# Patient Record
Sex: MALE | Race: ASIAN | Employment: OTHER | URBAN - METROPOLITAN AREA
[De-identification: names, ages, dates, MRNs, and addresses within clinical notes are randomized per-mention and may not be internally consistent; named-entity substitution may affect disease eponyms.]

---

## 2017-01-11 ENCOUNTER — HOSPITAL ENCOUNTER (OUTPATIENT)
Dept: CT IMAGING | Age: 67
Discharge: HOME | End: 2017-01-11
Attending: UROLOGY
Payer: COMMERCIAL

## 2017-01-11 DIAGNOSIS — N52.9 ERECTILE DYSFUNCTION, UNSPECIFIED ERECTILE DYSFUNCTION TYPE: ICD-10-CM

## 2017-01-11 DIAGNOSIS — C67.9 MALIGNANT NEOPLASM OF URINARY BLADDER, UNSPECIFIED SITE (HCC): ICD-10-CM

## 2017-01-11 DIAGNOSIS — R97.20 ELEVATED PSA: ICD-10-CM

## 2017-01-11 PROCEDURE — 74178 CT ABD&PLV WO CNTR FLWD CNTR: CPT

## 2017-01-11 PROCEDURE — 74011636320 HC RX REV CODE- 636/320: Performed by: UROLOGY

## 2017-01-11 RX ADMIN — IOPAMIDOL 100 ML: 755 INJECTION, SOLUTION INTRAVENOUS at 16:00

## 2017-02-08 ENCOUNTER — OFFICE VISIT (OUTPATIENT)
Dept: FAMILY MEDICINE CLINIC | Age: 67
End: 2017-02-08

## 2017-02-08 ENCOUNTER — HOSPITAL ENCOUNTER (OUTPATIENT)
Dept: LAB | Age: 67
Discharge: HOME | End: 2017-02-08
Payer: COMMERCIAL

## 2017-02-08 ENCOUNTER — TELEPHONE (OUTPATIENT)
Dept: FAMILY MEDICINE CLINIC | Age: 67
End: 2017-02-08

## 2017-02-08 VITALS
SYSTOLIC BLOOD PRESSURE: 124 MMHG | DIASTOLIC BLOOD PRESSURE: 76 MMHG | HEIGHT: 66 IN | WEIGHT: 161 LBS | BODY MASS INDEX: 25.88 KG/M2 | OXYGEN SATURATION: 98 % | HEART RATE: 79 BPM | TEMPERATURE: 98.3 F | RESPIRATION RATE: 16 BRPM

## 2017-02-08 DIAGNOSIS — J02.9 SORE THROAT: Primary | ICD-10-CM

## 2017-02-08 DIAGNOSIS — J02.9 SORE THROAT: ICD-10-CM

## 2017-02-08 DIAGNOSIS — Z79.4 TYPE 2 DIABETES MELLITUS WITHOUT COMPLICATION, WITH LONG-TERM CURRENT USE OF INSULIN (HCC): ICD-10-CM

## 2017-02-08 DIAGNOSIS — E11.9 TYPE 2 DIABETES MELLITUS WITHOUT COMPLICATION, WITH LONG-TERM CURRENT USE OF INSULIN (HCC): ICD-10-CM

## 2017-02-08 LAB
S PYO AG THROAT QL: NEGATIVE
VALID INTERNAL CONTROL?: YES

## 2017-02-08 PROCEDURE — 87070 CULTURE OTHR SPECIMN AEROBIC: CPT | Performed by: FAMILY MEDICINE

## 2017-02-08 RX ORDER — AMOXICILLIN AND CLAVULANATE POTASSIUM 875; 125 MG/1; MG/1
1 TABLET, FILM COATED ORAL 2 TIMES DAILY
Qty: 20 TAB | Refills: 0 | Status: SHIPPED | OUTPATIENT
Start: 2017-02-08 | End: 2017-02-18

## 2017-02-08 RX ORDER — PEN NEEDLE, DIABETIC 31 GX3/16"
NEEDLE, DISPOSABLE MISCELLANEOUS
Qty: 100 PEN NEEDLE | Refills: 1 | Status: SHIPPED | OUTPATIENT
Start: 2017-02-08

## 2017-02-08 RX ORDER — INSULIN GLARGINE 100 [IU]/ML
20 INJECTION, SOLUTION SUBCUTANEOUS ONCE
Qty: 3 EACH | Refills: 1 | Status: SHIPPED | OUTPATIENT
Start: 2017-02-08 | End: 2017-02-08

## 2017-02-08 RX ORDER — OMEPRAZOLE 40 MG/1
40 CAPSULE, DELAYED RELEASE ORAL DAILY
Qty: 90 CAP | Refills: 1 | Status: SHIPPED | OUTPATIENT
Start: 2017-02-08 | End: 2017-03-29 | Stop reason: SDUPTHER

## 2017-02-08 RX ORDER — LANCETS
EACH MISCELLANEOUS
Qty: 100 EACH | Refills: 11 | Status: SHIPPED | OUTPATIENT
Start: 2017-02-08 | End: 2018-06-01 | Stop reason: CLARIF

## 2017-02-08 RX ORDER — ROSUVASTATIN CALCIUM 20 MG/1
10 TABLET, FILM COATED ORAL DAILY
COMMUNITY
Start: 2016-12-19 | End: 2017-02-22

## 2017-02-08 RX ORDER — GLIMEPIRIDE 4 MG/1
4 TABLET ORAL 2 TIMES DAILY
Qty: 60 TAB | Refills: 3 | Status: SHIPPED | OUTPATIENT
Start: 2017-02-08 | End: 2017-05-31 | Stop reason: SDUPTHER

## 2017-02-08 NOTE — TELEPHONE ENCOUNTER
Per Dr Emiliano Davis patient can be seen by another provider. Please contact patient and offer an appt.

## 2017-02-08 NOTE — TELEPHONE ENCOUNTER
Patient called this morning. He states he came from [facility] yesterday and now has flu symptoms. Patient states he will call back to see if Dr. Niels Bunn has any cancellations for today or tomorrow. Patient requesting message to be sent to Dr. Niels Bunn.

## 2017-02-08 NOTE — PROGRESS NOTES
1. Have you been to the ER, urgent care clinic since your last visit? Hospitalized since your last visit? No    2. Have you seen or consulted any other health care providers outside of the [facility] since your last visit? Include any pap smears or colon screening.  Yes Dr. Polly Caicedo, LOV: 2/08/17 for eye exam.

## 2017-02-08 NOTE — MR AVS SNAPSHOT
Visit Information Date & Time Provider Department Dept. Phone Encounter #  
 2/8/2017  2:15 PM Jose Angel Mendosa, [facility] [phone] [id number] Follow-up Instructions Return in about 1 week (around 2/15/2017), or if symptoms worsen or fail to improve. Your Appointments 2/21/2017  2:15 PM  
ROUTINE CARE with Jose Angel Mendosa MD  
[facility] ([address]) Appt Note: routine f/u 2mo [address] [facility] [address] [place] [address] [address] [place] [address] Se  
  
    
 2/22/2017 10:45 AM  
PROCEDURE with Yohana Jessica MD  
[facility] ([address]) Appt Note: Return in about 5 months (around 1/3/2017) for Cysto and cytology; CTU prior . [address] [facility] [address]  
[address] [facility] [address] [facility] [address] [facility] [address] 6/14/2017 11:00 AM  
ESTABLISHED PATIENT with Andra Gonzales MD  
[facility]) Appt Note: 1 yr post labs; 1 yr  
 [address] [facility], [address] [facility] [address]  
[address] [facility], [address] [place] [address] Upcoming Health Maintenance Date Due  
 EYE EXAM RETINAL OR DILATED Q1 10/29/2016 HEMOGLOBIN A1C Q6M 6/20/2017 MICROALBUMIN Q1 6/20/2017 LIPID PANEL Q1 6/20/2017 FOOT EXAM Q1 7/13/2017 Pneumococcal 19-64 Highest Risk (3 of 3 - PPSV23) 7/13/2021 COLONOSCOPY 6/3/2023 DTaP/Tdap/Td series (2 - Td) 4/20/2025 Allergies as of 2/8/2017  Review Complete On: 2/8/2017 By: Jose Angel Mendosa MD  
 No Known Allergies Current Immunizations  Reviewed on 12/20/2016 Name Date Hep A Vaccine 1/11/2016, 10/31/2002 Influenza Vaccine 10/1/2016 Influenza Vaccine (Quad) PF 10/23/2015 Pneumococcal Conjugate (PCV-13) 6/29/2016 Tdap 4/20/2015 Typhoid Vaccine 1/11/2016, 10/31/2002 Yellow Fever Vaccine 1/11/2016 Not reviewed this visit You Were Diagnosed With   
  
 Codes Comments Sore throat    -  Primary ICD-10-CM: J02.9 ICD-9-CM: 041 Type 2 diabetes mellitus without complication, with long-term current use of insulin (HCC)     ICD-10-CM: E11.9, Z79.4 ICD-9-CM: 250.00, V58.67 Vitals BP Pulse Temp Resp Height(growth percentile) Weight(growth percentile) 124/76 (BP 1 Location: Left arm, BP Patient Position: Sitting) 79 98.3 °F (36.8 °C) (Oral) 16 5' 6\" (1.676 m) 161 lb (73 kg) SpO2 BMI Smoking Status 98% 25.99 kg/m2 Never Smoker Vitals History BMI and BSA Data Body Mass Index Body Surface Area  
 25.99 kg/m 2 1.84 m 2 Preferred Pharmacy Pharmacy Name Phone [address] [place] [address], [address] [place] [address] [phone] Your Updated Medication List  
  
   
This list is accurate as of: 2/8/17  3:21 PM.  Always use your most recent med list.  
  
  
  
  
 amoxicillin-clavulanate 875-125 mg per tablet Commonly known as:  AUGMENTIN Take 1 Tab by mouth two (2) times a day for 10 days. aspirin 81 mg chewable tablet Take 81 mg by mouth daily. atorvastatin 10 mg tablet Commonly known as:  LIPITOR Take 1 Tab by mouth daily. CRESTOR 20 mg tablet Generic drug:  rosuvastatin Take 10 mg by mouth daily. glimepiride 4 mg tablet Commonly known as:  AMARYL Take 1 Tab by mouth two (2) times a day. glucose blood VI test strips strip Commonly known as:  ONETOUCH ULTRA TEST Check twice daily  
  
 insulin glargine 100 unit/mL (3 mL) pen Commonly known as:  LANTUS SOLOSTAR  
20 Units by SubCUTAneous route once for 1 dose. 18 units at bedtime Insulin Needles (Disposable) 31 gauge x 3/16\" Ndle Commonly known as:  BD INSULIN PEN NEEDLE UF MINI Check twice daily Lancets Misc Check twice daily losartan-hydroCHLOROthiazide 100-25 mg per tablet Commonly known as:  HYZAAR Take 1 Tab by mouth daily. omeprazole 40 mg capsule Commonly known as:  PRILOSEC Take 1 Cap by mouth daily. sAXagliptin-metFORMIN 2.5-1,000 mg Tm24 Commonly known as:  KOMBIGLYZE XR  
1 tab twice daily  
  
 tadalafil 20 mg tablet Commonly known as:  CIALIS Take 1 Tab by mouth daily as needed. VITAMIN B-12 1,000 mcg sublingual tablet Generic drug:  cyanocobalamin Take 1,000 mcg by mouth daily. VITAMIN D2 PO Take 1 Cap by mouth daily. Prescriptions Sent to Pharmacy Refills  
 glimepiride (AMARYL) 4 mg tablet 3 Sig: Take 1 Tab by mouth two (2) times a day. Class: Normal  
 Pharmacy: [address] [place] [address] Ph #: [phone] Route: Oral  
 omeprazole (PRILOSEC) 40 mg capsule 1 Sig: Take 1 Cap by mouth daily. Class: Normal  
 Pharmacy: [address] [place] [address] Ph #: [phone] Route: Oral  
 insulin glargine (LANTUS SOLOSTAR) 100 unit/mL (3 mL) pen 1 Si Units by SubCUTAneous route once for 1 dose. 18 units at bedtime Class: Normal  
 Pharmacy: [address] [place] [address] Ph #: [phone] Route: SubCUTAneous Insulin Needles, Disposable, (BD INSULIN PEN NEEDLE UF MINI) 31 gauge x 3/16\" ndle 1 Sig: Check twice daily Class: Normal  
 Pharmacy: [address] [place] [address] Ph #: [phone] sAXagliptin-metFORMIN (KOMBIGLYZE XR) 2.5-1,000 mg TM24 3 Si tab twice daily Class: Normal  
 Pharmacy: [address] [place] [address] Jan alan Ph #: [phone]  
 glucose blood VI test strips (ONETOUCH ULTRA TEST) strip 6 Sig: Check twice daily  Class: Normal  
 Pharmacy: [address] [place] [address], [facility] Araceli [facility]ime [facility] Ph #: [phone] Lancets misc 11 Sig: Check twice daily Class: Normal  
 Pharmacy: [id number] [facility], [address] [facility] Ph #: [phone]  
 amoxicillin-clavulanate (AUGMENTIN) 875-125 mg per tablet 0 Sig: Take 1 Tab by mouth two (2) times a day for 10 days. Class: Normal  
 Pharmacy: [id number] [facility], [address] Ph #: [phone] Route: Oral  
  
We Performed the Following AMB POC RAPID STREP A [48669 CPT(R)] Follow-up Instructions Return in about 1 week (around 2/15/2017), or if symptoms worsen or fail to improve. Introducing [facility] SERVICES! Dear Manohar Kingsley: 
Thank you for requesting a [employer] account. Our records indicate that you already have an active [employer] account. You can access your account anytime at [email or web address] Did you know that you can access your hospital and ER discharge instructions at any time in [employer]? You can also review all of your test results from your hospital stay or ER visit. Additional Information If you have questions, please visit the Frequently Asked Questions section of the [employer] website at [email or web address] Remember, [employer] is NOT to be used for urgent needs. For medical emergencies, dial 911. Now available from your iPhone and Android! Please provide this summary of care documentation to your next provider. Your primary care clinician is listed as Daniel Johansen. If you have any questions after today's visit, please call [phone].

## 2017-02-08 NOTE — PROGRESS NOTES
HISTORY OF PRESENT ILLNESS  Estevan Manjarrez is a 61 y.o. male. HPI: Here with c/o sore throat. Feels watery eyes and runny nose. Feels feverish as well. No nausea or vomiting. No chest congestion. No abdominal pain. No sob. No chest pain. Has on and off mild dry cough. Taken otc medication for cold. Visit Vitals    /76 (BP 1 Location: Left arm, BP Patient Position: Sitting)    Pulse 79    Temp 98.3 °F (36.8 °C) (Oral)    Resp 16    Ht 5' 6\" (1.676 m)    Wt 161 lb (73 kg)    SpO2 98%    BMI 25.99 kg/m2   no headache or dizziness. No urine or bowel complains. No sick contact. Recently travelled from [place]. Said today fasting sugar was 120. Said lately compliant with insulin. Took medication for travel this time but not able to do diet modification at all. ROS: see HPI     Physical Exam   Constitutional: He is oriented to person, place, and time. No distress. HENT:   Throat: generalize erythema, rt side tonsillar exudate  Neck: no palpable lymph nodes    Cardiovascular: Normal heart sounds. Pulmonary/Chest: No respiratory distress. He has no wheezes. Abdominal: Soft. There is no tenderness. Neurological: He is oriented to person, place, and time. ASSESSMENT and PLAN    ICD-10-CM ICD-9-CM    1. Sore throat; fever and rt side tonsillar exudate. For now will treat with augmentin. Advised to take it with food or probiotic. Salt water gargle. Rapid strep is negative at the office. Will send throat culture. J02.9 462 AMB POC RAPID STREP A      amoxicillin-clavulanate (AUGMENTIN) 875-125 mg per tablet      THROAT CULTURE   2.  Type 2 diabetes mellitus without complication, with long-term current use of insulin ([facility]) E11.9 250.00 glimepiride (AMARYL) 4 mg tablet    Z79.4 V58.67 insulin glargine (LANTUS SOLOSTAR) 100 unit/mL (3 mL) pen      Insulin Needles, Disposable, (BD INSULIN PEN NEEDLE UF MINI) 31 gauge x 3/16\" ndle      sAXagliptin-metFORMIN (KOMBIGLYZE XR) 2.5-1,000 mg TM24 glucose blood VI test strips (ONETOUCH ULTRA TEST) strip      Lancets misc   Pt understood and agrees with above plan. Review HM  Had an eye exam today morning . will obtain record.s   Follow-up Disposition:  Return in about 1 week (around 2/15/2017), or if symptoms worsen or fail to improve.

## 2017-02-09 NOTE — PROGRESS NOTES
Let to know that throat culture is negative. Continue current plan and f/u on follow up visit.    Thx

## 2017-02-10 LAB
BACTERIA SPEC CULT: NORMAL
BACTERIA SPEC CULT: NORMAL
SERVICE CMNT-IMP: NORMAL

## 2017-02-10 NOTE — PROGRESS NOTES
Spoke with patient (2 verifiers name/) regarding throat culture is negative and to continue with current plan. Patient informed to keep follow up visit. Patient voiced understanding.

## 2017-02-21 ENCOUNTER — OFFICE VISIT (OUTPATIENT)
Dept: FAMILY MEDICINE CLINIC | Age: 67
End: 2017-02-21

## 2017-02-21 VITALS
WEIGHT: 158.6 LBS | TEMPERATURE: 98.1 F | SYSTOLIC BLOOD PRESSURE: 134 MMHG | RESPIRATION RATE: 16 BRPM | DIASTOLIC BLOOD PRESSURE: 72 MMHG | OXYGEN SATURATION: 96 % | HEIGHT: 66 IN | HEART RATE: 84 BPM | BODY MASS INDEX: 25.49 KG/M2

## 2017-02-21 DIAGNOSIS — E11.65 POORLY CONTROLLED DIABETES MELLITUS (HCC): Primary | ICD-10-CM

## 2017-02-21 RX ORDER — LANCETS 33 GAUGE
EACH MISCELLANEOUS
Refills: 11 | COMMUNITY
Start: 2017-02-08 | End: 2018-06-01 | Stop reason: CLARIF

## 2017-02-21 RX ORDER — PEN NEEDLE, DIABETIC 31 GX5/16"
NEEDLE, DISPOSABLE MISCELLANEOUS
Refills: 1 | COMMUNITY
Start: 2017-02-08 | End: 2018-08-13 | Stop reason: SDUPTHER

## 2017-02-21 NOTE — PROGRESS NOTES
1. Have you been to the ER, urgent care clinic since your last visit? Hospitalized since your last visit? No    2. Have you seen or consulted any other health care providers outside of the [address] [place] [address] since your last visit? Include any pap smears or colon screening. No    Patient had dm eye exam on 2/08/17 with Dr. Pamela Mosley.

## 2017-02-21 NOTE — PROGRESS NOTES
HISTORY OF PRESENT ILLNESS  Estevan Harley is a 61 y.o. male. HPI; Here for follow up on diabetes. Used to be non complaint with taking insulin. Now compliant. Trying diet modification but not much success. Asymptomatic. Currently taking 16 or 18 units of lantus. Checking blood sugar but did not bring log. Said fasting is less than 120 . No hypoglycemia. Denies any headache, dizziness, no chest pain or trouble breathing, no arm or leg weakness. No nausea or vomiting, no weight or appetite changes, no depression or anxiety. No urine or bowel complains, no palpitation, no diaphoresis. Visit Vitals    /72 (BP 1 Location: Left arm, BP Patient Position: Sitting)    Pulse 84    Temp 98.1 °F (36.7 °C) (Oral)    Resp 16    Ht 5' 6\" (1.676 m)    Wt 158 lb 9.6 oz (71.9 kg)    SpO2 96%    BMI 25.6 kg/m2     Lab Results   Component Value Date/Time    Hemoglobin A1c 8.9 12/20/2016 03:10 PM    Hemoglobin A1c (POC) 7.9 01/19/2016 02:40 PM    Hemoglobin A1c, External 8.4 06/20/2016     Lab Results   Component Value Date/Time    Sodium 136 12/20/2016 03:10 PM    Potassium 3.9 12/20/2016 03:10 PM    Chloride 101 12/20/2016 03:10 PM    CO2 27 12/20/2016 03:10 PM    Anion gap 8 12/20/2016 03:10 PM    Glucose 137 12/20/2016 03:10 PM    BUN 19 12/20/2016 03:10 PM    Creatinine 0.91 12/20/2016 03:10 PM    BUN/Creatinine ratio 21 12/20/2016 03:10 PM    GFR est AA >60 12/20/2016 03:10 PM    GFR est non-AA >60 12/20/2016 03:10 PM    Calcium 9.4 12/20/2016 03:10 PM    Bilirubin, total 0.4 12/20/2016 03:10 PM    AST (SGOT) 15 12/20/2016 03:10 PM    Alk.  phosphatase 77 12/20/2016 03:10 PM    Protein, total 7.3 12/20/2016 03:10 PM    Albumin 3.9 12/20/2016 03:10 PM    Globulin 3.4 12/20/2016 03:10 PM    A-G Ratio 1.1 12/20/2016 03:10 PM    ALT (SGPT) 23 12/20/2016 03:10 PM     Lab Results   Component Value Date/Time    TSH 1.570 05/29/2015 10:30 AM     Lab Results   Component Value Date/Time    Cholesterol, total 130 06/20/2016 09:39 AM    HDL Cholesterol 54 06/20/2016 09:39 AM    LDL, calculated 52.8 06/20/2016 09:39 AM    VLDL, calculated 23.2 06/20/2016 09:39 AM    Triglyceride 116 06/20/2016 09:39 AM    CHOL/HDL Ratio 2.4 06/20/2016 09:39 AM     Recently had sore throat. Improved with antibiotic. No side effects of antibiotic. ROS: see HPI     Physical Exam   Constitutional: He is oriented to person, place, and time. No distress. Neck: No thyromegaly present. Cardiovascular: Normal rate, regular rhythm and normal heart sounds. Pulmonary/Chest:   CTA   Abdominal: Soft. Bowel sounds are normal. There is no tenderness. Musculoskeletal: He exhibits no edema. Neurological: He is oriented to person, place, and time. Psychiatric: His behavior is normal.       ASSESSMENT and PLAN    ICD-10-CM ICD-9-CM    1. Poorly controlled diabetes mellitus ([facility] [address]): elevated HBA1C. Has coming up appt with endocrinology in couple of days. Advised to bring blood sugar log. Diet modification discussed 02/21/17 also discussed complications of diabetes and importance of blood glucose control. He understood it and more receptive of diet modification and compliance with taking insulin. i have advised him to bring the log to specialist office and for now take 18 units of lantus. E11.65 250.00    Pt understood and agrees with above plan. .  Review HM  Today received eye exam report   Follow-up Disposition:  Return in about 3 months (around 5/21/2017).

## 2017-02-21 NOTE — PATIENT INSTRUCTIONS

## 2017-02-21 NOTE — MR AVS SNAPSHOT
Visit Information Date & Time Provider Department Dept. Phone Encounter #  
 2/21/2017  2:15 PM Kourtney Roy, [facility] [phone] [id number] Follow-up Instructions Return in about 3 months (around 5/21/2017). Your Appointments 2/22/2017 10:45 AM  
PROCEDURE with Merrilee Councilman, MD  
[facility] ([address]) Appt Note: Return in about 5 months (around 1/3/2017) for Cysto and cytology; CTU prior . [address] Avalos [address] [place] [id number]  
[address] Lisa David [address] [place] [id number] 6/14/2017 11:00 AM  
ESTABLISHED PATIENT with Cody Charles MD  
[facility]) Appt Note: 1 yr post labs; 1 yr  
 [address] [facility], [address] [facility] [address]  
[address] [facility], [address] [place] [address] Upcoming Health Maintenance Date Due  
 EYE EXAM RETINAL OR DILATED Q1 10/29/2016 HEMOGLOBIN A1C Q6M 6/20/2017 MICROALBUMIN Q1 6/20/2017 LIPID PANEL Q1 6/20/2017 FOOT EXAM Q1 7/13/2017 Pneumococcal 19-64 Highest Risk (3 of 3 - PPSV23) 7/13/2021 COLONOSCOPY 6/3/2023 DTaP/Tdap/Td series (2 - Td) 4/20/2025 Allergies as of 2/21/2017  Review Complete On: 2/21/2017 By: Kourtney Roy MD  
 No Known Allergies Current Immunizations  Reviewed on 12/20/2016 Name Date Hep A Vaccine 1/11/2016, 10/31/2002 Influenza Vaccine 10/1/2016 Influenza Vaccine (Quad) PF 10/23/2015 Pneumococcal Conjugate (PCV-13) 6/29/2016 Tdap 4/20/2015 Typhoid Vaccine 1/11/2016, 10/31/2002 Yellow Fever Vaccine 1/11/2016 Not reviewed this visit You Were Diagnosed With   
  
 Codes Comments Poorly controlled diabetes mellitus ([facility] [address]    -  Primary ICD-10-CM: E11.65 ICD-9-CM: 250.00 Vitals BP Pulse Temp Resp Height(growth percentile) Weight(growth percentile) 134/72 (BP 1 Location: Left arm, BP Patient Position: Sitting) 84 98.1 °F (36.7 °C) (Oral) 16 5' 6\" (1.676 m) 158 lb 9.6 oz (71.9 kg) SpO2 BMI Smoking Status 96% 25.6 kg/m2 Never Smoker BMI and BSA Data Body Mass Index Body Surface Area  
 25.6 kg/m 2 1.83 m 2 Preferred Pharmacy Pharmacy Name Phone [address] [place] [address], [address] [place] [address] [phone] Your Updated Medication List  
  
   
This list is accurate as of: 2/21/17  3:28 PM.  Always use your most recent med list.  
  
  
  
  
 aspirin 81 mg chewable tablet Take 81 mg by mouth daily. CRESTOR 20 mg tablet Generic drug:  rosuvastatin Take 10 mg by mouth daily. glimepiride 4 mg tablet Commonly known as:  AMARYL Take 1 Tab by mouth two (2) times a day. glucose blood VI test strips strip Commonly known as:  ONETOUCH ULTRA TEST Check twice daily * Insulin Needles (Disposable) 31 gauge x 3/16\" Ndle Commonly known as:  BD INSULIN PEN NEEDLE UF MINI Check twice daily * BD INSULIN PEN NEEDLE UF SHORT 31 gauge x 5/16\" Ndle Generic drug:  Insulin Needles (Disposable) * Lancets Misc Check twice daily * TRUEPLUS LANCETS 33 gauge Misc Generic drug:  lancets LANTUS SC  
by SubCUTAneous route. 16-18 units daily  
  
 losartan-hydroCHLOROthiazide 100-25 mg per tablet Commonly known as:  HYZAAR Take 1 Tab by mouth daily. omeprazole 40 mg capsule Commonly known as:  PRILOSEC Take 1 Cap by mouth daily. sAXagliptin-metFORMIN 2.5-1,000 mg Tm24 Commonly known as:  KOMBIGLYZE XR  
1 tab twice daily  
  
 tadalafil 20 mg tablet Commonly known as:  CIALIS Take 1 Tab by mouth daily as needed. VITAMIN B-12 1,000 mcg sublingual tablet Generic drug:  cyanocobalamin Take 1,000 mcg by mouth daily. VITAMIN D2 PO Take 1 Cap by mouth daily. * Notice: This list has 4 medication(s) that are the same as other medications prescribed for you. Read the directions carefully, and ask your doctor or other care provider to review them with you. Follow-up Instructions Return in about 3 months (around 5/21/2017). Patient Instructions Learning About Diabetes Food Guidelines Your Care Instructions Meal planning is important to manage diabetes. It helps keep your blood sugar at a target level (which you set with your doctor). You don't have to eat special foods. You can eat what your family eats, including sweets once in a while. But you do have to pay attention to how often you eat and how much you eat of certain foods. You may want to work with a dietitian or a certified diabetes educator (CDE) to help you plan meals and snacks. A dietitian or CDE can also help you lose weight if that is one of your goals. What should you know about eating carbs? Managing the amount of carbohydrate (carbs) you eat is an important part of healthy meals when you have diabetes. Carbohydrate is found in many foods. · Learn which foods have carbs. And learn the amounts of carbs in different foods. ¨ Bread, cereal, pasta, and rice have about 15 grams of carbs in a serving. A serving is 1 slice of bread (1 ounce), ½ cup of cooked cereal, or 1/3 cup of cooked pasta or rice. ¨ Fruits have 15 grams of carbs in a serving. A serving is 1 small fresh fruit, such as an apple or orange; ½ of a banana; ½ cup of cooked or canned fruit; ½ cup of fruit juice; 1 cup of melon or raspberries; or 2 tablespoons of dried fruit. ¨ Milk and no-sugar-added yogurt have 15 grams of carbs in a serving. A serving is 1 cup of milk or 2/3 cup of no-sugar-added yogurt. ¨ Starchy vegetables have 15 grams of carbs in a serving.  A serving is ½ cup of mashed potatoes or sweet potato; 1 cup winter squash; ½ of a small baked potato; ½ cup of cooked beans; or ½ cup cooked corn or green peas. · Learn how much carbs to eat each day and at each meal. A dietitian or CDE can teach you how to keep track of the amount of carbs you eat. This is called carbohydrate counting. · If you are not sure how to count carbohydrate grams, use the Plate Method to plan meals. It is a good, quick way to make sure that you have a balanced meal. It also helps you spread carbs throughout the day. ¨ Divide your plate by types of foods. Put non-starchy vegetables on half the plate, meat or other protein food on one-quarter of the plate, and a grain or starchy vegetable in the final quarter of the plate. To this you can add a small piece of fruit and 1 cup of milk or yogurt, depending on how many carbs you are supposed to eat at a meal. 
· Try to eat about the same amount of carbs at each meal. Do not \"save up\" your daily allowance of carbs to eat at one meal. 
· Proteins have very little or no carbs per serving. Examples of proteins are beef, chicken, turkey, fish, eggs, tofu, cheese, cottage cheese, and peanut butter. A serving size of meat is 3 ounces, which is about the size of a deck of cards. Examples of meat substitute serving sizes (equal to 1 ounce of meat) are 1/4 cup of cottage cheese, 1 egg, 1 tablespoon of peanut butter, and ½ cup of tofu. How can you eat out and still eat healthy? · Learn to estimate the serving sizes of foods that have carbohydrate. If you measure food at home, it will be easier to estimate the amount in a serving of restaurant food. · If the meal you order has too much carbohydrate (such as potatoes, corn, or baked beans), ask to have a low-carbohydrate food instead. Ask for a salad or green vegetables. · If you use insulin, check your blood sugar before and after eating out to help you plan how much to eat in the future.  
· If you eat more carbohydrate at a meal than you had planned, take a walk or do other exercise. This will help lower your blood sugar. What else should you know? · Limit saturated fat, such as the fat from meat and dairy products. This is a healthy choice because people who have diabetes are at higher risk of heart disease. So choose lean cuts of meat and nonfat or low-fat dairy products. Use olive or canola oil instead of butter or shortening when cooking. · Don't skip meals. Your blood sugar may drop too low if you skip meals and take insulin or certain medicines for diabetes. · Check with your doctor before you drink alcohol. Alcohol can cause your blood sugar to drop too low. Alcohol can also cause a bad reaction if you take certain diabetes medicines. Follow-up care is a key part of your treatment and safety. Be sure to make and go to all appointments, and call your doctor if you are having problems. It's also a good idea to know your test results and keep a list of the medicines you take. Where can you learn more? Go to http://jah-lorenzo.info/. Enter N498 in the search box to learn more about \"Learning About Diabetes Food Guidelines. \" Current as of: May 23, 2016 Content Version: 11.1 © 9441-4865 [employer]. Care instructions adapted under license by "[employer] (which disclaims liability or warranty for this information). If you have questions about a medical condition or this instruction, always ask your healthcare professional. [address] any warranty or liability for your use of this information. Introducing [facility]! Dear Fabby Parmar: 
Thank you for requesting a [employer] account. Our records indicate that you already have an active [employer] account. You can access your account anytime at [email or web address] [email or web address] Did you know that you can access your hospital and ER discharge instructions at any time in [employer]?   You can also review all of your test results from your hospital stay or ER visit. Additional Information If you have questions, please visit the Frequently Asked Questions section of the [employer] website at [email or web address] Remember, [employer] is NOT to be used for urgent needs. For medical emergencies, dial 911. Now available from your iPhone and Android! Please provide this summary of care documentation to your next provider. Your primary care clinician is listed as Alex Plush. If you have any questions after today's visit, please call [phone].

## 2017-03-27 ENCOUNTER — TELEPHONE (OUTPATIENT)
Dept: FAMILY MEDICINE CLINIC | Age: 67
End: 2017-03-27

## 2017-03-29 RX ORDER — ROSUVASTATIN CALCIUM 10 MG/1
10 TABLET, FILM COATED ORAL
Qty: 90 TAB | Refills: 1 | Status: SHIPPED | OUTPATIENT
Start: 2017-03-29 | End: 2017-03-29 | Stop reason: ALTCHOICE

## 2017-03-29 RX ORDER — LOSARTAN POTASSIUM AND HYDROCHLOROTHIAZIDE 25; 100 MG/1; MG/1
1 TABLET ORAL DAILY
Qty: 90 TAB | Refills: 1 | Status: SHIPPED | OUTPATIENT
Start: 2017-03-29 | End: 2017-08-30 | Stop reason: SDUPTHER

## 2017-03-29 RX ORDER — SIMVASTATIN 10 MG/1
10 TABLET, FILM COATED ORAL
Qty: 90 TAB | Refills: 1 | Status: SHIPPED | OUTPATIENT
Start: 2017-03-29 | End: 2017-08-30 | Stop reason: SDUPTHER

## 2017-03-29 RX ORDER — OMEPRAZOLE 40 MG/1
40 CAPSULE, DELAYED RELEASE ORAL DAILY
Qty: 90 CAP | Refills: 1 | Status: SHIPPED | OUTPATIENT
Start: 2017-03-29 | End: 2017-08-30 | Stop reason: SDUPTHER

## 2017-05-31 ENCOUNTER — OFFICE VISIT (OUTPATIENT)
Dept: FAMILY MEDICINE CLINIC | Age: 67
End: 2017-05-31

## 2017-05-31 VITALS
TEMPERATURE: 98.6 F | SYSTOLIC BLOOD PRESSURE: 118 MMHG | BODY MASS INDEX: 26 KG/M2 | DIASTOLIC BLOOD PRESSURE: 68 MMHG | HEART RATE: 80 BPM | RESPIRATION RATE: 16 BRPM | HEIGHT: 66 IN | OXYGEN SATURATION: 99 % | WEIGHT: 161.8 LBS

## 2017-05-31 DIAGNOSIS — Z13.39 SCREENING FOR ALCOHOLISM: ICD-10-CM

## 2017-05-31 DIAGNOSIS — Z79.4 TYPE 2 DIABETES MELLITUS WITHOUT COMPLICATION, WITH LONG-TERM CURRENT USE OF INSULIN (HCC): ICD-10-CM

## 2017-05-31 DIAGNOSIS — E11.9 TYPE 2 DIABETES MELLITUS WITHOUT COMPLICATION, WITH LONG-TERM CURRENT USE OF INSULIN (HCC): ICD-10-CM

## 2017-05-31 DIAGNOSIS — I10 ESSENTIAL HYPERTENSION, BENIGN: ICD-10-CM

## 2017-05-31 DIAGNOSIS — C67.9 MALIGNANT NEOPLASM OF URINARY BLADDER, UNSPECIFIED SITE (HCC): ICD-10-CM

## 2017-05-31 DIAGNOSIS — Z00.00 ROUTINE GENERAL MEDICAL EXAMINATION AT A HEALTH CARE FACILITY: Primary | ICD-10-CM

## 2017-05-31 PROBLEM — Z71.89 ADVANCE DIRECTIVE DISCUSSED WITH PATIENT: Status: ACTIVE | Noted: 2017-05-31

## 2017-05-31 RX ORDER — GLIMEPIRIDE 4 MG/1
4 TABLET ORAL 2 TIMES DAILY
Qty: 60 TAB | Refills: 3 | Status: SHIPPED | OUTPATIENT
Start: 2017-05-31 | End: 2018-03-13 | Stop reason: SDUPTHER

## 2017-05-31 NOTE — PATIENT INSTRUCTIONS
Medicare Part B Preventive Services Limitations Recommendation Scheduled   Bone Mass Measurement  (age 72 & older, biennial) Requires diagnosis related to osteoporosis or estrogen deficiency. Biennial benefit unless patient has history of long-term glucocorticoid tx or baseline is needed because initial test was by other method NA    Cardiovascular Screening Blood Tests (every 5 years)  Total cholesterol, HDL, Triglycerides Order as a panel if possible Up to date    Colorectal Cancer Screening  -Fecal occult blood test (annual)  -Flexible sigmoidoscopy (5y)  -Screening colonoscopy (10y)  -Barium Enema  Up to date  Due 2019 according to office notes   Counseling to Prevent Tobacco Use (up to 8 sessions per year)  - Counseling greater than 3 and up to 10 minutes  - Counseling greater than 10 minutes Patients must be asymptomatic of tobacco-related conditions to receive as preventive service N/a      Diabetes Screening Tests (at least every 3 years, Medicare covers annually or at 6-month intervals for prediabetic patients)    Fasting blood sugar (FBS) or glucose tolerance test (GTT) Patient must be diagnosed with one of the following:  -Hypertension, Dyslipidemia, obesity, previous impaired FBS or GTT  Or any two of the following: overweight, FH of diabetes, age ? 72, history of gestational diabetes, birth of baby weighing more than 9 pounds Up to date    Diabetes Self-Management Training (DSMT) (no USPSTF recommendation) Requires referral by treating physician for patient with diabetes or renal disease. 10 hours of initial DSMT session of no less than 30 minutes each in a continuous 12-month period. 2 hours of follow-up DSMT in subsequent years.  N/a      Glaucoma Screening (no USPSTF recommendation) Diabetes mellitus, family history, , age 48 or over,  American, age 72 or over Up to date    Human Immunodeficiency Virus (HIV) Screening (annually for increased risk patients)  HIV-1 and HIV-2 by EIA, MARIEL, rapid antibody test, or oral mucosa transudate Patient must be at increased risk for HIV infection per USPSTF guidelines or pregnant. Tests covered annually for patients at increased risk. Pregnant patients may receive up to 3 test during pregnancy. N/a      Medical Nutrition Therapy (MNT) (for diabetes or renal disease not recommended schedule) Requires referral by treating physician for patient with diabetes or renal disease. Can be provided in same year as diabetes self-management training (DSMT), and CMS recommends medical nutrition therapy take place after DSMT. Up to 3 hours for initial year and 2 hours in subsequent years. N/a      Prostate Cancer Screening (annually up to age 76)  - Digital rectal exam (ALBERTO)  - Prostate specific antigen (PSA) Annually (age 48 or over), ALBERTO not paid separately when covered E/M service is provided on same date  Men up to age 76 may need a screening blood test for prostate cancer at certain intervals, depending on their personal and family history. This decision is between the patient and his provider. Up to date    Seasonal Influenza Vaccination (annually)  Up to date  Yearly      Pneumococcal Vaccination (once after 72)  Will get pneumococcal 21 in July     Hepatitis B Vaccinations (if medium/high risk) Medium/high risk factors:  End-stage renal disease,  Hemophiliacs who received Factor VIII or IX concentrates, Clients of institutions for the mentally retarded, Persons who live in the same house as a HepB virus carrier, Homosexual men, Illicit injectable drug abusers. NA    Shingles Vaccination A shingles vaccine is also recommended once in a lifetime after age 61 Up to date     Ultrasound Screening for Abdominal Aortic Aneurysm (AAA) (once) Patient must be referred through [facility] and not have had a screening for abdominal aortic aneurysm before under Medicare.   Limited to patients who meet one of the following criteria:  - Men who are 73-68 years old and have smoked more than 100 cigarettes in their lifetime.  -Anyone with a FH of AAA  -Anyone recommended for screening by USPSTF N/a             Learning About Diabetes Food Guidelines  Your Care Instructions  Meal planning is important to manage diabetes. It helps keep your blood sugar at a target level (which you set with your doctor). You don't have to eat special foods. You can eat what your family eats, including sweets once in a while. But you do have to pay attention to how often you eat and how much you eat of certain foods. You may want to work with a dietitian or a certified diabetes educator (CDE) to help you plan meals and snacks. A dietitian or CDE can also help you lose weight if that is one of your goals. What should you know about eating carbs? Managing the amount of carbohydrate (carbs) you eat is an important part of healthy meals when you have diabetes. Carbohydrate is found in many foods. · Learn which foods have carbs. And learn the amounts of carbs in different foods. ¨ Bread, cereal, pasta, and rice have about 15 grams of carbs in a serving. A serving is 1 slice of bread (1 ounce), ½ cup of cooked cereal, or 1/3 cup of cooked pasta or rice. ¨ Fruits have 15 grams of carbs in a serving. A serving is 1 small fresh fruit, such as an apple or orange; ½ of a banana; ½ cup of cooked or canned fruit; ½ cup of fruit juice; 1 cup of melon or raspberries; or 2 tablespoons of dried fruit. ¨ Milk and no-sugar-added yogurt have 15 grams of carbs in a serving. A serving is 1 cup of milk or 2/3 cup of no-sugar-added yogurt. ¨ Starchy vegetables have 15 grams of carbs in a serving. A serving is ½ cup of mashed potatoes or sweet potato; 1 cup winter squash; ½ of a small baked potato; ½ cup of cooked beans; or ½ cup cooked corn or green peas. · Learn how much carbs to eat each day and at each meal. A dietitian or CDE can teach you how to keep track of the amount of carbs you eat.  This is called carbohydrate counting. · If you are not sure how to count carbohydrate grams, use the Plate Method to plan meals. It is a good, quick way to make sure that you have a balanced meal. It also helps you spread carbs throughout the day. ¨ Divide your plate by types of foods. Put non-starchy vegetables on half the plate, meat or other protein food on one-quarter of the plate, and a grain or starchy vegetable in the final quarter of the plate. To this you can add a small piece of fruit and 1 cup of milk or yogurt, depending on how many carbs you are supposed to eat at a meal.  · Try to eat about the same amount of carbs at each meal. Do not \"save up\" your daily allowance of carbs to eat at one meal.  · Proteins have very little or no carbs per serving. Examples of proteins are beef, chicken, turkey, fish, eggs, tofu, cheese, cottage cheese, and peanut butter. A serving size of meat is 3 ounces, which is about the size of a deck of cards. Examples of meat substitute serving sizes (equal to 1 ounce of meat) are 1/4 cup of cottage cheese, 1 egg, 1 tablespoon of peanut butter, and ½ cup of tofu. How can you eat out and still eat healthy? · Learn to estimate the serving sizes of foods that have carbohydrate. If you measure food at home, it will be easier to estimate the amount in a serving of restaurant food. · If the meal you order has too much carbohydrate (such as potatoes, corn, or baked beans), ask to have a low-carbohydrate food instead. Ask for a salad or green vegetables. · If you use insulin, check your blood sugar before and after eating out to help you plan how much to eat in the future. · If you eat more carbohydrate at a meal than you had planned, take a walk or do other exercise. This will help lower your blood sugar. What else should you know? · Limit saturated fat, such as the fat from meat and dairy products.  This is a healthy choice because people who have diabetes are at higher risk of heart disease. So choose lean cuts of meat and nonfat or low-fat dairy products. Use olive or canola oil instead of butter or shortening when cooking. · Don't skip meals. Your blood sugar may drop too low if you skip meals and take insulin or certain medicines for diabetes. · Check with your doctor before you drink alcohol. Alcohol can cause your blood sugar to drop too low. Alcohol can also cause a bad reaction if you take certain diabetes medicines. Follow-up care is a key part of your treatment and safety. Be sure to make and go to all appointments, and call your doctor if you are having problems. It's also a good idea to know your test results and keep a list of the medicines you take. Where can you learn more? Go to http://jah-lorenzo.info/. Enter J397 in the search box to learn more about \"Learning About Diabetes Food Guidelines. \"  Current as of: May 23, 2016  Content Version: 11.2  © 6871-8445 [employer]. Care instructions adapted under license by [employer] (which disclaims liability or warranty for this information). If you have questions about a medical condition or this instruction, always ask your healthcare professional. [address] any warranty or liability for your use of this information.

## 2017-05-31 NOTE — MR AVS SNAPSHOT
Visit Information Date & Time Provider Department Dept. Phone Encounter #  
 5/31/2017  1:45 PM Ewa Garland, [address] [id number] Follow-up Instructions Return in about 3 months (around 8/31/2017) for in between can come for pneumococcal 23 immunization as a nurse visit around july . Your Appointments 6/14/2017 11:00 AM  
ESTABLISHED PATIENT with Viviane Nick MD  
[facility]) Appt Note: 1 yr post labs; 1 yr  
 [address] [facility], [address] [facility] [address]  
[address], [address] named [facility] [address]  
  
    
 8/30/2017 10:45 AM  
PROCEDURE with Phi Bhakta MD  
[facility] ([facility]) Appt Note: Return in about 6 months (around 8/22/2017) for cysto, cytology. [address] [place] [address]  
[address] [facility] [address] [facility] [address] [place] [address] Upcoming Health Maintenance Date Due Pneumococcal 65+ High/Highest Risk (2 of 2 - PPSV23) 3/25/2017 MEDICARE YEARLY EXAM 3/25/2017 HEMOGLOBIN A1C Q6M 6/20/2017 MICROALBUMIN Q1 6/20/2017 LIPID PANEL Q1 6/20/2017 FOOT EXAM Q1 7/13/2017 INFLUENZA AGE 9 TO ADULT 8/1/2017 EYE EXAM RETINAL OR DILATED Q1 2/8/2018 GLAUCOMA SCREENING Q2Y 2/8/2019 COLONOSCOPY 6/3/2023 DTaP/Tdap/Td series (2 - Td) 4/20/2025 Allergies as of 5/31/2017  Review Complete On: 5/31/2017 By: Anne John No Known Allergies Current Immunizations  Reviewed on 12/20/2016 Name Date Hep A Vaccine 1/11/2016, 10/31/2002 Influenza Vaccine 10/1/2016 Influenza Vaccine (Quad) PF 10/23/2015 Pneumococcal Conjugate (PCV-13) 6/29/2016 Tdap 4/20/2015 Typhoid Vaccine 1/11/2016, 10/31/2002 Yellow Fever Vaccine 1/11/2016 Not reviewed this visit You Were Diagnosed With   
  
 Codes Comments Essential hypertension, benign    -  Primary ICD-10-CM: I10 
ICD-9-CM: 401.1 Type 2 diabetes mellitus without complication, with long-term current use of insulin (HCC)     ICD-10-CM: E11.9, Z79.4 ICD-9-CM: 250.00, V58.67 Routine general medical examination at a health care facility     ICD-10-CM: Z00.00 ICD-9-CM: V70.0 Screening for alcoholism     ICD-10-CM: Z13.89 ICD-9-CM: V79.1 Malignant neoplasm of urinary bladder, unspecified site [facility])     ICD-10-CM: C67.9 ICD-9-CM: 188. 9 Vitals BP Pulse Temp Resp Height(growth percentile) Weight(growth percentile)  
 118/68 (BP 1 Location: Left arm, BP Patient Position: Sitting) 80 98.6 °F (37 °C) (Oral) 16 5' 6\" (1.676 m) 161 lb 12.8 oz (73.4 kg) SpO2 BMI Smoking Status 99% 26.12 kg/m2 Never Smoker Vitals History BMI and BSA Data Body Mass Index Body Surface Area  
 26.12 kg/m 2 1.85 m 2 Preferred Pharmacy Pharmacy Name [facility] [address] [facility] [phone] Your Updated Medication List  
  
   
This list is accurate as of: 5/31/17  2:50 PM.  Always use your most recent med list.  
  
  
  
  
 aspirin 81 mg chewable tablet Take 81 mg by mouth daily. CINNAMON PO Take  by mouth. HSOBHA EXTRACT PO Take  by mouth.  
  
 glimepiride 4 mg tablet Commonly known as:  AMARYL Take 1 Tab by mouth two (2) times a day. glucose blood VI test strips strip Commonly known as:  ONETOUCH ULTRA TEST Check twice daily * Insulin Needles (Disposable) 31 gauge x 3/16\" Ndle Commonly known as:  BD INSULIN PEN NEEDLE UF MINI Check twice daily * BD INSULIN PEN NEEDLE UF SHORT 31 gauge x 5/16\" Ndle Generic drug:  Insulin Needles (Disposable) * Lancets Misc Check twice daily * TRUEPLUS LANCETS 33 gauge Misc Generic drug:  lancets LANTUS SC  
by SubCUTAneous route. 16-18 units daily losartan-hydroCHLOROthiazide 100-25 mg per tablet Commonly known as:  HYZAAR Take 1 Tab by mouth daily. omeprazole 40 mg capsule Commonly known as:  PRILOSEC Take 1 Cap by mouth daily. simvastatin 10 mg tablet Commonly known as:  ZOCOR Take 1 Tab by mouth nightly. tadalafil 20 mg tablet Commonly known as:  CIALIS Take 1 Tab by mouth daily as needed. TURMERIC ROOT EXTRACT PO Take  by mouth. VITAMIN B-12 1,000 mcg sublingual tablet Generic drug:  cyanocobalamin Take 1,000 mcg by mouth daily. VITAMIN D2 PO Take 1 Cap by mouth daily. * Notice: This list has 4 medication(s) that are the same as other medications prescribed for you. Read the directions carefully, and ask your doctor or other care provider to review them with you. Prescriptions Sent to Pharmacy Refills  
 glimepiride (AMARYL) 4 mg tablet 3 Sig: Take 1 Tab by mouth two (2) times a day. Class: Normal  
 Pharmacy: [address] [place] [address] #: [phone] Route: Oral  
  
Follow-up Instructions Return in about 3 months (around 8/31/2017) for in between can come for pneumococcal 23 immunization as a nurse visit around july . To-Do List   
 05/31/2017 Lab:  HEMOGLOBIN A1C WITH EAG   
  
 05/31/2017 Lab:  LIPID PANEL   
  
 05/31/2017 Lab:  METABOLIC PANEL, COMPREHENSIVE   
  
 05/31/2017 Lab:  MICROALBUMIN, UR, RAND W/ MICROALBUMIN/CREA RATIO   
  
 05/31/2017 Lab:  TSH 3RD GENERATION Patient Instructions Medicare Part B Preventive Services Limitations Recommendation Scheduled Bone Mass Measurement 
(age 72 & older, biennial) Requires diagnosis related to osteoporosis or estrogen deficiency. Biennial benefit unless patient has history of long-term glucocorticoid tx or baseline is needed because initial test was by other method NA Cardiovascular Screening Blood Tests (every 5 years) Total cholesterol, HDL, Triglycerides Order as a panel if possible Up to date Colorectal Cancer Screening 
-Fecal occult blood test (annual) -Flexible sigmoidoscopy (5y) 
-Screening colonoscopy (10y) -Barium Enema  Up to date  Due 2019 according to office notes Counseling to Prevent Tobacco Use (up to 8 sessions per year) - Counseling greater than 3 and up to 10 minutes - Counseling greater than 10 minutes Patients must be asymptomatic of tobacco-related conditions to receive as preventive service N/a Diabetes Screening Tests (at least every 3 years, Medicare covers annually or at 6-month intervals for prediabetic patients) Fasting blood sugar (FBS) or glucose tolerance test (GTT) Patient must be diagnosed with one of the following: 
-Hypertension, Dyslipidemia, obesity, previous impaired FBS or GTT 
Or any two of the following: overweight, FH of diabetes, age ? 72, history of gestational diabetes, birth of baby weighing more than 9 pounds Up to date Diabetes Self-Management Training (DSMT) (no USPSTF recommendation) Requires referral by treating physician for patient with diabetes or renal disease. 10 hours of initial DSMT session of no less than 30 minutes each in a continuous 12-month period. 2 hours of follow-up DSMT in subsequent years. N/a Glaucoma Screening (no USPSTF recommendation) Diabetes mellitus, family history, , age 48 or over,  American, age 72 or over Up to date Human Immunodeficiency Virus (HIV) Screening (annually for increased risk patients) HIV-1 and HIV-2 by EIA, MARIEL, rapid antibody test, or oral mucosa transudate Patient must be at increased risk for HIV infection per USPSTF guidelines or pregnant. Tests covered annually for patients at increased risk. Pregnant patients may receive up to 3 test during pregnancy. N/a Medical Nutrition Therapy (MNT) (for diabetes or renal disease not recommended schedule) Requires referral by treating physician for patient with diabetes or renal disease. Can be provided in same year as diabetes self-management training (DSMT), and CMS recommends medical nutrition therapy take place after DSMT. Up to 3 hours for initial year and 2 hours in subsequent years. N/a Prostate Cancer Screening (annually up to age 76) - Digital rectal exam (ALBERTO) - Prostate specific antigen (PSA) Annually (age 48 or over), ALBERTO not paid separately when covered E/M service is provided on same date Men up to age 76 may need a screening blood test for prostate cancer at certain intervals, depending on their personal and family history. This decision is between the patient and his provider. Up to date Seasonal Influenza Vaccination (annually)  Up to date  Yearly Pneumococcal Vaccination (once after 65)  Will get pneumococcal 23 in July Hepatitis B Vaccinations (if medium/high risk) Medium/high risk factors:  End-stage renal disease, Hemophiliacs who received Factor VIII or IX concentrates, Clients of institutions for the mentally retarded, Persons who live in the same house as a HepB virus carrier, Homosexual men, Illicit injectable drug abusers. NA Shingles Vaccination A shingles vaccine is also recommended once in a lifetime after age 61 Up to date Ultrasound Screening for Abdominal Aortic Aneurysm (AAA) (once) Patient must be referred through IPPE and not have had a screening for abdominal aortic aneurysm before under Medicare. Limited to patients who meet one of the following criteria: 
- Men who are 73-68 years old and have smoked more than 100 cigarettes in their lifetime. 
-Anyone with a FH of AAA 
-Anyone recommended for screening by USPSTF N/a Learning About Diabetes Food Guidelines Your Care Instructions Meal planning is important to manage diabetes.  It helps keep your blood sugar at a target level (which you set with your doctor). You don't have to eat special foods. You can eat what your family eats, including sweets once in a while. But you do have to pay attention to how often you eat and how much you eat of certain foods. You may want to work with a dietitian or a certified diabetes educator (CDE) to help you plan meals and snacks. A dietitian or CDE can also help you lose weight if that is one of your goals. What should you know about eating carbs? Managing the amount of carbohydrate (carbs) you eat is an important part of healthy meals when you have diabetes. Carbohydrate is found in many foods. · Learn which foods have carbs. And learn the amounts of carbs in different foods. ¨ Bread, cereal, pasta, and rice have about 15 grams of carbs in a serving. A serving is 1 slice of bread (1 ounce), ½ cup of cooked cereal, or 1/3 cup of cooked pasta or rice. ¨ Fruits have 15 grams of carbs in a serving. A serving is 1 small fresh fruit, such as an apple or orange; ½ of a banana; ½ cup of cooked or canned fruit; ½ cup of fruit juice; 1 cup of melon or raspberries; or 2 tablespoons of dried fruit. ¨ Milk and no-sugar-added yogurt have 15 grams of carbs in a serving. A serving is 1 cup of milk or 2/3 cup of no-sugar-added yogurt. ¨ Starchy vegetables have 15 grams of carbs in a serving. A serving is ½ cup of mashed potatoes or sweet potato; 1 cup winter squash; ½ of a small baked potato; ½ cup of cooked beans; or ½ cup cooked corn or green peas. · Learn how much carbs to eat each day and at each meal. A dietitian or CDE can teach you how to keep track of the amount of carbs you eat. This is called carbohydrate counting. · If you are not sure how to count carbohydrate grams, use the Plate Method to plan meals. It is a good, quick way to make sure that you have a balanced meal. It also helps you spread carbs throughout the day. ¨ Divide your plate by types of foods. Put non-starchy vegetables on half the plate, meat or other protein food on one-quarter of the plate, and a grain or starchy vegetable in the final quarter of the plate. To this you can add a small piece of fruit and 1 cup of milk or yogurt, depending on how many carbs you are supposed to eat at a meal. 
· Try to eat about the same amount of carbs at each meal. Do not \"save up\" your daily allowance of carbs to eat at one meal. 
· Proteins have very little or no carbs per serving. Examples of proteins are beef, chicken, turkey, fish, eggs, tofu, cheese, cottage cheese, and peanut butter. A serving size of meat is 3 ounces, which is about the size of a deck of cards. Examples of meat substitute serving sizes (equal to 1 ounce of meat) are 1/4 cup of cottage cheese, 1 egg, 1 tablespoon of peanut butter, and ½ cup of tofu. How can you eat out and still eat healthy? · Learn to estimate the serving sizes of foods that have carbohydrate. If you measure food at home, it will be easier to estimate the amount in a serving of restaurant food. · If the meal you order has too much carbohydrate (such as potatoes, corn, or baked beans), ask to have a low-carbohydrate food instead. Ask for a salad or green vegetables. · If you use insulin, check your blood sugar before and after eating out to help you plan how much to eat in the future. · If you eat more carbohydrate at a meal than you had planned, take a walk or do other exercise. This will help lower your blood sugar. What else should you know? · Limit saturated fat, such as the fat from meat and dairy products. This is a healthy choice because people who have diabetes are at higher risk of heart disease. So choose lean cuts of meat and nonfat or low-fat dairy products. Use olive or canola oil instead of butter or shortening when cooking. · Don't skip meals.  Your blood sugar may drop too low if you skip meals and take insulin or certain medicines for diabetes. · Check with your doctor before you drink alcohol. Alcohol can cause your blood sugar to drop too low. Alcohol can also cause a bad reaction if you take certain diabetes medicines. Follow-up care is a key part of your treatment and safety. Be sure to make and go to all appointments, and call your doctor if you are having problems. It's also a good idea to know your test results and keep a list of the medicines you take. Where can you learn more? Go to http://jah-lorenzo.info/. Enter F754 in the search box to learn more about \"Learning About Diabetes Food Guidelines. \" Current as of: May 23, 2016 Content Version: 11.2 © 4387-2063 [employer]. Care instructions adapted under license by [employer] (which disclaims liability or warranty for this information). If you have questions about a medical condition or this instruction, always ask your healthcare professional. [place] [address] any warranty or liability for your use of this information. Introducing [facility]! Dear Rica Garrison: 
Thank you for requesting a [employer] account. Our records indicate that you already have an active [employer] account. You can access your account anytime at [email or web address] Did you know that you can access your hospital and ER discharge instructions at any time in [employer]? You can also review all of your test results from your hospital stay or ER visit. Additional Information If you have questions, please visit the Frequently Asked Questions section of the [employer] website at [email or web address] Remember, [employer] is NOT to be used for urgent needs. For medical emergencies, dial 911. Now available from your iPhone and Android! Please provide this summary of care documentation to your next provider. Your primary care clinician is listed as Kandi Wayne. If you have any questions after today's visit, please call [phone].

## 2017-05-31 NOTE — PROGRESS NOTES
This is a \"Welcome to [employer]"  Initial Preventive Physical Examination (IPPE) providing Personalized Prevention Plan Services (Performed in the first 12 months of enrollment)    I have reviewed the patient's medical history in detail and updated the computerized patient record. History     Past Medical History:   Diagnosis Date    Bladder cancer ([facility] [address] 7/15/13    Clinical Stage [id number] HG UC    Bladder tumor     Diabetes ([facility]     Essential hypertension     History of kidney stones     Hyperlipidemia     Peripheral neuropathy (HCC)     SBO (small bowel obstruction) (HCC)     Spinal stenosis     Spondylolisthesis, grade 1       Past Surgical History:   Procedure Laterality Date    HX UROLOGICAL  7/15/13    TURBT, Dr. Harvey Franks, [facility]    HX UROLOGICAL  1/4/16    Cysto, Dr. Harvey Franks, [facility]    HX WRIST FRACTURE [address]  4/27/15    (L) wrist     Current Outpatient Prescriptions   Medication Sig Dispense Refill    CINNAMON BARK (CINNAMON PO) Take  by mouth.  TURMERIC ROOT EXTRACT PO Take  by mouth.  GINGER ROOT (GINGER EXTRACT PO) Take  by mouth.  losartan-hydroCHLOROthiazide (HYZAAR) 100-25 mg per tablet Take 1 Tab by mouth daily. 90 Tab 1    omeprazole (PRILOSEC) 40 mg capsule Take 1 Cap by mouth daily. 90 Cap 1    simvastatin (ZOCOR) 10 mg tablet Take 1 Tab by mouth nightly. 90 Tab 1    TRUEPLUS LANCETS 33 gauge misc   11    BD INSULIN PEN NEEDLE UF SHORT 31 gauge x 5/16\" ndle   1    INSULIN GLARGINE,HUM. REC. ANLOG (LANTUS SC) by SubCUTAneous route. 16-18 units daily      glimepiride (AMARYL) 4 mg tablet Take 1 Tab by mouth two (2) times a day. 60 Tab 3    Insulin Needles, Disposable, (BD INSULIN PEN NEEDLE UF MINI) 31 gauge x 3/16\" ndle Check twice daily 100 Pen Needle 1    glucose blood VI test strips (ONETOUCH ULTRA TEST) strip Check twice daily 100 Strip 6    Lancets misc Check twice daily 100 Each 11    tadalafil (CIALIS) 20 mg tablet Take 1 Tab by mouth daily as needed.  6 Tab 3  cyanocobalamin (VITAMIN B-12) 1,000 mcg Subl Take 1,000 mcg by mouth daily.  ERGOCALCIFEROL, VITAMIN D2, (VITAMIN D2 PO) Take 1 Cap by mouth daily.  aspirin 81 mg chewable tablet Take 81 mg by mouth daily.  sAXagliptin-metFORMIN (KOMBIGLYZE XR) 2.5-1,000 mg TM24 1 tab twice daily 60 Tab 3     No Known Allergies  Family History   Problem Relation Age of Onset    Hypertension Mother     Heart Attack Neg Hx     Sudden Death Neg Hx      Social History   Substance Use Topics    Smoking status: Never Smoker    Smokeless tobacco: Never Used    Alcohol use Yes      Comment: 1-2 drinks/day     Diet, Lifestyle: generally follows a low sodium diet, also diabetic diet. Exercise level: moderately active    Depression Risk Screen     PHQ over the last two weeks 9/26/2016   Little interest or pleasure in doing things Not at all   Feeling down, depressed or hopeless Not at all   Total Score PHQ 2 0     Alcohol Risk Screen   On any occasion during the past 3 months, have you had more than 4 drinks containing alcohol? No    Do you average more than 14 drinks per week? No    Functional Ability and Level of Safety     Hearing Loss   none    Activities of Daily Living   Self-care     Fall Risk Screen     Fall Risk Assessment, last 12 mths 5/31/2017   Able to walk? Yes   Fall in past 12 months? No     Abuse Screen   Patient is not abused    Review of Systems   per Dr Leslie Valderrama    Physical Examination     No exam data present   Cognitive functions intact     EKG Screening: done in 2015. Non specific changes. Asymptomatic at this time. Patient Care Team:  Miriam Callejas MD as PCP - General (Family Practice)  Carole Toscano MD (Urology)  Wilda Cano MD as Consulting Provider (Internal Medicine)  Fahad Go MD as Consulting Provider (Urology)  Deon Prado MD (Ophthalmology)     End-of-life planning  Advanced Directive discussed and documented: YES and he has a living wheel.  Will provide a copy Review nurses note and assessment. Agree with that. Discussed 5 years health plan and given copy in AVS.  Discussed advance directive. Given hand out with AVS for more information. Discussed DNR and DNI. Assessment/Plan   Education and counseling provided:  Are appropriate based on today's review and evaluation    ICD-10-CM ICD-9-CM    1. Routine general medical examination at a health care facility Z00.00 V70.0    2. Screening for alcoholism Z13.89 V79.1    Pt understood and agrees with above plan. Follow-up Disposition:  Return in about 3 months (around 8/31/2017) for in between can come for pneumococcal 23 immunization as a nurse visit around july . Jose Bending

## 2017-05-31 NOTE — PROGRESS NOTES
1. Have you been to the ER, urgent care clinic since your last visit? Hospitalized since your last visit? No    2. Have you seen or consulted any other health care providers outside of the [address] [place] [address] since your last visit? Include any pap smears or colon screening.  Yes   5/31/17 with NP Vimal Preston - endocrinology    PCV-13 on 6/29/16

## 2017-06-14 ENCOUNTER — OFFICE VISIT (OUTPATIENT)
Dept: CARDIOLOGY CLINIC | Age: 67
End: 2017-06-14

## 2017-06-14 VITALS
WEIGHT: 156 LBS | DIASTOLIC BLOOD PRESSURE: 75 MMHG | BODY MASS INDEX: 25.07 KG/M2 | SYSTOLIC BLOOD PRESSURE: 133 MMHG | HEIGHT: 66 IN | HEART RATE: 83 BPM

## 2017-06-14 DIAGNOSIS — Z79.4 TYPE 2 DIABETES MELLITUS WITHOUT COMPLICATION, WITH LONG-TERM CURRENT USE OF INSULIN (HCC): ICD-10-CM

## 2017-06-14 DIAGNOSIS — E78.2 MIXED HYPERLIPIDEMIA: ICD-10-CM

## 2017-06-14 DIAGNOSIS — E11.9 TYPE 2 DIABETES MELLITUS WITHOUT COMPLICATION, WITH LONG-TERM CURRENT USE OF INSULIN (HCC): ICD-10-CM

## 2017-06-14 DIAGNOSIS — I10 ESSENTIAL HYPERTENSION, BENIGN: Primary | ICD-10-CM

## 2017-06-14 RX ORDER — PIOGLITAZONEHYDROCHLORIDE 15 MG/1
30 TABLET ORAL DAILY
COMMUNITY
End: 2018-02-28 | Stop reason: SDUPTHER

## 2017-06-14 RX ORDER — METFORMIN HYDROCHLORIDE 1000 MG/1
TABLET, FILM COATED, EXTENDED RELEASE ORAL
COMMUNITY
End: 2018-06-12 | Stop reason: ALTCHOICE

## 2017-06-14 NOTE — MR AVS SNAPSHOT
Visit Information Date & Time Provider Department Dept. Phone Encounter #  
 6/14/2017 11:00 AM Jeremy Warren MD [facility] [id number] Follow-up Instructions Return in about 1 year (around 6/14/2018). Your Appointments 8/30/2017 10:45 AM  
PROCEDURE with Roberta Menezes MD  
[facility] ([facility]) Appt Note: Return in about 6 months (around 8/22/2017) for cysto, cytology. [address]val[address] [facility] [address]  
[address] [facility] [address] [facility] [address]  
  
    
 8/30/2017  1:45 PM  
ROUTINE CARE with Rox Irving MD  
[facility] ([facility]) Appt Note: routine f/u 3mo [address] [place] [address] Drive [address] [facility]  
[address] [facility] [address] [address] [address] [address]  
  
    
 6/13/2018 11:00 AM  
Office Visit with Jeremy Warren MD  
[facility]) Appt Note: 1 yr  
 [address] [facility], [address] [facility] [address]  
[address] Upcoming Health Maintenance Date Due Pneumococcal 65+ High/Highest Risk (2 of 2 - PPSV23) 3/25/2017 HEMOGLOBIN A1C Q6M 6/20/2017 MICROALBUMIN Q1 6/20/2017 LIPID PANEL Q1 6/20/2017 FOOT EXAM Q1 7/13/2017 INFLUENZA AGE 9 TO ADULT 8/1/2017 EYE EXAM RETINAL OR DILATED Q1 2/8/2018 MEDICARE YEARLY EXAM 6/1/2018 GLAUCOMA SCREENING Q2Y 2/8/2019 COLONOSCOPY 6/3/2023 DTaP/Tdap/Td series (2 - Td) 4/20/2025 Allergies as of 6/14/2017  Review Complete On: 6/14/2017 By: Jeremy Warren MD  
 No Known Allergies Current Immunizations  Reviewed on 12/20/2016 Name Date Hep A Vaccine 1/11/2016, 10/31/2002 Influenza Vaccine 10/1/2016 Influenza Vaccine (Quad) PF 10/23/2015 Pneumococcal Conjugate (PCV-13) 6/29/2016 Tdap 4/20/2015 Typhoid Vaccine 1/11/2016, 10/31/2002 Yellow Fever Vaccine 1/11/2016 Not reviewed this visit You Were Diagnosed With   
  
 Codes Comments Essential hypertension, benign    -  Primary ICD-10-CM: I10 
ICD-9-CM: 401.1 controlled Mixed hyperlipidemia     ICD-10-CM: E78.2 ICD-9-CM: 272.2 stable Type 2 diabetes mellitus without complication, with long-term current use of insulin (HCC)     ICD-10-CM: E11.9, Z79.4 ICD-9-CM: 250.00, V58.67 Vitals BP Pulse Height(growth percentile) Weight(growth percentile) BMI Smoking Status 133/75 83 5' 6\" (1.676 m) 156 lb (70.8 kg) 25.18 kg/m2 Never Smoker Vitals History BMI and BSA Data Body Mass Index Body Surface Area  
 25.18 kg/m 2 1.82 m 2 [facility] [address] [facility] [phone] Your Updated Medication List  
  
   
This list is accurate as of: 6/14/17 11:40 AM.  Always use your most recent med list.  
  
  
  
  
 aspirin 81 mg chewable tablet Take 81 mg by mouth daily. CINNAMON PO Take  by mouth. SHOBHA EXTRACT PO Take  by mouth.  
  
 glimepiride 4 mg tablet Commonly known as:  AMARYL Take 1 Tab by mouth two (2) times a day. glucose blood VI test strips strip Commonly known as:  ONETOUCH ULTRA TEST Check twice daily * Insulin Needles (Disposable) 31 gauge x 3/16\" Ndle Commonly known as:  BD INSULIN PEN NEEDLE UF MINI Check twice daily * BD INSULIN PEN NEEDLE UF SHORT 31 gauge x 5/16\" Ndle Generic drug:  Insulin Needles (Disposable) JANUMET 50-1,000 mg per tablet Generic drug:  SITagliptin-metFORMIN Take 1 Tab by mouth two (2) times daily (with meals). * Lancets Misc Check twice daily * TRUEPLUS LANCETS 33 gauge Misc Generic drug:  lancets LANTUS SC  
by SubCUTAneous route. 16-18 units daily  
  
 losartan-hydroCHLOROthiazide 100-25 mg per tablet Commonly known as:  HYZAAR Take 1 Tab by mouth daily. metFORMIN 1,000 mg Tg24 24 hour tablet Commonly known as:  Castalia Rudolph Take  by mouth. omeprazole 40 mg capsule Commonly known as:  PRILOSEC Take 1 Cap by mouth daily. pioglitazone 15 mg tablet Commonly known as:  ACTOS Take  by mouth. simvastatin 10 mg tablet Commonly known as:  ZOCOR Take 1 Tab by mouth nightly. tadalafil 20 mg tablet Commonly known as:  CIALIS Take 1 Tab by mouth daily as needed. TURMERIC ROOT EXTRACT PO Take  by mouth. VITAMIN B-12 1,000 mcg sublingual tablet Generic drug:  cyanocobalamin Take 1,000 mcg by mouth daily. VITAMIN D2 PO Take 1 Cap by mouth daily. * Notice: This list has 4 medication(s) that are the same as other medications prescribed for you. Read the directions carefully, and ask your doctor or other care provider to review them with you. Follow-up Instructions Return in about 1 year (around 6/14/2018). Introducing [facility]! Dear Bal Paulino: 
Thank you for requesting a [employer] account. Our records indicate that you already have an active [employer] account. You can access your account anytime at [email or web address] Did you know that you can access your hospital and ER discharge instructions at any time in [employer]? You can also review all of your test results from your hospital stay or ER visit. Additional Information If you have questions, please visit the Frequently Asked Questions section of the [employer] website at [email or web address] Remember, [employer] is NOT to be used for urgent needs. For medical emergencies, dial 911. Now available from your iPhone and Android! Please provide this summary of care documentation to your next provider. Your primary care clinician is listed as Americo Rae.  If you have any questions after today's visit, please call [phone].

## 2017-06-14 NOTE — LETTER
[employer] 1950 6/14/2017 Dear Jae John MD 
 
I had the pleasure of evaluating  Mr. Shaka Scott in office today. Below are the relevant portions of my assessment and plan of care. ICD-10-CM ICD-9-CM 1. Essential hypertension, benign I10 401.1   
 controlled 2. Mixed hyperlipidemia E78.2 272.2   
 stable 3. Type 2 diabetes mellitus without complication, with long-term current use of insulin (HCC) E11.9 250.00   
 Z79.4 V58.67 Current Outpatient Prescriptions Medication Sig Dispense Refill  SITagliptin-metFORMIN (JANUMET) 50-1,000 mg per tablet Take 1 Tab by mouth two (2) times daily (with meals).  pioglitazone (ACTOS) 15 mg tablet Take  by mouth.  metFORMIN (GLUMETZA) 1,000 mg TG24 24 hour tablet Take  by mouth.  CINNAMON BARK (CINNAMON PO) Take  by mouth.  TURMERIC ROOT EXTRACT PO Take  by mouth.  GINGER ROOT (GINGER EXTRACT PO) Take  by mouth.  glimepiride (AMARYL) 4 mg tablet Take 1 Tab by mouth two (2) times a day. 60 Tab 3  
 losartan-hydroCHLOROthiazide (HYZAAR) 100-25 mg per tablet Take 1 Tab by mouth daily. 90 Tab 1  
 omeprazole (PRILOSEC) 40 mg capsule Take 1 Cap by mouth daily. 90 Cap 1  simvastatin (ZOCOR) 10 mg tablet Take 1 Tab by mouth nightly. 90 Tab 1  TRUEPLUS LANCETS 33 gauge misc   11  
 BD INSULIN PEN NEEDLE UF SHORT 31 gauge x 5/16\" ndle   1  
 INSULIN GLARGINE,HUM. REC. ANLOG (LANTUS SC) by SubCUTAneous route. 16-18 units daily  Insulin Needles, Disposable, (BD INSULIN PEN NEEDLE UF MINI) 31 gauge x 3/16\" ndle Check twice daily 100 Pen Needle 1  
 glucose blood VI test strips (ONETOUCH ULTRA TEST) strip Check twice daily 100 Strip 6  Lancets misc Check twice daily 100 Each 11  
 tadalafil (CIALIS) 20 mg tablet Take 1 Tab by mouth daily as needed. 6 Tab 3  cyanocobalamin (VITAMIN B-12) 1,000 mcg Subl Take 1,000 mcg by mouth daily.  ERGOCALCIFEROL, VITAMIN D2, (VITAMIN D2 PO) Take 1 Cap by mouth daily.  aspirin 81 mg chewable tablet Take 81 mg by mouth daily. Orders Placed This Encounter  SITagliptin-metFORMIN (JANUMET) 50-1,000 mg per tablet Sig: Take 1 Tab by mouth two (2) times daily (with meals).  pioglitazone (ACTOS) 15 mg tablet Sig: Take  by mouth.  metFORMIN (GLUMETZA) 1,000 mg TG24 24 hour tablet Sig: Take  by mouth. If you have questions, please do not hesitate to call me. I look forward to following Mr. Maynor Pardo along with you. Sincerely, Tess Benedict MD

## 2017-06-14 NOTE — PROGRESS NOTES
HISTORY OF PRESENT ILLNESS  Ananth Gilmore is a 74 y.o. male. HPI Comments: Patient with htn,dm,hyperlipidemia. On follow up patient denies any chest pains,sob, palpitation or other significant symptoms. Hypertension   The history is provided by the patient. This is a chronic problem. The problem occurs constantly. The problem has not changed since onset. Pertinent negatives include no chest pain, no abdominal pain, no headaches and no shortness of breath. Cholesterol Problem   The history is provided by the patient. This is a chronic problem. The problem occurs constantly. Pertinent negatives include no chest pain, no abdominal pain, no headaches and no shortness of breath. Review of Systems   Constitutional: Negative for chills and fever. HENT: Negative for nosebleeds. Eyes: Negative for blurred vision and double vision. Respiratory: Negative for cough, hemoptysis, sputum production, shortness of breath and wheezing. Cardiovascular: Negative for chest pain, palpitations, orthopnea, claudication, leg swelling and PND. Gastrointestinal: Negative for abdominal pain, heartburn, nausea and vomiting. Musculoskeletal: Negative for myalgias. Skin: Negative for rash. Neurological: Negative for dizziness, weakness and headaches. Endo/Heme/Allergies: Does not bruise/bleed easily.      Family History   Problem Relation Age of Onset    Hypertension Mother     Heart Attack Neg Hx     Sudden Death Neg Hx        Past Medical History:   Diagnosis Date    Bladder cancer ([facility] [address] 7/15/13    Clinical Stage [id number] HG UC    Bladder tumor     Diabetes ([facility] [address]     Essential hypertension     History of kidney stones     Hyperlipidemia     Peripheral neuropathy (HCC)     SBO (small bowel obstruction) (HCC)     Spinal stenosis     Spondylolisthesis, grade 1        Past Surgical History:   Procedure Laterality Date    HX UROLOGICAL  7/15/13    TURBT, Dr. Maddi Dillon, [facility] HX UROLOGICAL  1/4/16 Dr. Maddi Erwin, [facility]    HX WRIST FRACTURE [address]  4/27/15    (L) wrist       No Known Allergies    Current Outpatient Prescriptions   Medication Sig    SITagliptin-metFORMIN (JANUMET) 50-1,000 mg per tablet Take 1 Tab by mouth two (2) times daily (with meals).  pioglitazone (ACTOS) 15 mg tablet Take  by mouth.  metFORMIN (GLUMETZA) 1,000 mg TG24 24 hour tablet Take  by mouth.  CINNAMON BARK (CINNAMON PO) Take  by mouth.  TURMERIC ROOT EXTRACT PO Take  by mouth.  GINGER ROOT (GINGER EXTRACT PO) Take  by mouth.  glimepiride (AMARYL) 4 mg tablet Take 1 Tab by mouth two (2) times a day.  losartan-hydroCHLOROthiazide (HYZAAR) 100-25 mg per tablet Take 1 Tab by mouth daily.  omeprazole (PRILOSEC) 40 mg capsule Take 1 Cap by mouth daily.  simvastatin (ZOCOR) 10 mg tablet Take 1 Tab by mouth nightly.  TRUEPLUS LANCETS 33 gauge misc     BD INSULIN PEN NEEDLE UF SHORT 31 gauge x 5/16\" ndle     INSULIN GLARGINE,HUM. REC. ANLOG (LANTUS SC) by SubCUTAneous route. 16-18 units daily    Insulin Needles, Disposable, (BD INSULIN PEN NEEDLE UF MINI) 31 gauge x 3/16\" ndle Check twice daily    glucose blood VI test strips (ONETOUCH ULTRA TEST) strip Check twice daily    Lancets misc Check twice daily    tadalafil (CIALIS) 20 mg tablet Take 1 Tab by mouth daily as needed.  cyanocobalamin (VITAMIN B-12) 1,000 mcg Subl Take 1,000 mcg by mouth daily.  ERGOCALCIFEROL, VITAMIN D2, (VITAMIN D2 PO) Take 1 Cap by mouth daily.  aspirin 81 mg chewable tablet Take 81 mg by mouth daily. No current facility-administered medications for this visit. Visit Vitals    /75    Pulse 83    Ht 5' 6\" (1.676 m)    Wt 70.8 kg (156 lb)    BMI 25.18 kg/m2         Physical Exam   Constitutional: He is oriented to person, place, and time. He appears well-developed and well-nourished. HENT:   Head: Normocephalic and atraumatic. Eyes: Conjunctivae are normal.   Neck: Neck supple. No JVD present. No tracheal deviation present. No thyromegaly present. Cardiovascular: Normal rate, regular rhythm and normal heart sounds. Exam reveals no gallop and no friction rub. No murmur heard. Pulmonary/Chest: Breath sounds normal. No respiratory distress. He has no wheezes. He has no rales. He exhibits no tenderness. Abdominal: Soft. There is no tenderness. Musculoskeletal: He exhibits no edema. Neurological: He is alert and oriented to person, place, and time. Skin: Skin is warm and dry. Psychiatric: He has a normal mood and affect. Mr. Micaela Stroud has a reminder for a \"due or due soon\" health maintenance. I have asked that he contact his primary care provider for follow-up on this health maintenance. Conclusion: 4/2013:stress test  1. Normal perfusion scan. 2. Normal wall motion and ejection fraction. Assessment       ICD-10-CM ICD-9-CM    1. Essential hypertension, benign I10 401.1     controlled   2. Mixed hyperlipidemia E78.2 272.2     stable   3. Type 2 diabetes mellitus without complication, with long-term current use of insulin ([facility]) E11.9 250.00     Z79.4 V58.67        There are no discontinued medications. No orders of the defined types were placed in this encounter. Follow-up Disposition:  Return in about 1 year (around 6/14/2018).

## 2017-06-28 ENCOUNTER — HOSPITAL ENCOUNTER (OUTPATIENT)
Dept: LAB | Age: 67
Discharge: HOME | End: 2017-06-28
Payer: MEDICARE

## 2017-06-28 LAB
ALBUMIN SERPL BCP-MCNC: 4 G/DL (ref 3.4–5)
ALBUMIN/GLOB SERPL: 1.3 {RATIO} (ref 0.8–1.7)
ALP SERPL-CCNC: 61 U/L (ref 45–117)
ALT SERPL-CCNC: 24 U/L (ref 16–61)
ANION GAP BLD CALC-SCNC: 6 MMOL/L (ref 3–18)
AST SERPL W P-5'-P-CCNC: 19 U/L (ref 15–37)
BILIRUB SERPL-MCNC: 0.3 MG/DL (ref 0.2–1)
BUN SERPL-MCNC: 15 MG/DL (ref 7–18)
BUN/CREAT SERPL: 17 (ref 12–20)
CALCIUM SERPL-MCNC: 9 MG/DL (ref 8.5–10.1)
CHLORIDE SERPL-SCNC: 102 MMOL/L (ref 100–108)
CHOLEST SERPL-MCNC: 130 MG/DL
CO2 SERPL-SCNC: 30 MMOL/L (ref 21–32)
CREAT SERPL-MCNC: 0.9 MG/DL (ref 0.6–1.3)
CREAT UR-MCNC: 153 MG/DL (ref 30–125)
EST. AVERAGE GLUCOSE BLD GHB EST-MCNC: 192 MG/DL
GLOBULIN SER CALC-MCNC: 3.2 G/DL (ref 2–4)
GLUCOSE SERPL-MCNC: 74 MG/DL (ref 74–99)
HBA1C MFR BLD: 8.3 % (ref 4.2–5.6)
HDLC SERPL-MCNC: 49 MG/DL (ref 40–60)
HDLC SERPL: 2.7 {RATIO} (ref 0–5)
LDLC SERPL CALC-MCNC: 62.2 MG/DL (ref 0–100)
LIPID PROFILE,FLP: NORMAL
MICROALBUMIN UR-MCNC: 1.91 MG/DL (ref 0–3)
MICROALBUMIN/CREAT UR-RTO: 12 MG/G (ref 0–30)
POTASSIUM SERPL-SCNC: 4.7 MMOL/L (ref 3.5–5.5)
PROT SERPL-MCNC: 7.2 G/DL (ref 6.4–8.2)
SODIUM SERPL-SCNC: 138 MMOL/L (ref 136–145)
TRIGL SERPL-MCNC: 94 MG/DL (ref ?–150)
TSH SERPL DL<=0.05 MIU/L-ACNC: 1.01 UIU/ML (ref 0.36–3.74)
VLDLC SERPL CALC-MCNC: 18.8 MG/DL

## 2017-06-28 PROCEDURE — 82043 UR ALBUMIN QUANTITATIVE: CPT | Performed by: FAMILY MEDICINE

## 2017-06-28 PROCEDURE — 84443 ASSAY THYROID STIM HORMONE: CPT | Performed by: FAMILY MEDICINE

## 2017-06-28 PROCEDURE — 83036 HEMOGLOBIN GLYCOSYLATED A1C: CPT | Performed by: FAMILY MEDICINE

## 2017-06-28 PROCEDURE — 80061 LIPID PANEL: CPT | Performed by: FAMILY MEDICINE

## 2017-06-28 PROCEDURE — 80053 COMPREHEN METABOLIC PANEL: CPT | Performed by: FAMILY MEDICINE

## 2017-06-28 PROCEDURE — 36415 COLL VENOUS BLD VENIPUNCTURE: CPT | Performed by: FAMILY MEDICINE

## 2017-06-28 NOTE — PROGRESS NOTES
Let pt know that some improvement in diabetes. Keep diet modification and compliance with medication. Will f/u next visit. Other labs ok as well. Further discussion on follow up visit.

## 2017-07-07 NOTE — PROGRESS NOTES
Spoke with patient (2 verifiers name/) regarding some improvement in diabetes and to keep diet modification and compliance with medication. Patient informed other labs are ok and further discussion on follow up visit. Patient voiced understanding.

## 2017-08-30 ENCOUNTER — OFFICE VISIT (OUTPATIENT)
Dept: FAMILY MEDICINE CLINIC | Age: 67
End: 2017-08-30

## 2017-08-30 VITALS
DIASTOLIC BLOOD PRESSURE: 76 MMHG | WEIGHT: 152.8 LBS | SYSTOLIC BLOOD PRESSURE: 110 MMHG | HEIGHT: 66 IN | BODY MASS INDEX: 24.56 KG/M2 | HEART RATE: 86 BPM | RESPIRATION RATE: 16 BRPM | OXYGEN SATURATION: 98 % | TEMPERATURE: 98.3 F

## 2017-08-30 DIAGNOSIS — I10 ESSENTIAL HYPERTENSION, BENIGN: ICD-10-CM

## 2017-08-30 DIAGNOSIS — Z79.4 TYPE 2 DIABETES MELLITUS WITHOUT COMPLICATION, WITH LONG-TERM CURRENT USE OF INSULIN (HCC): Primary | ICD-10-CM

## 2017-08-30 DIAGNOSIS — E11.9 TYPE 2 DIABETES MELLITUS WITHOUT COMPLICATION, WITH LONG-TERM CURRENT USE OF INSULIN (HCC): Primary | ICD-10-CM

## 2017-08-30 DIAGNOSIS — E78.2 MIXED HYPERLIPIDEMIA: ICD-10-CM

## 2017-08-30 DIAGNOSIS — Z23 ENCOUNTER FOR IMMUNIZATION: ICD-10-CM

## 2017-08-30 RX ORDER — OMEPRAZOLE 40 MG/1
40 CAPSULE, DELAYED RELEASE ORAL DAILY
Qty: 90 CAP | Refills: 1 | Status: SHIPPED | OUTPATIENT
Start: 2017-08-30 | End: 2018-02-09 | Stop reason: SDUPTHER

## 2017-08-30 RX ORDER — LOSARTAN POTASSIUM AND HYDROCHLOROTHIAZIDE 25; 100 MG/1; MG/1
1 TABLET ORAL DAILY
Qty: 90 TAB | Refills: 1 | Status: SHIPPED | OUTPATIENT
Start: 2017-08-30 | End: 2018-02-09 | Stop reason: SDUPTHER

## 2017-08-30 RX ORDER — SIMVASTATIN 10 MG/1
10 TABLET, FILM COATED ORAL
Qty: 90 TAB | Refills: 1 | Status: SHIPPED | OUTPATIENT
Start: 2017-08-30 | End: 2018-02-09 | Stop reason: SDUPTHER

## 2017-08-30 RX ORDER — GLIMEPIRIDE 4 MG/1
4 TABLET ORAL 2 TIMES DAILY
Qty: 60 TAB | Refills: 3 | Status: CANCELLED | OUTPATIENT
Start: 2017-08-30

## 2017-08-30 NOTE — PROGRESS NOTES
Verbal order for PPSV23 vaccine    Patient given PPSV23 vaccine in L deltoid. Patient tolerated well. No adverse effects noted.     Lot [id number]  Exp  01/20/2019  [facility] [employer]  . [address]  [id number]

## 2017-08-30 NOTE — PATIENT INSTRUCTIONS
Learning About Diabetes Food Guidelines  Your Care Instructions  Meal planning is important to manage diabetes. It helps keep your blood sugar at a target level (which you set with your doctor). You don't have to eat special foods. You can eat what your family eats, including sweets once in a while. But you do have to pay attention to how often you eat and how much you eat of certain foods. You may want to work with a dietitian or a certified diabetes educator (CDE) to help you plan meals and snacks. A dietitian or CDE can also help you lose weight if that is one of your goals. What should you know about eating carbs? Managing the amount of carbohydrate (carbs) you eat is an important part of healthy meals when you have diabetes. Carbohydrate is found in many foods. · Learn which foods have carbs. And learn the amounts of carbs in different foods. ¨ Bread, cereal, pasta, and rice have about 15 grams of carbs in a serving. A serving is 1 slice of bread (1 ounce), ½ cup of cooked cereal, or 1/3 cup of cooked pasta or rice. ¨ Fruits have 15 grams of carbs in a serving. A serving is 1 small fresh fruit, such as an apple or orange; ½ of a banana; ½ cup of cooked or canned fruit; ½ cup of fruit juice; 1 cup of melon or raspberries; or 2 tablespoons of dried fruit. ¨ Milk and no-sugar-added yogurt have 15 grams of carbs in a serving. A serving is 1 cup of milk or 2/3 cup of no-sugar-added yogurt. ¨ Starchy vegetables have 15 grams of carbs in a serving. A serving is ½ cup of mashed potatoes or sweet potato; 1 cup winter squash; ½ of a small baked potato; ½ cup of cooked beans; or ½ cup cooked corn or green peas. · Learn how much carbs to eat each day and at each meal. A dietitian or CDE can teach you how to keep track of the amount of carbs you eat. This is called carbohydrate counting. · If you are not sure how to count carbohydrate grams, use the Plate Method to plan meals.  It is a good, quick way to make sure that you have a balanced meal. It also helps you spread carbs throughout the day. ¨ Divide your plate by types of foods. Put non-starchy vegetables on half the plate, meat or other protein food on one-quarter of the plate, and a grain or starchy vegetable in the final quarter of the plate. To this you can add a small piece of fruit and 1 cup of milk or yogurt, depending on how many carbs you are supposed to eat at a meal.  · Try to eat about the same amount of carbs at each meal. Do not \"save up\" your daily allowance of carbs to eat at one meal.  · Proteins have very little or no carbs per serving. Examples of proteins are beef, chicken, turkey, fish, eggs, tofu, cheese, cottage cheese, and peanut butter. A serving size of meat is 3 ounces, which is about the size of a deck of cards. Examples of meat substitute serving sizes (equal to 1 ounce of meat) are 1/4 cup of cottage cheese, 1 egg, 1 tablespoon of peanut butter, and ½ cup of tofu. How can you eat out and still eat healthy? · Learn to estimate the serving sizes of foods that have carbohydrate. If you measure food at home, it will be easier to estimate the amount in a serving of restaurant food. · If the meal you order has too much carbohydrate (such as potatoes, corn, or baked beans), ask to have a low-carbohydrate food instead. Ask for a salad or green vegetables. · If you use insulin, check your blood sugar before and after eating out to help you plan how much to eat in the future. · If you eat more carbohydrate at a meal than you had planned, take a walk or do other exercise. This will help lower your blood sugar. What else should you know? · Limit saturated fat, such as the fat from meat and dairy products. This is a healthy choice because people who have diabetes are at higher risk of heart disease. So choose lean cuts of meat and nonfat or low-fat dairy products. Use olive or canola oil instead of butter or shortening when cooking.   · Don't skip meals. Your blood sugar may drop too low if you skip meals and take insulin or certain medicines for diabetes. · Check with your doctor before you drink alcohol. Alcohol can cause your blood sugar to drop too low. Alcohol can also cause a bad reaction if you take certain diabetes medicines. Follow-up care is a key part of your treatment and safety. Be sure to make and go to all appointments, and call your doctor if you are having problems. It's also a good idea to know your test results and keep a list of the medicines you take. Where can you learn more? Go to http://jah[employer]OLT Solutionslorenzo.info/. Enter K441 in the search box to learn more about \"Learning About Diabetes Food Guidelines. \"  Current as of: March 13, 2017  Content Version: 11.3  © 6920-5494 [employer]. Care instructions adapted under license by [employer] (which disclaims liability or warranty for this information). If you have questions about a medical condition or this instruction, always ask your healthcare professional. [place] [address] any warranty or liability for your use of this information. Low Sodium Diet (2,000 Milligram): Care Instructions  Your Care Instructions  Too much sodium causes your body to hold on to extra water. This can raise your blood pressure and force your heart and kidneys to work harder. In very serious cases, this could cause you to be put in the hospital. It might even be life-threatening. By limiting sodium, you will feel better and lower your risk of serious problems. The most common source of sodium is salt. People get most of the salt in their diet from canned, prepared, and packaged foods. Fast food and restaurant meals also are very high in sodium. Your doctor will probably limit your sodium to less than 2,000 milligrams (mg) a day. This limit counts all the sodium in prepared and packaged foods and any salt you add to your food.   Follow-up care is a key part of your treatment and safety. Be sure to make and go to all appointments, and call your doctor if you are having problems. It's also a good idea to know your test results and keep a list of the medicines you take. How can you care for yourself at home? Read food labels  · Read labels on cans and food packages. The labels tell you how much sodium is in each serving. Make sure that you look at the serving size. If you eat more than the serving size, you have eaten more sodium. · Food labels also tell you the Percent Daily Value for sodium. Choose products with low Percent Daily Values for sodium. · Be aware that sodium can come in forms other than salt, including monosodium glutamate (MSG), sodium citrate, and sodium bicarbonate (baking soda). MSG is often added to Asian food. When you eat out, you can sometimes ask for food without MSG or added salt. Buy low-sodium foods  · Buy foods that are labeled \"unsalted\" (no salt added), \"sodium-free\" (less than 5 mg of sodium per serving), or \"low-sodium\" (less than 140 mg of sodium per serving). Foods labeled \"reduced-sodium\" and \"light sodium\" may still have too much sodium. Be sure to read the label to see how much sodium you are getting. · Buy fresh vegetables, or frozen vegetables without added sauces. Buy low-sodium versions of canned vegetables, soups, and other canned goods. Prepare low-sodium meals  · Cut back on the amount of salt you use in cooking. This will help you adjust to the taste. Do not add salt after cooking. One teaspoon of salt has about 2,300 mg of sodium. · Take the salt shaker off the table. · Flavor your food with garlic, lemon juice, onion, vinegar, herbs, and spices. Do not use soy sauce, lite soy sauce, steak sauce, onion salt, garlic salt, celery salt, mustard, or ketchup on your food. · Use low-sodium salad dressings, sauces, and ketchup. Or make your own salad dressings and sauces without adding salt.   · Use less salt (or none) when recipes call for it. You can often use half the salt a recipe calls for without losing flavor. Other foods such as rice, pasta, and grains do not need added salt. · Rinse canned vegetables, and cook them in fresh water. This removes some--but not all--of the salt. · Avoid water that is naturally high in sodium or that has been treated with water softeners, which add sodium. Call your local water company to find out the sodium content of your water supply. If you buy bottled water, read the label and choose a sodium-free brand. Avoid high-sodium foods  · Avoid eating:  ¨ Smoked, cured, salted, and canned meat, fish, and poultry. ¨ Ham, lenz, hot dogs, and luncheon meats. ¨ Regular, hard, and processed cheese and regular peanut butter. ¨ Crackers with salted tops, and other salted snack foods such as pretzels, chips, and salted popcorn. ¨ Frozen prepared meals, unless labeled low-sodium. ¨ Canned and dried soups, broths, and bouillon, unless labeled sodium-free or low-sodium. ¨ Canned vegetables, unless labeled sodium-free or low-sodium. ¨ [place] fries, pizza, tacos, and other fast foods. ¨ Pickles, olives, ketchup, and other condiments, especially soy sauce, unless labeled sodium-free or low-sodium. Where can you learn more? Go to http://jah-lorenzo.info/. Enter W189 in the search box to learn more about \"Low Sodium Diet (2,000 Milligram): Care Instructions. \"  Current as of: July 26, 2016  Content Version: 11.3  © 2328-2593 [employer]. Care instructions adapted under license by [employer] (which disclaims liability or warranty for this information). If you have questions about a medical condition or this instruction, always ask your healthcare professional. [address] any warranty or liability for your use of this information.     Vaccine Information Statement    Pneumococcal Polysaccharide Vaccine: What You Need to Know    Many Vaccine Information Statements are available in Latvian and other languages. See www.immunize.org/vis. Hojas de información Sobre Vacunas están disponibles en español y en muchos otros idiomas. Visite Tammy.si. 1. Why get vaccinated? Vaccination can protect older adults (and some children and younger adults) from pneumococcal disease. Pneumococcal disease is caused by bacteria that can spread from person to person through close contact. It can cause ear infections, and it can also lead to more serious infections of the:   Lungs (pneumonia),   Blood (bacteremia), and   Covering of the brain and spinal cord (meningitis). Meningitis can cause deafness and brain damage, and it can be fatal.      Anyone can get pneumococcal disease, but children under 3years of age, people with certain medical conditions, adults over 72years of age, and cigarette smokers are at the highest risk. About 18,000 older adults die each year from pneumococcal disease in the [place]. Treatment of pneumococcal infections with penicillin and other drugs used to be more effective. But some strains of the disease have become resistant to these drugs. This makes prevention of the disease, through vaccination, even more important. 2. Pneumococcal polysaccharide vaccine (PPSV23)    Pneumococcal polysaccharide vaccine (PPSV23) protects against 23 types of pneumococcal bacteria. It will not prevent all pneumococcal disease. PPSV23 is recommended for:   All adults 72years of age and older,   Anyone 2 through 59years of age with certain long-term health problems,   Anyone 2 through 59years of age with a weakened immune system,   Adults 23 through 59years of age who smoke cigarettes or have asthma. Most people need only one dose of PPSV. A second dose is recommended for certain high-risk groups.   People 72 and older should get a dose even if they have gotten one or more doses of the vaccine before they turned 72. Your healthcare provider can give you more information about these recommendations. Most healthy adults develop protection within 2 to 3 weeks of getting the shot. 3. Some people should not get this vaccine     Anyone who has had a life-threatening allergic reaction to PPSV should not get another dose.  Anyone who has a severe allergy to any component of PPSV should not receive it. Tell your provider if you have any severe allergies.  Anyone who is moderately or severely ill when the shot is scheduled may be asked to wait until they recover before getting the vaccine. Someone with a mild illness can usually be vaccinated.  Children less than 3years of age should not receive this vaccine.  There is no evidence that PPSV is harmful to either a pregnant woman or to her fetus. However, as a precaution, women who need the vaccine should be vaccinated before becoming pregnant, if possible. 4. Risks of a vaccine reaction    With any medicine, including vaccines, there is a chance of side effects. These are usually mild and go away on their own, but serious reactions are also possible. About half of people who get PPSV have mild side effects, such as redness or pain where the shot is given, which go away within about two days. Less than 1 out of 100 people develop a fever, muscle aches, or more severe local reactions. Problems that could happen after any vaccine:     People sometimes faint after a medical procedure, including vaccination. Sitting or lying down for about 15 minutes can help prevent fainting, and injuries caused by a fall. Tell your doctor if you feel dizzy, or have vision changes or ringing in the ears.  Some people get severe pain in the shoulder and have difficulty moving the arm where a shot was given. This happens very rarely.  Any medication can cause a severe allergic reaction.  Such reactions from a vaccine are very rare, estimated at about 1 in a million doses, and would happen within a few minutes to a few hours after the vaccination. As with any medicine, there is a very remote chance of a vaccine causing a serious injury or death. The safety of vaccines is always being monitored. For more information, visit: www.cdc.gov/vaccinesafety/     5. What if there is a serious reaction? What should I look for? Look for anything that concerns you, such as signs of a severe allergic reaction, very high fever, or unusual behavior. Signs of a severe allergic reaction can include hives, swelling of the face and throat, difficulty breathing, a fast heartbeat, dizziness, and weakness. These would usually start a few minutes to a few hours after the vaccination. What should I do? If you think it is a severe allergic reaction or other emergency that cant wait, call 9-1-1 or get to the nearest hospital. Otherwise, call your doctor. Afterward, the reaction should be reported to the Vaccine Adverse Event Reporting System (VAERS). Your doctor might file this report, or you can do it yourself through the VAERS web site at www.vaers. [facility].gov, or by calling 0-462.236.5407. VAERS does not give medical advice. 6. How can I learn more?  Ask your doctor. He or she can give you the vaccine package insert or suggest other sources of information.  Call your local or state health department.    Contact the Centers for Disease Control and Prevention (CDC):  - Call 7-610.452.1876 (1-800-CDC-INFO) or  - Visit CDCs website at [employer] [employer] [facility] 04/24/2015    [facility] for Disease Control and Prevention    Office Use Only

## 2017-08-30 NOTE — MR AVS SNAPSHOT
Visit Information Date & Time Provider Department Dept. Phone Encounter #  
 8/30/2017  1:45 PM Chucky Cleary, [address] [facility] [phone] [id number] Follow-up Instructions Return in about 3 months (around 11/30/2017). Your Appointments 11/22/2017 10:45 AM  
ESTABLISHED PATIENT with Jez Gallagher MD  
[facility] ([facility]) [address] [facility] [address]  
[address] [facility] [address] [facility] [address]  
  
    
 2/28/2018 11:15 AM  
PROCEDURE with Jez Gallagher MD  
[facility] ([facility]) Appt Note: 6mo Cysto/Cyto  
 [address] 
[address] [facility] [address]  
[address] [facility] [address] [facility] [address] 6/13/2018 11:00 AM  
Office Visit with Dorothy Garcia MD  
[facility]) Appt Note: 1 yr  
 [address] [facility], [address] [facility] [address]  
[address], [address] [facility] [address] Upcoming Health Maintenance Date Due Pneumococcal 65+ High/Highest Risk (2 of 2 - PPSV23) 3/25/2017 INFLUENZA AGE 9 TO ADULT 8/1/2017 HEMOGLOBIN A1C Q6M 12/28/2017 EYE EXAM RETINAL OR DILATED Q1 2/8/2018 MEDICARE YEARLY EXAM 6/1/2018 MICROALBUMIN Q1 6/28/2018 LIPID PANEL Q1 6/28/2018 FOOT EXAM Q1 8/30/2018 GLAUCOMA SCREENING Q2Y 2/8/2019 COLONOSCOPY 6/3/2023 DTaP/Tdap/Td series (2 - Td) 4/20/2025 Allergies as of 8/30/2017  Review Complete On: 8/30/2017 By: Chucky Cleary MD  
 No Known Allergies Current Immunizations  Reviewed on 12/20/2016 Name Date Hep A Vaccine 1/11/2016, 10/31/2002 Influenza Vaccine 10/1/2016 Influenza Vaccine (Quad) PF 10/23/2015 Pneumococcal Conjugate (PCV-13) 6/29/2016 Pneumococcal Polysaccharide (PPSV-23) 8/30/2017 Tdap 4/20/2015 Typhoid Vaccine 1/11/2016, 10/31/2002 Yellow Fever Vaccine 1/11/2016 Not reviewed this visit You Were Diagnosed With   
  
 Codes Comments Type 2 diabetes mellitus without complication, with long-term current use of insulin (HCC)    -  Primary ICD-10-CM: E11.9, Z79.4 ICD-9-CM: 250.00, V58.67 Essential hypertension, benign     ICD-10-CM: I10 
ICD-9-CM: 401.1 Mixed hyperlipidemia     ICD-10-CM: E78.2 ICD-9-CM: 272.2 Encounter for immunization     ICD-10-CM: Y83 ICD-9-CM: V03.89 Vitals BP Pulse Temp Resp Height(growth percentile) Weight(growth percentile) 110/76 (BP 1 Location: Left arm, BP Patient Position: Sitting) 86 98.3 °F (36.8 °C) (Oral) 16 5' 6\" (1.676 m) 152 lb 12.8 oz (69.3 kg) SpO2 BMI Smoking Status 98% 24.66 kg/m2 Never Smoker BMI and BSA Data Body Mass Index Body Surface Area  
 24.66 kg/m 2 1.8 m 2 Preferred Pharmacy Pharmacy Name Phone [facility] [address] [facility] [phone] Your Updated Medication List  
  
   
This list is accurate as of: 8/30/17  2:47 PM.  Always use your most recent med list.  
  
  
  
  
 aspirin 81 mg chewable tablet Take 81 mg by mouth daily. CINNAMON PO Take  by mouth. SHOBHA EXTRACT PO Take  by mouth.  
  
 glimepiride 4 mg tablet Commonly known as:  AMARYL Take 1 Tab by mouth two (2) times a day. glucose blood VI test strips strip Commonly known as:  ONETOUCH ULTRA TEST Check twice daily * Insulin Needles (Disposable) 31 gauge x 3/16\" Ndle Commonly known as:  BD INSULIN PEN NEEDLE UF MINI Check twice daily * BD INSULIN PEN NEEDLE UF SHORT 31 gauge x 5/16\" Ndle Generic drug:  Insulin Needles (Disposable) JANUMET 50-1,000 mg per tablet Generic drug:  SITagliptin-metFORMIN Take 1 Tab by mouth two (2) times daily (with meals). * Lancets Misc Check twice daily * TRUEPLUS LANCETS 33 gauge Misc Generic drug:  lancets  
  
 losartan-hydroCHLOROthiazide 100-25 mg per tablet Commonly known as:  HYZAAR Take 1 Tab by mouth daily. metFORMIN 1,000 mg Tg24 24 hour tablet Commonly known as:  Amee Waldron Take  by mouth. omeprazole 40 mg capsule Commonly known as:  PRILOSEC Take 1 Cap by mouth daily. pioglitazone 15 mg tablet Commonly known as:  ACTOS Take 15 mg by mouth daily. simvastatin 10 mg tablet Commonly known as:  ZOCOR Take 1 Tab by mouth nightly. tadalafil 20 mg tablet Commonly known as:  CIALIS Take 1 Tab by mouth daily as needed. TURMERIC ROOT EXTRACT PO Take  by mouth. VITAMIN B-12 1,000 mcg sublingual tablet Generic drug:  cyanocobalamin Take 1,000 mcg by mouth daily. VITAMIN D2 PO Take 1 Cap by mouth daily. * Notice: This list has 4 medication(s) that are the same as other medications prescribed for you. Read the directions carefully, and ask your doctor or other care provider to review them with you. Prescriptions Sent to Pharmacy Refills  
 glucose blood VI test strips (ONETOUCH ULTRA TEST) strip 6 Sig: Check twice daily Class: Normal  
 Pharmacy: [address] [place] [address] Ph #: [phone]  
 losartan-hydroCHLOROthiazide (HYZAAR) 100-25 mg per tablet 1 Sig: Take 1 Tab by mouth daily. Class: Normal  
 Pharmacy: [address] [place] [address] Ph #: [phone] Route: Oral  
 omeprazole (PRILOSEC) 40 mg capsule 1 Sig: Take 1 Cap by mouth daily. Class: Normal  
 Pharmacy: [address] [place], [place] [address] Ph #: [phone] Route: Oral  
 simvastatin (ZOCOR) 10 mg tablet 1 Sig: Take 1 Tab by mouth nightly. Class: Normal  
 Pharmacy: [address] [place] [address] Ph #: [phone]  Route: Oral  
  
 We Performed the Following ADMIN PNEUMOCOCCAL VACCINE [ [facility]] PNEUMOCOCCAL POLYSACCHARIDE VACCINE, 23-VALENT, ADULT OR IMMUNOSUPPRESSED PT DOSE, [74394 CPT(R)] Follow-up Instructions Return in about 3 months (around 11/30/2017). Patient Instructions Learning About Diabetes Food Guidelines Your Care Instructions Meal planning is important to manage diabetes. It helps keep your blood sugar at a target level (which you set with your doctor). You don't have to eat special foods. You can eat what your family eats, including sweets once in a while. But you do have to pay attention to how often you eat and how much you eat of certain foods. You may want to work with a dietitian or a certified diabetes educator (CDE) to help you plan meals and snacks. A dietitian or CDE can also help you lose weight if that is one of your goals. What should you know about eating carbs? Managing the amount of carbohydrate (carbs) you eat is an important part of healthy meals when you have diabetes. Carbohydrate is found in many foods. · Learn which foods have carbs. And learn the amounts of carbs in different foods. ¨ Bread, cereal, pasta, and rice have about 15 grams of carbs in a serving. A serving is 1 slice of bread (1 ounce), ½ cup of cooked cereal, or 1/3 cup of cooked pasta or rice. ¨ Fruits have 15 grams of carbs in a serving. A serving is 1 small fresh fruit, such as an apple or orange; ½ of a banana; ½ cup of cooked or canned fruit; ½ cup of fruit juice; 1 cup of melon or raspberries; or 2 tablespoons of dried fruit. ¨ Milk and no-sugar-added yogurt have 15 grams of carbs in a serving. A serving is 1 cup of milk or 2/3 cup of no-sugar-added yogurt. ¨ Starchy vegetables have 15 grams of carbs in a serving. A serving is ½ cup of mashed potatoes or sweet potato; 1 cup winter squash; ½ of a small baked potato; ½ cup of cooked beans; or ½ cup cooked corn or green peas. · Learn how much carbs to eat each day and at each meal. A dietitian or CDE can teach you how to keep track of the amount of carbs you eat. This is called carbohydrate counting. · If you are not sure how to count carbohydrate grams, use the Plate Method to plan meals. It is a good, quick way to make sure that you have a balanced meal. It also helps you spread carbs throughout the day. ¨ Divide your plate by types of foods. Put non-starchy vegetables on half the plate, meat or other protein food on one-quarter of the plate, and a grain or starchy vegetable in the final quarter of the plate. To this you can add a small piece of fruit and 1 cup of milk or yogurt, depending on how many carbs you are supposed to eat at a meal. 
· Try to eat about the same amount of carbs at each meal. Do not \"save up\" your daily allowance of carbs to eat at one meal. 
· Proteins have very little or no carbs per serving. Examples of proteins are beef, chicken, turkey, fish, eggs, tofu, cheese, cottage cheese, and peanut butter. A serving size of meat is 3 ounces, which is about the size of a deck of cards. Examples of meat substitute serving sizes (equal to 1 ounce of meat) are 1/4 cup of cottage cheese, 1 egg, 1 tablespoon of peanut butter, and ½ cup of tofu. How can you eat out and still eat healthy? · Learn to estimate the serving sizes of foods that have carbohydrate. If you measure food at home, it will be easier to estimate the amount in a serving of restaurant food. · If the meal you order has too much carbohydrate (such as potatoes, corn, or baked beans), ask to have a low-carbohydrate food instead. Ask for a salad or green vegetables. · If you use insulin, check your blood sugar before and after eating out to help you plan how much to eat in the future. · If you eat more carbohydrate at a meal than you had planned, take a walk or do other exercise. This will help lower your blood sugar. What else should you know? · Limit saturated fat, such as the fat from meat and dairy products. This is a healthy choice because people who have diabetes are at higher risk of heart disease. So choose lean cuts of meat and nonfat or low-fat dairy products. Use olive or canola oil instead of butter or shortening when cooking. · Don't skip meals. Your blood sugar may drop too low if you skip meals and take insulin or certain medicines for diabetes. · Check with your doctor before you drink alcohol. Alcohol can cause your blood sugar to drop too low. Alcohol can also cause a bad reaction if you take certain diabetes medicines. Follow-up care is a key part of your treatment and safety. Be sure to make and go to all appointments, and call your doctor if you are having problems. It's also a good idea to know your test results and keep a list of the medicines you take. Where can you learn more? Go to http://jah-lorenzo.info/. Enter H922 in the search box to learn more about \"Learning About Diabetes Food Guidelines. \" Current as of: March 13, 2017 Content Version: 11.3 © 2396-6660 [employer]. Care instructions adapted under license by [employer] (which disclaims liability or warranty for this information). If you have questions about a medical condition or this instruction, always ask your healthcare professional. [address] any warranty or liability for your use of this information. Low Sodium Diet (2,000 Milligram): Care Instructions Your Care Instructions Too much sodium causes your body to hold on to extra water. This can raise your blood pressure and force your heart and kidneys to work harder. In very serious cases, this could cause you to be put in the hospital. It might even be life-threatening. By limiting sodium, you will feel better and lower your risk of serious problems. The most common source of sodium is salt.  People get most of the salt in their diet from canned, prepared, and packaged foods. Fast food and restaurant meals also are very high in sodium. Your doctor will probably limit your sodium to less than 2,000 milligrams (mg) a day. This limit counts all the sodium in prepared and packaged foods and any salt you add to your food. Follow-up care is a key part of your treatment and safety. Be sure to make and go to all appointments, and call your doctor if you are having problems. It's also a good idea to know your test results and keep a list of the medicines you take. How can you care for yourself at home? Read food labels · Read labels on cans and food packages. The labels tell you how much sodium is in each serving. Make sure that you look at the serving size. If you eat more than the serving size, you have eaten more sodium. · Food labels also tell you the Percent Daily Value for sodium. Choose products with low Percent Daily Values for sodium. · Be aware that sodium can come in forms other than salt, including monosodium glutamate (MSG), sodium citrate, and sodium bicarbonate (baking soda). MSG is often added to Asian food. When you eat out, you can sometimes ask for food without MSG or added salt. Buy low-sodium foods · Buy foods that are labeled \"unsalted\" (no salt added), \"sodium-free\" (less than 5 mg of sodium per serving), or \"low-sodium\" (less than 140 mg of sodium per serving). Foods labeled \"reduced-sodium\" and \"light sodium\" may still have too much sodium. Be sure to read the label to see how much sodium you are getting. · Buy fresh vegetables, or frozen vegetables without added sauces. Buy low-sodium versions of canned vegetables, soups, and other canned goods. Prepare low-sodium meals · Cut back on the amount of salt you use in cooking. This will help you adjust to the taste. Do not add salt after cooking. One teaspoon of salt has about 2,300 mg of sodium. · Take the salt shaker off the table. · Flavor your food with garlic, lemon juice, onion, vinegar, herbs, and spices. Do not use soy sauce, lite soy sauce, steak sauce, onion salt, garlic salt, celery salt, mustard, or ketchup on your food. · Use low-sodium salad dressings, sauces, and ketchup. Or make your own salad dressings and sauces without adding salt. · Use less salt (or none) when recipes call for it. You can often use half the salt a recipe calls for without losing flavor. Other foods such as rice, pasta, and grains do not need added salt. · Rinse canned vegetables, and cook them in fresh water. This removes somebut not allof the salt. · Avoid water that is naturally high in sodium or that has been treated with water softeners, which add sodium. Call your local water company to find out the sodium content of your water supply. If you buy bottled water, read the label and choose a sodium-free brand. Avoid high-sodium foods · Avoid eating: ¨ Smoked, cured, salted, and canned meat, fish, and poultry. ¨ Ham, lenz, hot dogs, and luncheon meats. ¨ Regular, hard, and processed cheese and regular peanut butter. ¨ Crackers with salted tops, and other salted snack foods such as pretzels, chips, and salted popcorn. ¨ Frozen prepared meals, unless labeled low-sodium. ¨ Canned and dried soups, broths, and bouillon, unless labeled sodium-free or low-sodium. ¨ Canned vegetables, unless labeled sodium-free or low-sodium. ¨ Western Rufina fries, pizza, tacos, and other fast foods. ¨ Pickles, olives, ketchup, and other condiments, especially soy sauce, unless labeled sodium-free or low-sodium. Where can you learn more? Go to http://jah-lorenzo.info/. Enter E013 in the search box to learn more about \"Low Sodium Diet (2,000 Milligram): Care Instructions. \" Current as of: July 26, 2016 Content Version: 11.3 © 0972-0000 [employer].  Care instructions adapted under license by Arin Romero (which disclaims liability or warranty for this information). If you have questions about a medical condition or this instruction, always ask your healthcare professional. [address] any warranty or liability for your use of this information. Vaccine Information Statement Pneumococcal Polysaccharide Vaccine: What You Need to Know Many Vaccine Information Statements are available in [place] and other languages. See www.immunize.org/vis. Hojas de información Sobre Vacunas están disponibles en español y en muchos otros idiomas. Visite SandersScale.si. 1. Why get vaccinated? Vaccination can protect older adults (and some children and younger adults) from pneumococcal disease. Pneumococcal disease is caused by bacteria that can spread from person to person through close contact. It can cause ear infections, and it can also lead to more serious infections of the: 
 Lungs (pneumonia),  Blood (bacteremia), and 
 Covering of the brain and spinal cord (meningitis). Meningitis can cause deafness and brain damage, and it can be fatal.   
 
Anyone can get pneumococcal disease, but children under 3years of age, people with certain medical conditions, adults over 72years of age, and cigarette smokers are at the highest risk. About 18,000 older adults die each year from pneumococcal disease in the [place]. Treatment of pneumococcal infections with penicillin and other drugs used to be more effective. But some strains of the disease have become resistant to these drugs. This makes prevention of the disease, through vaccination, even more important. 2. Pneumococcal polysaccharide vaccine (PPSV23) Pneumococcal polysaccharide vaccine (PPSV23) protects against 23 types of pneumococcal bacteria. It will not prevent all pneumococcal disease. PPSV23 is recommended for:  All adults 72years of age and older, 
  Anyone 2 through 59years of age with certain long-term health problems, 
 Anyone 2 through 59years of age with a weakened immune system, 
 Adults 23 through 59years of age who smoke cigarettes or have asthma. Most people need only one dose of PPSV. A second dose is recommended for certain high-risk groups. People 72 and older should get a dose even if they have gotten one or more doses of the vaccine before they turned 65. Your healthcare provider can give you more information about these recommendations. Most healthy adults develop protection within 2 to 3 weeks of getting the shot. 3. Some people should not get this vaccine  Anyone who has had a life-threatening allergic reaction to PPSV should not get another dose.  Anyone who has a severe allergy to any component of PPSV should not receive it. Tell your provider if you have any severe allergies.  Anyone who is moderately or severely ill when the shot is scheduled may be asked to wait until they recover before getting the vaccine. Someone with a mild illness can usually be vaccinated.  Children less than 3years of age should not receive this vaccine.  There is no evidence that PPSV is harmful to either a pregnant woman or to her fetus. However, as a precaution, women who need the vaccine should be vaccinated before becoming pregnant, if possible. 4. Risks of a vaccine reaction With any medicine, including vaccines, there is a chance of side effects. These are usually mild and go away on their own, but serious reactions are also possible. About half of people who get PPSV have mild side effects, such as redness or pain where the shot is given, which go away within about two days. Less than 1 out of 100 people develop a fever, muscle aches, or more severe local reactions. Problems that could happen after any vaccine:  People sometimes faint after a medical procedure, including vaccination. Sitting or lying down for about 15 minutes can help prevent fainting, and injuries caused by a fall. Tell your doctor if you feel dizzy, or have vision changes or ringing in the ears.  Some people get severe pain in the shoulder and have difficulty moving the arm where a shot was given. This happens very rarely.  Any medication can cause a severe allergic reaction. Such reactions from a vaccine are very rare, estimated at about 1 in a million doses, and would happen within a few minutes to a few hours after the vaccination. As with any medicine, there is a very remote chance of a vaccine causing a serious injury or death. The safety of vaccines is always being monitored. For more information, visit: www.cdc.gov/vaccinesafety/  
 
5. What if there is a serious reaction? What should I look for? Look for anything that concerns you, such as signs of a severe allergic reaction, very high fever, or unusual behavior. Signs of a severe allergic reaction can include hives, swelling of the face and throat, difficulty breathing, a fast heartbeat, dizziness, and weakness. These would usually start a few minutes to a few hours after the vaccination. What should I do? If you think it is a severe allergic reaction or other emergency that cant wait, call 9-1-1 or get to the nearest hospital. Otherwise, call your doctor. Afterward, the reaction should be reported to the Vaccine Adverse Event Reporting System (VAERS). Your doctor might file this report, or you can do it yourself through the VAERS web site at www.vaers. hhs.gov, or by calling 5-102.516.8192. VAERS does not give medical advice. 6. How can I learn more?  Ask your doctor. He or she can give you the vaccine package insert or suggest other sources of information.  Call your local or state health department.  
 Contact the Centers for Disease Control and Prevention (CDC): 
- Call 7-451.800.2109 (1-800-CDC-INFO) or 
 - Visit CDCs website at www.cdc.gov/vaccines Vaccine Information Statement PPSV  
04/24/2015 Department of [facility] and GR[employer] Centers for Disease Control and Prevention Office Use Only [facility] Dear Denisha All: 
Thank you for requesting a Offerial account. Our records indicate that you already have an active Offerial account. You can access your account anytime at [email or web address] Did you know that you can access your hospital and ER discharge instructions at any time in Offerial? You can also review all of your test results from your hospital stay or ER visit. Additional Information If you have questions, please visit the Frequently Asked Questions section of the Offerial website at [email or web address] Remember, Offerial is NOT to be used for urgent needs. For medical emergencies, dial 911. Now available from your iPhone and Android! Please provide this summary of care documentation to your next provider. Your primary care clinician is listed as Peng Figueroa. If you have any questions after today's visit, please call [phone].

## 2017-08-30 NOTE — PROGRESS NOTES
1. Have you been to the ER, urgent care clinic since your last visit? Hospitalized since your last visit? No    2. Have you seen or consulted any other health care providers outside of the [facility] since your last visit? Include any pap smears or colon screening. [facility] 5/31/17      Patient wants to discuss benefits of taking medication in the morning vs in the evening. Patient has not had dm foot exam.  Patient last dm eye exam was with Dr. Harman Mahoney in October or November 2016.   Last pneumonia vaccine 6/29/16

## 2017-08-30 NOTE — PROGRESS NOTES
HISTORY OF PRESENT ILLNESS  Bud Fall is a 74 y.o. male. HPI: Here for routine follow up. Diabetes. Gradually improving. Currently review home blood sugar log. Has changed his diet habit significantly and now fasting sugar is around 110-120. No hypoglycemia. Also following endocrinologist with compliance. HBA1C improved from 8.6 to 8.3. Said he is trying to be complaint with diet modification. Has coming up appt with Marivel Salcedo tomorrow. Will ask medication refill with them. Denies any headache, dizziness, no chest pain or trouble breathing, no arm or leg weakness. No nausea or vomiting, no weight or appetite changes, no depression or anxiety. No urine or bowel complains, no palpitation, no diaphoresis. Visit Vitals    /76 (BP 1 Location: Left arm, BP Patient Position: Sitting)    Pulse 86    Temp 98.3 °F (36.8 °C) (Oral)    Resp 16    Ht 5' 6\" (1.676 m)    Wt 152 lb 12.8 oz (69.3 kg)    SpO2 98%    BMI 24.66 kg/m2     Review medication list, vitals, problem list,allergies. Also has h/o hypertension. On medication. No side effects. Blood pressure stable. Asymptomatic. H/o hyperlipidemia. On medication. No side effects. Review labs.    Lab Results   Component Value Date/Time    WBC 17.5 03/22/2015 12:45 PM    HGB 13.3 03/22/2015 12:45 PM    HCT 41.4 03/22/2015 12:45 PM    PLATELET 722 20/01/6584 12:45 PM    MCV 90.6 03/22/2015 12:45 PM     Lab Results   Component Value Date/Time    Sodium 138 06/28/2017 09:30 AM    Potassium 4.7 06/28/2017 09:30 AM    Chloride 102 06/28/2017 09:30 AM    CO2 30 06/28/2017 09:30 AM    Anion gap 6 06/28/2017 09:30 AM    Glucose 74 06/28/2017 09:30 AM    BUN 15 06/28/2017 09:30 AM    Creatinine 0.90 06/28/2017 09:30 AM    BUN/Creatinine ratio 17 06/28/2017 09:30 AM    GFR est AA >60 06/28/2017 09:30 AM    GFR est non-AA >60 06/28/2017 09:30 AM    Calcium 9.0 06/28/2017 09:30 AM    Bilirubin, total 0.3 06/28/2017 09:30 AM    AST (SGOT) 19 06/28/2017 09:30 AM    Alk. phosphatase 61 06/28/2017 09:30 AM    Protein, total 7.2 06/28/2017 09:30 AM    Albumin 4.0 06/28/2017 09:30 AM    Globulin 3.2 06/28/2017 09:30 AM    A-G Ratio 1.3 06/28/2017 09:30 AM    ALT (SGPT) 24 06/28/2017 09:30 AM     Lab Results   Component Value Date/Time    Cholesterol, total 130 06/28/2017 09:30 AM    HDL Cholesterol 49 06/28/2017 09:30 AM    LDL, calculated 62.2 06/28/2017 09:30 AM    VLDL, calculated 18.8 06/28/2017 09:30 AM    Triglyceride 94 06/28/2017 09:30 AM    CHOL/HDL Ratio 2.7 06/28/2017 09:30 AM     Lab Results   Component Value Date/Time    TSH 1.01 06/28/2017 09:30 AM     Lab Results   Component Value Date/Time    Microalbumin/Creat ratio (mg/g creat) 12 06/28/2017 09:30 AM    Microalbumin,urine random 1.91 06/28/2017 09:30 AM   h/o bladder cancer. Post treatment. On surveillance. No concern. Being compliant with specialist recommendations. Review their notes. ROS: see HPI     Physical Exam   Constitutional: He is oriented to person, place, and time. No distress. Neck: No thyromegaly present. Cardiovascular: Normal rate, regular rhythm and normal heart sounds. Pulmonary/Chest:   CTA   Abdominal: Soft. Bowel sounds are normal. There is no tenderness. Musculoskeletal: He exhibits no edema. Foot exam: no callus or open skin area,  Monofilament test normal.  Peripheral pulsations of dorsalis pedis palpable both lower ext. Neurological: He is oriented to person, place, and time. Psychiatric: His behavior is normal.       ASSESSMENT and PLAN    ICD-10-CM ICD-9-CM    1. Type 2 diabetes mellitus without complication, with long-term current use of insulin ([address]): HBA1C elevated. Fasting blood sugar improving gradually per home blood sugar log. He has changed his diet habit significantly. Will be following endocrinologist as well. Foot exam done today. Pneumonia shot will be given today. Will f/u next visit.   E11.9 250.00 glucose blood VI test strips (ONETOUCH ULTRA TEST) strip    Z79.4 V58.67    2. Essential hypertension, benign:stable at this time. Low salt diet. Exercise as tolerated. Will continue current plan. I10 401.1    3. Mixed hyperlipidemia: stable at this time. Low salt diet. Exercise as tolerated. Will continue current plan.    E78.2 272.2    Pt understood and agrees with above plan. Review HM  Discussed flu shot will be available soon probably next month.     Follow-up Disposition: Not on File

## 2017-11-29 ENCOUNTER — OFFICE VISIT (OUTPATIENT)
Dept: FAMILY MEDICINE CLINIC | Age: 67
End: 2017-11-29

## 2017-11-29 VITALS
HEART RATE: 75 BPM | WEIGHT: 156 LBS | RESPIRATION RATE: 16 BRPM | DIASTOLIC BLOOD PRESSURE: 72 MMHG | HEIGHT: 66 IN | SYSTOLIC BLOOD PRESSURE: 122 MMHG | OXYGEN SATURATION: 99 % | BODY MASS INDEX: 25.07 KG/M2

## 2017-11-29 DIAGNOSIS — E11.9 DIABETES MELLITUS WITHOUT COMPLICATION (HCC): ICD-10-CM

## 2017-11-29 DIAGNOSIS — E78.2 MIXED HYPERLIPIDEMIA: ICD-10-CM

## 2017-11-29 DIAGNOSIS — I10 ESSENTIAL HYPERTENSION, BENIGN: Primary | ICD-10-CM

## 2017-11-29 NOTE — MR AVS SNAPSHOT
Visit Information Date & Time Provider Department Dept. Phone Encounter #  
 11/29/2017  1:30 PM Christy Ashton, [facility] [phone] [id number] Follow-up Instructions Return in about 3 months (around 2/28/2018). Your Appointments 2/28/2018 11:15 AM  
PROCEDURE with Donta Denise MD  
[facility] ([facility]) Appt Note: 6mo Cysto/Cyto  
 [address] 
[address] [facility] [address]  
[address] [facility] [address] 6/13/2018 11:00 AM  
Office Visit with Oscar Jiménez MD  
[facility]) Appt Note: 1 yr  
 [address] [facility], [address] [facility] [address]  
[address], [address] Upcoming Health Maintenance Date Due HEMOGLOBIN A1C Q6M 12/28/2017 EYE EXAM RETINAL OR DILATED Q1 2/8/2018 MEDICARE YEARLY EXAM 6/1/2018 MICROALBUMIN Q1 6/28/2018 LIPID PANEL Q1 6/28/2018 FOOT EXAM Q1 8/30/2018 GLAUCOMA SCREENING Q2Y 2/8/2019 COLONOSCOPY 6/3/2023 DTaP/Tdap/Td series (2 - Td) 4/20/2025 Allergies as of 11/29/2017  Review Complete On: 11/29/2017 By: Christy Ashton MD  
 No Known Allergies Current Immunizations  Reviewed on 12/20/2016 Name Date Hep A Vaccine 1/11/2016, 10/31/2002 Influenza Vaccine 10/1/2016 Influenza Vaccine (Quad) PF 10/23/2015 Pneumococcal Conjugate (PCV-13) 6/29/2016 Pneumococcal Polysaccharide (PPSV-23) 8/30/2017 Tdap 4/20/2015 Typhoid Vaccine 1/11/2016, 10/31/2002 Yellow Fever Vaccine 1/11/2016 Not reviewed this visit You Were Diagnosed With   
  
 Codes Comments Essential hypertension, benign    -  Primary ICD-10-CM: I10 
ICD-9-CM: 401.1 Mixed hyperlipidemia     ICD-10-CM: E78.2 ICD-9-CM: 272.2 BMI 25.0-25.9,adult     ICD-10-CM: P29.78 ICD-9-CM: V85.21   
 Diabetes mellitus without complication ([facility]     QAZ-48-UT: E11.9 ICD-9-CM: 250.00 Vitals BP Pulse Resp Height(growth percentile) Weight(growth percentile) SpO2  
 122/72 (BP 1 Location: Left arm, BP Patient Position: Sitting) 75 16 5' 6\" (1.676 m) 156 lb (70.8 kg) 99% BMI Smoking Status 25.18 kg/m2 Never Smoker BMI and BSA Data Body Mass Index Body Surface Area  
 25.18 kg/m 2 1.82 m 2 Preferred Pharmacy Pharmacy Name Phone [facility] [address] [facility] [phone] Your Updated Medication List  
  
   
This list is accurate as of: 11/29/17  2:24 PM.  Always use your most recent med list.  
  
  
  
  
 aspirin 81 mg chewable tablet Take 81 mg by mouth daily. CINNAMON PO Take  by mouth. SHOBHA EXTRACT PO Take  by mouth.  
  
 glimepiride 4 mg tablet Commonly known as:  AMARYL Take 1 Tab by mouth two (2) times a day. glucose blood VI test strips strip Commonly known as:  ONETOUCH ULTRA TEST Check twice daily * Insulin Needles (Disposable) 31 gauge x 3/16\" Ndle Commonly known as:  BD INSULIN PEN NEEDLE UF MINI Check twice daily * BD INSULIN PEN NEEDLE UF SHORT 31 gauge x 5/16\" Ndle Generic drug:  Insulin Needles (Disposable) JANUMET 50-1,000 mg per tablet Generic drug:  SITagliptin-metFORMIN Take 1 Tab by mouth two (2) times daily (with meals). * Lancets Misc Check twice daily * TRUEPLUS LANCETS 33 gauge Misc Generic drug:  lancets  
  
 losartan-hydroCHLOROthiazide 100-25 mg per tablet Commonly known as:  HYZAAR Take 1 Tab by mouth daily. metFORMIN 1,000 mg Tg24 24 hour tablet Commonly known as:  Abelino Raúl Take  by mouth. omeprazole 40 mg capsule Commonly known as:  PRILOSEC Take 1 Cap by mouth daily. pioglitazone 15 mg tablet Commonly known as:  ACTOS Take 30 mg by mouth daily. Indications: patient states the medication has increased. simvastatin 10 mg tablet Commonly known as:  ZOCOR Take 1 Tab by mouth nightly. tadalafil 20 mg tablet Commonly known as:  CIALIS Take 1 Tab by mouth daily as needed. TURMERIC ROOT EXTRACT PO Take  by mouth. VITAMIN B-12 1,000 mcg sublingual tablet Generic drug:  cyanocobalamin Take 1,000 mcg by mouth daily. VITAMIN D2 PO Take 1 Cap by mouth daily. * Notice: This list has 4 medication(s) that are the same as other medications prescribed for you. Read the directions carefully, and ask your doctor or other care provider to review them with you. We Performed the Following REFERRAL TO ENDOCRINOLOGY [QGJ27 Custom] Comments:  
 Please evaluate and treat for diabetes. Pt is changing insurance to [place] in Jan 2018. Need to process referral at that time. Follow-up Instructions Return in about 3 months (around 2/28/2018). To-Do List   
 11/29/2017 Lab:  HEMOGLOBIN A1C WITH EAG   
  
 11/29/2017 Lab:  METABOLIC PANEL, COMPREHENSIVE Referral Information Referral ID Referred By Referred To  
  
 [id number] [facility], [address]dy [address] [place] [address] [place], [facility] [address] Phone: [phone] Fax: [phone] Visits Status Start Date End Date 1 New Request 11/29/17 11/29/18 If your referral has a status of pending review or denied, additional information will be sent to support the outcome of this decision. Patient Instructions Learning About Diabetes Food Guidelines Your Care Instructions Meal planning is important to manage diabetes. It helps keep your blood sugar at a target level (which you set with your doctor). You don't have to eat special foods. You can eat what your family eats, including sweets once in a while.  But you do have to pay attention to how often you eat and how much you eat of certain foods. You may want to work with a dietitian or a certified diabetes educator (CDE) to help you plan meals and snacks. A dietitian or CDE can also help you lose weight if that is one of your goals. What should you know about eating carbs? Managing the amount of carbohydrate (carbs) you eat is an important part of healthy meals when you have diabetes. Carbohydrate is found in many foods. · Learn which foods have carbs. And learn the amounts of carbs in different foods. ¨ Bread, cereal, pasta, and rice have about 15 grams of carbs in a serving. A serving is 1 slice of bread (1 ounce), ½ cup of cooked cereal, or 1/3 cup of cooked pasta or rice. ¨ Fruits have 15 grams of carbs in a serving. A serving is 1 small fresh fruit, such as an apple or orange; ½ of a banana; ½ cup of cooked or canned fruit; ½ cup of fruit juice; 1 cup of melon or raspberries; or 2 tablespoons of dried fruit. ¨ Milk and no-sugar-added yogurt have 15 grams of carbs in a serving. A serving is 1 cup of milk or 2/3 cup of no-sugar-added yogurt. ¨ Starchy vegetables have 15 grams of carbs in a serving. A serving is ½ cup of mashed potatoes or sweet potato; 1 cup winter squash; ½ of a small baked potato; ½ cup of cooked beans; or ½ cup cooked corn or green peas. · Learn how much carbs to eat each day and at each meal. A dietitian or CDE can teach you how to keep track of the amount of carbs you eat. This is called carbohydrate counting. · If you are not sure how to count carbohydrate grams, use the Plate Method to plan meals. It is a good, quick way to make sure that you have a balanced meal. It also helps you spread carbs throughout the day. ¨ Divide your plate by types of foods. Put non-starchy vegetables on half the plate, meat or other protein food on one-quarter of the plate, and a grain or starchy vegetable in the final quarter of the plate.  To this you can add a small piece of fruit and 1 cup of milk or yogurt, depending on how many carbs you are supposed to eat at a meal. 
· Try to eat about the same amount of carbs at each meal. Do not \"save up\" your daily allowance of carbs to eat at one meal. 
· Proteins have very little or no carbs per serving. Examples of proteins are beef, chicken, turkey, fish, eggs, tofu, cheese, cottage cheese, and peanut butter. A serving size of meat is 3 ounces, which is about the size of a deck of cards. Examples of meat substitute serving sizes (equal to 1 ounce of meat) are 1/4 cup of cottage cheese, 1 egg, 1 tablespoon of peanut butter, and ½ cup of tofu. How can you eat out and still eat healthy? · Learn to estimate the serving sizes of foods that have carbohydrate. If you measure food at home, it will be easier to estimate the amount in a serving of restaurant food. · If the meal you order has too much carbohydrate (such as potatoes, corn, or baked beans), ask to have a low-carbohydrate food instead. Ask for a salad or green vegetables. · If you use insulin, check your blood sugar before and after eating out to help you plan how much to eat in the future. · If you eat more carbohydrate at a meal than you had planned, take a walk or do other exercise. This will help lower your blood sugar. What else should you know? · Limit saturated fat, such as the fat from meat and dairy products. This is a healthy choice because people who have diabetes are at higher risk of heart disease. So choose lean cuts of meat and nonfat or low-fat dairy products. Use olive or canola oil instead of butter or shortening when cooking. · Don't skip meals. Your blood sugar may drop too low if you skip meals and take insulin or certain medicines for diabetes. · Check with your doctor before you drink alcohol. Alcohol can cause your blood sugar to drop too low. Alcohol can also cause a bad reaction if you take certain diabetes medicines. Follow-up care is a key part of your treatment and safety. Be sure to make and go to all appointments, and call your doctor if you are having problems. It's also a good idea to know your test results and keep a list of the medicines you take. Where can you learn more? Go to http://jah-lorenzo.info/. Enter J922 in the search box to learn more about \"Learning About Diabetes Food Guidelines. \" Current as of: March 13, 2017 Content Version: 11.4 © 8600-1075 [employer]. Care instructions adapted under license by [employer] (which disclaims liability or warranty for this information). If you have questions about a medical condition or this instruction, always ask your healthcare professional. [address] any warranty or liability for your use of this information. Low Sodium Diet (2,000 Milligram): Care Instructions Your Care Instructions Too much sodium causes your body to hold on to extra water. This can raise your blood pressure and force your heart and kidneys to work harder. In very serious cases, this could cause you to be put in the hospital. It might even be life-threatening. By limiting sodium, you will feel better and lower your risk of serious problems. The most common source of sodium is salt. People get most of the salt in their diet from canned, prepared, and packaged foods. Fast food and restaurant meals also are very high in sodium. Your doctor will probably limit your sodium to less than 2,000 milligrams (mg) a day. This limit counts all the sodium in prepared and packaged foods and any salt you add to your food. Follow-up care is a key part of your treatment and safety. Be sure to make and go to all appointments, and call your doctor if you are having problems. It's also a good idea to know your test results and keep a list of the medicines you take. How can you care for yourself at home? Read food labels · Read labels on cans and food packages. The labels tell you how much sodium is in each serving. Make sure that you look at the serving size. If you eat more than the serving size, you have eaten more sodium. · Food labels also tell you the Percent Daily Value for sodium. Choose products with low Percent Daily Values for sodium. · Be aware that sodium can come in forms other than salt, including monosodium glutamate (MSG), sodium citrate, and sodium bicarbonate (baking soda). MSG is often added to Asian food. When you eat out, you can sometimes ask for food without MSG or added salt. Buy low-sodium foods · Buy foods that are labeled \"unsalted\" (no salt added), \"sodium-free\" (less than 5 mg of sodium per serving), or \"low-sodium\" (less than 140 mg of sodium per serving). Foods labeled \"reduced-sodium\" and \"light sodium\" may still have too much sodium. Be sure to read the label to see how much sodium you are getting. · Buy fresh vegetables, or frozen vegetables without added sauces. Buy low-sodium versions of canned vegetables, soups, and other canned goods. Prepare low-sodium meals · Cut back on the amount of salt you use in cooking. This will help you adjust to the taste. Do not add salt after cooking. One teaspoon of salt has about 2,300 mg of sodium. · Take the salt shaker off the table. · Flavor your food with garlic, lemon juice, onion, vinegar, herbs, and spices. Do not use soy sauce, lite soy sauce, steak sauce, onion salt, garlic salt, celery salt, mustard, or ketchup on your food. · Use low-sodium salad dressings, sauces, and ketchup. Or make your own salad dressings and sauces without adding salt. · Use less salt (or none) when recipes call for it. You can often use half the salt a recipe calls for without losing flavor. Other foods such as rice, pasta, and grains do not need added salt. · Rinse canned vegetables, and cook them in fresh water. This removes some-but not all-of the salt. · Avoid water that is naturally high in sodium or that has been treated with water softeners, which add sodium. Call your local water company to find out the sodium content of your water supply. If you buy bottled water, read the label and choose a sodium-free brand. Avoid high-sodium foods · Avoid eating: ¨ Smoked, cured, salted, and canned meat, fish, and poultry. ¨ Ham, lenz, hot dogs, and luncheon meats. ¨ Regular, hard, and processed cheese and regular peanut butter. ¨ Crackers with salted tops, and other salted snack foods such as pretzels, chips, and salted popcorn. ¨ Frozen prepared meals, unless labeled low-sodium. ¨ Canned and dried soups, broths, and bouillon, unless labeled sodium-free or low-sodium. ¨ Canned vegetables, unless labeled sodium-free or low-sodium. ¨ Western [place] fries, pizza, tacos, and other fast foods. ¨ Pickles, olives, ketchup, and other condiments, especially soy sauce, unless labeled sodium-free or low-sodium. Where can you learn more? Go to http://jah-lorenzo.info/. Enter W824 in the search box to learn more about \"Low Sodium Diet (2,000 Milligram): Care Instructions. \" Current as of: May 12, 2017 Content Version: 11.4 © 7357-7683 Help/Systems. Care instructions adapted under license by [employer] (which disclaims liability or warranty for this information). If you have questions about a medical condition or this instruction, always ask your healthcare professional. [place] [address] any warranty or liability for your use of this information. Introducing [facility]! Dear Lawson Rosa: 
Thank you for requesting a Yun Yun account. Our records indicate that you have previously registered for a Yun Yun account but its currently inactive. Please call our [employer] support line at [phone]. Additional Information If you have questions, please visit the Frequently Asked Questions section of the [employer] website at [email or web address] [employer]. [email or web address] Remember, [employer] is NOT to be used for urgent needs. For medical emergencies, dial 911. Now available from your iPhone and Android! Please provide this summary of care documentation to your next provider. Your primary care clinician is listed as Oscar Guerrier. If you have any questions after today's visit, please call [phone].

## 2017-11-29 NOTE — PROGRESS NOTES
HISTORY OF PRESENT ILLNESS  Ayala Barillas is a 74 y.o. male. HPI: Here for routine follow up. H/o hypertension. Today vitals stable. complaince with medication. No side effects. Visit Vitals    /72 (BP 1 Location: Left arm, BP Patient Position: Sitting)    Pulse 75    Resp 16    Ht 5' 6\" (1.676 m)    Wt 156 lb (70.8 kg)    SpO2 99%    BMI 25.18 kg/m2     Denies any headache, dizziness, no chest pain or trouble breathing, no arm or leg weakness. No nausea or vomiting, no weight or appetite changes, no depression or anxiety. No urine or bowel complains, no palpitation, no diaphoresis. Also h/o diabetes. Poorly controlled. Said currently stopped insulin as he was having hypoglycemia. On oral medication and complaince with taking medication. Also trying diet modification. Not doing much exercise. Following endocrinology as well. Now in process of changing insurance to [facility] [employer] from next year so wanted new referral for endo as Damián Hope is not as a covering physician. Denies any hypoglycemia at this time. Review labs. Lab Results   Component Value Date/Time    Hemoglobin A1c 8.3 06/28/2017 09:30 AM    Hemoglobin A1c (POC) 7.9 01/19/2016 02:40 PM    Hemoglobin A1c, External 8.4 06/20/2016     Lab Results   Component Value Date/Time    Sodium 138 06/28/2017 09:30 AM    Potassium 4.7 06/28/2017 09:30 AM    Chloride 102 06/28/2017 09:30 AM    CO2 30 06/28/2017 09:30 AM    Anion gap 6 06/28/2017 09:30 AM    Glucose 74 06/28/2017 09:30 AM    BUN 15 06/28/2017 09:30 AM    Creatinine 0.90 06/28/2017 09:30 AM    BUN/Creatinine ratio 17 06/28/2017 09:30 AM    GFR est AA >60 06/28/2017 09:30 AM    GFR est non-AA >60 06/28/2017 09:30 AM    Calcium 9.0 06/28/2017 09:30 AM    Bilirubin, total 0.3 06/28/2017 09:30 AM    AST (SGOT) 19 06/28/2017 09:30 AM    Alk.  phosphatase 61 06/28/2017 09:30 AM    Protein, total 7.2 06/28/2017 09:30 AM    Albumin 4.0 06/28/2017 09:30 AM    Globulin 3.2 06/28/2017 09:30 AM A-G Ratio 1.3 06/28/2017 09:30 AM    ALT (SGPT) 24 06/28/2017 09:30 AM     Lab Results   Component Value Date/Time    Cholesterol, total 130 06/28/2017 09:30 AM    HDL Cholesterol 49 06/28/2017 09:30 AM    LDL, calculated 62.2 06/28/2017 09:30 AM    VLDL, calculated 18.8 06/28/2017 09:30 AM    Triglyceride 94 06/28/2017 09:30 AM    CHOL/HDL Ratio 2.7 06/28/2017 09:30 AM     Lab Results   Component Value Date/Time    TSH 1.01 06/28/2017 09:30 AM     Lab Results   Component Value Date/Time    Microalbumin/Creat ratio (mg/g creat) 12 06/28/2017 09:30 AM    Microalbumin,urine random 1.91 06/28/2017 09:30 AM     Also h/o hyperlipidemia. On statin. No side effects. Borderline high BMI. Again discussed to start routine exercise and diet modification. ROS: see HPI     Physical Exam   Constitutional: He is oriented to person, place, and time. No distress. Neck: No thyromegaly present. Cardiovascular: Normal rate, regular rhythm and normal heart sounds. Pulmonary/Chest:   CTA   Abdominal: Soft. Bowel sounds are normal. There is no tenderness. Musculoskeletal: He exhibits no edema. Lymphadenopathy:     He has no cervical adenopathy. Neurological: He is oriented to person, place, and time. Psychiatric: His behavior is normal.       ASSESSMENT and PLAN    ICD-10-CM ICD-9-CM    1. Essential hypertension, benign: stable at this time. Low salt diet. Exercise as tolerated. Will continue current plan. I10 401.1    2. Mixed hyperlipidemia: diet modification. On statin. Also exercise as tolerated. E78.2 272.2    3. BMI 25.0-25.9,adult: diet modification and will work on exercise as tolerated. Z68.25 V85.21    4. Diabetes mellitus without complication ([facility] [address]): TQE2S not at goal. For now on oral medication. Stopped insulin and doing herbal medication. For now will recheck labs and further plan after lab result.   E11.9 250.00 REFERRAL TO ENDOCRINOLOGY      HEMOGLOBIN A1C WITH EAG      METABOLIC PANEL, COMPREHENSIVE Pt understood and agree with the plan   Review    Follow-up Disposition:  Return in about 3 months (around 2/28/2018).

## 2017-11-29 NOTE — PATIENT INSTRUCTIONS
Learning About Diabetes Food Guidelines  Your Care Instructions    Meal planning is important to manage diabetes. It helps keep your blood sugar at a target level (which you set with your doctor). You don't have to eat special foods. You can eat what your family eats, including sweets once in a while. But you do have to pay attention to how often you eat and how much you eat of certain foods. You may want to work with a dietitian or a certified diabetes educator (CDE) to help you plan meals and snacks. A dietitian or CDE can also help you lose weight if that is one of your goals. What should you know about eating carbs? Managing the amount of carbohydrate (carbs) you eat is an important part of healthy meals when you have diabetes. Carbohydrate is found in many foods. · Learn which foods have carbs. And learn the amounts of carbs in different foods. ¨ Bread, cereal, pasta, and rice have about 15 grams of carbs in a serving. A serving is 1 slice of bread (1 ounce), ½ cup of cooked cereal, or 1/3 cup of cooked pasta or rice. ¨ Fruits have 15 grams of carbs in a serving. A serving is 1 small fresh fruit, such as an apple or orange; ½ of a banana; ½ cup of cooked or canned fruit; ½ cup of fruit juice; 1 cup of melon or raspberries; or 2 tablespoons of dried fruit. ¨ Milk and no-sugar-added yogurt have 15 grams of carbs in a serving. A serving is 1 cup of milk or 2/3 cup of no-sugar-added yogurt. ¨ Starchy vegetables have 15 grams of carbs in a serving. A serving is ½ cup of mashed potatoes or sweet potato; 1 cup winter squash; ½ of a small baked potato; ½ cup of cooked beans; or ½ cup cooked corn or green peas. · Learn how much carbs to eat each day and at each meal. A dietitian or CDE can teach you how to keep track of the amount of carbs you eat. This is called carbohydrate counting. · If you are not sure how to count carbohydrate grams, use the Plate Method to plan meals.  It is a good, quick way to make sure that you have a balanced meal. It also helps you spread carbs throughout the day. ¨ Divide your plate by types of foods. Put non-starchy vegetables on half the plate, meat or other protein food on one-quarter of the plate, and a grain or starchy vegetable in the final quarter of the plate. To this you can add a small piece of fruit and 1 cup of milk or yogurt, depending on how many carbs you are supposed to eat at a meal.  · Try to eat about the same amount of carbs at each meal. Do not \"save up\" your daily allowance of carbs to eat at one meal.  · Proteins have very little or no carbs per serving. Examples of proteins are beef, chicken, turkey, fish, eggs, tofu, cheese, cottage cheese, and peanut butter. A serving size of meat is 3 ounces, which is about the size of a deck of cards. Examples of meat substitute serving sizes (equal to 1 ounce of meat) are 1/4 cup of cottage cheese, 1 egg, 1 tablespoon of peanut butter, and ½ cup of tofu. How can you eat out and still eat healthy? · Learn to estimate the serving sizes of foods that have carbohydrate. If you measure food at home, it will be easier to estimate the amount in a serving of restaurant food. · If the meal you order has too much carbohydrate (such as potatoes, corn, or baked beans), ask to have a low-carbohydrate food instead. Ask for a salad or green vegetables. · If you use insulin, check your blood sugar before and after eating out to help you plan how much to eat in the future. · If you eat more carbohydrate at a meal than you had planned, take a walk or do other exercise. This will help lower your blood sugar. What else should you know? · Limit saturated fat, such as the fat from meat and dairy products. This is a healthy choice because people who have diabetes are at higher risk of heart disease. So choose lean cuts of meat and nonfat or low-fat dairy products.  Use olive or canola oil instead of butter or shortening when cooking. · Don't skip meals. Your blood sugar may drop too low if you skip meals and take insulin or certain medicines for diabetes. · Check with your doctor before you drink alcohol. Alcohol can cause your blood sugar to drop too low. Alcohol can also cause a bad reaction if you take certain diabetes medicines. Follow-up care is a key part of your treatment and safety. Be sure to make and go to all appointments, and call your doctor if you are having problems. It's also a good idea to know your test results and keep a list of the medicines you take. Where can you learn more? Go to http://jah-lorenzo.info/. Enter H206 in the search box to learn more about \"Learning About Diabetes Food Guidelines. \"  Current as of: March 13, 2017  Content Version: 11.4  © 9964-0056 [employer]. Care instructions adapted under license by [employer] (which disclaims liability or warranty for this information). If you have questions about a medical condition or this instruction, always ask your healthcare professional. [place] [address] any warranty or liability for your use of this information. Low Sodium Diet (2,000 Milligram): Care Instructions  Your Care Instructions    Too much sodium causes your body to hold on to extra water. This can raise your blood pressure and force your heart and kidneys to work harder. In very serious cases, this could cause you to be put in the hospital. It might even be life-threatening. By limiting sodium, you will feel better and lower your risk of serious problems. The most common source of sodium is salt. People get most of the salt in their diet from canned, prepared, and packaged foods. Fast food and restaurant meals also are very high in sodium. Your doctor will probably limit your sodium to less than 2,000 milligrams (mg) a day.  This limit counts all the sodium in prepared and packaged foods and any salt you add to your food.  Follow-up care is a key part of your treatment and safety. Be sure to make and go to all appointments, and call your doctor if you are having problems. It's also a good idea to know your test results and keep a list of the medicines you take. How can you care for yourself at home? Read food labels  · Read labels on cans and food packages. The labels tell you how much sodium is in each serving. Make sure that you look at the serving size. If you eat more than the serving size, you have eaten more sodium. · Food labels also tell you the Percent Daily Value for sodium. Choose products with low Percent Daily Values for sodium. · Be aware that sodium can come in forms other than salt, including monosodium glutamate (MSG), sodium citrate, and sodium bicarbonate (baking soda). MSG is often added to Asian food. When you eat out, you can sometimes ask for food without MSG or added salt. Buy low-sodium foods  · Buy foods that are labeled \"unsalted\" (no salt added), \"sodium-free\" (less than 5 mg of sodium per serving), or \"low-sodium\" (less than 140 mg of sodium per serving). Foods labeled \"reduced-sodium\" and \"light sodium\" may still have too much sodium. Be sure to read the label to see how much sodium you are getting. · Buy fresh vegetables, or frozen vegetables without added sauces. Buy low-sodium versions of canned vegetables, soups, and other canned goods. Prepare low-sodium meals  · Cut back on the amount of salt you use in cooking. This will help you adjust to the taste. Do not add salt after cooking. One teaspoon of salt has about 2,300 mg of sodium. · Take the salt shaker off the table. · Flavor your food with garlic, lemon juice, onion, vinegar, herbs, and spices. Do not use soy sauce, lite soy sauce, steak sauce, onion salt, garlic salt, celery salt, mustard, or ketchup on your food. · Use low-sodium salad dressings, sauces, and ketchup.  Or make your own salad dressings and sauces without adding salt.  · Use less salt (or none) when recipes call for it. You can often use half the salt a recipe calls for without losing flavor. Other foods such as rice, pasta, and grains do not need added salt. · Rinse canned vegetables, and cook them in fresh water. This removes some-but not all-of the salt. · Avoid water that is naturally high in sodium or that has been treated with water softeners, which add sodium. Call your local water company to find out the sodium content of your water supply. If you buy bottled water, read the label and choose a sodium-free brand. Avoid high-sodium foods  · Avoid eating:  ¨ Smoked, cured, salted, and canned meat, fish, and poultry. ¨ Ham, lenz, hot dogs, and luncheon meats. ¨ Regular, hard, and processed cheese and regular peanut butter. ¨ Crackers with salted tops, and other salted snack foods such as pretzels, chips, and salted popcorn. ¨ Frozen prepared meals, unless labeled low-sodium. ¨ Canned and dried soups, broths, and bouillon, unless labeled sodium-free or low-sodium. ¨ Canned vegetables, unless labeled sodium-free or low-sodium. ¨ [place] fries, pizza, tacos, and other fast foods. ¨ Pickles, olives, ketchup, and other condiments, especially soy sauce, unless labeled sodium-free or low-sodium. Where can you learn more? Go to http://jah-lorenzo.info/. Enter X527 in the search box to learn more about \"Low Sodium Diet (2,000 Milligram): Care Instructions. \"  Current as of: May 12, 2017  Content Version: 11.4  © 1228-7594 [employer]. Care instructions adapted under license by [employer] (which disclaims liability or warranty for this information). If you have questions about a medical condition or this instruction, always ask your healthcare professional. [address] any warranty or liability for your use of this information.

## 2017-12-08 LAB
ALBUMIN SERPL-MCNC: 4.3 G/DL (ref 3.6–4.8)
ALBUMIN/GLOB SERPL: 1.5 {RATIO} (ref 1.2–2.2)
ALP SERPL-CCNC: 59 IU/L (ref 39–117)
ALT SERPL-CCNC: 14 IU/L (ref 0–44)
AST SERPL-CCNC: 18 IU/L (ref 0–40)
BILIRUB SERPL-MCNC: 0.3 MG/DL (ref 0–1.2)
BUN SERPL-MCNC: 16 MG/DL (ref 8–27)
BUN/CREAT SERPL: 18 (ref 10–24)
CALCIUM SERPL-MCNC: 9.3 MG/DL (ref 8.6–10.2)
CHLORIDE SERPL-SCNC: 100 MMOL/L (ref 96–106)
CO2 SERPL-SCNC: 27 MMOL/L (ref 18–29)
CREAT SERPL-MCNC: 0.91 MG/DL (ref 0.76–1.27)
EST. AVERAGE GLUCOSE BLD GHB EST-MCNC: 183 MG/DL
GFR SERPLBLD CREATININE-BSD FMLA CKD-EPI: 102 ML/MIN/1.73
GFR SERPLBLD CREATININE-BSD FMLA CKD-EPI: 88 ML/MIN/1.73
GLOBULIN SER CALC-MCNC: 2.9 G/DL (ref 1.5–4.5)
GLUCOSE SERPL-MCNC: 115 MG/DL (ref 65–99)
HBA1C MFR BLD: 8 % (ref 4.8–5.6)
POTASSIUM SERPL-SCNC: 4.4 MMOL/L (ref 3.5–5.2)
PROT SERPL-MCNC: 7.2 G/DL (ref 6–8.5)
SODIUM SERPL-SCNC: 141 MMOL/L (ref 134–144)

## 2017-12-08 NOTE — PROGRESS NOTES
Let pt know that still diabetes test HBA1C around 8. Will observe and if needed might need to consider going back on insulin. For now continue current plan. Diet modification is very important. Thanks.

## 2017-12-14 NOTE — PROGRESS NOTES
Spoke with patient (2 verifiers name/) regarding diabetes test A1C is around 8. Patient informed Dr Tabitha Dinero will observe and if needed might need to consider going back on insulin. Patient informed for now to continue current plan and diet modification is very important. Patient voiced understanding.

## 2018-02-09 RX ORDER — LOSARTAN POTASSIUM AND HYDROCHLOROTHIAZIDE 25; 100 MG/1; MG/1
TABLET ORAL
Qty: 90 TAB | Refills: 1 | Status: SHIPPED | OUTPATIENT
Start: 2018-02-09 | End: 2018-05-22 | Stop reason: SDUPTHER

## 2018-02-09 RX ORDER — OMEPRAZOLE 40 MG/1
CAPSULE, DELAYED RELEASE ORAL
Qty: 90 CAP | Refills: 1 | Status: SHIPPED | OUTPATIENT
Start: 2018-02-09 | End: 2018-05-22 | Stop reason: SDUPTHER

## 2018-02-09 RX ORDER — SIMVASTATIN 10 MG/1
TABLET, FILM COATED ORAL
Qty: 90 TAB | Refills: 1 | Status: SHIPPED | OUTPATIENT
Start: 2018-02-09 | End: 2018-05-22 | Stop reason: ALTCHOICE

## 2018-02-28 ENCOUNTER — OFFICE VISIT (OUTPATIENT)
Dept: FAMILY MEDICINE CLINIC | Age: 68
End: 2018-02-28

## 2018-02-28 VITALS
TEMPERATURE: 98 F | BODY MASS INDEX: 24.85 KG/M2 | RESPIRATION RATE: 16 BRPM | OXYGEN SATURATION: 99 % | HEIGHT: 66 IN | HEART RATE: 83 BPM | DIASTOLIC BLOOD PRESSURE: 78 MMHG | WEIGHT: 154.6 LBS | SYSTOLIC BLOOD PRESSURE: 96 MMHG

## 2018-02-28 DIAGNOSIS — Z85.51 H/O CARCINOMA OF BLADDER: ICD-10-CM

## 2018-02-28 DIAGNOSIS — E11.9 TYPE 2 DIABETES MELLITUS WITHOUT COMPLICATION, WITHOUT LONG-TERM CURRENT USE OF INSULIN (HCC): Primary | ICD-10-CM

## 2018-02-28 DIAGNOSIS — I10 ESSENTIAL HYPERTENSION, BENIGN: ICD-10-CM

## 2018-02-28 RX ORDER — PIOGLITAZONEHYDROCHLORIDE 15 MG/1
30 TABLET ORAL DAILY
Qty: 90 TAB | Refills: 0 | Status: SHIPPED | OUTPATIENT
Start: 2018-02-28 | End: 2018-05-22 | Stop reason: DRUGHIGH

## 2018-02-28 NOTE — MR AVS SNAPSHOT
[address] [facility] [address] 200 [facility] 
[phone] Patient: Any Montez MRN: [id number] Visit Information Date & Time Provider Department Dept. Phone Encounter #  
 2/28/2018  9:45 AM Ignacio Castillo [facility] [phone] [id number] Follow-up Instructions Return in about 3 months (around 5/28/2018). Your Appointments 3/1/2018 11:30 AM  
Nurse Visit with [facility] NURSE [facility] ([address]) [address] [facility] [address] [facility] [address]  
[address] [facility] [address] [facility] [address]  
  
    
 3/8/2018 11:10 AM  
Nurse Visit with Birdie Vang [facility] ([address]) Appt Note: MBW Pt- PSA  
 [address] [facility] [address] [address]  
  
    
 3/14/2018  1:15 PM  
PROCEDURE with Feliciano Bush MD  
[facility] ([address]) Appt Note: 6m cysto/cytol 7[address] [facility] [address] [address]  
  
    
 6/13/2018 11:00 AM  
Office Visit with Braxton Kelly MD  
[facility]) Appt Note: 1 yr  
 [address] [facility], [address] [facility] [address]  
1338 [facility], [address] [place] [address] Upcoming Health Maintenance Date Due  
 EYE EXAM RETINAL OR DILATED Q1 2/8/2018 MEDICARE YEARLY EXAM 6/1/2018 HEMOGLOBIN A1C Q6M 6/7/2018 MICROALBUMIN Q1 6/28/2018 LIPID PANEL Q1 6/28/2018 FOOT EXAM Q1 8/30/2018 GLAUCOMA SCREENING Q2Y 2/8/2019 COLONOSCOPY 6/3/2023 DTaP/Tdap/Td series (2 - Td) 4/20/2025 Allergies as of 2/28/2018  Review Complete On: 2/28/2018 By: Ignacio Castillo MD  
 No Known Allergies Current Immunizations  Reviewed on 12/20/2016 Name Date Hep A Vaccine 1/11/2016, 10/31/2002 Influenza Vaccine 10/1/2016 Influenza Vaccine (Quad) PF 10/23/2015 Pneumococcal Conjugate (PCV-13) 6/29/2016 Pneumococcal Polysaccharide (PPSV-23) 8/30/2017 Tdap 4/20/2015 Typhoid Vaccine 1/11/2016, 10/31/2002 Yellow Fever Vaccine 1/11/2016 Not reviewed this visit You Were Diagnosed With   
  
 Codes Comments Type 2 diabetes mellitus without complication, without long-term current use of insulin (HCC)    -  Primary ICD-10-CM: E11.9 ICD-9-CM: 250.00 Essential hypertension, benign     ICD-10-CM: I10 
ICD-9-CM: 401.1 H/O carcinoma of bladder     ICD-10-CM: Z85.51 
ICD-9-CM: V10.51 Vitals BP Pulse Temp Resp Height(growth percentile) Weight(growth percentile) 96/78 (BP 1 Location: Left arm, BP Patient Position: Sitting) 83 98 °F (36.7 °C) (Oral) 16 5' 6\" (1.676 m) 154 lb 9.6 oz (70.1 kg) SpO2 BMI Smoking Status 99% 24.95 kg/m2 Never Smoker Vitals History BMI and BSA Data Body Mass Index Body Surface Area 24.95 kg/m 2 1.81 m 2 Preferred Pharmacy Pharmacy Name Phone [address], [address] [place] [address] [phone] Your Updated Medication List  
  
   
This list is accurate as of 2/28/18 10:28 AM.  Always use your most recent med list.  
  
  
  
  
 aspirin 81 mg chewable tablet Take 81 mg by mouth daily. CINNAMON PO Take  by mouth. SHOBHA EXTRACT PO Take  by mouth.  
  
 glimepiride 4 mg tablet Commonly known as:  AMARYL Take 1 Tab by mouth two (2) times a day. glucose blood VI test strips strip Commonly known as:  ONETOUCH ULTRA TEST Check twice daily * Insulin Needles (Disposable) 31 gauge x 3/16\" Ndle Commonly known as:  BD INSULIN PEN NEEDLE UF MINI Check twice daily * BD INSULIN PEN NEEDLE UF SHORT 31 gauge x 5/16\" Ndle Generic drug:  Insulin Needles (Disposable) JANUMET 50-1,000 mg per tablet Generic drug:  SITagliptin-metFORMIN Take 1 Tab by mouth two (2) times daily (with meals). * Lancets Misc Check twice daily * TRUEPLUS LANCETS 33 gauge Misc Generic drug:  lancets  
  
 losartan-hydroCHLOROthiazide 100-25 mg per tablet Commonly known as:  HYZAAR  
TAKE ONE TABLET BY MOUTH ONCE DAILY  
  
 metFORMIN 1,000 mg Tg24 24 hour tablet Commonly known as:  3060 Yes[address] Castillo Take  by mouth. omeprazole 40 mg capsule Commonly known as:  PRILOSEC  
TAKE ONE CAPSULE BY MOUTH ONCE DAILY pioglitazone 15 mg tablet Commonly known as:  ACTOS Take 2 Tabs by mouth daily. Indications: patient states the medication has increased. simvastatin 10 mg tablet Commonly known as:  ZOCOR  
TAKE ONE TABLET BY MOUTH NIGHTLY  
  
 tadalafil 20 mg tablet Commonly known as:  CIALIS Take 1 Tab by mouth daily as needed. trospium 20 mg tablet Commonly known as:  Dsouza [place] Take 1 Tab by mouth two (2) times a day. TURMERIC ROOT EXTRACT PO Take  by mouth. VITAMIN B-12 1,000 mcg sublingual tablet Generic drug:  cyanocobalamin Take 1,000 mcg by mouth daily. VITAMIN D2 PO Take 1 Cap by mouth daily. * Notice: This list has 4 medication(s) that are the same as other medications prescribed for you. Read the directions carefully, and ask your doctor or other care provider to review them with you. Prescriptions Sent to Pharmacy Refills  
 pioglitazone (ACTOS) 15 mg tablet 0 Sig: Take 2 Tabs by mouth daily. Indications: patient states the medication has increased. Class: Normal  
 Pharmacy: [address] [place] [address] #: [phone] Route: Oral  
  
Follow-up Instructions Return in about 3 months (around 5/28/2018). To-Do List   
 02/28/2018 Lab:  HEMOGLOBIN A1C WITH EAG   
  
 02/28/2018 Lab:  LIPID PANEL   
  
 02/28/2018 Lab:  METABOLIC PANEL, COMPREHENSIVE   
  
 02/28/2018 Lab:  TSH 3RD GENERATION Patient Instructions Eucerin Aquaphor Learning About Diabetes Food Guidelines Your Care Instructions Meal planning is important to manage diabetes. It helps keep your blood sugar at a target level (which you set with your doctor). You don't have to eat special foods. You can eat what your family eats, including sweets once in a while. But you do have to pay attention to how often you eat and how much you eat of certain foods. You may want to work with a dietitian or a certified diabetes educator (CDE) to help you plan meals and snacks. A dietitian or CDE can also help you lose weight if that is one of your goals. What should you know about eating carbs? Managing the amount of carbohydrate (carbs) you eat is an important part of healthy meals when you have diabetes. Carbohydrate is found in many foods. · Learn which foods have carbs. And learn the amounts of carbs in different foods. ¨ Bread, cereal, pasta, and rice have about 15 grams of carbs in a serving. A serving is 1 slice of bread (1 ounce), ½ cup of cooked cereal, or 1/3 cup of cooked pasta or rice. ¨ Fruits have 15 grams of carbs in a serving. A serving is 1 small fresh fruit, such as an apple or orange; ½ of a banana; ½ cup of cooked or canned fruit; ½ cup of fruit juice; 1 cup of melon or raspberries; or 2 tablespoons of dried fruit. ¨ Milk and no-sugar-added yogurt have 15 grams of carbs in a serving. A serving is 1 cup of milk or 2/3 cup of no-sugar-added yogurt. ¨ Starchy vegetables have 15 grams of carbs in a serving. A serving is ½ cup of mashed potatoes or sweet potato; 1 cup winter squash; ½ of a small baked potato; ½ cup of cooked beans; or ½ cup cooked corn or green peas. · Learn how much carbs to eat each day and at each meal. A dietitian or CDE can teach you how to keep track of the amount of carbs you eat. This is called carbohydrate counting. · If you are not sure how to count carbohydrate grams, use the Plate Method to plan meals. It is a good, quick way to make sure that you have a balanced meal. It also helps you spread carbs throughout the day. ¨ Divide your plate by types of foods. Put non-starchy vegetables on half the plate, meat or other protein food on one-quarter of the plate, and a grain or starchy vegetable in the final quarter of the plate. To this you can add a small piece of fruit and 1 cup of milk or yogurt, depending on how many carbs you are supposed to eat at a meal. 
· Try to eat about the same amount of carbs at each meal. Do not \"save up\" your daily allowance of carbs to eat at one meal. 
· Proteins have very little or no carbs per serving. Examples of proteins are beef, chicken, turkey, fish, eggs, tofu, cheese, cottage cheese, and peanut butter. A serving size of meat is 3 ounces, which is about the size of a deck of cards. Examples of meat substitute serving sizes (equal to 1 ounce of meat) are 1/4 cup of cottage cheese, 1 egg, 1 tablespoon of peanut butter, and ½ cup of tofu. How can you eat out and still eat healthy? · Learn to estimate the serving sizes of foods that have carbohydrate. If you measure food at home, it will be easier to estimate the amount in a serving of restaurant food. · If the meal you order has too much carbohydrate (such as potatoes, corn, or baked beans), ask to have a low-carbohydrate food instead. Ask for a salad or green vegetables. · If you use insulin, check your blood sugar before and after eating out to help you plan how much to eat in the future. · If you eat more carbohydrate at a meal than you had planned, take a walk or do other exercise. This will help lower your blood sugar. What else should you know? · Limit saturated fat, such as the fat from meat and dairy products. This is a healthy choice because people who have diabetes are at higher risk of heart disease. So choose lean cuts of meat and nonfat or low-fat dairy products. Use olive or canola oil instead of butter or shortening when cooking. · Don't skip meals. Your blood sugar may drop too low if you skip meals and take insulin or certain medicines for diabetes. · Check with your doctor before you drink alcohol. Alcohol can cause your blood sugar to drop too low. Alcohol can also cause a bad reaction if you take certain diabetes medicines. Follow-up care is a key part of your treatment and safety. Be sure to make and go to all appointments, and call your doctor if you are having problems. It's also a good idea to know your test results and keep a list of the medicines you take. Where can you learn more? Go to http://jah-lorenzo.info/. Enter Z111 in the search box to learn more about \"Learning About Diabetes Food Guidelines. \" Current as of: March 13, 2017 Content Version: 11.4 © 5729-9125 iTwixie. Care instructions adapted under license by [employer] (which disclaims liability or warranty for this information). If you have questions about a medical condition or this instruction, always ask your healthcare professional. [address] any warranty or liability for your use of this information. Low Sodium Diet (2,000 Milligram): Care Instructions Your Care Instructions Too much sodium causes your body to hold on to extra water. This can raise your blood pressure and force your heart and kidneys to work harder. In very serious cases, this could cause you to be put in the hospital. It might even be life-threatening. By limiting sodium, you will feel better and lower your risk of serious problems. The most common source of sodium is salt.  People get most of the salt in their diet from canned, prepared, and packaged foods. Fast food and restaurant meals also are very high in sodium. Your doctor will probably limit your sodium to less than 2,000 milligrams (mg) a day. This limit counts all the sodium in prepared and packaged foods and any salt you add to your food. Follow-up care is a key part of your treatment and safety. Be sure to make and go to all appointments, and call your doctor if you are having problems. It's also a good idea to know your test results and keep a list of the medicines you take. How can you care for yourself at home? Read food labels · Read labels on cans and food packages. The labels tell you how much sodium is in each serving. Make sure that you look at the serving size. If you eat more than the serving size, you have eaten more sodium. · Food labels also tell you the Percent Daily Value for sodium. Choose products with low Percent Daily Values for sodium. · Be aware that sodium can come in forms other than salt, including monosodium glutamate (MSG), sodium citrate, and sodium bicarbonate (baking soda). MSG is often added to Asian food. When you eat out, you can sometimes ask for food without MSG or added salt. Buy low-sodium foods · Buy foods that are labeled \"unsalted\" (no salt added), \"sodium-free\" (less than 5 mg of sodium per serving), or \"low-sodium\" (less than 140 mg of sodium per serving). Foods labeled \"reduced-sodium\" and \"light sodium\" may still have too much sodium. Be sure to read the label to see how much sodium you are getting. · Buy fresh vegetables, or frozen vegetables without added sauces. Buy low-sodium versions of canned vegetables, soups, and other canned goods. Prepare low-sodium meals · Cut back on the amount of salt you use in cooking. This will help you adjust to the taste. Do not add salt after cooking. One teaspoon of salt has about 2,300 mg of sodium. · Take the salt shaker off the table. · Flavor your food with garlic, lemon juice, onion, vinegar, herbs, and spices. Do not use soy sauce, lite soy sauce, steak sauce, onion salt, garlic salt, celery salt, mustard, or ketchup on your food. · Use low-sodium salad dressings, sauces, and ketchup. Or make your own salad dressings and sauces without adding salt. · Use less salt (or none) when recipes call for it. You can often use half the salt a recipe calls for without losing flavor. Other foods such as rice, pasta, and grains do not need added salt. · Rinse canned vegetables, and cook them in fresh water. This removes some-but not all-of the salt. · Avoid water that is naturally high in sodium or that has been treated with water softeners, which add sodium. Call your local water company to find out the sodium content of your water supply. If you buy bottled water, read the label and choose a sodium-free brand. Avoid high-sodium foods · Avoid eating: ¨ Smoked, cured, salted, and canned meat, fish, and poultry. ¨ Ham, lenz, hot dogs, and luncheon meats. ¨ Regular, hard, and processed cheese and regular peanut butter. ¨ Crackers with salted tops, and other salted snack foods such as pretzels, chips, and salted popcorn. ¨ Frozen prepared meals, unless labeled low-sodium. ¨ Canned and dried soups, broths, and bouillon, unless labeled sodium-free or low-sodium. ¨ Canned vegetables, unless labeled sodium-free or low-sodium. ¨ [place] fries, pizza, tacos, and other fast foods. ¨ Pickles, olives, ketchup, and other condiments, especially soy sauce, unless labeled sodium-free or low-sodium. Where can you learn more? Go to http://jah-lorenzo.info/. Enter J945 in the search box to learn more about \"Low Sodium Diet (2,000 Milligram): Care Instructions. \" Current as of: May 12, 2017 Content Version: 11.4 © 5202-3763 [employer]wise[employer].  Care instructions adapted under license by [address] (which disclaims liability or warranty for this information). If you have questions about a medical condition or this instruction, always ask your healthcare professional. [address] any warranty or liability for your use of this information. Introducing [facility]! Dear [place]: 
Thank you for requesting a GPal account. Our records indicate that you have previously registered for a GPal account but its currently inactive. Please call our GPal support line at [phone]. Additional Information If you have questions, please visit the Frequently Asked Questions section of the GPal website at [email or web address] Remember, GPal is NOT to be used for urgent needs. For medical emergencies, dial 911. Now available from your iPhone and Android! Please provide this summary of care documentation to your next provider. Your primary care clinician is listed as Sebas Cummings. If you have any questions after today's visit, please call [phone].

## 2018-02-28 NOTE — PROGRESS NOTES
HISTORY OF PRESENT ILLNESS  Freddy Tracy is a 74 y.o. male. HPI: here for routine follow up. Lately doing diet modification and complaint with oral medication. Used to non complaint with checking blood sugar, insulin and diet. Also doing home remedy. No hypoglycemia. Brought blood sugar log and highest blood sugar fasting was around 154 and average blood sugar was around 120. Denies any headache, dizziness, no chest pain or trouble breathing, no arm or leg weakness. No nausea or vomiting, no weight or appetite changes, no depression or anxiety. No urine or bowel complains, no palpitation, no diaphoresis. No abdominal pain. Also h/o hypertension. Complaint with medication. No side effects. Asymptomatic. Vitals stable today. Visit Vitals    BP 96/78 (BP 1 Location: Left arm, BP Patient Position: Sitting)    Pulse 83    Temp 98 °F (36.7 °C) (Oral)    Resp 16    Ht 5' 6\" (1.676 m)    Wt 154 lb 9.6 oz (70.1 kg)    SpO2 99%    BMI 24.95 kg/m2     Review labs.    Lab Results   Component Value Date/Time    Hemoglobin A1c 8.0 (H) 12/07/2017 09:29 AM    Hemoglobin A1c (POC) 7.9 01/19/2016 02:40 PM    Hemoglobin A1c, External 8.4 06/20/2016     Lab Results   Component Value Date/Time    WBC 17.5 (H) 03/22/2015 12:45 PM    HGB 13.3 03/22/2015 12:45 PM    HCT 41.4 03/22/2015 12:45 PM    PLATELET 455 97/71/1119 12:45 PM    MCV 90.6 03/22/2015 12:45 PM     Lab Results   Component Value Date/Time    Sodium 141 12/07/2017 09:29 AM    Potassium 4.4 12/07/2017 09:29 AM    Chloride 100 12/07/2017 09:29 AM    CO2 27 12/07/2017 09:29 AM    Anion gap 6 06/28/2017 09:30 AM    Glucose 115 (H) 12/07/2017 09:29 AM    BUN 16 12/07/2017 09:29 AM    Creatinine 0.91 12/07/2017 09:29 AM    BUN/Creatinine ratio 18 12/07/2017 09:29 AM    GFR est  12/07/2017 09:29 AM    GFR est non-AA 88 12/07/2017 09:29 AM    Calcium 9.3 12/07/2017 09:29 AM    Bilirubin, total 0.3 12/07/2017 09:29 AM    AST (SGOT) 18 12/07/2017 09:29 AM Alk. phosphatase 59 12/07/2017 09:29 AM    Protein, total 7.2 12/07/2017 09:29 AM    Albumin 4.3 12/07/2017 09:29 AM    Globulin 3.2 06/28/2017 09:30 AM    A-G Ratio 1.5 12/07/2017 09:29 AM    ALT (SGPT) 14 12/07/2017 09:29 AM     Lab Results   Component Value Date/Time    TSH 1.01 06/28/2017 09:30 AM     Lab Results   Component Value Date/Time    Cholesterol, total 130 06/28/2017 09:30 AM    HDL Cholesterol 49 06/28/2017 09:30 AM    LDL, calculated 62.2 06/28/2017 09:30 AM    VLDL, calculated 18.8 06/28/2017 09:30 AM    Triglyceride 94 06/28/2017 09:30 AM    CHOL/HDL Ratio 2.7 06/28/2017 09:30 AM     Lab Results   Component Value Date/Time    Microalbumin/Creat ratio (mg/g creat) 12 06/28/2017 09:30 AM    Microalbumin,urine random 1.91 06/28/2017 09:30 AM   h/o bladder cancer. Following urology and on surveillance. ROS: see HPI     Physical Exam   Constitutional: He is oriented to person, place, and time. No distress. Neck: No thyromegaly present. Cardiovascular: Normal rate, regular rhythm and normal heart sounds. Pulmonary/Chest:   CTA   Abdominal: Soft. Bowel sounds are normal. There is no tenderness. Musculoskeletal: He exhibits no edema. Lymphadenopathy:     He has no cervical adenopathy. Neurological: He is oriented to person, place, and time. Psychiatric: His behavior is normal.       ASSESSMENT and PLAN    ICD-10-CM ICD-9-CM    1. Type 2 diabetes mellitus without complication, without long-term current use of insulin ([facility] [address]): continue current oral medication. Diet modification and keep blood sugar log. On ARB and statin. Scheduled an eye exam in April  E11.9 250.00 pioglitazone (ACTOS) 15 mg tablet      HEMOGLOBIN A1C WITH EAG      METABOLIC PANEL, COMPREHENSIVE      TSH 3RD GENERATION      LIPID PANEL   2. Essential hypertension, benign: stable at this time. Low salt diet. Exercise as tolerated. Will continue current plan. I10 401.1    3. H/O carcinoma of bladder: following urology.  On surveillance. Z85.51 V10.51    Pt understood and agree with the plan   Review HM   Follow-up Disposition:  Return in about 3 months (around 5/28/2018).

## 2018-02-28 NOTE — PATIENT INSTRUCTIONS
Eucerin  Aquaphor       Learning About Diabetes Food Guidelines  Your Care Instructions    Meal planning is important to manage diabetes. It helps keep your blood sugar at a target level (which you set with your doctor). You don't have to eat special foods. You can eat what your family eats, including sweets once in a while. But you do have to pay attention to how often you eat and how much you eat of certain foods. You may want to work with a dietitian or a certified diabetes educator (CDE) to help you plan meals and snacks. A dietitian or CDE can also help you lose weight if that is one of your goals. What should you know about eating carbs? Managing the amount of carbohydrate (carbs) you eat is an important part of healthy meals when you have diabetes. Carbohydrate is found in many foods. · Learn which foods have carbs. And learn the amounts of carbs in different foods. ¨ Bread, cereal, pasta, and rice have about 15 grams of carbs in a serving. A serving is 1 slice of bread (1 ounce), ½ cup of cooked cereal, or 1/3 cup of cooked pasta or rice. ¨ Fruits have 15 grams of carbs in a serving. A serving is 1 small fresh fruit, such as an apple or orange; ½ of a banana; ½ cup of cooked or canned fruit; ½ cup of fruit juice; 1 cup of melon or raspberries; or 2 tablespoons of dried fruit. ¨ Milk and no-sugar-added yogurt have 15 grams of carbs in a serving. A serving is 1 cup of milk or 2/3 cup of no-sugar-added yogurt. ¨ Starchy vegetables have 15 grams of carbs in a serving. A serving is ½ cup of mashed potatoes or sweet potato; 1 cup winter squash; ½ of a small baked potato; ½ cup of cooked beans; or ½ cup cooked corn or green peas. · Learn how much carbs to eat each day and at each meal. A dietitian or CDE can teach you how to keep track of the amount of carbs you eat. This is called carbohydrate counting. · If you are not sure how to count carbohydrate grams, use the Plate Method to plan meals.  It is a good, quick way to make sure that you have a balanced meal. It also helps you spread carbs throughout the day. ¨ Divide your plate by types of foods. Put non-starchy vegetables on half the plate, meat or other protein food on one-quarter of the plate, and a grain or starchy vegetable in the final quarter of the plate. To this you can add a small piece of fruit and 1 cup of milk or yogurt, depending on how many carbs you are supposed to eat at a meal.  · Try to eat about the same amount of carbs at each meal. Do not \"save up\" your daily allowance of carbs to eat at one meal.  · Proteins have very little or no carbs per serving. Examples of proteins are beef, chicken, turkey, fish, eggs, tofu, cheese, cottage cheese, and peanut butter. A serving size of meat is 3 ounces, which is about the size of a deck of cards. Examples of meat substitute serving sizes (equal to 1 ounce of meat) are 1/4 cup of cottage cheese, 1 egg, 1 tablespoon of peanut butter, and ½ cup of tofu. How can you eat out and still eat healthy? · Learn to estimate the serving sizes of foods that have carbohydrate. If you measure food at home, it will be easier to estimate the amount in a serving of restaurant food. · If the meal you order has too much carbohydrate (such as potatoes, corn, or baked beans), ask to have a low-carbohydrate food instead. Ask for a salad or green vegetables. · If you use insulin, check your blood sugar before and after eating out to help you plan how much to eat in the future. · If you eat more carbohydrate at a meal than you had planned, take a walk or do other exercise. This will help lower your blood sugar. What else should you know? · Limit saturated fat, such as the fat from meat and dairy products. This is a healthy choice because people who have diabetes are at higher risk of heart disease. So choose lean cuts of meat and nonfat or low-fat dairy products.  Use olive or canola oil instead of butter or shortening when cooking. · Don't skip meals. Your blood sugar may drop too low if you skip meals and take insulin or certain medicines for diabetes. · Check with your doctor before you drink alcohol. Alcohol can cause your blood sugar to drop too low. Alcohol can also cause a bad reaction if you take certain diabetes medicines. Follow-up care is a key part of your treatment and safety. Be sure to make and go to all appointments, and call your doctor if you are having problems. It's also a good idea to know your test results and keep a list of the medicines you take. Where can you learn more? Go to http://jah-lorenzo.info/. Enter R647 in the search box to learn more about \"Learning About Diabetes Food Guidelines. \"  Current as of: March 13, 2017  Content Version: 11.4  © 1282-9686 [employer]. Care instructions adapted under license by [employer] (which disclaims liability or warranty for this information). If you have questions about a medical condition or this instruction, always ask your healthcare professional. [place] [address] any warranty or liability for your use of this information. Low Sodium Diet (2,000 Milligram): Care Instructions  Your Care Instructions    Too much sodium causes your body to hold on to extra water. This can raise your blood pressure and force your heart and kidneys to work harder. In very serious cases, this could cause you to be put in the hospital. It might even be life-threatening. By limiting sodium, you will feel better and lower your risk of serious problems. The most common source of sodium is salt. People get most of the salt in their diet from canned, prepared, and packaged foods. Fast food and restaurant meals also are very high in sodium. Your doctor will probably limit your sodium to less than 2,000 milligrams (mg) a day.  This limit counts all the sodium in prepared and packaged foods and any salt you add to your food.  Follow-up care is a key part of your treatment and safety. Be sure to make and go to all appointments, and call your doctor if you are having problems. It's also a good idea to know your test results and keep a list of the medicines you take. How can you care for yourself at home? Read food labels  · Read labels on cans and food packages. The labels tell you how much sodium is in each serving. Make sure that you look at the serving size. If you eat more than the serving size, you have eaten more sodium. · Food labels also tell you the Percent Daily Value for sodium. Choose products with low Percent Daily Values for sodium. · Be aware that sodium can come in forms other than salt, including monosodium glutamate (MSG), sodium citrate, and sodium bicarbonate (baking soda). MSG is often added to Asian food. When you eat out, you can sometimes ask for food without MSG or added salt. Buy low-sodium foods  · Buy foods that are labeled \"unsalted\" (no salt added), \"sodium-free\" (less than 5 mg of sodium per serving), or \"low-sodium\" (less than 140 mg of sodium per serving). Foods labeled \"reduced-sodium\" and \"light sodium\" may still have too much sodium. Be sure to read the label to see how much sodium you are getting. · Buy fresh vegetables, or frozen vegetables without added sauces. Buy low-sodium versions of canned vegetables, soups, and other canned goods. Prepare low-sodium meals  · Cut back on the amount of salt you use in cooking. This will help you adjust to the taste. Do not add salt after cooking. One teaspoon of salt has about 2,300 mg of sodium. · Take the salt shaker off the table. · Flavor your food with garlic, lemon juice, onion, vinegar, herbs, and spices. Do not use soy sauce, lite soy sauce, steak sauce, onion salt, garlic salt, celery salt, mustard, or ketchup on your food. · Use low-sodium salad dressings, sauces, and ketchup.  Or make your own salad dressings and sauces without adding salt.  · Use less salt (or none) when recipes call for it. You can often use half the salt a recipe calls for without losing flavor. Other foods such as rice, pasta, and grains do not need added salt. · Rinse canned vegetables, and cook them in fresh water. This removes some-but not all-of the salt. · Avoid water that is naturally high in sodium or that has been treated with water softeners, which add sodium. Call your local water company to find out the sodium content of your water supply. If you buy bottled water, read the label and choose a sodium-free brand. Avoid high-sodium foods  · Avoid eating:  ¨ Smoked, cured, salted, and canned meat, fish, and poultry. ¨ Ham, lenz, hot dogs, and luncheon meats. ¨ Regular, hard, and processed cheese and regular peanut butter. ¨ Crackers with salted tops, and other salted snack foods such as pretzels, chips, and salted popcorn. ¨ Frozen prepared meals, unless labeled low-sodium. ¨ Canned and dried soups, broths, and bouillon, unless labeled sodium-free or low-sodium. ¨ Canned vegetables, unless labeled sodium-free or low-sodium. ¨ [place] fries, pizza, tacos, and other fast foods. ¨ Pickles, olives, ketchup, and other condiments, especially soy sauce, unless labeled sodium-free or low-sodium. Where can you learn more? Go to http://jah-lorenzo.info/. Enter H215 in the search box to learn more about \"Low Sodium Diet (2,000 Milligram): Care Instructions. \"  Current as of: May 12, 2017  Content Version: 11.4  © 3026-6920 [employer]. Care instructions adapted under license by [employer] (which disclaims liability or warranty for this information). If you have questions about a medical condition or this instruction, always ask your healthcare professional. [address] any warranty or liability for your use of this information.

## 2018-02-28 NOTE — PROGRESS NOTES
1. Have you been to the ER, urgent care clinic since your last visit? Hospitalized since your last visit? No    2. Have you seen or consulted any other health care providers outside of the [address] [place] [address] since your last visit? Include any pap smears or colon screening. No    Patient states he needs another endocrinologist; current provider does not accept [place]. Patient is leaving for [facility] next week for 15 days. Last dm eye exam 4/2017 and has upcoming appointment with Dr. Uvaldo Greene 4/2018.

## 2018-02-28 NOTE — ACP (ADVANCE CARE PLANNING)
Advance Care Planning (ACP) Provider Conversation Snapshot    Date of ACP Conversation: 02/28/18  Persons included in Conversation:  patient  Length of ACP Conversation in minutes:  <16 minutes (Non-Billable)    Authorized Decision Maker (if patient is incapable of making informed decisions): This person is:   wife and son yaz           For Patients with Decision Making Capacity:   pt has living wheel. he will provide a copy    Conversation Outcomes / Follow-Up Plan:   pt has a living wheel. will provide a copy. no change since made it.

## 2018-03-01 ENCOUNTER — HOSPITAL ENCOUNTER (OUTPATIENT)
Dept: LAB | Age: 68
Discharge: HOME | End: 2018-03-01

## 2018-03-01 PROCEDURE — 99001 SPECIMEN HANDLING PT-LAB: CPT | Performed by: FAMILY MEDICINE

## 2018-03-02 LAB
ALBUMIN SERPL-MCNC: 4.2 G/DL (ref 3.6–4.8)
ALBUMIN/GLOB SERPL: 1.4 {RATIO} (ref 1.2–2.2)
ALP SERPL-CCNC: 64 IU/L (ref 39–117)
ALT SERPL-CCNC: 14 IU/L (ref 0–44)
AST SERPL-CCNC: 19 IU/L (ref 0–40)
BILIRUB SERPL-MCNC: 0.3 MG/DL (ref 0–1.2)
BUN SERPL-MCNC: 16 MG/DL (ref 8–27)
BUN/CREAT SERPL: 20 (ref 10–24)
CALCIUM SERPL-MCNC: 9.2 MG/DL (ref 8.6–10.2)
CHLORIDE SERPL-SCNC: 102 MMOL/L (ref 96–106)
CHOLEST SERPL-MCNC: 153 MG/DL (ref 100–199)
CO2 SERPL-SCNC: 24 MMOL/L (ref 18–29)
CREAT SERPL-MCNC: 0.81 MG/DL (ref 0.76–1.27)
EST. AVERAGE GLUCOSE BLD GHB EST-MCNC: 171 MG/DL
GFR SERPLBLD CREATININE-BSD FMLA CKD-EPI: 108 ML/MIN/1.73
GFR SERPLBLD CREATININE-BSD FMLA CKD-EPI: 93 ML/MIN/1.73
GLOBULIN SER CALC-MCNC: 2.9 G/DL (ref 1.5–4.5)
GLUCOSE SERPL-MCNC: 123 MG/DL (ref 65–99)
HBA1C MFR BLD: 7.6 % (ref 4.8–5.6)
HDLC SERPL-MCNC: 56 MG/DL
INTERPRETATION, 910389: NORMAL
LDLC SERPL CALC-MCNC: 76 MG/DL (ref 0–99)
Lab: NORMAL
POTASSIUM SERPL-SCNC: 4.3 MMOL/L (ref 3.5–5.2)
PROT SERPL-MCNC: 7.1 G/DL (ref 6–8.5)
SODIUM SERPL-SCNC: 141 MMOL/L (ref 134–144)
TRIGL SERPL-MCNC: 105 MG/DL (ref 0–149)
TSH SERPL DL<=0.005 MIU/L-ACNC: 1.41 UIU/ML (ref 0.45–4.5)
VLDLC SERPL CALC-MCNC: 21 MG/DL (ref 5–40)

## 2018-03-04 NOTE — PROGRESS NOTES
Let pt know that diabetes showed improvement . For now continue current plan and will discuss further on follow up visit. Keep doing diet modification .  Thanks

## 2018-03-07 NOTE — PROGRESS NOTES
Spoke with patient's wife (on HIPPA) and advised of improvement of DM and to continue the current plan and follow-up as discussed. Requests a copy of results mailed to review with son.

## 2018-03-13 DIAGNOSIS — E11.9 TYPE 2 DIABETES MELLITUS WITHOUT COMPLICATION, WITH LONG-TERM CURRENT USE OF INSULIN (HCC): ICD-10-CM

## 2018-03-13 DIAGNOSIS — Z79.4 TYPE 2 DIABETES MELLITUS WITHOUT COMPLICATION, WITH LONG-TERM CURRENT USE OF INSULIN (HCC): ICD-10-CM

## 2018-03-14 RX ORDER — GLIMEPIRIDE 4 MG/1
4 TABLET ORAL 2 TIMES DAILY
Qty: 60 TAB | Refills: 3 | Status: SHIPPED | OUTPATIENT
Start: 2018-03-14 | End: 2018-03-19 | Stop reason: SDUPTHER

## 2018-03-16 ENCOUNTER — OFFICE VISIT (OUTPATIENT)
Dept: FAMILY MEDICINE CLINIC | Age: 68
End: 2018-03-16

## 2018-03-16 VITALS
OXYGEN SATURATION: 98 % | DIASTOLIC BLOOD PRESSURE: 70 MMHG | TEMPERATURE: 99.6 F | HEIGHT: 66 IN | WEIGHT: 156 LBS | HEART RATE: 91 BPM | SYSTOLIC BLOOD PRESSURE: 136 MMHG | BODY MASS INDEX: 25.07 KG/M2 | RESPIRATION RATE: 16 BRPM

## 2018-03-16 DIAGNOSIS — J11.1 INFLUENZA: Primary | ICD-10-CM

## 2018-03-16 RX ORDER — OSELTAMIVIR PHOSPHATE 75 MG/1
75 CAPSULE ORAL 2 TIMES DAILY
Qty: 10 CAP | Refills: 0 | Status: SHIPPED | OUTPATIENT
Start: 2018-03-16 | End: 2018-03-21

## 2018-03-16 NOTE — PROGRESS NOTES
1. Have you been to the ER, urgent care clinic since your last visit? Hospitalized since your last visit? No    2. Have you seen or consulted any other health care providers outside of the [address] [place] [address] since your last visit? Include any pap smears or colon screening.  No

## 2018-03-16 NOTE — MR AVS SNAPSHOT
[address] [facility] [address] [facility] 
[phone] Patient: Shruthi Mitchell MRN: [id number] Visit Information Date & Time Provider Department Dept. Phone Encounter #  
 3/16/2018  3:30 PM Coleman Gill [facility] [phone] [id number] Your Appointments 2018  1:00 PM  
FOLLOW UP EXAM with Coleman Gill MD  
[facility] ([address]) Appt Note: 3mth f/u  
 [address] [facility] [address] [facility] [address] [facility]  
[address] [facility] [address] [place] [address]  
  
    
 2018 11:00 AM  
Office Visit with Waleska Garcia MD  
[facility] Appt Note: 1 yr  
 [address] [facility], [address] [place] [address]  
[address] [place] [address], [address] [place] [address] Upcoming Health Maintenance Date Due  
 EYE EXAM RETINAL OR DILATED Q1 2018 MEDICARE YEARLY EXAM 2018 MICROALBUMIN Q1 2018 FOOT EXAM Q1 2018 HEMOGLOBIN A1C Q6M 2018 GLAUCOMA SCREENING Q2Y 2019 LIPID PANEL Q1 3/1/2019 COLONOSCOPY 6/3/2023 DTaP/Tdap/Td series (2 - Td) 2025 Allergies as of 3/16/2018  Review Complete On: 3/16/2018 By: Coleman Gill MD  
 No Known Allergies Current Immunizations  Reviewed on 2016 Name Date Hep A Vaccine 2016, 10/31/2002 Influenza Vaccine 10/1/2016 Influenza Vaccine (Quad) PF 10/23/2015 Pneumococcal Conjugate (PCV-13) 2016 Pneumococcal Polysaccharide (PPSV-23) 2017 Tdap 2015 Typhoid Vaccine 2016, 10/31/2002 Yellow Fever Vaccine 2016 Not reviewed this visit You Were Diagnosed With   
  
 Codes Comments Influenza    -  Primary ICD-10-CM: J11.1 ICD-9-CM: 487.1 positive influenza A Vitals BP Pulse Temp Resp Height(growth percentile) Weight(growth percentile) 136/70 (BP 1 Location: Left arm, BP Patient Position: Sitting) 91 99.6 °F (37.6 °C) (Oral) 16 5' 6\" (1.676 m) 156 lb (70.8 kg) SpO2 BMI Smoking Status 98% 25.18 kg/m2 Never Smoker Vitals History BMI and BSA Data Body Mass Index Body Surface Area  
 25.18 kg/m 2 1.82 m 2 Preferred Pharmacy Pharmacy Name Phone [address], [address] [facility] [address] [facility] [phone] Your Updated Medication List  
  
   
This list is accurate as of 3/16/18  3:47 PM.  Always use your most recent med list.  
  
  
  
  
 aspirin 81 mg chewable tablet Take 81 mg by mouth daily. CINNAMON PO Take  by mouth. SHOBHA EXTRACT PO Take  by mouth.  
  
 glimepiride 4 mg tablet Commonly known as:  AMARYL Take 1 Tab by mouth two (2) times a day. glucose blood VI test strips strip Commonly known as:  ONETOUCH ULTRA TEST Check twice daily * Insulin Needles (Disposable) 31 gauge x 3/16\" Ndle Commonly known as:  BD INSULIN PEN NEEDLE UF MINI Check twice daily * BD INSULIN PEN NEEDLE UF SHORT 31 gauge x 5/16\" Ndle Generic drug:  Insulin Needles (Disposable) JANUMET 50-1,000 mg per tablet Generic drug:  SITagliptin-metFORMIN Take 1 Tab by mouth two (2) times daily (with meals). * Lancets Misc Check twice daily * TRUEPLUS LANCETS 33 gauge Misc Generic drug:  lancets  
  
 losartan-hydroCHLOROthiazide 100-25 mg per tablet Commonly known as:  HYZAAR  
TAKE ONE TABLET BY MOUTH ONCE DAILY  
  
 metFORMIN 1,000 mg Tg24 24 hour tablet Commonly known as:  Caralee Ou Take  by mouth. omeprazole 40 mg capsule Commonly known as:  PRILOSEC  
TAKE ONE CAPSULE BY MOUTH ONCE DAILY  
  
 oseltamivir 75 mg capsule Commonly known as:  TAMIFLU Take 1 Cap by mouth two (2) times a day for 5 days. pioglitazone 15 mg tablet Commonly known as:  ACTOS Take 2 Tabs by mouth daily. Indications: patient states the medication has increased. simvastatin 10 mg tablet Commonly known as:  ZOCOR  
TAKE ONE TABLET BY MOUTH NIGHTLY  
  
 tadalafil 20 mg tablet Commonly known as:  CIALIS Take 1 Tab by mouth daily as needed. trospium 20 mg tablet Commonly known as:  Dsouza Farnam Take 1 Tab by mouth two (2) times a day. TURMERIC ROOT EXTRACT PO Take  by mouth. VITAMIN B-12 1,000 mcg sublingual tablet Generic drug:  cyanocobalamin Take 1,000 mcg by mouth daily. VITAMIN D2 PO Take 1 Cap by mouth daily. * Notice: This list has 4 medication(s) that are the same as other medications prescribed for you. Read the directions carefully, and ask your doctor or other care provider to review them with you. Prescriptions Sent to Pharmacy Refills  
 oseltamivir (TAMIFLU) 75 mg capsule 0 Sig: Take 1 Cap by mouth two (2) times a day for 5 days. Class: Normal  
 Pharmacy: [address] [place] [address] #: [phone] Route: Oral  
  
Introducing [facility]! Dear Ortiz Decker: 
Thank you for requesting a Terabitz account. Our records indicate that you have previously registered for a Terabitz account but its currently inactive. Please call our Terabitz support line at [phone]. Additional Information If you have questions, please visit the Frequently Asked Questions section of the Terabitz website at [email or web address]. [employer]/[email or web address] Remember, Terabitz is NOT to be used for urgent needs. For medical emergencies, dial 911. Now available from your iPhone and Android! Please provide this summary of care documentation to your next provider. Your primary care clinician is listed as Evla Jeff. If you have any questions after today's visit, please call [phone].

## 2018-03-16 NOTE — PATIENT INSTRUCTIONS

## 2018-03-16 NOTE — PROGRESS NOTES
HISTORY OF PRESENT ILLNESS  Margot Fallon is a 74 y.o. male. HPI: here for same day sick appt. Came from [place] day before yesterday. Felt tiered and feverish. Sore throat. No cough. No nausea or vomiting. Feeling stomach upset but no diarrhea. No abdominal pain. Feels fatigue. Denies any specific sick contact. Had flu shot this season. No h/o copd or asthma. No smoking. Visit Vitals    /70 (BP 1 Location: Left arm, BP Patient Position: Sitting)    Pulse 91    Temp 99.6 °F (37.6 °C) (Oral)    Resp 16    Ht 5' 6\" (1.676 m)    Wt 156 lb (70.8 kg)    SpO2 98%    BMI 25.18 kg/m2     Review medication list, vitals, problem list,allergies. ROS: see HPI     Physical Exam   Constitutional: He is oriented to person, place, and time. No distress. HENT:   Throat: no tonsillar enlargement, no erythema. Neck: no palpable lymph nodes    Cardiovascular: Normal heart sounds. Pulmonary/Chest: No respiratory distress. He has no wheezes. Abdominal: Soft. There is no tenderness. Musculoskeletal: He exhibits no edema. Neurological: He is oriented to person, place, and time. ASSESSMENT and PLAN    ICD-10-CM ICD-9-CM    1. Influenza: tamiflu given. Salt water gargle for strep throat. Negative rapid strep test. Tylenol or motrin with food as needed for fever and pain. If no improvement in symptoms over weekend come back. J11.1 487.1 oseltamivir (TAMIFLU) 75 mg capsule    positive influenza A    Pt understood and agree with the plan   Review HM     Addundum\"   Sore throat. Rapid strep negative. No palpable lymph nodes. Pt called back that still has sore throat. Will give azithromycin as ? Rt tonsillar exudate   having wheezing and cough. Will send prednisone as well.    Follow-up Disposition: Not on File

## 2018-03-19 ENCOUNTER — TELEPHONE (OUTPATIENT)
Dept: FAMILY MEDICINE CLINIC | Age: 68
End: 2018-03-19

## 2018-03-19 DIAGNOSIS — Z79.4 TYPE 2 DIABETES MELLITUS WITHOUT COMPLICATION, WITH LONG-TERM CURRENT USE OF INSULIN (HCC): ICD-10-CM

## 2018-03-19 DIAGNOSIS — E11.9 TYPE 2 DIABETES MELLITUS WITHOUT COMPLICATION, WITH LONG-TERM CURRENT USE OF INSULIN (HCC): ICD-10-CM

## 2018-03-19 RX ORDER — AZITHROMYCIN 250 MG/1
TABLET, FILM COATED ORAL
Qty: 6 TAB | Refills: 0 | Status: SHIPPED | OUTPATIENT
Start: 2018-03-19 | End: 2018-03-24

## 2018-03-19 RX ORDER — GLIMEPIRIDE 4 MG/1
4 TABLET ORAL 2 TIMES DAILY
Qty: 180 TAB | Refills: 0 | Status: SHIPPED | OUTPATIENT
Start: 2018-03-19 | End: 2018-05-22 | Stop reason: SDUPTHER

## 2018-03-19 RX ORDER — PREDNISONE 10 MG/1
10 TABLET ORAL
Qty: 7 TAB | Refills: 0 | Status: SHIPPED | OUTPATIENT
Start: 2018-03-19 | End: 2018-06-12 | Stop reason: ALTCHOICE

## 2018-03-19 RX ORDER — ALBUTEROL SULFATE 90 UG/1
2 AEROSOL, METERED RESPIRATORY (INHALATION)
Qty: 1 INHALER | Refills: 0 | Status: SHIPPED | OUTPATIENT
Start: 2018-03-19 | End: 2018-06-27

## 2018-03-19 RX ORDER — GLIMEPIRIDE 4 MG/1
4 TABLET ORAL 2 TIMES DAILY
Qty: 180 TAB | Status: CANCELLED | OUTPATIENT
Start: 2018-03-19

## 2018-03-19 NOTE — TELEPHONE ENCOUNTER
Pt called and states the tadalafil isn't working Pt would like to talk to you Please call pt [phone]

## 2018-03-19 NOTE — TELEPHONE ENCOUNTER
Clarified with Bashir Lyman that the patient was calling about Tamiflu. Contacted patient and verified identity using name and date of birth (2- identifiers)  Spoke with patient and he said that he is still experiencing a dry heavy cough that is disturbing his sleep. Also with fairly sore throat still and chills off and on. Does report some wheezing. Advised Dr. Serenity Sen and she indicated that she would send Azithromycin, Prednisone and Albuterol. Patient notified and advised to contact office back by Wed if no improvement or worsening of symptoms. Verbalized understanding.

## 2018-03-19 NOTE — TELEPHONE ENCOUNTER
[facility] requesting to change the Amaryl script o a 90-day supply for improved compliance. See scanned attached document.

## 2018-05-08 ENCOUNTER — TELEPHONE (OUTPATIENT)
Dept: FAMILY MEDICINE CLINIC | Age: 68
End: 2018-05-08

## 2018-05-08 NOTE — TELEPHONE ENCOUNTER
Yoselyn Martin is requesting a prior auth for RX One Touch Ultra Blue Strips . Please go to key. CoSchedule. [employer] and use Key: [id number].           fax request sent to nurses

## 2018-05-17 NOTE — TELEPHONE ENCOUNTER
Outcome  Denied today  Questionnaire submitted. [place] Case [id number] Status: Partial Denial. Authorization and Notifications Pending.

## 2018-05-21 DIAGNOSIS — Z79.4 TYPE 2 DIABETES MELLITUS WITHOUT COMPLICATION, WITH LONG-TERM CURRENT USE OF INSULIN (HCC): ICD-10-CM

## 2018-05-21 DIAGNOSIS — E11.9 TYPE 2 DIABETES MELLITUS WITHOUT COMPLICATION, WITH LONG-TERM CURRENT USE OF INSULIN (HCC): ICD-10-CM

## 2018-05-21 NOTE — TELEPHONE ENCOUNTER
This pharmacy faxed over request for the following prescriptions to be filled:    Medication requested :   Requested Prescriptions     Pending Prescriptions Disp Refills    glimepiride (AMARYL) 4 mg tablet 180 Tab 0     Sig: Take 1 Tab by mouth two (2) times a day.      PCP: Saji Brito or Print: Mio  Mail order or Local pharmacy  jose Gonzalez     Scheduled appointment if not seen by current providers in office: LOV 2/28/2018 f/u 5/30/2018

## 2018-05-22 DIAGNOSIS — E11.65 POORLY CONTROLLED DIABETES MELLITUS (HCC): Primary | ICD-10-CM

## 2018-05-22 DIAGNOSIS — K21.9 GASTROESOPHAGEAL REFLUX DISEASE WITHOUT ESOPHAGITIS: ICD-10-CM

## 2018-05-22 DIAGNOSIS — E11.9 TYPE 2 DIABETES MELLITUS WITHOUT COMPLICATION, WITH LONG-TERM CURRENT USE OF INSULIN (HCC): Primary | ICD-10-CM

## 2018-05-22 DIAGNOSIS — I10 ESSENTIAL HYPERTENSION: ICD-10-CM

## 2018-05-22 DIAGNOSIS — Z79.4 TYPE 2 DIABETES MELLITUS WITHOUT COMPLICATION, WITH LONG-TERM CURRENT USE OF INSULIN (HCC): Primary | ICD-10-CM

## 2018-05-22 RX ORDER — ATORVASTATIN CALCIUM 10 MG/1
10 TABLET, FILM COATED ORAL DAILY
Qty: 90 TAB | Refills: 1 | Status: SHIPPED | OUTPATIENT
Start: 2018-05-22 | End: 2018-10-08 | Stop reason: SDUPTHER

## 2018-05-22 RX ORDER — LOSARTAN POTASSIUM AND HYDROCHLOROTHIAZIDE 25; 100 MG/1; MG/1
1 TABLET ORAL DAILY
Qty: 90 TAB | Refills: 1 | Status: SHIPPED | OUTPATIENT
Start: 2018-05-22 | End: 2018-10-08 | Stop reason: SDUPTHER

## 2018-05-22 RX ORDER — PIOGLITAZONEHYDROCHLORIDE 30 MG/1
TABLET ORAL
Qty: 90 TAB | Refills: 1 | Status: SHIPPED | OUTPATIENT
Start: 2018-05-22 | End: 2018-10-08 | Stop reason: SDUPTHER

## 2018-05-22 RX ORDER — INSULIN GLARGINE 100 [IU]/ML
INJECTION, SOLUTION SUBCUTANEOUS
Qty: 3 PEN | Refills: 1 | Status: SHIPPED | OUTPATIENT
Start: 2018-05-22 | End: 2018-08-13 | Stop reason: SDUPTHER

## 2018-05-22 RX ORDER — GLIMEPIRIDE 4 MG/1
4 TABLET ORAL 2 TIMES DAILY
Qty: 180 TAB | Refills: 0 | Status: SHIPPED | OUTPATIENT
Start: 2018-05-22 | End: 2018-08-13 | Stop reason: SDUPTHER

## 2018-05-22 RX ORDER — OMEPRAZOLE 40 MG/1
40 CAPSULE, DELAYED RELEASE ORAL DAILY
Qty: 90 CAP | Refills: 1 | Status: SHIPPED | OUTPATIENT
Start: 2018-05-22 | End: 2018-10-08 | Stop reason: SDUPTHER

## 2018-05-29 ENCOUNTER — TELEPHONE (OUTPATIENT)
Dept: FAMILY MEDICINE CLINIC | Age: 68
End: 2018-05-29

## 2018-05-29 NOTE — TELEPHONE ENCOUNTER
One Touch Supplies are Non Preferred & require Prior Auth. Pt aware and is requesting new RX for preferred supplies. Accu-Check Eliane Plus Kit, Accu-Chek Eliane Plus Test Strips, Accu-Chek Softclix Lancets.  Fax request sent to nurses

## 2018-05-30 ENCOUNTER — TELEPHONE (OUTPATIENT)
Dept: FAMILY MEDICINE CLINIC | Age: 68
End: 2018-05-30

## 2018-05-30 NOTE — TELEPHONE ENCOUNTER
Spoke with Janina Molina, surgery scheduler for Dr. Nilton Anderson, in regards to pre-op clearance appointment. Janina Molina was advised patient will need endocrinology clearance prior to PCP pre-op exam and surgery, as per Dr. Claudell Morales recommendation. Janina Molina was informed patient has an endocrinology appointment on 6/06/18 at 9:00 a.m. Patient was scheduled for a pre-op appointment with Dr. Ananda Agarwal during phone call on 6/12/18 at 11:30 a.m. Janina Molina will contact patient to reschedule surgery date to 6/13/18. She will give patient PCP pre-op appointment information, as well. Janina Molina was asked to return call to [facility], [phone] option 0, if any further questions. She verbalized understanding.

## 2018-05-30 NOTE — TELEPHONE ENCOUNTER
Dr. Neva Moran please see message below and route new script for testing supplies to [facility] pharmacy.

## 2018-06-01 RX ORDER — BLOOD-GLUCOSE METER
EACH MISCELLANEOUS
Qty: 1 EACH | Refills: 0 | Status: SHIPPED | OUTPATIENT
Start: 2018-06-01 | End: 2022-01-19 | Stop reason: SDUPTHER

## 2018-06-01 RX ORDER — LANCETS
EACH MISCELLANEOUS
Qty: 100 EACH | Refills: 3 | Status: SHIPPED | OUTPATIENT
Start: 2018-06-01 | End: 2022-01-19 | Stop reason: SDUPTHER

## 2018-06-01 NOTE — TELEPHONE ENCOUNTER
Left a voice message asking patient to return call to \[facility]\"" in regards to an upcoming appointment, [phone] option 0. Ask for Beatriz 31. Need to advise patient Dr. Izaiah Badillo would like to see him for his pre-op exam on 6/11/18 at 8:00 a.m. He was originally scheduled for pre-op exam with Dr. Izaiah Badillo on 6/12/18, but PCP requested that he be scheduled on the 11th instead.

## 2018-06-01 NOTE — TELEPHONE ENCOUNTER
Pt called with a fax # from [address] [place] [address].  This is for the prior auth on his one touch diabetic supplies

## 2018-06-01 NOTE — TELEPHONE ENCOUNTER
Spoke with patient (all identifiers verified) to advise Dr. Clay Lucio changed his pre-op exam appointment to Monday, 6/11/18 at 8:00 a.m. Patient stated the he could not come to an early morning appointment that day, as he had to be at his office. He requested a later morning appointment for the pre-op exam. Patient was given another appointment time for  6/11/18 at 10:30A with Dr. Clay Lucio. He was told we are working him into the schedule and he may have a little bit of wait time before he is seen. Patient verbalized understanding. Patient was contacted by Eboni Hamlin and aware of new surgery date with Dr. Gareth Chery, 6/13/18. He stated he has a specialist appointment on 6/13/18. He was asked to contact Eboni Hamlin, surgery scheduler for Dr. Gareth Chery, to discuss upcoming surgery date. Patient verbalized understanding. Left a voice message for Eboni Hamlin advising patient's pre-op exam has been rescheduled to 6/11/18 at 10:30A with Dr. Clay Lucio. She was asked to contact [facility], [phone] option 0, if any questions. Ask for Beatriz Valles.

## 2018-06-12 ENCOUNTER — OFFICE VISIT (OUTPATIENT)
Dept: FAMILY MEDICINE CLINIC | Age: 68
End: 2018-06-12

## 2018-06-12 VITALS
DIASTOLIC BLOOD PRESSURE: 68 MMHG | RESPIRATION RATE: 16 BRPM | WEIGHT: 151.2 LBS | OXYGEN SATURATION: 97 % | HEIGHT: 66 IN | SYSTOLIC BLOOD PRESSURE: 116 MMHG | TEMPERATURE: 97.8 F | HEART RATE: 79 BPM | BODY MASS INDEX: 24.3 KG/M2

## 2018-06-12 DIAGNOSIS — R97.20 ELEVATED PSA: ICD-10-CM

## 2018-06-12 DIAGNOSIS — I10 ESSENTIAL HYPERTENSION, BENIGN: ICD-10-CM

## 2018-06-12 DIAGNOSIS — Z01.818 PRE-OPERATIVE CLEARANCE: Primary | ICD-10-CM

## 2018-06-12 DIAGNOSIS — E11.9 TYPE 2 DIABETES MELLITUS WITHOUT COMPLICATION, WITH LONG-TERM CURRENT USE OF INSULIN (HCC): ICD-10-CM

## 2018-06-12 DIAGNOSIS — Z79.4 TYPE 2 DIABETES MELLITUS WITHOUT COMPLICATION, WITH LONG-TERM CURRENT USE OF INSULIN (HCC): ICD-10-CM

## 2018-06-12 DIAGNOSIS — E78.2 MIXED HYPERLIPIDEMIA: ICD-10-CM

## 2018-06-12 LAB
ALBUMIN UR QL STRIP: 30 MG/L
CREATININE, URINE POC: 300 MG/DL
MICROALBUMIN/CREAT RATIO POC: <30 MG/G

## 2018-06-12 RX ORDER — PREDNISOLONE ACETATE 10 MG/ML
SUSPENSION/ DROPS OPHTHALMIC
COMMUNITY
Start: 2018-05-03 | End: 2018-08-13

## 2018-06-12 RX ORDER — BESIFLOXACIN 6 MG/ML
SUSPENSION OPHTHALMIC
COMMUNITY
Start: 2018-05-15 | End: 2018-08-13

## 2018-06-12 RX ORDER — KETOROLAC TROMETHAMINE 4 MG/ML
SOLUTION/ DROPS OPHTHALMIC
COMMUNITY
Start: 2018-05-03 | End: 2018-08-13

## 2018-06-12 NOTE — PROGRESS NOTES
1. Have you been to the ER, urgent care clinic since your last visit? Hospitalized since your last visit? No    2. Have you seen or consulted any other health care providers outside of the [address] [place] [address] since your last visit? Include any pap smears or colon screening. [facility] LOV: 6/11/18    Patient wants to know which medications he is to take prior to surgery tomorrow. He has the eye drops, but is unsure which to take prior to surgery as well. Patient was advised to speak with Dr. Pinzon [place] office regarding drops administration. He verbalized understanding.

## 2018-06-12 NOTE — PROGRESS NOTES
HISTORY OF PRESENT ILLNESS  Estevan Malagon is a 79 y.o. male. HPI: Here for pre op clearance for cataract extraction with intraocular lens implantation , left eye with Dr. Josh Cole on 6/13/18. Has h/o diabetes, hypertension, hyperlipidemia, prior h/o bladder cancer and under surveillance. Today vitals stable. Asymptomatic. Denies any headache, dizziness, no chest pain or trouble breathing, no arm or leg weakness. No nausea or vomiting, no weight or appetite changes, no depression or anxiety. No urine or bowel complains, no palpitation, no diaphoresis. No abdominal pain. No unusual fatigue. No cold or cough. No fever. No recent antibiotic usage. No recent steroid use. No prior h/o complications from anesthesia. H/o diabetes. Recently fasting sugar is around 100-110. No hypoglycemia. HBA1C around 7.6. Denies any abdominal pain. No nausea or vomiting. No unusual fatigue. No weight or appetite changes. Was non complaint with his diet and insulin but since sometime being very complaint.     Visit Vitals    /68 (BP 1 Location: Left arm, BP Patient Position: Sitting)    Pulse 79    Temp 97.8 °F (36.6 °C) (Oral)    Resp 16    Ht 5' 6\" (1.676 m)    Wt 151 lb 3.2 oz (68.6 kg)    SpO2 97%    BMI 24.4 kg/m2     Lab Results   Component Value Date/Time    WBC 17.5 (H) 03/22/2015 12:45 PM    HGB 13.3 03/22/2015 12:45 PM    HCT 41.4 03/22/2015 12:45 PM    PLATELET 228 97/29/6474 12:45 PM    MCV 90.6 03/22/2015 12:45 PM     Lab Results   Component Value Date/Time    Sodium 141 03/01/2018 12:00 AM    Potassium 4.3 03/01/2018 12:00 AM    Chloride 102 03/01/2018 12:00 AM    CO2 24 03/01/2018 12:00 AM    Anion gap 6 06/28/2017 09:30 AM    Glucose 123 (H) 03/01/2018 12:00 AM    BUN 16 03/01/2018 12:00 AM    Creatinine 0.81 03/01/2018 12:00 AM    BUN/Creatinine ratio 20 03/01/2018 12:00 AM    GFR est  03/01/2018 12:00 AM    GFR est non-AA 93 03/01/2018 12:00 AM    Calcium 9.2 03/01/2018 12:00 AM Bilirubin, total 0.3 03/01/2018 12:00 AM    AST (SGOT) 19 03/01/2018 12:00 AM    Alk. phosphatase 64 03/01/2018 12:00 AM    Protein, total 7.1 03/01/2018 12:00 AM    Albumin 4.2 03/01/2018 12:00 AM    Globulin 3.2 06/28/2017 09:30 AM    A-G Ratio 1.4 03/01/2018 12:00 AM    ALT (SGPT) 14 03/01/2018 12:00 AM     Lab Results   Component Value Date/Time    Cholesterol, total 153 03/01/2018 12:00 AM    HDL Cholesterol 56 03/01/2018 12:00 AM    LDL, calculated 76 03/01/2018 12:00 AM    VLDL, calculated 21 03/01/2018 12:00 AM    Triglyceride 105 03/01/2018 12:00 AM    CHOL/HDL Ratio 2.7 06/28/2017 09:30 AM     Lab Results   Component Value Date/Time    TSH 1.410 03/01/2018 12:00 AM     Lab Results   Component Value Date/Time    Hemoglobin A1c 7.6 (H) 03/01/2018 12:00 AM    Hemoglobin A1c (POC) 7.9 01/19/2016 02:40 PM    Hemoglobin A1c, External 8.4 06/20/2016     Lab Results   Component Value Date/Time    Microalbumin/Creat ratio (mg/g creat) 12 06/28/2017 09:30 AM    Microalbumin,urine random 1.91 06/28/2017 09:30 AM     Patient Active Problem List    Diagnosis Date Noted    Advance directive discussed with patient 05/31/2017    Abnormal findings on esophagogastroduodenoscopy (EGD)/ done on 8/1/16. gunner's esophagus . f/u EGD due in 3 years will be 2019 08/02/2016    Cubital tunnel syndrome on left 01/12/2016    Elevated PSA 10/23/2015    Erectile dysfunction 07/08/2015    S/P colonoscopy with polypectomy/ follwoing GI. done in 2013. stage 4 esophagitis on EGD done in 2013. recently seen GI.  Dr. Krupa Handley on 7/7/16 06/24/2015    Bladder cancer ([facility]     Bladder tumor     Chest pain, unspecified 04/24/2013    Essential hypertension, benign 04/24/2013    Type 2 diabetes mellitus without complication ([facility] 11/18/1608    Mixed hyperlipidemia 04/24/2013     Current Outpatient Prescriptions   Medication Sig Dispense Refill    prednisoLONE acetate (PRED FORTE) 1 % ophthalmic suspension       ketorolac (ACULAR LS) 0.4 % drop       BESIVANCE 0.6 % drps ophthalmic suspension       glucose blood VI test strips (ACCU-CHEK HALIMA PLUS TEST STRP) strip Use 1 daily for blood glucose monitoring DX: E11.9 100 Strip 3    Lancets (ACCU-CHEK SOFTCLIX LANCETS) misc Use 1 daily for blood glucose monitoring DX: E11.9 100 Each 3    Blood-Glucose Meter (ACCU-CHEK HALIMA PLUS METER) misc Use 1 daily for blood glucose monitoring DX: E11.9 1 Each 0    glimepiride (AMARYL) 4 mg tablet Take 1 Tab by mouth two (2) times a day. 180 Tab 0    SITagliptin (JANUVIA) 100 mg tablet Take 1 Tab by mouth daily. 90 Tab 1    empagliflozin (JARDIANCE) 10 mg tablet Take 1 Tab by mouth daily. 90 Tab 1    glucose blood VI test strips (ONETOUCH ULTRA TEST) strip Check twice daily 100 Strip 6    pioglitazone (ACTOS) 30 mg tablet Take once a day (Patient taking differently: 30 mg. Take once a day) 90 Tab 1    losartan-hydroCHLOROthiazide (HYZAAR) 100-25 mg per tablet Take 1 Tab by mouth daily. 90 Tab 1    atorvastatin (LIPITOR) 10 mg tablet Take 1 Tab by mouth daily. 90 Tab 1    omeprazole (PRILOSEC) 40 mg capsule Take 1 Cap by mouth daily. 90 Cap 1    insulin glargine (LANTUS,BASAGLAR) 100 unit/mL (3 mL) inpn 10 units at bedtime 3 Pen 1    tadalafil (CIALIS) 20 mg tablet Take 1 Tab by mouth daily as needed. 6 Tab 3    CINNAMON BARK (CINNAMON PO) Take  by mouth.  TURMERIC ROOT EXTRACT PO Take  by mouth.  GINGER ROOT (GINGER EXTRACT PO) Take  by mouth.  BD INSULIN PEN NEEDLE UF SHORT 31 gauge x 5/16\" ndle   1    Insulin Needles, Disposable, (BD INSULIN PEN NEEDLE UF MINI) 31 gauge x 3/16\" ndle Check twice daily 100 Pen Needle 1    cyanocobalamin (VITAMIN B-12) 1,000 mcg Subl Take 1,000 mcg by mouth daily.  ERGOCALCIFEROL, VITAMIN D2, (VITAMIN D2 PO) Take 1 Cap by mouth daily.  aspirin 81 mg chewable tablet Take 81 mg by mouth daily.       albuterol (PROVENTIL HFA, VENTOLIN HFA, PROAIR HFA) 90 mcg/actuation inhaler Take 2 Puffs by inhalation every four (4) hours as needed for Wheezing. 1 Inhaler 0    trospium (SANCTURA) 20 mg tablet Take 1 Tab by mouth two (2) times a day. 180 Tab 2     No Known Allergies  Past Medical History:   Diagnosis Date    Bladder cancer ([facility] [address] 7/15/13    Clinical Stage [id number] HG UC    Bladder tumor     Diabetes ([facility] [address]     Essential hypertension     History of kidney stones     Hyperlipidemia     Peripheral neuropathy     SBO (small bowel obstruction) (HCC)     Spinal stenosis     Spondylolisthesis, grade 1      Past Surgical History:   Procedure Laterality Date    HX UROLOGICAL  7/15/13    T[facility]BT, Dr. Roque Mercado, [facility]    HX UROLOGICAL  1/4/16    Cysto, Dr. Roque Mercado, [address] [place] [address]    HX WRIST FRACTURE [place]  4/27/15    (L) wrist     Family History   Problem Relation Age of Onset    Hypertension Mother     Heart Attack Neg Hx     Sudden Death Neg Hx      Social History   Substance Use Topics    Smoking status: Never Smoker    Smokeless tobacco: Never Used    Alcohol use Yes      Comment: 1-2 drinks/day      ROS: see HPI     Physical Exam   Constitutional: He is oriented to person, place, and time. No distress. Neck: No thyromegaly present. Cardiovascular: Normal rate, regular rhythm and normal heart sounds. Pulmonary/Chest:   CTA   Abdominal: Soft. Bowel sounds are normal. There is no tenderness. Musculoskeletal: He exhibits no edema. Lymphadenopathy:     He has no cervical adenopathy. Neurological: He is oriented to person, place, and time. Psychiatric: His behavior is normal.       ASSESSMENT and PLAN    ICD-10-CM ICD-9-CM    1. Pre-operative clearance: cleared for scheduled procedure. Endo clearance is on the file. For now advised to follow insulin instructions per surgeon if he would be fasting for his scheduled surgery. Hold aspirin. He has scheduled surgery tomorrow. I have advise pt to let surgeon know about taking aspirin as risk of bleeding. Z01.818 V72.84    2.  Type 2 diabetes mellitus without complication, with long-term current use of insulin ([facility] [address]): currently stable fasting sugar around 100-130. Continue diet modification and current oral medication and insulin. Discussed the importance of tight glucose control to improve healing process after any procedure. He understood it well and will be compliant with his diet and medication. E11.9 250.00 AMB POC URINE, MICROALBUMIN, SEMIQUANT (3 RESULTS)    Z79.4 V58.67    3. Essential hypertension, benign;stable at this time. Low salt diet. Exercise as tolerated. Will continue current plan. I10 401.1    4. Elevated PSA: following urology/oncology as h/o bladder cancer. Under surveillance. R97.20 790.93    5. Mixed hyperlipidemia: on statin. No side effects. E78.2 272.2    Pt understood and agree with the plan     Follow-up Disposition:  Return in about 2 months (around 8/12/2018).

## 2018-06-12 NOTE — MR AVS SNAPSHOT
[address] 
[phone] Patient: Wesley Lambert MRN: [id number] Visit Information Date & Time Provider Department Dept. Phone Encounter #  
 6/12/2018 11:45 AM Tatiana Aguilar, [address] [facility] [address] [id number] Follow-up Instructions Return in about 2 months (around 8/12/2018). Your Appointments 6/27/2018  9:30 AM  
Office Visit with Olga Blankenship MD  
[facility]) Appt Note: 1 yr; â¢1 yr  
 [address] [facility], [address] [place] [id number]  
[address], [address] [place] [address] [address] [place] Upcoming Health Maintenance Date Due  
 EYE EXAM RETINAL OR DILATED Q1 2/8/2018 MEDICARE YEARLY EXAM 6/1/2018 MICROALBUMIN Q1 6/28/2018 Influenza Age 5 to Adult 8/1/2018 FOOT EXAM Q1 8/30/2018 HEMOGLOBIN A1C Q6M 9/1/2018 GLAUCOMA SCREENING Q2Y 2/8/2019 LIPID PANEL Q1 3/1/2019 COLONOSCOPY 6/3/2023 DTaP/Tdap/Td series (2 - Td) 4/20/2025 Allergies as of 6/12/2018  Review Complete On: 6/12/2018 By: Tatiana Aguilar MD  
 No Known Allergies Current Immunizations  Reviewed on 12/20/2016 Name Date Hep A Vaccine 1/11/2016, 10/31/2002 Influenza Vaccine 10/1/2016 Influenza Vaccine (Quad) PF 10/23/2015 Pneumococcal Conjugate (PCV-13) 6/29/2016 Pneumococcal Polysaccharide (PPSV-23) 8/30/2017 Tdap 4/20/2015 Typhoid Vaccine 1/11/2016, 10/31/2002 Yellow Fever Vaccine 1/11/2016 Not reviewed this visit You Were Diagnosed With   
  
 Codes Comments Pre-operative clearance    -  Primary ICD-10-CM: M36.276 ICD-9-CM: V72.84 Type 2 diabetes mellitus without complication, with long-term current use of insulin (HCC)     ICD-10-CM: E11.9, Z79.4 ICD-9-CM: 250.00, V58.67 Essential hypertension, benign     ICD-10-CM: I10 
ICD-9-CM: 401.1 Elevated PSA     ICD-10-CM: R97.20 ICD-9-CM: 790.93 Mixed hyperlipidemia     ICD-10-CM: E78.2 ICD-9-CM: 272.2 Vitals BP Pulse Temp Resp Height(growth percentile) Weight(growth percentile) 116/68 (BP 1 Location: Left arm, BP Patient Position: Sitting) 79 97.8 °F (36.6 °C) (Oral) 16 5' 6\" (1.676 m) 151 lb 3.2 oz (68.6 kg) SpO2 BMI Smoking Status 97% 24.4 kg/m2 Never Smoker Vitals History BMI and BSA Data Body Mass Index Body Surface Area  
 24.4 kg/m 2 1.79 m 2 [facility] [facility] [address] [phone] Your Updated Medication List  
  
   
This list is accurate as of 6/12/18 12:02 PM.  Always use your most recent med list.  
  
  
  
  
 albuterol 90 mcg/actuation inhaler Commonly known as:  PROVENTIL HFA, VENTOLIN HFA, PROAIR HFA Take 2 Puffs by inhalation every four (4) hours as needed for Wheezing. aspirin 81 mg chewable tablet Take 81 mg by mouth daily. atorvastatin 10 mg tablet Commonly known as:  LIPITOR Take 1 Tab by mouth daily. BESIVANCE 0.6 % Drps ophthalmic suspension Generic drug:  besifloxacin Blood-Glucose Meter Misc Commonly known as:  ACCU-CHEK HALIMA PLUS METER Use 1 daily for blood glucose monitoring DX: E11.9 CINNAMON PO Take  by mouth.  
  
 empagliflozin 10 mg tablet Commonly known as:  Sammi Otter Take 1 Tab by mouth daily. GINGER EXTRACT PO Take  by mouth.  
  
 glimepiride 4 mg tablet Commonly known as:  AMARYL Take 1 Tab by mouth two (2) times a day. * glucose blood VI test strips strip Commonly known as:  ONETOUCH ULTRA TEST Check twice daily * glucose blood VI test strips strip Commonly known as:  ACCU-CHEK HALIMA PLUS TEST STRP Use 1 daily for blood glucose monitoring DX: E11.9  
  
 insulin glargine 100 unit/mL (3 mL) Inpn Commonly known as:  LANTUS,BASAGLAR  
10 units at bedtime * Insulin Needles (Disposable) 31 gauge x 3/16\" Ndle Commonly known as:  BD ULTRA-FINE MINI PEN NEEDLE Check twice daily * BD ULTRA-FINE SHORT PEN NEEDLE 31 gauge x 5/16\" Ndle Generic drug:  Insulin Needles (Disposable)  
  
 ketorolac 0.4 % Drop Commonly known as:  ACULAR LS Lancets Misc Commonly known as:  ACCU-CHEK SOFTCLIX LANCETS Use 1 daily for blood glucose monitoring DX: E11.9  
  
 losartan-hydroCHLOROthiazide 100-25 mg per tablet Commonly known as:  HYZAAR Take 1 Tab by mouth daily. omeprazole 40 mg capsule Commonly known as:  PRILOSEC Take 1 Cap by mouth daily. pioglitazone 30 mg tablet Commonly known as:  ACTOS Take once a day  
  
 prednisoLONE acetate 1 % ophthalmic suspension Commonly known as:  PRED FORTE SITagliptin 100 mg tablet Commonly known as:  Christy Mitchell Take 1 Tab by mouth daily. tadalafil 20 mg tablet Commonly known as:  CIALIS Take 1 Tab by mouth daily as needed. trospium 20 mg tablet Commonly known as:  Dsouza [place] Take 1 Tab by mouth two (2) times a day. TURMERIC ROOT EXTRACT PO Take  by mouth. VITAMIN B-12 1,000 mcg sublingual tablet Generic drug:  cyanocobalamin Take 1,000 mcg by mouth daily. VITAMIN D2 PO Take 1 Cap by mouth daily. * Notice: This list has 4 medication(s) that are the same as other medications prescribed for you. Read the directions carefully, and ask your doctor or other care provider to review them with you. Follow-up Instructions Return in about 2 months (around 8/12/2018). Patient Instructions Learning About Meal Planning for Diabetes Why plan your meals? Meal planning can be a key part of managing diabetes.  Planning meals and snacks with the right balance of carbohydrate, protein, and fat can help you keep your blood sugar at the target level you set with your doctor. You don't have to eat special foods. You can eat what your family eats, including sweets once in a while. But you do have to pay attention to how often you eat and how much you eat of certain foods. You may want to work with a dietitian or a certified diabetes educator. He or she can give you tips and meal ideas and can answer your questions about meal planning. This health professional can also help you reach a healthy weight if that is one of your goals. What plan is right for you? Your dietitian or diabetes educator may suggest that you start with the plate format or carbohydrate counting. The plate format The plate format is a simple way to help you manage how you eat. You plan meals by learning how much space each food should take on a plate. Using the plate format helps you spread carbohydrate throughout the day. It can make it easier to keep your blood sugar level within your target range. It also helps you see if you're eating healthy portion sizes. To use the plate format, you put non-starchy vegetables on half your plate. Add meat or meat substitutes on one-quarter of the plate. Put a grain or starchy vegetable (such as brown rice or a potato) on the final quarter of the plate. You can add a small piece of fruit and some low-fat or fat-free milk or yogurt, depending on your carbohydrate goal for each meal. 
Here are some tips for using the plate format: · Make sure that you are not using an oversized plate. A 9-inch plate is best. Many restaurants use larger plates. · Get used to using the plate format at home. Then you can use it when you eat out. · Write down your questions about using the plate format. Talk to your doctor, a dietitian, or a diabetes educator about your concerns. Carbohydrate counting With carbohydrate counting, you plan meals based on the amount of carbohydrate in each food. Carbohydrate raises blood sugar higher and more quickly than any other nutrient. It is found in desserts, breads and cereals, and fruit. It's also found in starchy vegetables such as potatoes and corn, grains such as rice and pasta, and milk and yogurt. Spreading carbohydrate throughout the day helps keep your blood sugar levels within your target range. Your daily amount depends on several things, including your weight, how active you are, which diabetes medicines you take, and what your goals are for your blood sugar levels. A registered dietitian or diabetes educator can help you plan how much carbohydrate to include in each meal and snack. A guideline for your daily amount of carbohydrate is: · 45 to 60 grams at each meal. That's about the same as 3 to 4 carbohydrate servings. · 15 to 20 grams at each snack. That's about the same as 1 carbohydrate serving. The Nutrition Facts label on packaged foods tells you how much carbohydrate is in a serving of the food. First, look at the serving size on the food label. Is that the amount you eat in a serving? All of the nutrition information on a food label is based on that serving size. So if you eat more or less than that, you'll need to adjust the other numbers. Total carbohydrate is the next thing you need to look for on the label. If you count carbohydrate servings, one serving of carbohydrate is 15 grams. For foods that don't come with labels, such as fresh fruits and vegetables, you'll need a guide that lists carbohydrate in these foods. Ask your doctor, dietitian, or diabetes educator about books or other nutrition guides you can use. If you take insulin, you need to know how many grams of carbohydrate are in a meal. This lets you know how much rapid-acting insulin to take before you eat. If you use an insulin pump, you get a constant rate of insulin during the day.  So the pump must be programmed at meals to give you extra insulin to cover the rise in blood sugar after meals. When you know how much carbohydrate you will eat, you can take the right amount of insulin. Or, if you always use the same amount of insulin, you need to make sure that you eat the same amount of carbohydrate at meals. If you need more help to understand carbohydrate counting and food labels, ask your doctor, dietitian, or diabetes educator. How do you get started with meal planning? Here are some tips to get started: 
· Plan your meals a week at a time. Don't forget to include snacks too. · Use cookbooks or online recipes to plan several main meals. Plan some quick meals for busy nights. You also can double some recipes that freeze well. Then you can save half for other busy nights when you don't have time to cook. · Make sure you have the ingredients you need for your recipes. If you're running low on basic items, put these items on your shopping list too. · List foods that you use to make breakfasts, lunches, and snacks. List plenty of fruits and vegetables. · Post this list on the refrigerator. Add to it as you think of more things you need. · Take the list to the store to do your weekly shopping. Follow-up care is a key part of your treatment and safety. Be sure to make and go to all appointments, and call your doctor if you are having problems. It's also a good idea to know your test results and keep a list of the medicines you take. Where can you learn more? Go to http://jah-lorenzo.info/. Anastasia Johnson in the search box to learn more about \"Learning About Meal Planning for Diabetes. \" Current as of: March 13, 2017 Content Version: 11.4 © 7066-1576 Healthwise, Incorporated. Care instructions adapted under license by [employer] (which disclaims liability or warranty for this information).  If you have questions about a medical condition or this instruction, always ask your healthcare professional. Rani Anna [employer] disclaims any warranty or liability for your use of this information. Learning About Diabetes Food Guidelines Your Care Instructions Meal planning is important to manage diabetes. It helps keep your blood sugar at a target level (which you set with your doctor). You don't have to eat special foods. You can eat what your family eats, including sweets once in a while. But you do have to pay attention to how often you eat and how much you eat of certain foods. You may want to work with a dietitian or a certified diabetes educator (CDE) to help you plan meals and snacks. A dietitian or CDE can also help you lose weight if that is one of your goals. What should you know about eating carbs? Managing the amount of carbohydrate (carbs) you eat is an important part of healthy meals when you have diabetes. Carbohydrate is found in many foods. · Learn which foods have carbs. And learn the amounts of carbs in different foods. ¨ Bread, cereal, pasta, and rice have about 15 grams of carbs in a serving. A serving is 1 slice of bread (1 ounce), ½ cup of cooked cereal, or 1/3 cup of cooked pasta or rice. ¨ Fruits have 15 grams of carbs in a serving. A serving is 1 small fresh fruit, such as an apple or orange; ½ of a banana; ½ cup of cooked or canned fruit; ½ cup of fruit juice; 1 cup of melon or raspberries; or 2 tablespoons of dried fruit. ¨ Milk and no-sugar-added yogurt have 15 grams of carbs in a serving. A serving is 1 cup of milk or 2/3 cup of no-sugar-added yogurt. ¨ Starchy vegetables have 15 grams of carbs in a serving. A serving is ½ cup of mashed potatoes or sweet potato; 1 cup winter squash; ½ of a small baked potato; ½ cup of cooked beans; or ½ cup cooked corn or green peas. · Learn how much carbs to eat each day and at each meal. A dietitian or CDE can teach you how to keep track of the amount of carbs you eat. This is called carbohydrate counting. · If you are not sure how to count carbohydrate grams, use the Plate Method to plan meals. It is a good, quick way to make sure that you have a balanced meal. It also helps you spread carbs throughout the day. ¨ Divide your plate by types of foods. Put non-starchy vegetables on half the plate, meat or other protein food on one-quarter of the plate, and a grain or starchy vegetable in the final quarter of the plate. To this you can add a small piece of fruit and 1 cup of milk or yogurt, depending on how many carbs you are supposed to eat at a meal. 
· Try to eat about the same amount of carbs at each meal. Do not \"save up\" your daily allowance of carbs to eat at one meal. 
· Proteins have very little or no carbs per serving. Examples of proteins are beef, chicken, turkey, fish, eggs, tofu, cheese, cottage cheese, and peanut butter. A serving size of meat is 3 ounces, which is about the size of a deck of cards. Examples of meat substitute serving sizes (equal to 1 ounce of meat) are 1/4 cup of cottage cheese, 1 egg, 1 tablespoon of peanut butter, and ½ cup of tofu. How can you eat out and still eat healthy? · Learn to estimate the serving sizes of foods that have carbohydrate. If you measure food at home, it will be easier to estimate the amount in a serving of restaurant food. · If the meal you order has too much carbohydrate (such as potatoes, corn, or baked beans), ask to have a low-carbohydrate food instead. Ask for a salad or green vegetables. · If you use insulin, check your blood sugar before and after eating out to help you plan how much to eat in the future. · If you eat more carbohydrate at a meal than you had planned, take a walk or do other exercise. This will help lower your blood sugar. What else should you know? · Limit saturated fat, such as the fat from meat and dairy products.  This is a healthy choice because people who have diabetes are at higher risk of heart disease. So choose lean cuts of meat and nonfat or low-fat dairy products. Use olive or canola oil instead of butter or shortening when cooking. · Don't skip meals. Your blood sugar may drop too low if you skip meals and take insulin or certain medicines for diabetes. · Check with your doctor before you drink alcohol. Alcohol can cause your blood sugar to drop too low. Alcohol can also cause a bad reaction if you take certain diabetes medicines. Follow-up care is a key part of your treatment and safety. Be sure to make and go to all appointments, and call your doctor if you are having problems. It's also a good idea to know your test results and keep a list of the medicines you take. Where can you learn more? Go to http://jah-lorenzo.info/. Enter W757 in the search box to learn more about \"Learning About Diabetes Food Guidelines. \" Current as of: March 13, 2017 Content Version: 11.4 © 4180-8182 [employer]. Care instructions adapted under license by [employer] (which disclaims liability or warranty for this information). If you have questions about a medical condition or this instruction, always ask your healthcare professional. [place] [address] any warranty or liability for your use of this information. Low Sodium Diet (2,000 Milligram): Care Instructions Your Care Instructions Too much sodium causes your body to hold on to extra water. This can raise your blood pressure and force your heart and kidneys to work harder. In very serious cases, this could cause you to be put in the hospital. It might even be life-threatening. By limiting sodium, you will feel better and lower your risk of serious problems. The most common source of sodium is salt. People get most of the salt in their diet from canned, prepared, and packaged foods. Fast food and restaurant meals also are very high in sodium.  Your doctor will probably limit your sodium to less than 2,000 milligrams (mg) a day. This limit counts all the sodium in prepared and packaged foods and any salt you add to your food. Follow-up care is a key part of your treatment and safety. Be sure to make and go to all appointments, and call your doctor if you are having problems. It's also a good idea to know your test results and keep a list of the medicines you take. How can you care for yourself at home? Read food labels · Read labels on cans and food packages. The labels tell you how much sodium is in each serving. Make sure that you look at the serving size. If you eat more than the serving size, you have eaten more sodium. · Food labels also tell you the Percent Daily Value for sodium. Choose products with low Percent Daily Values for sodium. · Be aware that sodium can come in forms other than salt, including monosodium glutamate (MSG), sodium citrate, and sodium bicarbonate (baking soda). MSG is often added to Asian food. When you eat out, you can sometimes ask for food without MSG or added salt. Buy low-sodium foods · Buy foods that are labeled \"unsalted\" (no salt added), \"sodium-free\" (less than 5 mg of sodium per serving), or \"low-sodium\" (less than 140 mg of sodium per serving). Foods labeled \"reduced-sodium\" and \"light sodium\" may still have too much sodium. Be sure to read the label to see how much sodium you are getting. · Buy fresh vegetables, or frozen vegetables without added sauces. Buy low-sodium versions of canned vegetables, soups, and other canned goods. Prepare low-sodium meals · Cut back on the amount of salt you use in cooking. This will help you adjust to the taste. Do not add salt after cooking. One teaspoon of salt has about 2,300 mg of sodium. · Take the salt shaker off the table. · Flavor your food with garlic, lemon juice, onion, vinegar, herbs, and spices.  Do not use soy sauce, lite soy sauce, steak sauce, onion salt, garlic salt, celery salt, mustard, or ketchup on your food. · Use low-sodium salad dressings, sauces, and ketchup. Or make your own salad dressings and sauces without adding salt. · Use less salt (or none) when recipes call for it. You can often use half the salt a recipe calls for without losing flavor. Other foods such as rice, pasta, and grains do not need added salt. · Rinse canned vegetables, and cook them in fresh water. This removes some-but not all-of the salt. · Avoid water that is naturally high in sodium or that has been treated with water softeners, which add sodium. Call your local water company to find out the sodium content of your water supply. If you buy bottled water, read the label and choose a sodium-free brand. Avoid high-sodium foods · Avoid eating: ¨ Smoked, cured, salted, and canned meat, fish, and poultry. ¨ Ham, lenz, hot dogs, and luncheon meats. ¨ Regular, hard, and processed cheese and regular peanut butter. ¨ Crackers with salted tops, and other salted snack foods such as pretzels, chips, and salted popcorn. ¨ Frozen prepared meals, unless labeled low-sodium. ¨ Canned and dried soups, broths, and bouillon, unless labeled sodium-free or low-sodium. ¨ Canned vegetables, unless labeled sodium-free or low-sodium. ¨ [place] fries, pizza, tacos, and other fast foods. ¨ Pickles, olives, ketchup, and other condiments, especially soy sauce, unless labeled sodium-free or low-sodium. Where can you learn more? Go to http://jah-lorenzo.info/. Enter R015 in the search box to learn more about \"Low Sodium Diet (2,000 Milligram): Care Instructions. \" Current as of: May 12, 2017 Content Version: 11.4 © 4417-2674 [employer]. Care instructions adapted under license by [employer] (which disclaims liability or warranty for this information).  If you have questions about a medical condition or this instruction, always ask your healthcare professional. [place] [address] any warranty or liability for your use of this information. Introducing [facility]! [employer] introduces [employer] patient portal. Now you can access parts of your medical record, email your doctor's office, and request medication refills online. 1. In your internet browser, go to [email or web address] 2. Click on the First Time User? Click Here link in the Sign In box. You will see the New Member Sign Up page. 3. Enter your [employer] Access Code exactly as it appears below. You will not need to use this code after youve completed the sign-up process. If you do not sign up before the expiration date, you must request a new code. · [employer] Access Code: [email or web address] Expires: 9/10/2018 11:27 AM 
 
4. Enter the last four digits of your Social Security Number (xxxx) and Date of Birth (mm/dd/yyyy) as indicated and click Submit. You will be taken to the next sign-up page. 5. Create a [employer] ID. This will be your [employer] login ID and cannot be changed, so think of one that is secure and easy to remember. 6. Create a [employer] password. You can change your password at any time. 7. Enter your Password Reset Question and Answer. This can be used at a later time if you forget your password. 8. Enter your e-mail address. You will receive e-mail notification when new information is available in [address] 9. Click Sign Up. You can now view and download portions of your medical record. 10. Click the Download Summary menu link to download a portable copy of your medical information. If you have questions, please visit the Frequently Asked Questions section of the [employer] website. Remember, [employer] is NOT to be used for urgent needs. For medical emergencies, dial 911. Now available from your iPhone and Android! Please provide this summary of care documentation to your next provider. Your primary care clinician is listed as Diamond Kuhn. If you have any questions after today's visit, please call [phone].

## 2018-06-12 NOTE — PATIENT INSTRUCTIONS
Learning About Meal Planning for Diabetes  Why plan your meals? Meal planning can be a key part of managing diabetes. Planning meals and snacks with the right balance of carbohydrate, protein, and fat can help you keep your blood sugar at the target level you set with your doctor. You don't have to eat special foods. You can eat what your family eats, including sweets once in a while. But you do have to pay attention to how often you eat and how much you eat of certain foods. You may want to work with a dietitian or a certified diabetes educator. He or she can give you tips and meal ideas and can answer your questions about meal planning. This health professional can also help you reach a healthy weight if that is one of your goals. What plan is right for you? Your dietitian or diabetes educator may suggest that you start with the plate format or carbohydrate counting. The plate format  The plate format is a simple way to help you manage how you eat. You plan meals by learning how much space each food should take on a plate. Using the plate format helps you spread carbohydrate throughout the day. It can make it easier to keep your blood sugar level within your target range. It also helps you see if you're eating healthy portion sizes. To use the plate format, you put non-starchy vegetables on half your plate. Add meat or meat substitutes on one-quarter of the plate. Put a grain or starchy vegetable (such as brown rice or a potato) on the final quarter of the plate. You can add a small piece of fruit and some low-fat or fat-free milk or yogurt, depending on your carbohydrate goal for each meal.  Here are some tips for using the plate format:  · Make sure that you are not using an oversized plate. A 9-inch plate is best. Many restaurants use larger plates. · Get used to using the plate format at home. Then you can use it when you eat out. · Write down your questions about using the plate format.  Talk to your doctor, a dietitian, or a diabetes educator about your concerns. Carbohydrate counting  With carbohydrate counting, you plan meals based on the amount of carbohydrate in each food. Carbohydrate raises blood sugar higher and more quickly than any other nutrient. It is found in desserts, breads and cereals, and fruit. It's also found in starchy vegetables such as potatoes and corn, grains such as rice and pasta, and milk and yogurt. Spreading carbohydrate throughout the day helps keep your blood sugar levels within your target range. Your daily amount depends on several things, including your weight, how active you are, which diabetes medicines you take, and what your goals are for your blood sugar levels. A registered dietitian or diabetes educator can help you plan how much carbohydrate to include in each meal and snack. A guideline for your daily amount of carbohydrate is:  · 45 to 60 grams at each meal. That's about the same as 3 to 4 carbohydrate servings. · 15 to 20 grams at each snack. That's about the same as 1 carbohydrate serving. The Nutrition Facts label on packaged foods tells you how much carbohydrate is in a serving of the food. First, look at the serving size on the food label. Is that the amount you eat in a serving? All of the nutrition information on a food label is based on that serving size. So if you eat more or less than that, you'll need to adjust the other numbers. Total carbohydrate is the next thing you need to look for on the label. If you count carbohydrate servings, one serving of carbohydrate is 15 grams. For foods that don't come with labels, such as fresh fruits and vegetables, you'll need a guide that lists carbohydrate in these foods. Ask your doctor, dietitian, or diabetes educator about books or other nutrition guides you can use.   If you take insulin, you need to know how many grams of carbohydrate are in a meal. This lets you know how much rapid-acting insulin to take before you eat. If you use an insulin pump, you get a constant rate of insulin during the day. So the pump must be programmed at meals to give you extra insulin to cover the rise in blood sugar after meals. When you know how much carbohydrate you will eat, you can take the right amount of insulin. Or, if you always use the same amount of insulin, you need to make sure that you eat the same amount of carbohydrate at meals. If you need more help to understand carbohydrate counting and food labels, ask your doctor, dietitian, or diabetes educator. How do you get started with meal planning? Here are some tips to get started:  · Plan your meals a week at a time. Don't forget to include snacks too. · Use cookbooks or online recipes to plan several main meals. Plan some quick meals for busy nights. You also can double some recipes that freeze well. Then you can save half for other busy nights when you don't have time to cook. · Make sure you have the ingredients you need for your recipes. If you're running low on basic items, put these items on your shopping list too. · List foods that you use to make breakfasts, lunches, and snacks. List plenty of fruits and vegetables. · Post this list on the refrigerator. Add to it as you think of more things you need. · Take the list to the store to do your weekly shopping. Follow-up care is a key part of your treatment and safety. Be sure to make and go to all appointments, and call your doctor if you are having problems. It's also a good idea to know your test results and keep a list of the medicines you take. Where can you learn more? Go to http://jah-lorenzo.info/. Deedee Ferrer in the search box to learn more about \"Learning About Meal Planning for Diabetes. \"  Current as of: March 13, 2017  Content Version: 11.4  © 3099-7221 Healthwise, Incorporated.  Care instructions adapted under license by [employer] (which disclaims liability or warranty for this information). If you have questions about a medical condition or this instruction, always ask your healthcare professional. [address] any warranty or liability for your use of this information. Learning About Diabetes Food Guidelines  Your Care Instructions    Meal planning is important to manage diabetes. It helps keep your blood sugar at a target level (which you set with your doctor). You don't have to eat special foods. You can eat what your family eats, including sweets once in a while. But you do have to pay attention to how often you eat and how much you eat of certain foods. You may want to work with a dietitian or a certified diabetes educator (CDE) to help you plan meals and snacks. A dietitian or CDE can also help you lose weight if that is one of your goals. What should you know about eating carbs? Managing the amount of carbohydrate (carbs) you eat is an important part of healthy meals when you have diabetes. Carbohydrate is found in many foods. · Learn which foods have carbs. And learn the amounts of carbs in different foods. ¨ Bread, cereal, pasta, and rice have about 15 grams of carbs in a serving. A serving is 1 slice of bread (1 ounce), ½ cup of cooked cereal, or 1/3 cup of cooked pasta or rice. ¨ Fruits have 15 grams of carbs in a serving. A serving is 1 small fresh fruit, such as an apple or orange; ½ of a banana; ½ cup of cooked or canned fruit; ½ cup of fruit juice; 1 cup of melon or raspberries; or 2 tablespoons of dried fruit. ¨ Milk and no-sugar-added yogurt have 15 grams of carbs in a serving. A serving is 1 cup of milk or 2/3 cup of no-sugar-added yogurt. ¨ Starchy vegetables have 15 grams of carbs in a serving. A serving is ½ cup of mashed potatoes or sweet potato; 1 cup winter squash; ½ of a small baked potato; ½ cup of cooked beans; or ½ cup cooked corn or green peas.   · Learn how much carbs to eat each day and at each meal. A dietitian or CDE can teach you how to keep track of the amount of carbs you eat. This is called carbohydrate counting. · If you are not sure how to count carbohydrate grams, use the Plate Method to plan meals. It is a good, quick way to make sure that you have a balanced meal. It also helps you spread carbs throughout the day. ¨ Divide your plate by types of foods. Put non-starchy vegetables on half the plate, meat or other protein food on one-quarter of the plate, and a grain or starchy vegetable in the final quarter of the plate. To this you can add a small piece of fruit and 1 cup of milk or yogurt, depending on how many carbs you are supposed to eat at a meal.  · Try to eat about the same amount of carbs at each meal. Do not \"save up\" your daily allowance of carbs to eat at one meal.  · Proteins have very little or no carbs per serving. Examples of proteins are beef, chicken, turkey, fish, eggs, tofu, cheese, cottage cheese, and peanut butter. A serving size of meat is 3 ounces, which is about the size of a deck of cards. Examples of meat substitute serving sizes (equal to 1 ounce of meat) are 1/4 cup of cottage cheese, 1 egg, 1 tablespoon of peanut butter, and ½ cup of tofu. How can you eat out and still eat healthy? · Learn to estimate the serving sizes of foods that have carbohydrate. If you measure food at home, it will be easier to estimate the amount in a serving of restaurant food. · If the meal you order has too much carbohydrate (such as potatoes, corn, or baked beans), ask to have a low-carbohydrate food instead. Ask for a salad or green vegetables. · If you use insulin, check your blood sugar before and after eating out to help you plan how much to eat in the future. · If you eat more carbohydrate at a meal than you had planned, take a walk or do other exercise. This will help lower your blood sugar. What else should you know?   · Limit saturated fat, such as the fat from meat and dairy products. This is a healthy choice because people who have diabetes are at higher risk of heart disease. So choose lean cuts of meat and nonfat or low-fat dairy products. Use olive or canola oil instead of butter or shortening when cooking. · Don't skip meals. Your blood sugar may drop too low if you skip meals and take insulin or certain medicines for diabetes. · Check with your doctor before you drink alcohol. Alcohol can cause your blood sugar to drop too low. Alcohol can also cause a bad reaction if you take certain diabetes medicines. Follow-up care is a key part of your treatment and safety. Be sure to make and go to all appointments, and call your doctor if you are having problems. It's also a good idea to know your test results and keep a list of the medicines you take. Where can you learn more? Go to http://jah-lorenzo.info/. Enter E395 in the search box to learn more about \"Learning About Diabetes Food Guidelines. \"  Current as of: March 13, 2017  Content Version: 11.4  © 7990-6112 [employer]. Care instructions adapted under license by [employer] (which disclaims liability or warranty for this information). If you have questions about a medical condition or this instruction, always ask your healthcare professional. [address] any warranty or liability for your use of this information. Low Sodium Diet (2,000 Milligram): Care Instructions  Your Care Instructions    Too much sodium causes your body to hold on to extra water. This can raise your blood pressure and force your heart and kidneys to work harder. In very serious cases, this could cause you to be put in the hospital. It might even be life-threatening. By limiting sodium, you will feel better and lower your risk of serious problems. The most common source of sodium is salt. People get most of the salt in their diet from canned, prepared, and packaged foods.  Fast food and restaurant meals also are very high in sodium. Your doctor will probably limit your sodium to less than 2,000 milligrams (mg) a day. This limit counts all the sodium in prepared and packaged foods and any salt you add to your food. Follow-up care is a key part of your treatment and safety. Be sure to make and go to all appointments, and call your doctor if you are having problems. It's also a good idea to know your test results and keep a list of the medicines you take. How can you care for yourself at home? Read food labels  · Read labels on cans and food packages. The labels tell you how much sodium is in each serving. Make sure that you look at the serving size. If you eat more than the serving size, you have eaten more sodium. · Food labels also tell you the Percent Daily Value for sodium. Choose products with low Percent Daily Values for sodium. · Be aware that sodium can come in forms other than salt, including monosodium glutamate (MSG), sodium citrate, and sodium bicarbonate (baking soda). MSG is often added to Asian food. When you eat out, you can sometimes ask for food without MSG or added salt. Buy low-sodium foods  · Buy foods that are labeled \"unsalted\" (no salt added), \"sodium-free\" (less than 5 mg of sodium per serving), or \"low-sodium\" (less than 140 mg of sodium per serving). Foods labeled \"reduced-sodium\" and \"light sodium\" may still have too much sodium. Be sure to read the label to see how much sodium you are getting. · Buy fresh vegetables, or frozen vegetables without added sauces. Buy low-sodium versions of canned vegetables, soups, and other canned goods. Prepare low-sodium meals  · Cut back on the amount of salt you use in cooking. This will help you adjust to the taste. Do not add salt after cooking. One teaspoon of salt has about 2,300 mg of sodium. · Take the salt shaker off the table. · Flavor your food with garlic, lemon juice, onion, vinegar, herbs, and spices.  Do not use soy sauce, lite soy sauce, steak sauce, onion salt, garlic salt, celery salt, mustard, or ketchup on your food. · Use low-sodium salad dressings, sauces, and ketchup. Or make your own salad dressings and sauces without adding salt. · Use less salt (or none) when recipes call for it. You can often use half the salt a recipe calls for without losing flavor. Other foods such as rice, pasta, and grains do not need added salt. · Rinse canned vegetables, and cook them in fresh water. This removes some-but not all-of the salt. · Avoid water that is naturally high in sodium or that has been treated with water softeners, which add sodium. Call your local water company to find out the sodium content of your water supply. If you buy bottled water, read the label and choose a sodium-free brand. Avoid high-sodium foods  · Avoid eating:  ¨ Smoked, cured, salted, and canned meat, fish, and poultry. ¨ Ham, lenz, hot dogs, and luncheon meats. ¨ Regular, hard, and processed cheese and regular peanut butter. ¨ Crackers with salted tops, and other salted snack foods such as pretzels, chips, and salted popcorn. ¨ Frozen prepared meals, unless labeled low-sodium. ¨ Canned and dried soups, broths, and bouillon, unless labeled sodium-free or low-sodium. ¨ Canned vegetables, unless labeled sodium-free or low-sodium. ¨ [place] fries, pizza, tacos, and other fast foods. ¨ Pickles, olives, ketchup, and other condiments, especially soy sauce, unless labeled sodium-free or low-sodium. Where can you learn more? Go to http://jah-lorenzo.info/. Enter C106 in the search box to learn more about \"Low Sodium Diet (2,000 Milligram): Care Instructions. \"  Current as of: May 12, 2017  Content Version: 11.4  © 7784-1937 [employer]. Care instructions adapted under license by [employer] (which disclaims liability or warranty for this information).  If you have questions about a medical condition or this instruction, always ask your healthcare professional. [place] [address] any warranty or liability for your use of this information.

## 2018-06-27 ENCOUNTER — TELEPHONE (OUTPATIENT)
Dept: FAMILY MEDICINE CLINIC | Age: 68
End: 2018-06-27

## 2018-06-27 ENCOUNTER — OFFICE VISIT (OUTPATIENT)
Dept: CARDIOLOGY CLINIC | Age: 68
End: 2018-06-27

## 2018-06-27 VITALS
SYSTOLIC BLOOD PRESSURE: 109 MMHG | HEART RATE: 68 BPM | DIASTOLIC BLOOD PRESSURE: 57 MMHG | BODY MASS INDEX: 25.55 KG/M2 | WEIGHT: 159 LBS | HEIGHT: 66 IN

## 2018-06-27 DIAGNOSIS — I10 ESSENTIAL HYPERTENSION, BENIGN: Primary | ICD-10-CM

## 2018-06-27 DIAGNOSIS — E78.2 MIXED HYPERLIPIDEMIA: ICD-10-CM

## 2018-06-27 DIAGNOSIS — E11.9 TYPE 2 DIABETES MELLITUS WITHOUT COMPLICATION, WITH LONG-TERM CURRENT USE OF INSULIN (HCC): ICD-10-CM

## 2018-06-27 DIAGNOSIS — Z79.4 TYPE 2 DIABETES MELLITUS WITHOUT COMPLICATION, WITH LONG-TERM CURRENT USE OF INSULIN (HCC): ICD-10-CM

## 2018-06-27 NOTE — PROGRESS NOTES
Patient didn't bring medications, verbally reviewed    1. Have you been to the ER, urgent care clinic since your last visit? Hospitalized since your last visit? No    2. Have you seen or consulted any other health care providers outside of the [address] [place] [address] since your last visit? Include any pap smears or colon screening.  Yes Where: PCP Routine/ Endocrinology Routine

## 2018-06-27 NOTE — MR AVS SNAPSHOT
[address] [facility] [address] 
 
 
 [address] [facility], [address] [facility] [address] 
[phone] Patient: Brandi Becerril MRN: [id number] OKM:[id number] Visit Information Date & Time Provider Department Dept. Phone Encounter #  
 6/27/2018  9:30 AM Abdullahi Mcelroy MD [facility] [address] [place] [address] [id number] Follow-up Instructions Return in about 1 year (around 6/27/2019). Your Appointments 8/13/2018  1:30 PM  
FOLLOW UP EXAM with Niko Weldon MD  
[address] [place] [address] ([facility]) Appt Note: routine f/u 2mo [address] [facility] [address]  
[address] [facility] [address] [facility] [address] [facility]  
  
    
 6/26/2019  9:15 AM  
Office Visit with Abdullahi Mcelroy MD  
[facility]) Appt Note: 1 yr  
 [address] [facility] [address], [address] [facility] [address]  
[address] [facility], [address] [place] [address] Upcoming Health Maintenance Date Due  
 EYE EXAM RETINAL OR DILATED Q1 2/8/2018 MEDICARE YEARLY EXAM 6/1/2018 Influenza Age 5 to Adult 8/1/2018 FOOT EXAM Q1 8/30/2018 HEMOGLOBIN A1C Q6M 9/1/2018 GLAUCOMA SCREENING Q2Y 2/8/2019 LIPID PANEL Q1 3/1/2019 MICROALBUMIN Q1 6/12/2019 COLONOSCOPY 6/3/2023 DTaP/Tdap/Td series (2 - Td) 4/20/2025 Allergies as of 6/27/2018  Review Complete On: 6/27/2018 By: Abdullahi Mcelroy MD  
 No Known Allergies Current Immunizations  Reviewed on 12/20/2016 Name Date Hep A Vaccine 1/11/2016, 10/31/2002 Influenza Vaccine 10/1/2016 Influenza Vaccine (Quad) PF 10/23/2015 Pneumococcal Conjugate (PCV-13) 6/29/2016 Pneumococcal Polysaccharide (PPSV-23) 8/30/2017 Tdap 4/20/2015 Typhoid Vaccine 1/11/2016, 10/31/2002 Yellow Fever Vaccine 1/11/2016 Not reviewed this visit You Were Diagnosed With   
  
 Codes Comments Essential hypertension, benign    -  Primary ICD-10-CM: I10 
ICD-9-CM: 401.1 controlled Mixed hyperlipidemia     ICD-10-CM: E78.2 ICD-9-CM: 272.2 on statin 
lab with pcp Type 2 diabetes mellitus without complication, with long-term current use of insulin (HCC)     ICD-10-CM: E11.9, Z79.4 ICD-9-CM: 250.00, V58.67 Vitals BP Pulse Height(growth percentile) Weight(growth percentile) BMI Smoking Status 109/57 68 5' 6\" (1.676 m) 159 lb (72.1 kg) 25.66 kg/m2 Never Smoker Vitals History BMI and BSA Data Body Mass Index Body Surface Area  
 25.66 kg/m 2 1.83 m 2 Preferred Pharmacy Pharmacy Name Phone [facility] [address] [place] [phone] [address] [phone] Your Updated Medication List  
  
   
This list is accurate as of 6/27/18  9:45 AM.  Always use your most recent med list.  
  
  
  
  
 aspirin 81 mg chewable tablet Take 81 mg by mouth daily. atorvastatin 10 mg tablet Commonly known as:  LIPITOR Take 1 Tab by mouth daily. BESIVANCE 0.6 % Drps ophthalmic suspension Generic drug:  besifloxacin Blood-Glucose Meter Misc Commonly known as:  ACCU-CHEK HALIMA PLUS METER Use 1 daily for blood glucose monitoring DX: E11.9 CINNAMON PO Take  by mouth.  
  
 empagliflozin 10 mg tablet Commonly known as:  Montalvo Rook Take 1 Tab by mouth daily. SHOBHA EXTRACT PO Take  by mouth.  
  
 glimepiride 4 mg tablet Commonly known as:  AMARYL Take 1 Tab by mouth two (2) times a day. * glucose blood VI test strips strip Commonly known as:  ONETOUCH ULTRA TEST Check twice daily * glucose blood VI test strips strip Commonly known as:  ACCU-CHEK HALIMA PLUS TEST STRP Use 1 daily for blood glucose monitoring DX: E11.9  
  
 insulin glargine 100 unit/mL (3 mL) Inpn Commonly known as:  LANTUS,BASAGLAR  
10 units at bedtime * Insulin Needles (Disposable) 31 gauge x 3/16\" Ndle Commonly known as:  BD ULTRA-FINE MINI PEN NEEDLE Check twice daily * BD ULTRA-FINE SHORT PEN NEEDLE 31 gauge x 5/16\" Ndle Generic drug:  Insulin Needles (Disposable)  
  
 ketorolac 0.4 % Drop Commonly known as:  ACULAR LS Lancets Misc Commonly known as:  ACCU-CHEK SOFTCLIX LANCETS Use 1 daily for blood glucose monitoring DX: E11.9  
  
 losartan-hydroCHLOROthiazide 100-25 mg per tablet Commonly known as:  HYZAAR Take 1 Tab by mouth daily. omeprazole 40 mg capsule Commonly known as:  PRILOSEC Take 1 Cap by mouth daily. pioglitazone 30 mg tablet Commonly known as:  ACTOS Take once a day  
  
 prednisoLONE acetate 1 % ophthalmic suspension Commonly known as:  PRED FORTE SITagliptin 100 mg tablet Commonly known as:  Del Couch Take 1 Tab by mouth daily. tadalafil 20 mg tablet Commonly known as:  CIALIS Take 1 Tab by mouth daily as needed. trospium 20 mg tablet Commonly known as:  Dsouza [place] Take 1 Tab by mouth two (2) times a day. TURMERIC ROOT EXTRACT PO Take  by mouth. VITAMIN B-12 1,000 mcg sublingual tablet Generic drug:  cyanocobalamin Take 1,000 mcg by mouth daily. VITAMIN D2 PO Take 1 Cap by mouth daily. * Notice: This list has 4 medication(s) that are the same as other medications prescribed for you. Read the directions carefully, and ask your doctor or other care provider to review them with you. Follow-up Instructions Return in about 1 year (around 6/27/2019). Introducing [facility]! Elsi Gonzales introduces [employer] patient portal. Now you can access parts of your medical record, email your doctor's office, and request medication refills online. 1. In your internet browser, go to [email or web address] 2. Click on the First Time User? Click Here link in the Sign In box. You will see the New Member Sign Up page. 3. Enter your [email or web address] Access Code exactly as it appears below. You will not need to use this code after youve completed the sign-up process. If you do not sign up before the expiration date, you must request a new code. · [email or web address] Access Code: [email or web address] Expires: 9/10/2018 11:27 AM 
 
4. Enter the last four digits of your Social Security Number (xxxx) and Date of Birth (mm/dd/yyyy) as indicated and click Submit. You will be taken to the next sign-up page. 5. Create a [email or web address] ID. This will be your [email or web address] login ID and cannot be changed, so think of one that is secure and easy to remember. 6. Create a [email or web address] password. You can change your password at any time. 7. Enter your Password Reset Question and Answer. This can be used at a later time if you forget your password. 8. Enter your e-mail address. You will receive e-mail notification when new information is available in [address]. 9. Click Sign Up. You can now view and download portions of your medical record. 10. Click the Download Summary menu link to download a portable copy of your medical information. If you have questions, please visit the Frequently Asked Questions section of the [email or web address] website. Remember, [email or web address] is NOT to be used for urgent needs. For medical emergencies, dial 911. Now available from your iPhone and Android! Please provide this summary of care documentation to your next provider. Your primary care clinician is listed as Leesa Quiñones. If you have any questions after today's visit, please call [phone].

## 2018-06-27 NOTE — TELEPHONE ENCOUNTER
Pt states that he hasn't been able to sleep for 3 days and bought melatonin OTC but it didn't help. Please advise.

## 2018-06-27 NOTE — LETTER
Marco Olsenald 1950 
 
6/27/2018 Dear Alma Bar MD 
 
I had the pleasure of evaluating  Mr. Amy Caballero in office today. Below are the relevant portions of my assessment and plan of care. ICD-10-CM ICD-9-CM 1. Essential hypertension, benign I10 401.1   
 controlled 2. Mixed hyperlipidemia E78.2 272.2   
 on statin 
lab with pcp 3. Type 2 diabetes mellitus without complication, with long-term current use of insulin ([facility]) E11.9 250.00   
 Z79.4 V58.67 Current Outpatient Prescriptions Medication Sig Dispense Refill  prednisoLONE acetate (PRED FORTE) 1 % ophthalmic suspension  ketorolac (ACULAR LS) 0.4 % drop  BESIVANCE 0.6 % drps ophthalmic suspension  glucose blood VI test strips (ACCU-CHEK HALIMA PLUS TEST STRP) strip Use 1 daily for blood glucose monitoring DX: E11.9 100 Strip 3  Lancets (ACCU-CHEK SOFTCLIX LANCETS) misc Use 1 daily for blood glucose monitoring DX: E11.9 100 Each 3  Blood-Glucose Meter (ACCU-CHEK HALIMA PLUS METER) misc Use 1 daily for blood glucose monitoring DX: E11.9 1 Each 0  
 glimepiride (AMARYL) 4 mg tablet Take 1 Tab by mouth two (2) times a day. 180 Tab 0  
 SITagliptin (JANUVIA) 100 mg tablet Take 1 Tab by mouth daily. 90 Tab 1  
 empagliflozin (JARDIANCE) 10 mg tablet Take 1 Tab by mouth daily. 90 Tab 1  
 glucose blood VI test strips (ONETOUCH ULTRA TEST) strip Check twice daily 100 Strip 6  pioglitazone (ACTOS) 30 mg tablet Take once a day (Patient taking differently: 30 mg. Take once a day) 90 Tab 1  
 losartan-hydroCHLOROthiazide (HYZAAR) 100-25 mg per tablet Take 1 Tab by mouth daily. 90 Tab 1  
 atorvastatin (LIPITOR) 10 mg tablet Take 1 Tab by mouth daily. 90 Tab 1  
 omeprazole (PRILOSEC) 40 mg capsule Take 1 Cap by mouth daily.  90 Cap 1  
 insulin glargine (LANTUS,BASAGLAR) 100 unit/mL (3 mL) inpn 10 units at bedtime 3 Pen 1  
 trospium (SANCTURA) 20 mg tablet Take 1 Tab by mouth two (2) times a day. 180 Tab 2  
 tadalafil (CIALIS) 20 mg tablet Take 1 Tab by mouth daily as needed. 6 Tab 3  
 CINNAMON BARK (CINNAMON PO) Take  by mouth.  TURMERIC ROOT EXTRACT PO Take  by mouth.  GINGER ROOT (GINGER EXTRACT PO) Take  by mouth.  BD INSULIN PEN NEEDLE UF SHORT 31 gauge x 5/16\" ndle   1  
 Insulin Needles, Disposable, (BD INSULIN PEN NEEDLE UF MINI) 31 gauge x 3/16\" ndle Check twice daily 100 Pen Needle 1  cyanocobalamin (VITAMIN B-12) 1,000 mcg Subl Take 1,000 mcg by mouth daily.  ERGOCALCIFEROL, VITAMIN D2, (VITAMIN D2 PO) Take 1 Cap by mouth daily.  aspirin 81 mg chewable tablet Take 81 mg by mouth daily. No orders of the defined types were placed in this encounter. If you have questions, please do not hesitate to call me. I look forward to following Radha Arvin Peguero along with you. Sincerely, Navi Mcgregor MD

## 2018-06-27 NOTE — PROGRESS NOTES
HISTORY OF PRESENT ILLNESS  Estevan Walker is a 79 y.o. male. HPI Comments: Patient with htn,dm,hyperlipidemia. On follow up patient denies any chest pains,sob, palpitation or other significant symptoms. Hypertension   The history is provided by the patient. This is a chronic problem. The problem occurs constantly. The problem has not changed since onset. Pertinent negatives include no chest pain, no abdominal pain, no headaches and no shortness of breath. Cholesterol Problem   The history is provided by the patient. This is a chronic problem. The problem occurs constantly. Pertinent negatives include no chest pain, no abdominal pain, no headaches and no shortness of breath. Review of Systems   Constitutional: Negative for chills and fever. HENT: Negative for nosebleeds. Eyes: Negative for blurred vision and double vision. Respiratory: Negative for cough, hemoptysis, sputum production, shortness of breath and wheezing. Cardiovascular: Negative for chest pain, palpitations, orthopnea, claudication, leg swelling and PND. Gastrointestinal: Negative for abdominal pain, heartburn, nausea and vomiting. Musculoskeletal: Negative for myalgias. Skin: Negative for rash. Neurological: Negative for dizziness, weakness and headaches. Endo/Heme/Allergies: Does not bruise/bleed easily.      Family History   Problem Relation Age of Onset    Hypertension Mother     Heart Attack Neg Hx     Sudden Death Neg Hx        Past Medical History:   Diagnosis Date    Bladder cancer ([address] 7/15/13    Clinical Stage TaN0M0 HG UC    Bladder tumor     Diabetes ([address]     Essential hypertension     History of kidney stones     Hyperlipidemia     Peripheral neuropathy     SBO (small bowel obstruction) ([address]     Spinal stenosis     Spondylolisthesis, grade 1        Past Surgical History:   Procedure Laterality Date    HX UROLOGICAL  7/15/13    TURBT, [facility] Alyssa Skinner, [facility]    HX UROLOGICAL  1/4/16    Cysto, Dr. Catalina Rader, [facility]    HX WRIST FRACTURE [address]  4/27/15    (L) wrist       No Known Allergies    Current Outpatient Prescriptions   Medication Sig    prednisoLONE acetate (PRED FORTE) 1 % ophthalmic suspension     ketorolac (ACULAR LS) 0.4 % drop     BESIVANCE 0.6 % drps ophthalmic suspension     glucose blood VI test strips (ACCU-CHEK HALIMA PLUS TEST STRP) strip Use 1 daily for blood glucose monitoring DX: E11.9    Lancets (ACCU-CHEK SOFTCLIX LANCETS) misc Use 1 daily for blood glucose monitoring DX: E11.9    Blood-Glucose Meter (ACCU-CHEK HALIMA PLUS METER) misc Use 1 daily for blood glucose monitoring DX: E11.9    glimepiride (AMARYL) 4 mg tablet Take 1 Tab by mouth two (2) times a day.  SITagliptin (JANUVIA) 100 mg tablet Take 1 Tab by mouth daily.  empagliflozin (JARDIANCE) 10 mg tablet Take 1 Tab by mouth daily.  glucose blood VI test strips (ONETOUCH ULTRA TEST) strip Check twice daily    pioglitazone (ACTOS) 30 mg tablet Take once a day (Patient taking differently: 30 mg. Take once a day)    losartan-hydroCHLOROthiazide (HYZAAR) 100-25 mg per tablet Take 1 Tab by mouth daily.  atorvastatin (LIPITOR) 10 mg tablet Take 1 Tab by mouth daily.  omeprazole (PRILOSEC) 40 mg capsule Take 1 Cap by mouth daily.  insulin glargine (LANTUS,BASAGLAR) 100 unit/mL (3 mL) inpn 10 units at bedtime    trospium (SANCTURA) 20 mg tablet Take 1 Tab by mouth two (2) times a day.  tadalafil (CIALIS) 20 mg tablet Take 1 Tab by mouth daily as needed.  CINNAMON BARK (CINNAMON PO) Take  by mouth.  TURMERIC ROOT EXTRACT PO Take  by mouth.  GINGER ROOT (GINGER EXTRACT PO) Take  by mouth.  BD INSULIN PEN NEEDLE UF SHORT 31 gauge x 5/16\" ndle     Insulin Needles, Disposable, (BD INSULIN PEN NEEDLE UF MINI) 31 gauge x 3/16\" ndle Check twice daily    cyanocobalamin (VITAMIN B-12) 1,000 mcg Subl Take 1,000 mcg by mouth daily.  ERGOCALCIFEROL, VITAMIN D2, (VITAMIN D2 PO) Take 1 Cap by mouth daily.     aspirin 81 mg chewable tablet Take 81 mg by mouth daily. No current facility-administered medications for this visit. Visit Vitals    /57    Pulse 68    Ht 5' 6\" (1.676 m)    Wt 72.1 kg (159 lb)    BMI 25.66 kg/m2         Physical Exam   Constitutional: He is oriented to person, place, and time. He appears well-developed and well-nourished. HENT:   Head: Normocephalic and atraumatic. Eyes: Conjunctivae are normal.   Neck: Neck supple. No JVD present. No tracheal deviation present. No thyromegaly present. Cardiovascular: Normal rate, regular rhythm and normal heart sounds. Exam reveals no gallop and no friction rub. No murmur heard. Pulmonary/Chest: Breath sounds normal. No respiratory distress. He has no wheezes. He has no rales. He exhibits no tenderness. Abdominal: Soft. There is no tenderness. Musculoskeletal: He exhibits no edema. Neurological: He is alert and oriented to person, place, and time. Skin: Skin is warm and dry. Psychiatric: He has a normal mood and affect. Mr. Yamilex Anthony has a reminder for a \"due or due soon\" health maintenance. I have asked that he contact his primary care provider for follow-up on this health maintenance. Conclusion: 4/2013:stress test  1. Normal perfusion scan. 2. Normal wall motion and ejection fraction. I Have personally reviewed recent relevant labs available and discussed with patient  3/2018  Assessment       ICD-10-CM ICD-9-CM    1. Essential hypertension, benign I10 401.1     controlled   2. Mixed hyperlipidemia E78.2 272.2     on statin  lab with pcp   3.  Type 2 diabetes mellitus without complication, with long-term current use of insulin ([facility]) E11.9 250.00     Z79.4 V58.67        Medications Discontinued During This Encounter   Medication Reason    albuterol (PROVENTIL HFA, VENTOLIN HFA, PROAIR HFA) 90 mcg/actuation inhaler Not A Current Medication       No orders of the defined types were placed in this encounter. Follow-up Disposition:  Return in about 1 year (around 6/27/2019).

## 2018-07-05 ENCOUNTER — TELEPHONE (OUTPATIENT)
Dept: FAMILY MEDICINE CLINIC | Age: 68
End: 2018-07-05

## 2018-07-05 NOTE — TELEPHONE ENCOUNTER
Pt was told form [facility] for his Diabetes and they stated that they are limited license and can only see him 3 times a year and he needs to go back to his PCP. Would like to know if he can get in to a different Endocrinology.

## 2018-07-09 ENCOUNTER — TELEPHONE (OUTPATIENT)
Dept: FAMILY MEDICINE CLINIC | Age: 68
End: 2018-07-09

## 2018-07-10 NOTE — TELEPHONE ENCOUNTER
Spoke with patient (all identifiers verified) in regards to endocrinology referral request. He stated he would like to see another endocrinologist; as he was told by [facility] physician that there was nothing they could do for him and that he should return to PCP for diabetes management. Patient stated there office could only see him three times per year; wants an endocrinologist that will see him every six weeks. He stated he will be out of town from July 16th through July 29th. Patient was advised he has an upcoming endocrinology appointment at [facility] with Dr. Johana Harris on Tuesday, 9/18/18 at 9:00A. He was asked to arrive 30 minutes prior to appointment. Patient stated he would not be able to make this appointment, as he will be in [place]. He was asked to contact [facility] to reschedule appointment. Patient verbalized understanding. He was given the address and phone number to endocrinology office ([address] [place] [address]; [phone]). Office notes and labs have been faxed to Dr. Nasim King, as per  request, to [phone]. A fax confirmation has been received. Also, patient advised he stopped taking his Jardiance last week since it was causing urinary frequency and sleep issues. Patient aware a message will be routed to Dr. Henrik Britton. He was reminded he has a follow-up appointment scheduled with Dr. Henrik Britton on 8/13/18 at 1:30P. Patient verbalized understanding.

## 2018-07-10 NOTE — TELEPHONE ENCOUNTER
Kirsten Marquez   to Christy Ashton MD        11:43 AM   Note      Spoke with patient (all identifiers verified) in regards to endocrinology referral request. He stated he would like to see another endocrinologist; as he was told by [facility] physician that there was nothing they could do for him and that he should return to PCP for diabetes management. Patient stated there office could only see him three times per year; wants an endocrinologist that will see him every six weeks. He stated he will be out of town from July 16th through July 29th. Patient was advised he has an upcoming endocrinology appointment at [facility] with Dr. Gabby Villavicencio on Tuesday, 9/18/18 at 9:00A. He was asked to arrive 30 minutes prior to appointment. Patient stated he would not be able to make this appointment, as he will be in [place]. He was asked to contact [facility] to reschedule appointment. Patient verbalized understanding.     He was given the address and phone number to endocrinology office ([address] [place] [address], [place] [address]; [phone]). Office notes and labs have been faxed to Dr. Geri Allen, as per  request, to [phone]. A fax confirmation has been received.     Also, patient advised he stopped taking his Jardiance last week since it was causing urinary frequency and sleep issues. Patient aware a message will be routed to Dr. Ajith Laguna. He was reminded he has a follow-up appointment scheduled with Dr. Ajith Laguna on 8/13/18 at 1:30P. Patient verbalized understanding.

## 2018-08-13 ENCOUNTER — OFFICE VISIT (OUTPATIENT)
Dept: FAMILY MEDICINE CLINIC | Age: 68
End: 2018-08-13

## 2018-08-13 VITALS
DIASTOLIC BLOOD PRESSURE: 78 MMHG | HEART RATE: 85 BPM | TEMPERATURE: 97.9 F | OXYGEN SATURATION: 96 % | HEIGHT: 66 IN | RESPIRATION RATE: 16 BRPM | WEIGHT: 156.8 LBS | BODY MASS INDEX: 25.2 KG/M2 | SYSTOLIC BLOOD PRESSURE: 120 MMHG

## 2018-08-13 DIAGNOSIS — Z00.00 INITIAL MEDICARE ANNUAL WELLNESS VISIT: Primary | ICD-10-CM

## 2018-08-13 DIAGNOSIS — R35.0 URINE FREQUENCY: ICD-10-CM

## 2018-08-13 DIAGNOSIS — G47.9 SLEEP TROUBLE: ICD-10-CM

## 2018-08-13 DIAGNOSIS — E11.9 TYPE 2 DIABETES MELLITUS WITHOUT COMPLICATION, WITH LONG-TERM CURRENT USE OF INSULIN (HCC): ICD-10-CM

## 2018-08-13 DIAGNOSIS — R97.20 ELEVATED PSA: ICD-10-CM

## 2018-08-13 DIAGNOSIS — Z79.4 TYPE 2 DIABETES MELLITUS WITHOUT COMPLICATION, WITH LONG-TERM CURRENT USE OF INSULIN (HCC): ICD-10-CM

## 2018-08-13 LAB
BILIRUB UR QL STRIP: NEGATIVE
GLUCOSE UR-MCNC: ABNORMAL MG/DL
HBA1C MFR BLD HPLC: 8.6 %
KETONES P FAST UR STRIP-MCNC: NEGATIVE MG/DL
PH UR STRIP: 5.5 [PH] (ref 4.6–8)
PROT UR QL STRIP: NEGATIVE
SP GR UR STRIP: 1.01 (ref 1–1.03)
UA UROBILINOGEN AMB POC: ABNORMAL (ref 0.2–1)
URINALYSIS CLARITY POC: CLEAR
URINALYSIS COLOR POC: YELLOW
URINE BLOOD POC: NEGATIVE
URINE LEUKOCYTES POC: NEGATIVE
URINE NITRITES POC: NEGATIVE

## 2018-08-13 RX ORDER — GLIMEPIRIDE 4 MG/1
4 TABLET ORAL 2 TIMES DAILY
Qty: 180 TAB | Refills: 0 | Status: SHIPPED | OUTPATIENT
Start: 2018-08-13 | End: 2018-10-08 | Stop reason: SDUPTHER

## 2018-08-13 RX ORDER — BISMUTH SUBSALICYLATE 262 MG
1 TABLET,CHEWABLE ORAL DAILY
COMMUNITY

## 2018-08-13 RX ORDER — TRAZODONE HYDROCHLORIDE 50 MG/1
50 TABLET ORAL
Qty: 30 TAB | Refills: 1 | Status: SHIPPED | OUTPATIENT
Start: 2018-08-13 | End: 2018-10-01 | Stop reason: SDUPTHER

## 2018-08-13 RX ORDER — INSULIN GLARGINE 100 [IU]/ML
INJECTION, SOLUTION SUBCUTANEOUS
Qty: 3 PEN | Refills: 1 | Status: SHIPPED | OUTPATIENT
Start: 2018-08-13 | End: 2018-10-08 | Stop reason: SDUPTHER

## 2018-08-13 RX ORDER — TRAZODONE HYDROCHLORIDE 50 MG/1
50 TABLET ORAL
Qty: 30 TAB | Refills: 1 | Status: SHIPPED | OUTPATIENT
Start: 2018-08-13 | End: 2018-08-13 | Stop reason: SDUPTHER

## 2018-08-13 RX ORDER — CHOLECALCIFEROL (VITAMIN D3) 125 MCG
10 CAPSULE ORAL
COMMUNITY
End: 2019-06-26

## 2018-08-13 RX ORDER — PEN NEEDLE, DIABETIC 31 GX5/16"
NEEDLE, DISPOSABLE MISCELLANEOUS
Qty: 1 PACKAGE | Refills: 1 | Status: SHIPPED | OUTPATIENT
Start: 2018-08-13 | End: 2018-10-08 | Stop reason: SDUPTHER

## 2018-08-13 NOTE — PROGRESS NOTES
This is an Initial Medicare Annual Wellness Exam (AWV) (Performed 12 months after IPPE or effective date of Medicare Part B enrollment, Once in a lifetime)    I have reviewed the patient's medical history in detail and updated the computerized patient record. History     Past Medical History:   Diagnosis Date    Bl[facility] ([facility] 7/15/13    Clinical Stage TaN0M0 HG UC    Bladder tumor     Diabetes ([facility]     Essential hypertension     History of kidney stones     Hyperlipidemia     Peripheral neuropathy     SBO (small bowel obstruction) (HCC)     Spinal stenosis     Spondylolisthesis, grade 1       Past Surgical History:   Procedure Laterality Date    HX UROLOGICAL  7/15/13    [facility]URBT, [facility]. Chuck Agarwal, [facility]    HX UROLOGICAL  1/4/16    Cysto, Dr. Chuck Agarwal, [facility]    HX WRIST FRACTURE [address]  4/27/15    (L) wrist     Current Outpatient Prescriptions   Medication Sig Dispense Refill    prednisoLONE acetate (PRED FORTE) 1 % ophthalmic suspension       ketorolac (ACULAR LS) 0.4 % drop       BESIVANCE 0.6 % drps ophthalmic suspension       glucose blood VI test strips (ACCU-CHEK HALIMA PLUS TEST STRP) strip Use 1 daily for blood glucose monitoring DX: E11.9 100 Strip 3    Lancets (ACCU-CHEK SOFTCLIX LANCETS) misc Use 1 daily for blood glucose monitoring DX: E11.9 100 Each 3    Blood-Glucose Meter (ACCU-CHEK HALIMA PLUS METER) misc Use 1 daily for blood glucose monitoring DX: E11.9 1 Each 0    glimepiride (AMARYL) 4 mg tablet Take 1 Tab by mouth two (2) times a day. 180 Tab 0    SITagliptin (JANUVIA) 100 mg tablet Take 1 Tab by mouth daily. 90 Tab 1    empagliflozin (JARDIANCE) 10 mg tablet Take 1 Tab by mouth daily. 90 Tab 1    glucose blood VI test strips (ONETOUCH ULTRA TEST) strip Check twice daily 100 Strip 6    pioglitazone (ACTOS) 30 mg tablet Take once a day (Patient taking differently: 30 mg.  Take once a day) 90 Tab 1    losartan-hydroCHLOROthiazide (HYZAAR) 100-25 mg per tablet Take 1 Tab by mouth daily. 90 Tab 1    atorvastatin (LIPITOR) 10 mg tablet Take 1 Tab by mouth daily. 90 Tab 1    omeprazole (PRILOSEC) 40 mg capsule Take 1 Cap by mouth daily. 90 Cap 1    insulin glargine (LANTUS,BASAGLAR) 100 unit/mL (3 mL) inpn 10 units at bedtime 3 Pen 1    trospium (SANCTURA) 20 mg tablet Take 1 Tab by mouth two (2) times a day. 180 Tab 2    tadalafil (CIALIS) 20 mg tablet Take 1 Tab by mouth daily as needed. 6 Tab 3    CINNAMON BARK (CINNAMON PO) Take  by mouth.  TURMERIC ROOT EXTRACT PO Take  by mouth.  GINGER ROOT (GINGER EXTRACT PO) Take  by mouth.  BD INSULIN PEN NEEDLE UF SHORT 31 gauge x 5/16\" ndle   1    Insulin Needles, Disposable, (BD INSULIN PEN NEEDLE UF MINI) 31 gauge x 3/16\" ndle Check twice daily 100 Pen Needle 1    cyanocobalamin (VITAMIN B-12) 1,000 mcg Subl Take 1,000 mcg by mouth daily.  ERGOCALCIFEROL, VITAMIN D2, (VITAMIN D2 PO) Take 1 Cap by mouth daily.  aspirin 81 mg chewable tablet Take 81 mg by mouth daily. No Known Allergies  Family History   Problem Relation Age of Onset    Hypertension Mother     Heart Attack Neg Hx     Sudden Death Neg Hx      Social History   Substance Use Topics    Smoking status: Never Smoker    Smokeless tobacco: Never Used    Alcohol use Yes      Comment: 1-2 drinks/day     Patient Active Problem List   Diagnosis Code    Chest pain, unspecified R07.9    Essential hypertension, benign I10    Type 2 diabetes mellitus without complication ([facility] P72.7    Mixed hyperlipidemia E78.2    Bladder cancer ([facility] C67.9    Bladder tumor D49.4    S/P colonoscopy with polypectomy/ follwoing GI. done in 2013. stage 4 esophagitis on EGD done in 2013. recently seen GI. Dr. Manuel Naidu on 7/7/16 Z98.890    Erectile dysfunction N52.9    Elevated PSA R97.20    Cubital tunnel syndrome on left G56.22    Abnormal findings on esophagogastroduodenoscopy (EGD)/ done on 8/1/16. gunner's esophagus .  f/u EGD due in 3 years will be 2019 R19.8    Advance directive discussed with patient Z71.89       Depression Risk Factor Screening:     PHQ over the last two weeks 2018   Little interest or pleasure in doing things Not at all   Feeling down, depressed, irritable, or hopeless Not at all   Total Score PHQ 2 0     Alcohol Risk Factor Screening:   {screenin}    Functional Ability and Level of Safety:     Hearing Loss  {Desc; hearing loss:71392::\"Hearing is good. \"}    Activities of Daily Living  The home contains: {AWV Home Safety:83145::\"no safety equipment. \"}  {Functional ADL's:78920::\"Patient does total self care\"}    Fall Risk  Fall Risk Assessment, last 12 mths 2018   Able to walk? Yes   Fall in past 12 months? No       Abuse Screen  {Abuse Screen:::\"Patient is not abused\"}    Cognitive Screening   Evaluation of Cognitive Function:  Has your family/caregiver stated any concerns about your memory: {AWV no/yes:72982::\"no\"}  {Cognitive Screenin::\"Normal\"}    Patient Care Team   Patient Care Team:  Jing Longoria MD as PCP - General (Family Practice)  Matt Mccarty MD (Urology)  Umm Schmitz MD as Consulting Provider (Internal Medicine)  Venkatesh Smart MD as Consulting Provider (Urology)  Santo Vargas MD (Ophthalmology)  Selvin Cloud MD (Ophthalmology)  Rich Avila MD (Endocrinology)    Assessment/Plan   Education and counseling provided:  {Education List, choose as appropriate:::\"Are appropriate based on today's review and evaluation\"}    Diagnoses and all orders for this visit:    1.  Initial Medicare annual wellness visit       Health Maintenance Due   Topic Date Due    EYE EXAM RETINAL OR DILATED Q1  2018    MEDICARE YEARLY EXAM  2018    Influenza Age 5 to Adult  2018    FOOT EXAM Q1  2018    HEMOGLOBIN A1C Q6M  2018

## 2018-08-13 NOTE — PROGRESS NOTES
1. Have you been to the ER, urgent care clinic since your last visit? Hospitalized since your last visit? No    2. Have you seen or consulted any other health care providers outside of the [address] [place] [address] since your last visit? Include any pap smears or colon screening. Dr. Berny Lo: 8/2018    Patient has not had flu vaccine. Patient had dm eye exam - Dr. J Luis Rolon 2/2018. Patient has not had dm foot exam.    Patient has an upcoming appointment with Dr. Merlin Sarks DR P [facility]) on Tuesday, 10/09/18 at 11:00A.

## 2018-08-13 NOTE — MR AVS SNAPSHOT
[address] [facility] [address] 200 [facility] 
[phone] Patient: Russell Cummings MRN: [id number] Visit Information Date & Time Provider Department Dept. Phone Encounter #  
 8/13/2018  1:30 PM Patrick Alexis, [facility] [phone] [id number] Your Appointments 3/13/2019  9:00 AM  
PROCEDURE with Dereje Aguilar MD  
[facility] ([address] [facility]) Appt Note: 1yr Cysto/Cyto  
 [address] [facility] [address] [address] [address] 10[address] [facility]  
  
   
 200 [facility]  
  
    
 6/26/2019  9:15 AM  
Office Visit with Mukul Lee MD  
[facility]) Appt Note: 1 yr  
 [address] [facility], [address] [place] [address]  
1338 [facility], [address] [place] [address] Upcoming Health Maintenance Date Due  
 EYE EXAM RETINAL OR DILATED Q1 2/8/2018 MEDICARE YEARLY EXAM 6/1/2018 Influenza Age 5 to Adult 8/1/2018 FOOT EXAM Q1 8/30/2018 HEMOGLOBIN A1C Q6M 9/1/2018 GLAUCOMA SCREENING Q2Y 2/8/2019 LIPID PANEL Q1 3/1/2019 MICROALBUMIN Q1 6/12/2019 COLONOSCOPY 8/9/2023 DTaP/Tdap/Td series (2 - Td) 4/20/2025 Allergies as of 8/13/2018  Review Complete On: 8/13/2018 By: Patrick Alexis MD  
 No Known Allergies Current Immunizations  Reviewed on 12/20/2016 Name Date Hep A Vaccine 1/11/2016, 10/31/2002 Influenza Vaccine 10/1/2016 Influenza Vaccine (Quad) PF 10/23/2015 Pneumococcal Conjugate (PCV-13) 6/29/2016 Pneumococcal Polysaccharide (PPSV-23) 8/30/2017 Tdap 4/20/2015 Typhoid Vaccine 1/11/2016, 10/31/2002 Yellow Fever Vaccine 1/11/2016 Not reviewed this visit You Were Diagnosed With   
  
 Codes Comments Initial Medicare annual wellness visit    -  Primary ICD-10-CM: Z00.00 ICD-9-CM: V70.0 Type 2 diabetes mellitus without complication, with long-term current use of insulin (HCC)     ICD-10-CM: E11.9, Z79.4 ICD-9-CM: 250.00, V58.67 Sleep trouble     ICD-10-CM: G47.9 ICD-9-CM: 780.50 BMI 25.0-25.9,adult     ICD-10-CM: E09.99 ICD-9-CM: V85.21 Urine frequency     ICD-10-CM: R35.0 ICD-9-CM: 788.41 Elevated PSA     ICD-10-CM: R97.20 ICD-9-CM: 790.93 Vitals BP Pulse Temp Resp Height(growth percentile) Weight(growth percentile) 120/78 (BP 1 Location: Left arm, BP Patient Position: Sitting) 85 97.9 °F (36.6 °C) (Oral) 16 5' 6\" (1.676 m) 156 lb 12.8 oz (71.1 kg) SpO2 BMI Smoking Status 96% 25.31 kg/m2 Never Smoker Vitals History BMI and BSA Data Body Mass Index Body Surface Area  
 25.31 kg/m 2 1.82 m 2 Preferred Pharmacy Pharmacy Name Phone [address] [address] [phone] Your Updated Medication List  
  
   
This list is accurate as of 8/13/18  2:55 PM.  Always use your most recent med list.  
  
  
  
  
 aspirin 81 mg chewable tablet Take 81 mg by mouth daily. atorvastatin 10 mg tablet Commonly known as:  LIPITOR Take 1 Tab by mouth daily. Blood-Glucose Meter Misc Commonly known as:  ACCU-CHEK HALIMA PLUS METER Use 1 daily for blood glucose monitoring DX: E11.9 CINNAMON PO Take  by mouth. SHOBHA EXTRACT PO Take  by mouth.  
  
 glimepiride 4 mg tablet Commonly known as:  AMARYL Take 1 Tab by mouth two (2) times a day. * glucose blood VI test strips strip Commonly known as:  ONETOUCH ULTRA TEST Check twice daily * glucose blood VI test strips strip Commonly known as:  ACCU-CHEK HALIMA PLUS TEST STRP Use 1 daily for blood glucose monitoring DX: E11.9  
  
 insulin glargine 100 unit/mL (3 mL) Inpn Commonly known as:  GREY MARADIAGA  
10 units at bedtime * Insulin Needles (Disposable) 31 gauge x 3/16\" Ndle Commonly known as:  BD ULTRA-FINE MINI PEN NEEDLE Check twice daily * BD ULTRA-FINE SHORT PEN NEEDLE 31 gauge x 5/16\" Ndle Generic drug:  Insulin Needles (Disposable) Once daily Lancets Misc Commonly known as:  ACCU-CHEK SOFTCLIX LANCETS Use 1 daily for blood glucose monitoring DX: E11.9  
  
 losartan-hydroCHLOROthiazide 100-25 mg per tablet Commonly known as:  HYZAAR Take 1 Tab by mouth daily. melatonin Tab tablet Take 10 mg by mouth nightly. multivitamin tablet Commonly known as:  ONE A DAY Take 1 Tab by mouth daily. omeprazole 40 mg capsule Commonly known as:  PRILOSEC Take 1 Cap by mouth daily. pioglitazone 30 mg tablet Commonly known as:  ACTOS Take once a day SITagliptin 100 mg tablet Commonly known as:  William Norlander Take 1 Tab by mouth daily. tadalafil 20 mg tablet Commonly known as:  CIALIS Take 1 Tab by mouth daily as needed. traZODone 50 mg tablet Commonly known as:  Amish Handy Take 1 Tab by mouth nightly. TURMERIC ROOT EXTRACT PO Take  by mouth. VITAMIN B-12 1,000 mcg sublingual tablet Generic drug:  cyanocobalamin Take 1,000 mcg by mouth daily. VITAMIN D2 PO Take 1 Cap by mouth daily. * Notice: This list has 4 medication(s) that are the same as other medications prescribed for you. Read the directions carefully, and ask your doctor or other care provider to review them with you. Prescriptions Sent to Pharmacy Refills  
 insulin glargine (LANTUS,BASAGLAR) 100 unit/mL (3 mL) inpn 1 Sig: 10 units at bedtime Class: Normal  
 Pharmacy: [address] [place] [address] Ph #: [phone] BD ULTRA-FINE SHORT PEN NEEDLE 31 gauge x 5/16\" ndle 1 Sig: Once daily Class: Normal  
 Pharmacy: [address] [place] [address] Ph #: [phone] glimepiride (AMARYL) 4 mg tablet 0 Sig: Take 1 Tab by mouth two (2) times a day. Class: Normal  
 Pharmacy: [address] [place] [address], [address] [place] [address] Ph #: [phone] Route: Oral  
 traZODone (DESYREL) 50 mg tablet 1 Sig: Take 1 Tab by mouth nightly. Class: Normal  
 Pharmacy: [address] [place] [address], [address] [place] [address] Ph #: [phone] Route: Oral  
  
We Performed the Following AMB POC HEMOGLOBIN A1C [14128 CPT(R)] AMB POC URINALYSIS DIP STICK AUTO W/ MICRO [79963 CPT(R)] REFERRAL TO SLEEP STUDIES [REF99 Custom] Referral Information Referral ID Referred By Referred To  
  
 [id number], [address] MD   
   83[address] [facility] [address] [place], [address] Phone: [phone] Fax: [phone] Visits Status Start Date End Date 1 New Request 8/13/18 8/13/19 If your referral has a status of pending review or denied, additional information will be sent to support the outcome of this decision. Patient Instructions Medicare Part B Preventive Services Limitations Recommendation Scheduled Bone Mass Measurement 
(age 72 & older, biennial) Requires diagnosis related to osteoporosis or estrogen deficiency. Biennial benefit unless patient has history of long-term glucocorticoid tx or baseline is needed because initial test was by other method Cardiovascular Screening Blood Tests (every 5 years) Total cholesterol, HDL, Triglycerides Order as a panel if possible 03/01/2018 Colorectal Cancer Screening 
-Fecal occult blood test (annual) -Flexible sigmoidoscopy (5y) 
-Screening colonoscopy (10y) -Barium Enema  08/09/2013 Q10 year Counseling to Prevent Tobacco Use (up to 8 sessions per year) - Counseling greater than 3 and up to 10 minutes - Counseling greater than 10 minutes Patients must be asymptomatic of tobacco-related conditions to receive as preventive service Not indicated Diabetes Screening Tests (at least every 3 years, Medicare covers annually or at 6-month intervals for prediabetic patients) Fasting blood sugar (FBS) or glucose tolerance test (GTT) Patient must be diagnosed with one of the following: 
-Hypertension, Dyslipidemia, obesity, previous impaired FBS or GTT 
Or any two of the following: overweight, FH of diabetes, age ? 72, history of gestational diabetes, birth of baby weighing more than 9 pounds 03/01/2018 Diabetes Self-Management Training (DSMT) (no USPSTF recommendation) Requires referral by treating physician for patient with diabetes or renal disease. 10 hours of initial DSMT session of no less than 30 minutes each in a continuous 12-month period. 2 hours of follow-up DSMT in subsequent years. UTD Glaucoma Screening (no USPSTF recommendation) Diabetes mellitus, family history, , age 48 or over,  American, age 72 or over 02/08/2017 Human Immunodeficiency Virus (HIV) Screening (annually for increased risk patients) HIV-1 and HIV-2 by EIA, MARIEL, rapid antibody test, or oral mucosa transudate Patient must be at increased risk for HIV infection per USPSTF guidelines or pregnant. Tests covered annually for patients at increased risk. Pregnant patients may receive up to 3 test during pregnancy. Not indicated Medical Nutrition Therapy (MNT) (for diabetes or renal disease not recommended schedule) Requires referral by treating physician for patient with diabetes or renal disease. Can be provided in same year as diabetes self-management training (DSMT), and CMS recommends medical nutrition therapy take place after DSMT. Up to 3 hours for initial year and 2 hours in subsequent years. UTD Prostate Cancer Screening (annually up to age 76) - Digital rectal exam (ALBERTO) - Prostate specific antigen (PSA) Annually (age 48 or over), ALBERTO not paid separately when covered E/M service is provided on same date Men up to age 76 may need a screening blood test for prostate cancer at certain intervals, depending on their personal and family history. This decision is between the patient and his provider. 03/01/2018 Seasonal Influenza Vaccination (annually)  09/04/2017 Pneumococcal Vaccination (once after 65) Completed Hepatitis B Vaccinations (if medium/high risk) Medium/high risk factors:  End-stage renal disease, Hemophiliacs who received Factor VIII or IX concentrates, Clients of institutions for the mentally retarded, Persons who live in the same house as a HepB virus carrier, Homosexual men, Illicit injectable drug abusers. Not indicated Shingles Vaccination A shingles vaccine is also recommended once in a lifetime after age 61 05/20/2015 Ultrasound Screening for Abdominal Aortic Aneurysm (AAA) (once) Patient must be referred through IPPE and not have had a screening for abdominal aortic aneurysm before under Medicare. Limited to patients who meet one of the following criteria: 
- Men who are 73-68 years old and have smoked more than 100 cigarettes in their lifetime. 
-Anyone with a FH of AAA 
-Anyone recommended for screening by USPSTF Not indicated Learning About Diabetes Food Guidelines Your Care Instructions Meal planning is important to manage diabetes. It helps keep your blood sugar at a target level (which you set with your doctor). You don't have to eat special foods. You can eat what your family eats, including sweets once in a while. But you do have to pay attention to how often you eat and how much you eat of certain foods. You may want to work with a dietitian or a certified diabetes educator (CDE) to help you plan meals and snacks. A dietitian or CDE can also help you lose weight if that is one of your goals. What should you know about eating carbs? Managing the amount of carbohydrate (carbs) you eat is an important part of healthy meals when you have diabetes. Carbohydrate is found in many foods. · Learn which foods have carbs. And learn the amounts of carbs in different foods. ¨ Bread, cereal, pasta, and rice have about 15 grams of carbs in a serving. A serving is 1 slice of bread (1 ounce), ½ cup of cooked cereal, or 1/3 cup of cooked pasta or rice. ¨ Fruits have 15 grams of carbs in a serving. A serving is 1 small fresh fruit, such as an apple or orange; ½ of a banana; ½ cup of cooked or canned fruit; ½ cup of fruit juice; 1 cup of melon or raspberries; or 2 tablespoons of dried fruit. ¨ Milk and no-sugar-added yogurt have 15 grams of carbs in a serving. A serving is 1 cup of milk or 2/3 cup of no-sugar-added yogurt. ¨ Starchy vegetables have 15 grams of carbs in a serving. A serving is ½ cup of mashed potatoes or sweet potato; 1 cup winter squash; ½ of a small baked potato; ½ cup of cooked beans; or ½ cup cooked corn or green peas. · Learn how much carbs to eat each day and at each meal. A dietitian or CDE can teach you how to keep track of the amount of carbs you eat. This is called carbohydrate counting. · If you are not sure how to count carbohydrate grams, use the Plate Method to plan meals. It is a good, quick way to make sure that you have a balanced meal. It also helps you spread carbs throughout the day. ¨ Divide your plate by types of foods. Put non-starchy vegetables on half the plate, meat or other protein food on one-quarter of the plate, and a grain or starchy vegetable in the final quarter of the plate. To this you can add a small piece of fruit and 1 cup of milk or yogurt, depending on how many carbs you are supposed to eat at a meal. 
· Try to eat about the same amount of carbs at each meal. Do not \"save up\" your daily allowance of carbs to eat at one meal. 
· Proteins have very little or no carbs per serving.  Examples of proteins are beef, chicken, turkey, fish, eggs, tofu, cheese, cottage cheese, and peanut butter. A serving size of meat is 3 ounces, which is about the size of a deck of cards. Examples of meat substitute serving sizes (equal to 1 ounce of meat) are 1/4 cup of cottage cheese, 1 egg, 1 tablespoon of peanut butter, and ½ cup of tofu. How can you eat out and still eat healthy? · Learn to estimate the serving sizes of foods that have carbohydrate. If you measure food at home, it will be easier to estimate the amount in a serving of restaurant food. · If the meal you order has too much carbohydrate (such as potatoes, corn, or baked beans), ask to have a low-carbohydrate food instead. Ask for a salad or green vegetables. · If you use insulin, check your blood sugar before and after eating out to help you plan how much to eat in the future. · If you eat more carbohydrate at a meal than you had planned, take a walk or do other exercise. This will help lower your blood sugar. What else should you know? · Limit saturated fat, such as the fat from meat and dairy products. This is a healthy choice because people who have diabetes are at higher risk of heart disease. So choose lean cuts of meat and nonfat or low-fat dairy products. Use olive or canola oil instead of butter or shortening when cooking. · Don't skip meals. Your blood sugar may drop too low if you skip meals and take insulin or certain medicines for diabetes. · Check with your doctor before you drink alcohol. Alcohol can cause your blood sugar to drop too low. Alcohol can also cause a bad reaction if you take certain diabetes medicines. Follow-up care is a key part of your treatment and safety. Be sure to make and go to all appointments, and call your doctor if you are having problems. It's also a good idea to know your test results and keep a list of the medicines you take. Where can you learn more? Go to http://jah-lorenzo.info/. Enter T742 in the search box to learn more about \"Learning About Diabetes Food Guidelines. \" Current as of: December 7, 2017 Content Version: 11.7 © 7156-7889 [employer]. Care instructions adapted under license by [employer] (which disclaims liability or warranty for this information). If you have questions about a medical condition or this instruction, always ask your healthcare professional. [place] [address] any warranty or liability for your use of this information. Advance Directives: Care Instructions Your Care Instructions An advance directive is a legal way to state your wishes at the end of your life. It tells your family and your doctor what to do if you can no longer say what you want. There are two main types of advance directives. You can change them any time that your wishes change. · A living will tells your family and your doctor your wishes about life support and other treatment. · A durable power of  for health care lets you name a person to make treatment decisions for you when you can't speak for yourself. This person is called a health care agent. If you do not have an advance directive, decisions about your medical care may be made by a doctor or a  who doesn't know you. It may help to think of an advance directive as a gift to the people who care for you. If you have one, they won't have to make tough decisions by themselves. Follow-up care is a key part of your treatment and safety. Be sure to make and go to all appointments, and call your doctor if you are having problems. It's also a good idea to know your test results and keep a list of the medicines you take. How can you care for yourself at home? · Discuss your wishes with your loved ones and your doctor. This way, there are no surprises. · Many states have a unique form.  Or you might use a universal form that has been approved by many states. This kind of form can sometimes be completed and stored online. Your electronic copy will then be available wherever you have a connection to the Internet. In most cases, doctors will respect your wishes even if you have a form from a different state. · You don't need a  to do an advance directive. But you may want to get legal advice. · Think about these questions when you prepare an advance directive: ¨ Who do you want to make decisions about your medical care if you are not able to? Many people choose a family member or close friend. ¨ Do you know enough about life support methods that might be used? If not, talk to your doctor so you understand. ¨ What are you most afraid of that might happen? You might be afraid of having pain, losing your independence, or being kept alive by machines. ¨ Where would you prefer to die? Choices include your home, a hospital, or a nursing home. ¨ Would you like to have information about hospice care to support you and your family? ¨ Do you want to donate organs when you die? ¨ Do you want certain Adventism practices performed before you die? If so, put your wishes in the advance directive. · Read your advance directive every year, and make changes as needed. When should you call for help? Be sure to contact your doctor if you have any questions. Where can you learn more? Go to http://jah-lorenzo.info/. Enter R264 in the search box to learn more about \"Advance Directives: Care Instructions. \" Current as of: October 6, 2017 Content Version: 11.7 © 0551-5003 [employer]. Care instructions adapted under license by [employer] (which disclaims liability or warranty for this information).  If you have questions about a medical condition or this instruction, always ask your healthcare professional. [address] any warranty or liability for your use of this information. Medicare Wellness Visit, Male The best way to live healthy is to have a lifestyle where you eat a well-balanced diet, exercise regularly, limit alcohol use, and quit all forms of tobacco/nicotine, if applicable. Regular preventive services are another way to keep healthy. Preventive services (vaccines, screening tests, monitoring & exams) can help personalize your care plan, which helps you manage your own care. Screening tests can find health problems at the earliest stages, when they are easiest to treat. [address] [facility] follows the current, evidence-based guidelines published by the [facility] Deion Jensen ([facility]) when recommending preventive services for our patients. Because we follow these guidelines, sometimes recommendations change over time as research supports it. (For example, a prostate screening blood test is no longer routinely recommended for men with no symptoms.) Of course, you and your provider may decide to screen more often for some diseases, based on your risk and co-morbidities (chronic disease you are already diagnosed with). Preventive services for you include: - Medicare offers their members a free annual wellness visit, which is time for you and your primary care provider to discuss and plan for your preventive service needs. Take advantage of this benefit every year! 
 
-All people over age 72 should receive the recommended pneumonia vaccines. Current USPSTF guidelines recommend a series of two vaccines for the best pneumonia protection.  
 
-All adults should have a yearly flu vaccine and a tetanus vaccine every 10 years.  All adults age 61 years should receive a shingles vaccine once in their lifetime.   
 
-All adults age 38-68 years who are overweight should have a diabetes screening test once every three years.  
 
-Other screening tests & preventive services for persons with diabetes include: an eye exam to screen for diabetic retinopathy, a kidney function test, a foot exam, and stricter control over your cholesterol.  
 
-Cardiovascular screening for adults with routine risk involves an electrocardiogram (ECG) at intervals determined by the provider.  
 
-Colorectal cancer screenings should be done for adults age 54-65 years with normal risk. There are a number of acceptable methods of screening for this type of cancer. Each test has its own benefits and drawbacks. Discuss with your provider what is most appropriate for you during your annual wellness visit. The different tests include: colonoscopy (considered the best screening method), a fecal occult blood test, a fecal DNA test, and sigmoidoscopy. 
 
-All adults born between [facility] should be screened once for Hepatitis C. 
 
-An Abdominal Aortic Aneurysm (AAA) Screening is recommended for men age 73-68 who has ever smoked in their lifetime. Here is a list of your current Health Maintenance items (your personalized list of preventive services) with a due date: 
Health Maintenance Due Topic Date Due [employer] Eye Exam  02/08/2018 [employer] Annual Well Visit  06/01/2018  Flu Vaccine  08/01/2018 [employer] Diabetic Foot Care  08/30/2018  Hemoglobin A1C    09/01/2018 Introducing [facility]! [employer] introduces Flywheel patient portal. Now you can access parts of your medical record, email your doctor's office, and request medication refills online. 1. In your internet browser, go to [email or web address] 2. Click on the First Time User? Click Here link in the Sign In box. You will see the New Member Sign Up page. 3. Enter your Flywheel Access Code exactly as it appears below. You will not need to use this code after youve completed the sign-up process. If you do not sign up before the expiration date, you must request a new code. · Flywheel Access Code: [email or web address] Expires: 9/10/2018 11:27 AM 
 
 4. Enter the last four digits of your Social Security Number (xxxx) and Date of Birth (mm/dd/yyyy) as indicated and click Submit. You will be taken to the next sign-up page. 5. Create a Employ[employer]ight ID. This will be your [employer] login ID and cannot be changed, so think of one that is secure and easy to remember. 6. Create a [employer] password. You can change your password at any time. 7. Enter your Password Reset Question and Answer. This can be used at a later time if you forget your password. 8. Enter your e-mail address. You will receive e-mail notification when new information is available in [address] 9. Click Sign Up. You can now view and download portions of your medical record. 10. Click the Download Summary menu link to download a portable copy of your medical information. If you have questions, please visit the Frequently Asked Questions section of the [employer] website. Remember, [employer] is NOT to be used for urgent needs. For medical emergencies, dial 911. Now available from your iPhone and Android! Please provide this summary of care documentation to your next provider. Your primary care clinician is listed as Daniel Johansen. If you have any questions after today's visit, please call [phone].

## 2018-08-13 NOTE — PROGRESS NOTES
HISTORY OF PRESENT ILLNESS  Estevan Jones is a 79 y.o. male. HPI: Here for routine follow up. H/o diabetes. Lately said increase frequency of urination at night time and feeling dry mouth. Has been checking blood sugar but forgot to bring log. Said fasting sugar is around 120-140. Noted on UA no signs of infection but sugar in urine. Feels fatigue and also poor sleep. Sebastián Rm he has been sleeping only 2-3 hours at night and partly due to frequency of urination. Denies any mood changes. No depression or anxiety. No unusual stress. Insisting to see sleep specialist as worried. Feeling daytime sleepiness. Taking naps during day. discussed high BMI. Discussed diet modification, calorie count and exercise. Discussed importance of weight loss. agree to do exercise and life style modification. Diet and exercise hand out given in AVS.   Visit Vitals    /78 (BP 1 Location: Left arm, BP Patient Position: Sitting)    Pulse 85    Temp 97.9 °F (36.6 °C) (Oral)    Resp 16    Ht 5' 6\" (1.676 m)    Wt 156 lb 12.8 oz (71.1 kg)    SpO2 96%    BMI 25.31 kg/m2     Review medication list, vitals, problem list,allergies. Denies any headache, dizziness, no chest pain or trouble breathing, no arm or leg weakness. No nausea or vomiting, no weight or appetite changes, no depression or anxiety. No  bowel complains, no palpitation, no diaphoresis. No abdominal pain. Lab Results   Component Value Date/Time    Hemoglobin A1c 7.6 (H) 03/01/2018 12:00 AM    Hemoglobin A1c (POC) 8.6 08/13/2018 02:50 PM    Hemoglobin A1c, External 8.4 06/20/2016     ROS: see HPI     Physical Exam   Constitutional: He is oriented to person, place, and time. No distress. Neck: No thyromegaly present. Cardiovascular: Normal rate, regular rhythm and normal heart sounds. Pulmonary/Chest:   CTA   Abdominal: Soft. Bowel sounds are normal. There is no tenderness. Musculoskeletal: He exhibits no edema.    Lymphadenopathy:     He has no cervical adenopathy. Neurological: He is oriented to person, place, and time. Psychiatric: His behavior is normal.       ASSESSMENT and PLAN  1. Type 2 diabetes mellitus without complication, with long-term current use of insulin ([address]): elevated blood sugar. Also glucose in UA. Symptomatic. Discussed diet modification. He will work on it and also discussed to adjust lantus according to fasting blood sugar. Take 12 unites at bedtime and increase 2 unites every other day if blood sugar fasting is more than 120. Also discussed importance of exercise and diet modification. F/u next visit and encouraged him to bring log. Pending appt with new endocrinologist in oct. E11.9 250.00 insulin glargine (LANTUS,BASAGLAR) 100 unit/mL (3 mL) inpn    Z79.4 V58.67 glimepiride (AMARYL) 4 mg tablet      AMB POC HEMOGLOBIN A1C   2. Sleep trouble: could be related to poorly controlled diabetes and urinary frequency. For now given trazodone but he wants to see sleep specialist before try any medication . mean time given trazodone. Discussed medication side effects. F/u next visit./  G47.9 780.50 REFERRAL TO SLEEP STUDIES      traZODone (DESYREL) 50 mg tablet      DISCONTINUED: traZODone (DESYREL) 50 mg tablet   3. BMI 25.0-25.9,adult: discussed high BMI. Discussed diet modification, calorie count and exercise. Discussed importance of weight loss. agree to do exercise and life style modification. Diet and exercise hand out given in AVS.    Z68.25 V85.21    4. Urine frequency: poorly controlled diabetes. For now goal to control diabetes. R35.0 788.41 AMB POC URINALYSIS DIP STICK AUTO W/ MICRO   5. Elevated PSA: following urology. R97.20 790.93    Pt understood and agree with the plan   Review HM   Follow-up Disposition:  Return in about 3 weeks (around 9/3/2018).

## 2018-08-13 NOTE — PATIENT INSTRUCTIONS
Medicare Part B Preventive Services Limitations Recommendation Scheduled   Bone Mass Measurement  (age 72 & older, biennial) Requires diagnosis related to osteoporosis or estrogen deficiency. Biennial benefit unless patient has history of long-term glucocorticoid tx or baseline is needed because initial test was by other method NA    Cardiovascular Screening Blood Tests (every 5 years)  Total cholesterol, HDL, Triglycerides Order as a panel if possible 03/01/2018    Colorectal Cancer Screening  -Fecal occult blood test (annual)  -Flexible sigmoidoscopy (5y)  -Screening colonoscopy (10y)  -Barium Enema  08/09/2013 Q10 year   Counseling to Prevent Tobacco Use (up to 8 sessions per year)  - Counseling greater than 3 and up to 10 minutes  - Counseling greater than 10 minutes Patients must be asymptomatic of tobacco-related conditions to receive as preventive service Not indicated    Diabetes Screening Tests (at least every 3 years, Medicare covers annually or at 6-month intervals for prediabetic patients)    Fasting blood sugar (FBS) or glucose tolerance test (GTT) Patient must be diagnosed with one of the following:  -Hypertension, Dyslipidemia, obesity, previous impaired FBS or GTT  Or any two of the following: overweight, FH of diabetes, age ? 72, history of gestational diabetes, birth of baby weighing more than 9 pounds 03/01/2018    Diabetes Self-Management Training (DSMT) (no USPSTF recommendation) Requires referral by treating physician for patient with diabetes or renal disease. 10 hours of initial DSMT session of no less than 30 minutes each in a continuous 12-month period. 2 hours of follow-up DSMT in subsequent years.  UTD    Glaucoma Screening (no USPSTF recommendation) Diabetes mellitus, family history, , age 48 or over,  American, age 72 or over 02/08/2017    Human Immunodeficiency Virus (HIV) Screening (annually for increased risk patients)  HIV-1 and HIV-2 by EIA, MARIEL, rapid antibody test, or oral mucosa transudate Patient must be at increased risk for HIV infection per USPSTF guidelines or pregnant. Tests covered annually for patients at increased risk. Pregnant patients may receive up to 3 test during pregnancy. Not indicated    Medical Nutrition Therapy (MNT) (for diabetes or renal disease not recommended schedule) Requires referral by treating physician for patient with diabetes or renal disease. Can be provided in same year as diabetes self-management training (DSMT), and CMS recommends medical nutrition therapy take place after DSMT. Up to 3 hours for initial year and 2 hours in subsequent years. UTD    Prostate Cancer Screening (annually up to age 76)  - Digital rectal exam (ALBERTO)  - Prostate specific antigen (PSA) Annually (age 48 or over), ALBERTO not paid separately when covered E/M service is provided on same date  Men up to age 76 may need a screening blood test for prostate cancer at certain intervals, depending on their personal and family history. This decision is between the patient and his provider. 03/01/2018    Seasonal Influenza Vaccination (annually)  09/04/2017      Pneumococcal Vaccination (once after 72)    Completed    Hepatitis B Vaccinations (if medium/high risk) Medium/high risk factors:  End-stage renal disease,  Hemophiliacs who received Factor VIII or IX concentrates, Clients of institutions for the mentally retarded, Persons who live in the same house as a HepB virus carrier, Homosexual men, Illicit injectable drug abusers. Not indicated    Shingles Vaccination A shingles vaccine is also recommended once in a lifetime after age 61 05/20/2015    Ultrasound Screening for Abdominal Aortic Aneurysm (AAA) (once) Patient must be referred through [facility] and not have had a screening for abdominal aortic aneurysm before under Medicare.   Limited to patients who meet one of the following criteria:  - Men who are 73-68 years old and have smoked more than 100 cigarettes in their lifetime.  -Anyone with a FH of AAA  -Anyone recommended for screening by USPSTF Not indicated           Learning About Diabetes Food Guidelines  Your Care Instructions    Meal planning is important to manage diabetes. It helps keep your blood sugar at a target level (which you set with your doctor). You don't have to eat special foods. You can eat what your family eats, including sweets once in a while. But you do have to pay attention to how often you eat and how much you eat of certain foods. You may want to work with a dietitian or a certified diabetes educator (CDE) to help you plan meals and snacks. A dietitian or CDE can also help you lose weight if that is one of your goals. What should you know about eating carbs? Managing the amount of carbohydrate (carbs) you eat is an important part of healthy meals when you have diabetes. Carbohydrate is found in many foods. · Learn which foods have carbs. And learn the amounts of carbs in different foods. ¨ Bread, cereal, pasta, and rice have about 15 grams of carbs in a serving. A serving is 1 slice of bread (1 ounce), ½ cup of cooked cereal, or 1/3 cup of cooked pasta or rice. ¨ Fruits have 15 grams of carbs in a serving. A serving is 1 small fresh fruit, such as an apple or orange; ½ of a banana; ½ cup of cooked or canned fruit; ½ cup of fruit juice; 1 cup of melon or raspberries; or 2 tablespoons of dried fruit. ¨ Milk and no-sugar-added yogurt have 15 grams of carbs in a serving. A serving is 1 cup of milk or 2/3 cup of no-sugar-added yogurt. ¨ Starchy vegetables have 15 grams of carbs in a serving. A serving is ½ cup of mashed potatoes or sweet potato; 1 cup winter squash; ½ of a small baked potato; ½ cup of cooked beans; or ½ cup cooked corn or green peas. · Learn how much carbs to eat each day and at each meal. A dietitian or CDE can teach you how to keep track of the amount of carbs you eat. This is called carbohydrate counting.   · If you are not sure how to count carbohydrate grams, use the Plate Method to plan meals. It is a good, quick way to make sure that you have a balanced meal. It also helps you spread carbs throughout the day. ¨ Divide your plate by types of foods. Put non-starchy vegetables on half the plate, meat or other protein food on one-quarter of the plate, and a grain or starchy vegetable in the final quarter of the plate. To this you can add a small piece of fruit and 1 cup of milk or yogurt, depending on how many carbs you are supposed to eat at a meal.  · Try to eat about the same amount of carbs at each meal. Do not \"save up\" your daily allowance of carbs to eat at one meal.  · Proteins have very little or no carbs per serving. Examples of proteins are beef, chicken, turkey, fish, eggs, tofu, cheese, cottage cheese, and peanut butter. A serving size of meat is 3 ounces, which is about the size of a deck of cards. Examples of meat substitute serving sizes (equal to 1 ounce of meat) are 1/4 cup of cottage cheese, 1 egg, 1 tablespoon of peanut butter, and ½ cup of tofu. How can you eat out and still eat healthy? · Learn to estimate the serving sizes of foods that have carbohydrate. If you measure food at home, it will be easier to estimate the amount in a serving of restaurant food. · If the meal you order has too much carbohydrate (such as potatoes, corn, or baked beans), ask to have a low-carbohydrate food instead. Ask for a salad or green vegetables. · If you use insulin, check your blood sugar before and after eating out to help you plan how much to eat in the future. · If you eat more carbohydrate at a meal than you had planned, take a walk or do other exercise. This will help lower your blood sugar. What else should you know? · Limit saturated fat, such as the fat from meat and dairy products. This is a healthy choice because people who have diabetes are at higher risk of heart disease.  So choose lean cuts of meat and nonfat or low-fat dairy products. Use olive or canola oil instead of butter or shortening when cooking. · Don't skip meals. Your blood sugar may drop too low if you skip meals and take insulin or certain medicines for diabetes. · Check with your doctor before you drink alcohol. Alcohol can cause your blood sugar to drop too low. Alcohol can also cause a bad reaction if you take certain diabetes medicines. Follow-up care is a key part of your treatment and safety. Be sure to make and go to all appointments, and call your doctor if you are having problems. It's also a good idea to know your test results and keep a list of the medicines you take. Where can you learn more? Go to http://jah-lorenzo.info/. Enter K204 in the search box to learn more about \"Learning About Diabetes Food Guidelines. \"  Current as of: December 7, 2017  Content Version: 11.7  © 5361-9239 [employer]. Care instructions adapted under license by [employer] (which disclaims liability or warranty for this information). If you have questions about a medical condition or this instruction, always ask your healthcare professional. [place] [address] any warranty or liability for your use of this information. Advance Directives: Care Instructions  Your Care Instructions  An advance directive is a legal way to state your wishes at the end of your life. It tells your family and your doctor what to do if you can no longer say what you want. There are two main types of advance directives. You can change them any time that your wishes change. · A living will tells your family and your doctor your wishes about life support and other treatment. · A durable power of  for health care lets you name a person to make treatment decisions for you when you can't speak for yourself. This person is called a health care agent.   If you do not have an advance directive, decisions about your medical care may be made by a doctor or a  who doesn't know you. It may help to think of an advance directive as a gift to the people who care for you. If you have one, they won't have to make tough decisions by themselves. Follow-up care is a key part of your treatment and safety. Be sure to make and go to all appointments, and call your doctor if you are having problems. It's also a good idea to know your test results and keep a list of the medicines you take. How can you care for yourself at home? · Discuss your wishes with your loved ones and your doctor. This way, there are no surprises. · Many states have a unique form. Or you might use a universal form that has been approved by many states. This kind of form can sometimes be completed and stored online. Your electronic copy will then be available wherever you have a connection to the Internet. In most cases, doctors will respect your wishes even if you have a form from a different state. · You don't need a  to do an advance directive. But you may want to get legal advice. · Think about these questions when you prepare an advance directive:  ¨ Who do you want to make decisions about your medical care if you are not able to? Many people choose a family member or close friend. ¨ Do you know enough about life support methods that might be used? If not, talk to your doctor so you understand. ¨ What are you most afraid of that might happen? You might be afraid of having pain, losing your independence, or being kept alive by machines. ¨ Where would you prefer to die? Choices include your home, a hospital, or a nursing home. ¨ Would you like to have information about hospice care to support you and your family? ¨ Do you want to donate organs when you die? ¨ Do you want certain Protestant practices performed before you die? If so, put your wishes in the advance directive.   · Read your advance directive every year, and make changes as needed. When should you call for help? Be sure to contact your doctor if you have any questions. Where can you learn more? Go to http://jah-lorenzo.info/. Enter R264 in the search box to learn more about \"Advance Directives: Care Instructions. \"  Current as of: October 6, 2017  Content Version: 11.7  © 6007-3237 [employer]. Care instructions adapted under license by [employer] (which disclaims liability or warranty for this information). If you have questions about a medical condition or this instruction, always ask your healthcare professional. [place] [address] any warranty or liability for your use of this information. Medicare Wellness Visit, Male    The best way to live healthy is to have a lifestyle where you eat a well-balanced diet, exercise regularly, limit alcohol use, and quit all forms of tobacco/nicotine, if applicable. Regular preventive services are another way to keep healthy. Preventive services (vaccines, screening tests, monitoring & exams) can help personalize your care plan, which helps you manage your own care. Screening tests can find health problems at the earliest stages, when they are easiest to treat. [facility] follows the current, evidence-based guidelines published by the [place] [employer] (USPSTF) when recommending preventive services for our patients. Because we follow these guidelines, sometimes recommendations change over time as research supports it. (For example, a prostate screening blood test is no longer routinely recommended for men with no symptoms.)    Of course, you and your provider may decide to screen more often for some diseases, based on your risk and co-morbidities (chronic disease you are already diagnosed with).      Preventive services for you include:    - Medicare offers their members a free annual wellness visit, which is time for you and your primary care provider to discuss and plan for your preventive service needs. Take advantage of this benefit every year!    -All people over age 72 should receive the recommended pneumonia vaccines. Current USPSTF guidelines recommend a series of two vaccines for the best pneumonia protection.     -All adults should have a yearly flu vaccine and a tetanus vaccine every 10 years. All adults age 61 years should receive a shingles vaccine once in their lifetime.      -All adults age 38-68 years who are overweight should have a diabetes screening test once every three years.     -Other screening tests & preventive services for persons with diabetes include: an eye exam to screen for diabetic retinopathy, a kidney function test, a foot exam, and stricter control over your cholesterol.     -Cardiovascular screening for adults with routine risk involves an electrocardiogram (ECG) at intervals determined by the provider.     -Colorectal cancer screenings should be done for adults age 54-65 years with normal risk. There are a number of acceptable methods of screening for this type of cancer. Each test has its own benefits and drawbacks. Discuss with your provider what is most appropriate for you during your annual wellness visit. The different tests include: colonoscopy (considered the best screening method), a fecal occult blood test, a fecal DNA test, and sigmoidoscopy.    -All adults born between [facility] should be screened once for Hepatitis C.    -An Abdominal Aortic Aneurysm (AAA) Screening is recommended for men age 73-68 who has ever smoked in their lifetime.      Here is a list of your current Health Maintenance items (your personalized list of preventive services) with a due date:  Health Maintenance Due   Topic Date Due   [address] Exam  02/08/2018    Annual Well Visit  06/01/2018    Flu Vaccine  08/01/2018    Diabetic Foot Care  08/30/2018    Hemoglobin A1C    09/01/2018

## 2018-09-07 ENCOUNTER — OFFICE VISIT (OUTPATIENT)
Dept: FAMILY MEDICINE CLINIC | Age: 68
End: 2018-09-07

## 2018-09-07 VITALS
SYSTOLIC BLOOD PRESSURE: 106 MMHG | WEIGHT: 156 LBS | RESPIRATION RATE: 16 BRPM | TEMPERATURE: 98 F | HEIGHT: 66 IN | OXYGEN SATURATION: 98 % | BODY MASS INDEX: 25.07 KG/M2 | DIASTOLIC BLOOD PRESSURE: 70 MMHG | HEART RATE: 80 BPM

## 2018-09-07 DIAGNOSIS — G47.9 TROUBLE IN SLEEPING: ICD-10-CM

## 2018-09-07 DIAGNOSIS — E11.65 POORLY CONTROLLED DIABETES MELLITUS (HCC): Primary | ICD-10-CM

## 2018-09-07 NOTE — PROGRESS NOTES
Chief Complaint   Patient presents with    Diabetes    Sleep Problem     states sleep is horrible- not taking med Rx'd as on hold by Dr. Chapo Caicedo- Has to RS sleep study    Elevated PSA     1. Have you been to the ER, urgent care clinic since your last visit? Hospitalized since your last visit? No    2. Have you seen or consulted any other health care providers outside of the [address] [place] [address] since your last visit? Include any pap smears or colon screening.  Dr. Chapo Caicedo for sleep

## 2018-09-07 NOTE — PATIENT INSTRUCTIONS

## 2018-09-07 NOTE — MR AVS SNAPSHOT
1017 [facility] 250 200 [facility] 
[phone] Patient: All Fernandez MRN: [id number] Visit Information Date & Time Provider Department Dept. Phone Encounter #  
 9/7/2018  1:00 PM Priscilla Ulloa, 2056 [facility] [phone] [id number] Follow-up Instructions Return in about 1 month (around 10/7/2018). Your Appointments 3/13/2019  9:00 AM  
PROCEDURE with Navin Thomas MD  
[facility]: 1yr Cysto/Cyto  
 2057 [facility] 200 [facility] [address] [place] [address]  
  
    
 6/26/2019  9:15 AM  
Office Visit with German Lund MD  
[facility]) Appt Note: 1 yr  
 178 [facility] [place], [facility] [address] [place] [address]  
[facility], [address] [place] [address] Upcoming Health Maintenance Date Due  
 EYE EXAM RETINAL OR DILATED Q1 2/8/2018 Influenza Age 5 to Adult 8/1/2018 FOOT EXAM Q1 8/30/2018 GLAUCOMA SCREENING Q2Y 2/8/2019 HEMOGLOBIN A1C Q6M 2/13/2019 LIPID PANEL Q1 3/1/2019 MICROALBUMIN Q1 6/12/2019 MEDICARE YEARLY EXAM 8/14/2019 COLONOSCOPY 8/9/2023 DTaP/Tdap/Td series (2 - Td) 4/20/2025 Allergies as of 9/7/2018  Review Complete On: 9/7/2018 By: Priscilla Ulloa MD  
 No Known Allergies Current Immunizations  Reviewed on 12/20/2016 Name Date Hep A Vaccine 1/11/2016, 10/31/2002 Influenza Vaccine 10/1/2016 Influenza Vaccine (Quad) PF 10/23/2015 Pneumococcal Conjugate (PCV-13) 6/29/2016 Pneumococcal Polysaccharide (PPSV-23) 8/30/2017 Tdap 4/20/2015 Typhoid Vaccine 1/11/2016, 10/31/2002 Yellow Fever Vaccine 1/11/2016 Not reviewed this visit You Were Diagnosed With   
  
 Codes Comments Poorly controlled diabetes mellitus ([facility] 75.)    -  Primary ICD-10-CM: E11.65 ICD-9-CM: 250.00 Trouble in sleeping     ICD-10-CM: G47.9 ICD-9-CM: 780.50 Vitals BP Pulse Temp Resp Height(growth percentile) Weight(growth percentile) 106/70 (BP 1 Location: Left arm, BP Patient Position: Sitting) 80 98 °F (36.7 °C) (Oral) 16 5' 6\" (1.676 m) 156 lb (70.8 kg) SpO2 BMI Smoking Status 98% 25.18 kg/m2 Never Smoker BMI and BSA Data Body Mass Index Body Surface Area  
 25.18 kg/m 2 1.82 m 2 Preferred Pharmacy Pharmacy Name Phone [address], [address] [facility] [phone] Your Updated Medication List  
  
   
This list is accurate as of 9/7/18  1:33 PM.  Always use your most recent med list.  
  
  
  
  
 aspirin 81 mg chewable tablet Take 81 mg by mouth daily. atorvastatin 10 mg tablet Commonly known as:  LIPITOR Take 1 Tab by mouth daily. Blood-Glucose Meter Misc Commonly known as:  ACCU-CHEK HALIMA PLUS METER Use 1 daily for blood glucose monitoring DX: E11.9 CINNAMON PO Take  by mouth. SHOBHA EXTRACT PO Take  by mouth.  
  
 glimepiride 4 mg tablet Commonly known as:  AMARYL Take 1 Tab by mouth two (2) times a day. * glucose blood VI test strips strip Commonly known as:  ONETOUCH ULTRA TEST Check twice daily * glucose blood VI test strips strip Commonly known as:  ACCU-CHEK HALIMA PLUS TEST STRP Use 1 daily for blood glucose monitoring DX: E11.9  
  
 insulin glargine 100 unit/mL (3 mL) Inpn Commonly known as:  LANTUS,BASAGLAR  
10 units at bedtime * Insulin Needles (Disposable) 31 gauge x 3/16\" Ndle Commonly known as:  BD ULTRA-FINE MINI PEN NEEDLE Check twice daily * BD ULTRA-FINE SHORT PEN NEEDLE 31 gauge x 5/16\" Ndle Generic drug:  Insulin Needles (Disposable) Once daily Lancets Misc Commonly known as:  ACCU-CHEK SOFTCLIX LANCETS  
 Use 1 daily for blood glucose monitoring DX: E11.9  
  
 losartan-hydroCHLOROthiazide 100-25 mg per tablet Commonly known as:  HYZAAR Take 1 Tab by mouth daily. melatonin Tab tablet Take 10 mg by mouth nightly. multivitamin tablet Commonly known as:  ONE A DAY Take 1 Tab by mouth daily. omeprazole 40 mg capsule Commonly known as:  PRILOSEC Take 1 Cap by mouth daily. pioglitazone 30 mg tablet Commonly known as:  ACTOS Take once a day SITagliptin 100 mg tablet Commonly known as:  Yary Pierini Take 1 Tab by mouth daily. tadalafil 20 mg tablet Commonly known as:  CIALIS Take 1 Tab by mouth daily as needed. traZODone 50 mg tablet Commonly known as:  Pranav Bump Take 1 Tab by mouth nightly. TURMERIC ROOT EXTRACT PO Take  by mouth. VITAMIN B-12 1,000 mcg sublingual tablet Generic drug:  cyanocobalamin Take 1,000 mcg by mouth daily. VITAMIN D2 PO Take 1 Cap by mouth daily. * Notice: This list has 4 medication(s) that are the same as other medications prescribed for you. Read the directions carefully, and ask your doctor or other care provider to review them with you. Follow-up Instructions Return in about 1 month (around 10/7/2018). Patient Instructions Learning About Diabetes Food Guidelines Your Care Instructions Meal planning is important to manage diabetes. It helps keep your blood sugar at a target level (which you set with your doctor). You don't have to eat special foods. You can eat what your family eats, including sweets once in a while. But you do have to pay attention to how often you eat and how much you eat of certain foods. You may want to work with a dietitian or a certified diabetes educator (CDE) to help you plan meals and snacks. A dietitian or CDE can also help you lose weight if that is one of your goals. What should you know about eating carbs? Managing the amount of carbohydrate (carbs) you eat is an important part of healthy meals when you have diabetes. Carbohydrate is found in many foods. · Learn which foods have carbs. And learn the amounts of carbs in different foods. ¨ Bread, cereal, pasta, and rice have about 15 grams of carbs in a serving. A serving is 1 slice of bread (1 ounce), ½ cup of cooked cereal, or 1/3 cup of cooked pasta or rice. ¨ Fruits have 15 grams of carbs in a serving. A serving is 1 small fresh fruit, such as an apple or orange; ½ of a banana; ½ cup of cooked or canned fruit; ½ cup of fruit juice; 1 cup of melon or raspberries; or 2 tablespoons of dried fruit. ¨ Milk and no-sugar-added yogurt have 15 grams of carbs in a serving. A serving is 1 cup of milk or 2/3 cup of no-sugar-added yogurt. ¨ Starchy vegetables have 15 grams of carbs in a serving. A serving is ½ cup of mashed potatoes or sweet potato; 1 cup winter squash; ½ of a small baked potato; ½ cup of cooked beans; or ½ cup cooked corn or green peas. · Learn how much carbs to eat each day and at each meal. A dietitian or CDE can teach you how to keep track of the amount of carbs you eat. This is called carbohydrate counting. · If you are not sure how to count carbohydrate grams, use the Plate Method to plan meals. It is a good, quick way to make sure that you have a balanced meal. It also helps you spread carbs throughout the day. ¨ Divide your plate by types of foods. Put non-starchy vegetables on half the plate, meat or other protein food on one-quarter of the plate, and a grain or starchy vegetable in the final quarter of the plate. To this you can add a small piece of fruit and 1 cup of milk or yogurt, depending on how many carbs you are supposed to eat at a meal. 
· Try to eat about the same amount of carbs at each meal. Do not \"save up\" your daily allowance of carbs to eat at one meal. 
· Proteins have very little or no carbs per serving.  Examples of proteins are beef, chicken, turkey, fish, eggs, tofu, cheese, cottage cheese, and peanut butter. A serving size of meat is 3 ounces, which is about the size of a deck of cards. Examples of meat substitute serving sizes (equal to 1 ounce of meat) are 1/4 cup of cottage cheese, 1 egg, 1 tablespoon of peanut butter, and ½ cup of tofu. How can you eat out and still eat healthy? · Learn to estimate the serving sizes of foods that have carbohydrate. If you measure food at home, it will be easier to estimate the amount in a serving of restaurant food. · If the meal you order has too much carbohydrate (such as potatoes, corn, or baked beans), ask to have a low-carbohydrate food instead. Ask for a salad or green vegetables. · If you use insulin, check your blood sugar before and after eating out to help you plan how much to eat in the future. · If you eat more carbohydrate at a meal than you had planned, take a walk or do other exercise. This will help lower your blood sugar. What else should you know? · Limit saturated fat, such as the fat from meat and dairy products. This is a healthy choice because people who have diabetes are at higher risk of heart disease. So choose lean cuts of meat and nonfat or low-fat dairy products. Use olive or canola oil instead of butter or shortening when cooking. · Don't skip meals. Your blood sugar may drop too low if you skip meals and take insulin or certain medicines for diabetes. · Check with your doctor before you drink alcohol. Alcohol can cause your blood sugar to drop too low. Alcohol can also cause a bad reaction if you take certain diabetes medicines. Follow-up care is a key part of your treatment and safety. Be sure to make and go to all appointments, and call your doctor if you are having problems. It's also a good idea to know your test results and keep a list of the medicines you take. Where can you learn more? Go to http://jah-lorenzo.info/. Enter Q251 in the search box to learn more about \"Learning About Diabetes Food Guidelines. \" Current as of: December 7, 2017 Content Version: 11.7 © 2055-2782 [employer]. Care instructions adapted under license by [employer] (which disclaims liability or warranty for this information). If you have questions about a medical condition or this instruction, always ask your healthcare professional. [address] any warranty or liability for your use of this information. Introducing [facility]! [employer] introduces Disrupt6 patient portal. Now you can access parts of your medical record, email your doctor's office, and request medication refills online. 1. In your internet browser, go to [email or web address] 2. Click on the First Time User? Click Here link in the Sign In box. You will see the New Member Sign Up page. 3. Enter your [email or web address] Access Code exactly as it appears below. You will not need to use this code after youve completed the sign-up process. If you do not sign up before the expiration date, you must request a new code. · [email or web address] Access Code: [email or web address] Expires: 9/10/2018 11:27 AM 
 
4. Enter the last four digits of your Social Security Number (xxxx) and Date of Birth (mm/dd/yyyy) as indicated and click Submit. You will be taken to the next sign-up page. 5. Create a Disrupt6 ID. This will be your Disrupt6 login ID and cannot be changed, so think of one that is secure and easy to remember. 6. Create a Disrupt6 password. You can change your password at any time. 7. Enter your Password Reset Question and Answer. This can be used at a later time if you forget your password. 8. Enter your e-mail address. You will receive e-mail notification when new information is available in [address] 9. Click Sign Up. You can now view and download portions of your medical record. 10. Click the Download Summary menu link to download a portable copy of your medical information. If you have questions, please visit the Frequently Asked Questions section of the [employer] website. Remember, [employer] is NOT to be used for urgent needs. For medical emergencies, dial 911. Now available from your iPhone and Android! Please provide this summary of care documentation to your next provider. Your primary care clinician is listed as Hina Storm. If you have any questions after today's visit, please call [phone].

## 2018-09-07 NOTE — PROGRESS NOTES
HISTORY OF PRESENT ILLNESS  Dilip R Lorie Gilford is a 79 y.o. male. HPI; here for follow up on elevated blood sugar. HBA1C done during office visit also went up. Also feeling urinary frequency and dry mouth. Last visit increased lantus to 12 units from 10 units. Said fasting sugar improved from 140-160 to 120-130. No hypoglycemia. Not checking post prandial blood sugar. Has son's marriage. Not much complaint with diet at this time. Discussed importance of being complaint with diet and medication. Also advised to keep a log and drop it off next week. Also having some trouble sleeping. Partly due to urinary frequency he has to wake up few times and not noy to go back to sleep easily. Taking melatonin and it is not helping much. Done home sleep study. Pending result. For now also poor sleep hygiene and not able to sleep same time. Some social stress of son's wedding and also employee issue at work so feeling some stress. No depression or anxiety. Feeling fatigue from not enough sleep. Visit Vitals    /70 (BP 1 Location: Left arm, BP Patient Position: Sitting)    Pulse 80    Temp 98 °F (36.7 °C) (Oral)    Resp 16    Ht 5' 6\" (1.676 m)    Wt 156 lb (70.8 kg)    SpO2 98%    BMI 25.18 kg/m2     Review medication list, vitals, problem list,allergies. Denies any headache or dizziness, no nausea or vomiting. No abdominal pain. No chest pain or sob. / no bowel  Complains.    Lab Results   Component Value Date/Time    Hemoglobin A1c 7.6 (H) 03/01/2018 12:00 AM    Hemoglobin A1c (POC) 8.6 08/13/2018 02:50 PM    Hemoglobin A1c, External 8.4 06/20/2016     Lab Results   Component Value Date/Time    Sodium 141 03/01/2018 12:00 AM    Potassium 4.3 03/01/2018 12:00 AM    Chloride 102 03/01/2018 12:00 AM    CO2 24 03/01/2018 12:00 AM    Anion gap 6 06/28/2017 09:30 AM    Glucose 123 (H) 03/01/2018 12:00 AM    BUN 16 03/01/2018 12:00 AM    Creatinine 0.81 03/01/2018 12:00 AM    BUN/Creatinine ratio 20 03/01/2018 12:00 AM GFR est  03/01/2018 12:00 AM    GFR est non-AA 93 03/01/2018 12:00 AM    Calcium 9.2 03/01/2018 12:00 AM    Bilirubin, total 0.3 03/01/2018 12:00 AM    AST (SGOT) 19 03/01/2018 12:00 AM    Alk. phosphatase 64 03/01/2018 12:00 AM    Protein, total 7.1 03/01/2018 12:00 AM    Albumin 4.2 03/01/2018 12:00 AM    Globulin 3.2 06/28/2017 09:30 AM    A-G Ratio 1.4 03/01/2018 12:00 AM    ALT (SGPT) 14 03/01/2018 12:00 AM     Lab Results   Component Value Date/Time    Cholesterol, total 153 03/01/2018 12:00 AM    HDL Cholesterol 56 03/01/2018 12:00 AM    LDL, calculated 76 03/01/2018 12:00 AM    VLDL, calculated 21 03/01/2018 12:00 AM    Triglyceride 105 03/01/2018 12:00 AM    CHOL/HDL Ratio 2.7 06/28/2017 09:30 AM     Lab Results   Component Value Date/Time    TSH 1.410 03/01/2018 12:00 AM           ROS: see HPI     Physical Exam   Constitutional: He is oriented to person, place, and time. No distress. Cardiovascular: Normal heart sounds. Pulmonary/Chest: No respiratory distress. He has no wheezes. Neurological: He is oriented to person, place, and time. ASSESSMENT and PLAN    ICD-10-CM ICD-9-CM    1. Poorly controlled diabetes mellitus ([facility] [address]): for now go up on lantus to 14 units. Keep post prandial blood sugar and fasting blood sugar log. compliant with diet and medication. F/u next visit. Also advise to drop off log next week. E11.65 250.00    2. Trouble in sleeping: sleep hygiene. Also will be following instructions from sleep specialist. Pending sleep study result. G47.9 780.50    Pt understood and agree with the plan     Follow-up Disposition:  Return in about 1 month (around 10/7/2018).

## 2018-10-01 DIAGNOSIS — G47.9 SLEEP TROUBLE: ICD-10-CM

## 2018-10-02 RX ORDER — TRAZODONE HYDROCHLORIDE 50 MG/1
TABLET ORAL
Qty: 60 TAB | Refills: 1 | Status: SHIPPED | OUTPATIENT
Start: 2018-10-02 | End: 2018-10-08 | Stop reason: SDUPTHER

## 2018-10-08 ENCOUNTER — OFFICE VISIT (OUTPATIENT)
Dept: FAMILY MEDICINE CLINIC | Age: 68
End: 2018-10-08

## 2018-10-08 VITALS
TEMPERATURE: 98.6 F | RESPIRATION RATE: 16 BRPM | WEIGHT: 157.8 LBS | HEIGHT: 66 IN | OXYGEN SATURATION: 98 % | HEART RATE: 86 BPM | BODY MASS INDEX: 25.36 KG/M2 | DIASTOLIC BLOOD PRESSURE: 60 MMHG | SYSTOLIC BLOOD PRESSURE: 116 MMHG

## 2018-10-08 DIAGNOSIS — Z23 ENCOUNTER FOR IMMUNIZATION: ICD-10-CM

## 2018-10-08 DIAGNOSIS — I10 ESSENTIAL HYPERTENSION: ICD-10-CM

## 2018-10-08 DIAGNOSIS — E11.9 TYPE 2 DIABETES MELLITUS WITHOUT COMPLICATION, WITH LONG-TERM CURRENT USE OF INSULIN (HCC): ICD-10-CM

## 2018-10-08 DIAGNOSIS — G47.9 SLEEP TROUBLE: ICD-10-CM

## 2018-10-08 DIAGNOSIS — Z79.4 TYPE 2 DIABETES MELLITUS WITHOUT COMPLICATION, WITH LONG-TERM CURRENT USE OF INSULIN (HCC): ICD-10-CM

## 2018-10-08 DIAGNOSIS — K21.9 GASTROESOPHAGEAL REFLUX DISEASE WITHOUT ESOPHAGITIS: ICD-10-CM

## 2018-10-08 RX ORDER — OMEPRAZOLE 40 MG/1
40 CAPSULE, DELAYED RELEASE ORAL DAILY
Qty: 90 CAP | Refills: 1 | Status: SHIPPED | OUTPATIENT
Start: 2018-10-08 | End: 2019-01-08 | Stop reason: SDUPTHER

## 2018-10-08 RX ORDER — PREDNISOLONE ACETATE 10 MG/ML
SUSPENSION/ DROPS OPHTHALMIC
Refills: 0 | COMMUNITY
Start: 2018-07-10 | End: 2019-10-01

## 2018-10-08 RX ORDER — GLIMEPIRIDE 4 MG/1
4 TABLET ORAL 2 TIMES DAILY
Qty: 180 TAB | Refills: 0 | Status: SHIPPED | OUTPATIENT
Start: 2018-10-08 | End: 2019-06-26 | Stop reason: ALTCHOICE

## 2018-10-08 RX ORDER — TRAZODONE HYDROCHLORIDE 50 MG/1
50 TABLET ORAL
Qty: 30 TAB | Refills: 1 | Status: SHIPPED | OUTPATIENT
Start: 2018-10-08 | End: 2019-06-26

## 2018-10-08 RX ORDER — PIOGLITAZONEHYDROCHLORIDE 30 MG/1
TABLET ORAL
Qty: 90 TAB | Refills: 1 | Status: SHIPPED | OUTPATIENT
Start: 2018-10-08 | End: 2019-03-15 | Stop reason: SDUPTHER

## 2018-10-08 RX ORDER — LOSARTAN POTASSIUM AND HYDROCHLOROTHIAZIDE 25; 100 MG/1; MG/1
1 TABLET ORAL DAILY
Qty: 90 TAB | Refills: 1 | Status: SHIPPED | OUTPATIENT
Start: 2018-10-08 | End: 2019-01-08 | Stop reason: SDUPTHER

## 2018-10-08 RX ORDER — ATORVASTATIN CALCIUM 10 MG/1
10 TABLET, FILM COATED ORAL DAILY
Qty: 90 TAB | Refills: 1 | Status: SHIPPED | OUTPATIENT
Start: 2018-10-08 | End: 2019-01-08 | Stop reason: SDUPTHER

## 2018-10-08 RX ORDER — INSULIN GLARGINE 100 [IU]/ML
INJECTION, SOLUTION SUBCUTANEOUS
Qty: 3 PEN | Refills: 1 | Status: SHIPPED | OUTPATIENT
Start: 2018-10-08 | End: 2019-03-15 | Stop reason: SDUPTHER

## 2018-10-08 RX ORDER — PEN NEEDLE, DIABETIC 31 GX5/16"
NEEDLE, DISPOSABLE MISCELLANEOUS
Qty: 1 PACKAGE | Refills: 1 | Status: SHIPPED | OUTPATIENT
Start: 2018-10-08 | End: 2019-03-15 | Stop reason: SDUPTHER

## 2018-10-08 NOTE — PROGRESS NOTES
HISTORY OF PRESENT ILLNESS  Estevan Luna is a 79 y.o. male. HPI: Here for follow up for insomnia. Had trouble falling and maintaining a sleep. Average was sleeping hardly couple of hours. No unusual anxiety or depression. Had some stress due to son's wedding but said not out of norm. Seen sleep specialist and also went for sleep study. Started him on trazodone and it has been helping for sleep. Taking half tab till now and going to start full tab as wanted to make sure he has no side effects. Denies any mood changes. Feels much better and no side effect of medication. Also working on sleep hygiene. Visit Vitals    /60 (BP 1 Location: Left arm, BP Patient Position: Sitting)    Pulse 86    Temp 98.6 °F (37 °C) (Oral)    Resp 16    Ht 5' 6\" (1.676 m)    Wt 157 lb 12.8 oz (71.6 kg)    SpO2 98%    BMI 25.47 kg/m2     Review medication list, vitals, problem list,allergies. Denies any headache or dizziness. No chest pain or sob. No abdominal pain. No urinary or bowel complains. His fasting sugar is coming around 100. Trying to be complaint with insulin and diet. Forgot to bring blood sugar log today but will bring it next visit. Today vital stable. ROS: see HPI     Physical Exam   Constitutional: He is oriented to person, place, and time. No distress. Cardiovascular: Normal heart sounds. Pulmonary/Chest: No respiratory distress. He has no wheezes. Musculoskeletal: He exhibits no edema. Neurological: He is oriented to person, place, and time. Psychiatric: His behavior is normal. Thought content normal.       ASSESSMENT and PLAN    ICD-10-CM ICD-9-CM    1. Sleep trouble: discussed sleep hygiene. Continue current management as it is helping. Given hand out on sleep hygiene in AVS. Further follow up  Next visit. G47.9 780.50 traZODone (DESYREL) 50 mg tablet   2.  Type 2 diabetes mellitus without complication, with long-term current use of insulin ([address]): improving fasting blood sugar, now around 100 . No hypoglycemia. F/u next visit. E11.9 250.00 insulin glargine (LANTUS,BASAGLAR) 100 unit/mL (3 mL) inpn    Z79.4 V58.67 glimepiride (AMARYL) 4 mg tablet      SITagliptin (JANUVIA) 100 mg tablet      pioglitazone (ACTOS) 30 mg tablet      losartan-hydroCHLOROthiazide (HYZAAR) 100-25 mg per tablet      atorvastatin (LIPITOR) 10 mg tablet   3. Essential hypertension: stable at this time. Low salt diet. Exercise as tolerated. Will continue current plan. I10 401.9 losartan-hydroCHLOROthiazide (HYZAAR) 100-25 mg per tablet   4. Gastroesophageal reflux disease without esophagitis K21.9 530.81 omeprazole (PRILOSEC) 40 mg capsule   5. Encounter for immunization Z23 V03.89 INFLUENZA VACCINE INACTIVATED (IIV), SUBUNIT, ADJUVANTED, IM      ADMIN INFLUENZA VIRUS VAC   Pt understood and agree with the plan   Review    Follow-up Disposition:  Return in about 3 months (around 1/8/2019).

## 2018-10-08 NOTE — PROGRESS NOTES
Patient presents for High Dose flu vaccine. Consent obtained, Tolerated procedure well at left deltoid. Patient remained in office 10 minutes post vaccine.  No side effects noted    Lot# [id number]  Exp: 05/31/2019  Jayajocelyn  DebRadha [address]: [id number]

## 2018-10-08 NOTE — MR AVS SNAPSHOT
[address] [facility] [address] [facility] 
[phone] Patient: Therese Roland MRN: [id number] LLX:[id number] Visit Information Date & Time Provider Department Dept. Phone Encounter #  
 10/8/2018  1:30 PM Seema Blackwood, [address] [phone] [id number] Follow-up Instructions Return in about 3 months (around 1/8/2019). Your Appointments 3/13/2019  9:00 AM  
PROCEDURE with Georgiana Noyola MD  
[facility] ([address] [facility]) Appt Note: 1yr Cysto/Cyto  
 [address] [facility] [address] [address] [address]  
  
    
 6/26/2019  9:15 AM  
Office Visit with Abbey Sharp MD  
[facility]) Appt Note: 1 yr  
 [address] [facility], [address] [facility] [address]  
[address], [address] [place] [address] Upcoming Health Maintenance Date Due Shingrix Vaccine Age 50> (1 of 2) 3/25/2002 FOOT EXAM Q1 8/30/2018 HEMOGLOBIN A1C Q6M 2/13/2019 LIPID PANEL Q1 3/1/2019 MICROALBUMIN Q1 6/12/2019 MEDICARE YEARLY EXAM 8/14/2019 EYE EXAM RETINAL OR DILATED Q1 9/11/2019 GLAUCOMA SCREENING Q2Y 9/11/2020 COLONOSCOPY 8/9/2023 DTaP/Tdap/Td series (2 - Td) 4/20/2025 Allergies as of 10/8/2018  Review Complete On: 10/8/2018 By: Cori Fox No Known Allergies Current Immunizations  Reviewed on 12/20/2016 Name Date Hep A Vaccine 1/11/2016, 10/31/2002 Influenza Vaccine 10/1/2016 Influenza Vaccine (Quad) PF 10/23/2015 Influenza Vaccine (Tri) Adjuvanted 10/8/2018 Pneumococcal Conjugate (PCV-13) 6/29/2016 Pneumococcal Polysaccharide (PPSV-23) 8/30/2017 Tdap 4/20/2015 Typhoid Vaccine 1/11/2016, 10/31/2002 Yellow Fever Vaccine 1/11/2016 Not reviewed this visit You Were Diagnosed With   
  
 Codes Comments Sleep trouble     ICD-10-CM: G47.9 ICD-9-CM: 780.50 Type 2 diabetes mellitus without complication, with long-term current use of insulin (HCC)     ICD-10-CM: E11.9, Z79.4 ICD-9-CM: 250.00, V58.67 Essential hypertension     ICD-10-CM: I10 
ICD-9-CM: 401.9 Gastroesophageal reflux disease without esophagitis     ICD-10-CM: K21.9 ICD-9-CM: 530.81 Encounter for immunization     ICD-10-CM: K31 ICD-9-CM: V03.89 Vitals BP Pulse Temp Resp Height(growth percentile) Weight(growth percentile) 116/60 (BP 1 Location: Left arm, BP Patient Position: Sitting) 86 98.6 °F (37 °C) (Oral) 16 5' 6\" (1.676 m) 157 lb 12.8 oz (71.6 kg) SpO2 BMI Smoking Status 98% 25.47 kg/m2 Never Smoker BMI and BSA Data Body Mass Index Body Surface Area  
 25.47 kg/m 2 1.83 m 2 Preferred Pharmacy Pharmacy Name Phone [facility] [address] [place] [address] [phone] Your Updated Medication List  
  
   
This list is accurate as of 10/8/18  2:20 PM.  Always use your most recent med list.  
  
  
  
  
 aspirin 81 mg chewable tablet Take 81 mg by mouth daily. atorvastatin 10 mg tablet Commonly known as:  LIPITOR Take 1 Tab by mouth daily. Blood-Glucose Meter Misc Commonly known as:  ACCU-CHEK HALIMA PLUS METER Use 1 daily for blood glucose monitoring DX: E11.9 CINNAMON PO Take  by mouth. SHOBHA EXTRACT PO Take  by mouth.  
  
 glimepiride 4 mg tablet Commonly known as:  AMARYL Take 1 Tab by mouth two (2) times a day. * glucose blood VI test strips strip Commonly known as:  ONETOUCH ULTRA TEST Check twice daily * glucose blood VI test strips strip Commonly known as:  ACCU-CHEK HALIMA PLUS TEST STRP Use 1 daily for blood glucose monitoring DX: E11.9  
  
 insulin glargine 100 unit/mL (3 mL) Inpn Commonly known as:  Gloria Junior  
 10 units at bedtime * Insulin Needles (Disposable) 31 gauge x 3/16\" Ndle Commonly known as:  BD ULTRA-FINE MINI PEN NEEDLE Check twice daily * BD ULTRA-FINE SHORT PEN NEEDLE 31 gauge x 5/16\" Ndle Generic drug:  Insulin Needles (Disposable) Once daily Lancets Misc Commonly known as:  ACCU-CHEK SOFTCLIX LANCETS Use 1 daily for blood glucose monitoring DX: E11.9  
  
 losartan-hydroCHLOROthiazide 100-25 mg per tablet Commonly known as:  HYZAAR Take 1 Tab by mouth daily. melatonin Tab tablet Take 10 mg by mouth nightly. multivitamin tablet Commonly known as:  ONE A DAY Take 1 Tab by mouth daily. omeprazole 40 mg capsule Commonly known as:  PRILOSEC Take 1 Cap by mouth daily. pioglitazone 30 mg tablet Commonly known as:  ACTOS Take once a day  
  
 prednisoLONE acetate 1 % ophthalmic suspension Commonly known as:  PRED FORTE INSTILL ONE DROP IN EACH EYE 2 TIMES A DAY FOR 10 DAYS THEN INSTILL ONE DROP ONCE DAILY FOR 10 DAYS SITagliptin 100 mg tablet Commonly known as:  Christian Soho Take 1 Tab by mouth daily. tadalafil 20 mg tablet Commonly known as:  CIALIS Take 1 Tab by mouth daily as needed. traZODone 50 mg tablet Commonly known as:  DESYREL  
TAKE 1 TABLET EVERY NIGHT  
  
 TURMERIC ROOT EXTRACT PO Take  by mouth. VITAMIN B-12 1,000 mcg sublingual tablet Generic drug:  cyanocobalamin Take 1,000 mcg by mouth daily. VITAMIN D2 PO Take 1 Cap by mouth daily. VITAMIN D3 PO Take  by mouth. * Notice: This list has 4 medication(s) that are the same as other medications prescribed for you. Read the directions carefully, and ask your doctor or other care provider to review them with you. We Performed the Following ADMIN INFLUENZA VIRUS VAC [ [facility]] INFLUENZA VACCINE INACTIVATED (IIV), SUBUNIT, ADJUVANTED, IM [id number] CPT(R)] Follow-up Instructions Return in about 3 months (around 1/8/2019). Patient Instructions Influenza (Flu) Vaccine (Inactivated or Recombinant): What You Need to Know Why get vaccinated? Influenza (\"flu\") is a contagious disease that spreads around the [place] every winter, usually between October and May. Flu is caused by influenza viruses and is spread mainly by coughing, sneezing, and close contact. Anyone can get flu. Flu strikes suddenly and can last several days. Symptoms vary by age, but can include: · Fever/chills. · Sore throat. · Muscle aches. · Fatigue. · Cough. · Headache. · Runny or stuffy nose. Flu can also lead to pneumonia and blood infections, and cause diarrhea and seizures in children. If you have a medical condition, such as heart or lung disease, flu can make it worse. Flu is more dangerous for some people. Infants and young children, people 72years of age and older, pregnant women, and people with certain health conditions or a weakened immune system are at greatest risk. Each year thousands of people in the [facility] die from flu, and many more are hospitalized. Flu vaccine can: · Keep you from getting flu. · Make flu less severe if you do get it. · Keep you from spreading flu to your family and other people. Inactivated and recombinant flu vaccines A dose of flu vaccine is recommended every flu season. Children 6 months through 6years of age may need two doses during the same flu season. Everyone else needs only one dose each flu season. Some inactivated flu vaccines contain a very small amount of a mercury-based preservative called thimerosal. Studies have not shown thimerosal in vaccines to be harmful, but flu vaccines that do not contain thimerosal are available. There is no live flu virus in flu shots. They cannot cause the flu. There are many flu viruses, and they are always changing.  Each year a new flu vaccine is made to protect against three or four viruses that are likely to cause disease in the upcoming flu season. But even when the vaccine doesn't exactly match these viruses, it may still provide some protection. Flu vaccine cannot prevent: · Flu that is caused by a virus not covered by the vaccine. · Illnesses that look like flu but are not. Some people should not get this vaccine Tell the person who is giving you the vaccine: · If you have any severe (life-threatening) allergies. If you ever had a life-threatening allergic reaction after a dose of flu vaccine, or have a severe allergy to any part of this vaccine, you may be advised not to get vaccinated. Most, but not all, types of flu vaccine contain a small amount of egg protein. · If you ever had Guillain-Barré syndrome (also called GBS) Some people with a history of GBS should not get this vaccine. This should be discussed with your doctor. · If you are not feeling well. It is usually okay to get flu vaccine when you have a mild illness, but you might be asked to come back when you feel better. Risks of a vaccine reaction With any medicine, including vaccines, there is a chance of reactions. These are usually mild and go away on their own, but serious reactions are also possible. Most people who get a flu shot do not have any problems with it. Minor problems following a flu shot include: · Soreness, redness, or swelling where the shot was given · Hoarseness · Sore, red or itchy eyes · Cough · Fever · Aches · Headache · Itching · Fatigue If these problems occur, they usually begin soon after the shot and last 1 or 2 days. More serious problems following a flu shot can include the following: · There may be a small increased risk of Guillain-Barré Syndrome (GBS) after inactivated flu vaccine. This risk has been estimated at 1 or 2 additional cases per million people vaccinated.  This is much lower than the risk of severe complications from flu, which can be prevented by flu vaccine. · Peggy Muhammad children who get the flu shot along with pneumococcal vaccine (PCV13) and/or DTaP vaccine at the same time might be slightly more likely to have a seizure caused by fever. Ask your doctor for more information. Tell your doctor if a child who is getting flu vaccine has ever had a seizure Problems that could happen after any injected vaccine: · People sometimes faint after a medical procedure, including vaccination. Sitting or lying down for about 15 minutes can help prevent fainting, and injuries caused by a fall. Tell your doctor if you feel dizzy, or have vision changes or ringing in the ears. · Some people get severe pain in the shoulder and have difficulty moving the arm where a shot was given. This happens very rarely. · Any medication can cause a severe allergic reaction. Such reactions from a vaccine are very rare, estimated at about 1 in a million doses, and would happen within a few minutes to a few hours after the vaccination. As with any medicine, there is a very remote chance of a vaccine causing a serious injury or death. The safety of vaccines is always being monitored. For more information, visit: www.cdc.gov/vaccinesafety/. What if there is a serious reaction? What should I look for? · Look for anything that concerns you, such as signs of a severe allergic reaction, very high fever, or unusual behavior. Signs of a severe allergic reaction can include hives, swelling of the face and throat, difficulty breathing, a fast heartbeat, dizziness, and weakness  usually within a few minutes to a few hours after the vaccination. What should I do? · If you think it is a severe allergic reaction or other emergency that can't wait, call 9-1-1 and get the person to the nearest hospital. Otherwise, call your doctor.  
· Reactions should be reported to the \"Vaccine Adverse Event Reporting System\" ([employer]). Your doctor should file this report, or you can do it yourself through the VAERS website at www.vaers. [facility].gov, or by calling 6-312.301.9630. [employer] does not give medical advice. The National Vaccine Injury Compensation Program 
The National Vaccine Injury Compensation Program (VICP) is a federal program that was created to compensate people who may have been injured by certain vaccines. Persons who believe they may have been injured by a vaccine can learn about the program and about filing a claim by calling 8-836.802.2758 or visiting the [employer] website at www.[facility].gov/vaccinecompensation. There is a time limit to file a claim for compensation. How can I learn more? · Ask your healthcare provider. He or she can give you the vaccine package insert or suggest other sources of information. · Call your local or state health department. · Contact the Centers for Disease Control and Prevention (CDC): 
¨ Call 6-567.947.3707 (1-800-CDC-INFO) or ¨ Visit CDC's website at www.cdc.gov/flu Vaccine Information Statement Inactivated Influenza Vaccine 8/7/2015) 42 SHANITA Gambino 409SM-77 [facility] and [place] 49 Centers for Disease Control and Prevention Many Vaccine Information Statements are available in [place] and other languages. See www.immunize.org/vis. Muchas hojas de información sobre vacunas están disponibles en español y en otros idiomas. Visite www.immunize.org/vis. Care instructions adapted under license by "[employer] (which disclaims liability or warranty for this information). If you have questions about a medical condition or this instruction, always ask your healthcare professional. [address] [address] any warranty or liability for your use of this information. Learning About Sleeping Well What does sleeping well mean? Sleeping well means getting enough sleep. How much sleep is enough varies among people. The number of hours you sleep is not as important as how you feel when you wake up. If you do not feel refreshed, you probably need more sleep. Another sign of not getting enough sleep is feeling tired during the day. The average total nightly sleep time is 7½ to 8 hours. Healthy adults may need a little more or a little less than this. Why is getting enough sleep important? Getting enough quality sleep is a basic part of good health. When your sleep suffers, your mood and your thoughts can suffer too. You may find yourself feeling more grumpy or stressed. Not getting enough sleep also can lead to serious problems, including injury, accidents, anxiety, and depression. What might cause poor sleeping? Many things can cause sleep problems, including: · Stress. Stress can be caused by fear about a single event, such as giving a speech. Or you may have ongoing stress, such as worry about work or school. · Depression, anxiety, and other mental or emotional conditions. · Changes in your sleep habits or surroundings. This includes changes that happen where you sleep, such as noise, light, or sleeping in a different bed. It also includes changes in your sleep pattern, such as having jet lag or working a late shift. · Health problems, such as pain, breathing problems, and restless legs syndrome. · Lack of regular exercise. How can you help yourself? Here are some tips that may help you sleep more soundly and wake up feeling more refreshed. Your sleeping area · Use your bedroom only for sleeping and sex. A bit of light reading may help you fall asleep. But if it doesn't, do your reading elsewhere in the house. Don't watch TV in bed. · Be sure your bed is big enough to stretch out comfortably, especially if you have a sleep partner. · Keep your bedroom quiet, dark, and cool. Use curtains, blinds, or a sleep mask to block out light. To block out noise, use earplugs, soothing music, or a \"white noise\" machine. Your evening and bedtime routine · Create a relaxing bedtime routine. You might want to take a warm shower or bath, listen to soothing music, or drink a cup of noncaffeinated tea. · Go to bed at the same time every night. And get up at the same time every morning, even if you feel tired. What to avoid · Limit caffeine (coffee, tea, caffeinated sodas) during the day, and don't have any for at least 4 to 6 hours before bedtime. · Don't drink alcohol before bedtime. Alcohol can cause you to wake up more often during the night. · Don't smoke or use tobacco, especially in the evening. Nicotine can keep you awake. · Don't take naps during the day, especially close to bedtime. · Don't lie in bed awake for too long. If you can't fall asleep, or if you wake up in the middle of the night and can't get back to sleep within 15 minutes or so, get out of bed and go to another room until you feel sleepy. · Don't take medicine right before bed that may keep you awake or make you feel hyper or energized. Your doctor can tell you if your medicine may do this and if you can take it earlier in the day. If you can't sleep · Imagine yourself in a peaceful, pleasant scene. Focus on the details and feelings of being in a place that is relaxing. · Get up and do a quiet or boring activity until you feel sleepy. · Don't drink any liquids after 6 p.m. if you wake up often because you have to go to the bathroom. Where can you learn more? Go to http://jah-lorenzo.info/. Enter K261 in the search box to learn more about \"Learning About Sleeping Well. \" Current as of: December 7, 2017 Content Version: 11.8 © 3230-9886 Healthwise, [employer]. Care instructions adapted under license by [employer] (which disclaims liability or warranty for this information).  If you have questions about a medical condition or this instruction, always ask your healthcare professional. [employer] disclaims any warranty or liability for your use of this information. Insomnia: Care Instructions Your Care Instructions Insomnia is the inability to sleep well. It is a common problem for most people at some time. Insomnia may make it hard for you to get to sleep, stay asleep, or sleep as long as you need to. This can make you tired and grouchy during the day. It can also make you forgetful, less effective at work, and unhappy. Insomnia can be caused by conditions such as depression or anxiety. Pain can also affect your ability to sleep. When these problems are solved, the insomnia usually clears up. But sometimes bad sleep habits can cause insomnia. If insomnia is affecting your work or your enjoyment of life, you can take steps to improve your sleep. Follow-up care is a key part of your treatment and safety. Be sure to make and go to all appointments, and call your doctor if you are having problems. It's also a good idea to know your test results and keep a list of the medicines you take. How can you care for yourself at home? What to avoid · Do not have drinks with caffeine, such as coffee or black tea, for 8 hours before bed. · Do not smoke or use other types of tobacco near bedtime. Nicotine is a stimulant and can keep you awake. · Avoid drinking alcohol late in the evening, because it can cause you to wake in the middle of the night. · Do not eat a big meal close to bedtime. If you are hungry, eat a light snack. · Do not drink a lot of water close to bedtime, because the need to urinate may wake you up during the night. · Do not read or watch TV in bed. Use the bed only for sleeping and sexual activity. What to try · Go to bed at the same time every night, and wake up at the same time every morning. Do not take naps during the day. · Keep your bedroom quiet, dark, and cool. · Sleep on a comfortable pillow and mattress. · If watching the clock makes you anxious, turn it facing away from you so you cannot see the time. · If you worry when you lie down, start a worry book. Well before bedtime, write down your worries, and then set the book and your concerns aside. · Try meditation or other relaxation techniques before you go to bed. · If you cannot fall asleep, get up and go to another room until you feel sleepy. Do something relaxing. Repeat your bedtime routine before you go to bed again. · Make your house quiet and calm about an hour before bedtime. Turn down the lights, turn off the TV, log off the computer, and turn down the volume on music. This can help you relax after a busy day. When should you call for help? Watch closely for changes in your health, and be sure to contact your doctor if: 
  · Your efforts to improve your sleep do not work.  
  · Your insomnia gets worse.  
  · You have been feeling down, depressed, or hopeless or have lost interest in things that you usually enjoy. Where can you learn more? Go to http://jah-lorenzo.info/. Enter P513 in the search box to learn more about \"Insomnia: Care Instructions. \" Current as of: June 29, 2018 Content Version: 11.8 © 3915-6134 Healthwise, Incorporated. Care instructions adapted under license by [employer] (which disclaims liability or warranty for this information). If you have questions about a medical condition or this instruction, always ask your healthcare professional. [place] [address] any warranty or liability for your use of this information. Introducing \[facility]\"" & HEALTH SERVICES! [facility] introduces KnowFu patient portal. Now you can access parts of your medical record, email your doctor's office, and request medication refills online. 1. In your internet browser, go to [email or web address] and [email or web address]. [email or web address]/[email or web address] and Play 2. Click on the First Time User? Click Here link in the Sign In box.  You will see the New Member Sign Up page. 3. Enter your [email or web address] Access Code exactly as it appears below. You will not need to use this code after youve completed the sign-up process. If you do not sign up before the expiration date, you must request a new code. · [email or web address] Access Code: [email or web address] Expires: 1/6/2019  1:44 PM 
 
4. Enter the last four digits of your Social Security Number (xxxx) and Date of Birth (mm/dd/yyyy) as indicated and click Submit. You will be taken to the next sign-up page. 5. Create a Clarot ID. This will be your [email or web address] login ID and cannot be changed, so think of one that is secure and easy to remember. 6. Create a [email or web address] password. You can change your password at any time. 7. Enter your Password Reset Question and Answer. This can be used at a later time if you forget your password. 8. Enter your e-mail address. You will receive e-mail notification when new information is available in [address]. 9. Click Sign Up. You can now view and download portions of your medical record. 10. Click the Download Summary menu link to download a portable copy of your medical information. If you have questions, please visit the Frequently Asked Questions section of the [email or web address] website. Remember, [email or web address] is NOT to be used for urgent needs. For medical emergencies, dial 911. Now available from your iPhone and Android! Please provide this summary of care documentation to your next provider. Your primary care clinician is listed as Gay Villagran. If you have any questions after today's visit, please call [phone].

## 2018-10-08 NOTE — PATIENT INSTRUCTIONS
Influenza (Flu) Vaccine (Inactivated or Recombinant): What You Need to Know  Why get vaccinated? Influenza (\"flu\") is a contagious disease that spreads around the [place] every winter, usually between October and May. Flu is caused by influenza viruses and is spread mainly by coughing, sneezing, and close contact. Anyone can get flu. Flu strikes suddenly and can last several days. Symptoms vary by age, but can include:  · Fever/chills. · Sore throat. · Muscle aches. · Fatigue. · Cough. · Headache. · Runny or stuffy nose. Flu can also lead to pneumonia and blood infections, and cause diarrhea and seizures in children. If you have a medical condition, such as heart or lung disease, flu can make it worse. Flu is more dangerous for some people. Infants and young children, people 72years of age and older, pregnant women, and people with certain health conditions or a weakened immune system are at greatest risk. Each year thousands of people in the [facility] die from flu, and many more are hospitalized. Flu vaccine can:  · Keep you from getting flu. · Make flu less severe if you do get it. · Keep you from spreading flu to your family and other people. Inactivated and recombinant flu vaccines  A dose of flu vaccine is recommended every flu season. Children 6 months through 6years of age may need two doses during the same flu season. Everyone else needs only one dose each flu season. Some inactivated flu vaccines contain a very small amount of a mercury-based preservative called thimerosal. Studies have not shown thimerosal in vaccines to be harmful, but flu vaccines that do not contain thimerosal are available. There is no live flu virus in flu shots. They cannot cause the flu. There are many flu viruses, and they are always changing. Each year a new flu vaccine is made to protect against three or four viruses that are likely to cause disease in the upcoming flu season.  But even when the vaccine doesn't exactly match these viruses, it may still provide some protection. Flu vaccine cannot prevent:  · Flu that is caused by a virus not covered by the vaccine. · Illnesses that look like flu but are not. Some people should not get this vaccine  Tell the person who is giving you the vaccine:  · If you have any severe (life-threatening) allergies. If you ever had a life-threatening allergic reaction after a dose of flu vaccine, or have a severe allergy to any part of this vaccine, you may be advised not to get vaccinated. Most, but not all, types of flu vaccine contain a small amount of egg protein. · If you ever had Guillain-Barré syndrome (also called GBS) Some people with a history of GBS should not get this vaccine. This should be discussed with your doctor. · If you are not feeling well. It is usually okay to get flu vaccine when you have a mild illness, but you might be asked to come back when you feel better. Risks of a vaccine reaction  With any medicine, including vaccines, there is a chance of reactions. These are usually mild and go away on their own, but serious reactions are also possible. Most people who get a flu shot do not have any problems with it. Minor problems following a flu shot include:  · Soreness, redness, or swelling where the shot was given  · Hoarseness  · Sore, red or itchy eyes  · Cough  · Fever  · Aches  · Headache  · Itching  · Fatigue  If these problems occur, they usually begin soon after the shot and last 1 or 2 days. More serious problems following a flu shot can include the following:  · There may be a small increased risk of Guillain-Barré Syndrome (GBS) after inactivated flu vaccine. This risk has been estimated at 1 or 2 additional cases per million people vaccinated. This is much lower than the risk of severe complications from flu, which can be prevented by flu vaccine.   · The [facility] children who get the flu shot along with pneumococcal vaccine (PCV13) and/or DTaP vaccine at the same time might be slightly more likely to have a seizure caused by fever. Ask your doctor for more information. Tell your doctor if a child who is getting flu vaccine has ever had a seizure  Problems that could happen after any injected vaccine:  · People sometimes faint after a medical procedure, including vaccination. Sitting or lying down for about 15 minutes can help prevent fainting, and injuries caused by a fall. Tell your doctor if you feel dizzy, or have vision changes or ringing in the ears. · Some people get severe pain in the shoulder and have difficulty moving the arm where a shot was given. This happens very rarely. · Any medication can cause a severe allergic reaction. Such reactions from a vaccine are very rare, estimated at about 1 in a million doses, and would happen within a few minutes to a few hours after the vaccination. As with any medicine, there is a very remote chance of a vaccine causing a serious injury or death. The safety of vaccines is always being monitored. For more information, visit: www.cdc.gov/vaccinesafety/. What if there is a serious reaction? What should I look for? · Look for anything that concerns you, such as signs of a severe allergic reaction, very high fever, or unusual behavior. Signs of a severe allergic reaction can include hives, swelling of the face and throat, difficulty breathing, a fast heartbeat, dizziness, and weakness - usually within a few minutes to a few hours after the vaccination. What should I do? · If you think it is a severe allergic reaction or other emergency that can't wait, call 9-1-1 and get the person to the nearest hospital. Otherwise, call your doctor. · Reactions should be reported to the \"Vaccine Adverse Event Reporting System\" (VAERS). Your doctor should file this report, or you can do it yourself through the VAERS website at www.vaers. [facility].gov, or by calling 0-368.841.2135.   [employer] does not give medical advice. The National Vaccine Injury Compensation Program  The National Vaccine Injury Compensation Program (VICP) is a federal program that was created to compensate people who may have been injured by certain vaccines. Persons who believe they may have been injured by a vaccine can learn about the program and about filing a claim by calling 0-225.104.5758 or visiting the [employer] website at www.[facility].gov/vaccinecompensation. There is a time limit to file a claim for compensation. How can I learn more? · Ask your healthcare provider. He or she can give you the vaccine package insert or suggest other sources of information. · Call your local or state health department. · Contact the Centers for Disease Control and Prevention (CDC):  ¨ Call 8-589.715.1489 (1-800-CDC-INFO) or  ¨ Visit CDC's website at www.cdc.gov/flu  Vaccine Information Statement  Inactivated Influenza Vaccine  8/7/2015)  42 SHANITA Jacobo [id number]  Department of Health and Human Services  Centers for Disease Control and Prevention  Many Vaccine Information Statements are available in [place] and other languages. See www.immunize.org/vis. Muchas hojas de información sobre vacunas están disponibles en español y en otros idiomas. Visite www.immunize.org/vis. Care instructions adapted under license by [employer] (which disclaims liability or warranty for this information). If you have questions about a medical condition or this instruction, always ask your healthcare professional. [place] [address] any warranty or liability for your use of this information. Learning About Sleeping Well  What does sleeping well mean? Sleeping well means getting enough sleep. How much sleep is enough varies among people. The number of hours you sleep is not as important as how you feel when you wake up. If you do not feel refreshed, you probably need more sleep.  Another sign of not getting enough sleep is feeling tired during the day.  The average total nightly sleep time is 7½ to 8 hours. Healthy adults may need a little more or a little less than this. Why is getting enough sleep important? Getting enough quality sleep is a basic part of good health. When your sleep suffers, your mood and your thoughts can suffer too. You may find yourself feeling more grumpy or stressed. Not getting enough sleep also can lead to serious problems, including injury, accidents, anxiety, and depression. What might cause poor sleeping? Many things can cause sleep problems, including:  · Stress. Stress can be caused by fear about a single event, such as giving a speech. Or you may have ongoing stress, such as worry about work or school. · Depression, anxiety, and other mental or emotional conditions. · Changes in your sleep habits or surroundings. This includes changes that happen where you sleep, such as noise, light, or sleeping in a different bed. It also includes changes in your sleep pattern, such as having jet lag or working a late shift. · Health problems, such as pain, breathing problems, and restless legs syndrome. · Lack of regular exercise. How can you help yourself? Here are some tips that may help you sleep more soundly and wake up feeling more refreshed. Your sleeping area  · Use your bedroom only for sleeping and sex. A bit of light reading may help you fall asleep. But if it doesn't, do your reading elsewhere in the house. Don't watch TV in bed. · Be sure your bed is big enough to stretch out comfortably, especially if you have a sleep partner. · Keep your bedroom quiet, dark, and cool. Use curtains, blinds, or a sleep mask to block out light. To block out noise, use earplugs, soothing music, or a \"white noise\" machine. Your evening and bedtime routine  · Create a relaxing bedtime routine. You might want to take a warm shower or bath, listen to soothing music, or drink a cup of noncaffeinated tea.   · Go to bed at the same time every night. And get up at the same time every morning, even if you feel tired. What to avoid  · Limit caffeine (coffee, tea, caffeinated sodas) during the day, and don't have any for at least 4 to 6 hours before bedtime. · Don't drink alcohol before bedtime. Alcohol can cause you to wake up more often during the night. · Don't smoke or use tobacco, especially in the evening. Nicotine can keep you awake. · Don't take naps during the day, especially close to bedtime. · Don't lie in bed awake for too long. If you can't fall asleep, or if you wake up in the middle of the night and can't get back to sleep within 15 minutes or so, get out of bed and go to another room until you feel sleepy. · Don't take medicine right before bed that may keep you awake or make you feel hyper or energized. Your doctor can tell you if your medicine may do this and if you can take it earlier in the day. If you can't sleep  · Imagine yourself in a peaceful, pleasant scene. Focus on the details and feelings of being in a place that is relaxing. · Get up and do a quiet or boring activity until you feel sleepy. · Don't drink any liquids after 6 p.m. if you wake up often because you have to go to the bathroom. Where can you learn more? Go to http://jah-lorenzo.info/. Enter E697 in the search box to learn more about \"Learning About Sleeping Well. \"  Current as of: December 7, 2017  Content Version: 11.8  © 9124-2109 [employer]. Care instructions adapted under license by [employer] (which disclaims liability or warranty for this information). If you have questions about a medical condition or this instruction, always ask your healthcare professional. [place] [address] any warranty or liability for your use of this information. Insomnia: Care Instructions  Your Care Instructions    Insomnia is the inability to sleep well.  It is a common problem for most people at some time. Insomnia may make it hard for you to get to sleep, stay asleep, or sleep as long as you need to. This can make you tired and grouchy during the day. It can also make you forgetful, less effective at work, and unhappy. Insomnia can be caused by conditions such as depression or anxiety. Pain can also affect your ability to sleep. When these problems are solved, the insomnia usually clears up. But sometimes bad sleep habits can cause insomnia. If insomnia is affecting your work or your enjoyment of life, you can take steps to improve your sleep. Follow-up care is a key part of your treatment and safety. Be sure to make and go to all appointments, and call your doctor if you are having problems. It's also a good idea to know your test results and keep a list of the medicines you take. How can you care for yourself at home? What to avoid  · Do not have drinks with caffeine, such as coffee or black tea, for 8 hours before bed. · Do not smoke or use other types of tobacco near bedtime. Nicotine is a stimulant and can keep you awake. · Avoid drinking alcohol late in the evening, because it can cause you to wake in the middle of the night. · Do not eat a big meal close to bedtime. If you are hungry, eat a light snack. · Do not drink a lot of water close to bedtime, because the need to urinate may wake you up during the night. · Do not read or watch TV in bed. Use the bed only for sleeping and sexual activity. What to try  · Go to bed at the same time every night, and wake up at the same time every morning. Do not take naps during the day. · Keep your bedroom quiet, dark, and cool. · Sleep on a comfortable pillow and mattress. · If watching the clock makes you anxious, turn it facing away from you so you cannot see the time. · If you worry when you lie down, start a worry book. Well before bedtime, write down your worries, and then set the book and your concerns aside.   · Try meditation or other relaxation techniques before you go to bed. · If you cannot fall asleep, get up and go to another room until you feel sleepy. Do something relaxing. Repeat your bedtime routine before you go to bed again. · Make your house quiet and calm about an hour before bedtime. Turn down the lights, turn off the TV, log off the computer, and turn down the volume on music. This can help you relax after a busy day. When should you call for help? Watch closely for changes in your health, and be sure to contact your doctor if:    · Your efforts to improve your sleep do not work.     · Your insomnia gets worse.     · You have been feeling down, depressed, or hopeless or have lost interest in things that you usually enjoy. Where can you learn more? Go to http://jah-lorenzo.info/. Enter P513 in the search box to learn more about \"Insomnia: Care Instructions. \"  Current as of: June 29, 2018  Content Version: 11.8  © 7685-8163 [employer]. Care instructions adapted under license by [employer] (which disclaims liability or warranty for this information). If you have questions about a medical condition or this instruction, always ask your healthcare professional. [address] any warranty or liability for your use of this information.

## 2018-10-08 NOTE — PROGRESS NOTES
1. Have you been to the ER, urgent care clinic since your last visit? Hospitalized since your last visit? no    2. Have you seen or consulted any other health care providers outside of the [address] [place] [address] since your last visit? Include any pap smears or colon screening.  no    Patient has not had dm foot exam.

## 2018-12-19 NOTE — TELEPHONE ENCOUNTER
This patient contacted office for the following prescriptions to be filled:    Medication requested :   Requested Prescriptions     Pending Prescriptions Disp Refills    glucose blood VI test strips (ACCU-CHEK HALIMA PLUS TEST STRP) strip 100 Strip 3     Sig: Use 1 daily for blood glucose monitoring DX: E11.9     PCP: [address] or Print: [employer]   Mail order or [phone]     Scheduled appointment if not seen by current providers in office: LOV 10/8/2018 F/U 1/8/2019

## 2019-01-08 ENCOUNTER — OFFICE VISIT (OUTPATIENT)
Dept: FAMILY MEDICINE CLINIC | Age: 69
End: 2019-01-08

## 2019-01-08 VITALS
OXYGEN SATURATION: 98 % | TEMPERATURE: 98.5 F | DIASTOLIC BLOOD PRESSURE: 70 MMHG | HEART RATE: 75 BPM | RESPIRATION RATE: 16 BRPM | WEIGHT: 161 LBS | SYSTOLIC BLOOD PRESSURE: 114 MMHG | BODY MASS INDEX: 25.88 KG/M2 | HEIGHT: 66 IN

## 2019-01-08 DIAGNOSIS — E11.9 TYPE 2 DIABETES MELLITUS WITHOUT COMPLICATION, WITH LONG-TERM CURRENT USE OF INSULIN (HCC): Primary | ICD-10-CM

## 2019-01-08 DIAGNOSIS — K21.9 GASTROESOPHAGEAL REFLUX DISEASE WITHOUT ESOPHAGITIS: ICD-10-CM

## 2019-01-08 DIAGNOSIS — G47.09 OTHER INSOMNIA: ICD-10-CM

## 2019-01-08 DIAGNOSIS — I10 ESSENTIAL HYPERTENSION: ICD-10-CM

## 2019-01-08 DIAGNOSIS — Z79.4 TYPE 2 DIABETES MELLITUS WITHOUT COMPLICATION, WITH LONG-TERM CURRENT USE OF INSULIN (HCC): Primary | ICD-10-CM

## 2019-01-08 RX ORDER — OMEPRAZOLE 40 MG/1
40 CAPSULE, DELAYED RELEASE ORAL DAILY
Qty: 90 CAP | Refills: 1 | Status: SHIPPED | OUTPATIENT
Start: 2019-01-08 | End: 2019-07-31 | Stop reason: SDUPTHER

## 2019-01-08 RX ORDER — LOSARTAN POTASSIUM AND HYDROCHLOROTHIAZIDE 25; 100 MG/1; MG/1
1 TABLET ORAL DAILY
Qty: 90 TAB | Refills: 1 | Status: SHIPPED | OUTPATIENT
Start: 2019-01-08 | End: 2019-06-19 | Stop reason: SDUPTHER

## 2019-01-08 RX ORDER — ATORVASTATIN CALCIUM 10 MG/1
10 TABLET, FILM COATED ORAL DAILY
Qty: 90 TAB | Refills: 1 | Status: SHIPPED | OUTPATIENT
Start: 2019-01-08 | End: 2019-06-19 | Stop reason: SDUPTHER

## 2019-01-08 NOTE — PATIENT INSTRUCTIONS
Nutrition Tips for Diabetes: After Your Visit  Your Care Instructions  A healthy diet is important to manage diabetes. It helps you lose weight (if you need to) and keep it off. It gives you the nutrition and energy your body needs and helps prevent heart disease. But a diet for diabetes does not mean that you have to eat special foods. You can eat what your family eats, including occasional sweets and other favorites. But you do have to pay attention to how often you eat and how much you eat of certain foods. The right plan for you will give you meals that help you keep your blood sugar at healthy levels. Try to eat a variety of foods and to spread carbohydrate throughout the day. Carbohydrate raises blood sugar higher and more quickly than any other nutrient does. Carbohydrate is found in sugar, breads and cereals, fruit, starchy vegetables such as potatoes and corn, and milk and yogurt. You may want to work with a dietitian or diabetes educator to help you plan meals and snacks. A dietitian or diabetes educator also can help you lose weight if that is one of your goals. The following tips can help you enjoy your meals and stay healthy. Follow-up care is a key part of your treatment and safety. Be sure to make and go to all appointments, and call your doctor if you are having problems. Its also a good idea to know your test results and keep a list of the medicines you take. How can you care for yourself at home? · Learn which foods have carbohydrate and how much carbohydrate to eat. A dietitian or diabetes educator can help you learn to keep track of how much carbohydrate you eat. · Spread carbohydrate throughout the day. Eat some carbohydrate at all meals, but do not eat too much at any one time. · Plan meals to include food from all the food groups.  These are the food groups and some example portion sizes:  ¨ Grains: 1 slice of bread (1 ounce), ½ cup of cooked cereal, and 1/3 cup of cooked pasta or rice. These have about 15 grams of carbohydrate in a serving. Choose whole grains such as whole wheat bread or crackers, oatmeal, and brown rice more often than refined grains. ¨ Fruit: 1 small fresh fruit, such as an apple or orange; ½ of a banana; ½ cup of chopped, cooked, or canned fruit; ½ cup of fruit juice; 1 cup of melon or raspberries; and 2 tablespoons of dried fruit. These have about 15 grams of carbohydrate in a serving. ¨ Dairy: 1 cup of nonfat or low-fat milk and 2/3 cup of plain yogurt. These have about 15 grams of carbohydrate in a serving. ¨ Protein foods: Beef, chicken, turkey, fish, eggs, tofu, cheese, cottage cheese, and peanut butter. A serving size of meat is 3 ounces, which is about the size of a deck of cards. Examples of meat substitute serving sizes (equal to 1 ounce of meat) are 1/4 cup of cottage cheese, 1 egg, 1 tablespoon of peanut butter, and ½ cup of tofu. These have very little or no carbohydrate per serving. ¨ Vegetables: Starchy vegetables such as ½ cup of cooked dried beans, peas, potatoes, or corn have about 15 grams of carbohydrate. Nonstarchy vegetables have very little carbohydrate, such as 1 cup of raw leafy vegetables (such as spinach), ½ cup of other vegetables (cooked or chopped), and 3/4 cup of vegetable juice. · Use the plate format to plan meals. It is a good, quick way to make sure that you have a balanced meal. It also helps you spread carbohydrate throughout the day. You divide your plate by types of foods. Put vegetables on half the plate, meat or meat substitutes on one-quarter of the plate, and a grain or starchy vegetable (such as brown rice or a potato) in the final quarter of the plate. To this you can add a small piece of fruit and 1 cup of milk or yogurt, depending on how much carbohydrate you are supposed to eat at a meal.  · Talk to your dietitian or diabetes educator about ways to add limited amounts of sweets into your meal plan.  You can eat these foods now and then, as long as you include the amount of carbohydrate they have in your daily carbohydrate allowance. · If you drink alcohol, limit it to no more than 1 drink a day for women and 2 drinks a day for men. If you are pregnant, no amount of alcohol is known to be safe. · Protein, fat, and fiber do not raise blood sugar as much as carbohydrate does. If you eat a lot of these nutrients in a meal, your blood sugar will rise more slowly than it would otherwise. · Limit saturated fats, such as those from meat and dairy products. Try to replace it with monounsaturated fat, such as olive oil. This is a healthier choice because people who have diabetes are at higher-than-average risk of heart disease. But use a modest amount of olive oil. A tablespoon of olive oil has 14 grams of fat and 120 calories. · Exercise lowers blood sugar. If you take insulin by shots or pump, you can use less than you would if you were not exercising. Keep in mind that timing matters. If you exercise within 1 hour after a meal, your body may need less insulin for that meal than it would if you exercised 3 hours after the meal. Test your blood sugar to find out how exercise affects your need for insulin. · Exercise on most days of the week. Aim for at least 30 minutes. Exercise helps you stay at a healthy weight and helps your body use insulin. Walking is an easy way to get exercise. Gradually increase the amount you walk every day. You also may want to swim, bike, or do other activities. When you eat out  · Learn to estimate the serving sizes of foods that have carbohydrate. If you measure food at home, it will be easier to estimate the amount in a serving of restaurant food. · If the meal you order has too much carbohydrate (such as potatoes, corn, or baked beans), ask to have a low-carbohydrate food instead. Ask for a salad or green vegetables.   · If you use insulin, check your blood sugar before and after eating out to help you plan how much to eat in the future. · If you eat more carbohydrate at a meal than you had planned, take a walk or do other exercise. This will help lower your blood sugar. Where can you learn more? Go to [employer]  Enter C398 in the search box to learn more about \"Nutrition Tips for Diabetes: After Your Visit. \"   © 4758-8601 Healthwise, Incorporated. Care instructions adapted under license by [employer] (which disclaims liability or warranty for this information). This care instruction is for use with your licensed healthcare professional. If you have questions about a medical condition or this instruction, always ask your healthcare professional. [address] any warranty or liability for your use of this information. Content Version: 79.2.914516; Current as of: June 4, 2014                 Learning About Sleeping Well  What does sleeping well mean? Sleeping well means getting enough sleep. How much sleep is enough varies among people. The number of hours you sleep is not as important as how you feel when you wake up. If you do not feel refreshed, you probably need more sleep. Another sign of not getting enough sleep is feeling tired during the day. The average total nightly sleep time is 7½ to 8 hours. Healthy adults may need a little more or a little less than this. Why is getting enough sleep important? Getting enough quality sleep is a basic part of good health. When your sleep suffers, your mood and your thoughts can suffer too. You may find yourself feeling more grumpy or stressed. Not getting enough sleep also can lead to serious problems, including injury, accidents, anxiety, and depression. What might cause poor sleeping? Many things can cause sleep problems, including:  · Stress. Stress can be caused by fear about a single event, such as giving a speech. Or you may have ongoing stress, such as worry about work or school.   · Depression, anxiety, and other mental or emotional conditions. · Changes in your sleep habits or surroundings. This includes changes that happen where you sleep, such as noise, light, or sleeping in a different bed. It also includes changes in your sleep pattern, such as having jet lag or working a late shift. · Health problems, such as pain, breathing problems, and restless legs syndrome. · Lack of regular exercise. How can you help yourself? Here are some tips that may help you sleep more soundly and wake up feeling more refreshed. Your sleeping area  · Use your bedroom only for sleeping and sex. A bit of light reading may help you fall asleep. But if it doesn't, do your reading elsewhere in the house. Don't watch TV in bed. · Be sure your bed is big enough to stretch out comfortably, especially if you have a sleep partner. · Keep your bedroom quiet, dark, and cool. Use curtains, blinds, or a sleep mask to block out light. To block out noise, use earplugs, soothing music, or a \"white noise\" machine. Your evening and bedtime routine  · Create a relaxing bedtime routine. You might want to take a warm shower or bath, listen to soothing music, or drink a cup of noncaffeinated tea. · Go to bed at the same time every night. And get up at the same time every morning, even if you feel tired. What to avoid  · Limit caffeine (coffee, tea, caffeinated sodas) during the day, and don't have any for at least 4 to 6 hours before bedtime. · Don't drink alcohol before bedtime. Alcohol can cause you to wake up more often during the night. · Don't smoke or use tobacco, especially in the evening. Nicotine can keep you awake. · Don't take naps during the day, especially close to bedtime. · Don't lie in bed awake for too long. If you can't fall asleep, or if you wake up in the middle of the night and can't get back to sleep within 15 minutes or so, get out of bed and go to another room until you feel sleepy.   · Don't take medicine right before bed that may keep you awake or make you feel hyper or energized. Your doctor can tell you if your medicine may do this and if you can take it earlier in the day. If you can't sleep  · Imagine yourself in a peaceful, pleasant scene. Focus on the details and feelings of being in a place that is relaxing. · Get up and do a quiet or boring activity until you feel sleepy. · Don't drink any liquids after 6 p.m. if you wake up often because you have to go to the bathroom. Where can you learn more? Go to http://jah-lorenzo.info/. Enter N461 in the search box to learn more about \"Learning About Sleeping Well. \"  Current as of: December 7, 2017  Content Version: 11.8  © 9953-0835 Healthwise, Incorporated. Care instructions adapted under license by [employer] (which disclaims liability or warranty for this information). If you have questions about a medical condition or this instruction, always ask your healthcare professional. [place] [address] any warranty or liability for your use of this information.

## 2019-01-08 NOTE — PROGRESS NOTES
1. Have you been to the ER, urgent care clinic since your last visit? Hospitalized since your last visit? No    2. Have you seen or consulted any other health care providers outside of the [address] [place] [address] since your last visit? Include any pap smears or colon screening. Dr. Lane Like: 10/2018.   Eye exam Dr. Wander Sandoval 1/2018    Last dm foot exam - patient states exam by PCP, Dr. Temple Hodgkins

## 2019-01-08 NOTE — PROGRESS NOTES
HISTORY OF PRESENT ILLNESS  Estevan Anthony is a 79 y.o. male. HPI: Here for follow up. Lately c/o insomnia which has been resolved at this time. Taking trazodone as needed only. Now thinking could be stress from his son's wedding. No mood changes. Denies any depression or anxiety. No unusual fatigue. Taking melatonin also as needed. H/o diabetes. Seen new endo specialist. Made some medication changes. Trying to follow diet modification per their recommendations. Brought blood sugar log. Some higher sugar around 196 and per him due to meal what he age night before. Trying to be more compliant with diet modification. Visit Vitals  /70 (BP 1 Location: Left arm, BP Patient Position: Sitting)   Pulse 75   Temp 98.5 °F (36.9 °C) (Oral)   Resp 16   Ht 5' 6\" (1.676 m)   Wt 161 lb (73 kg)   SpO2 98%   BMI 25.99 kg/m²     Review medication list, vitals, problem list,allergies. Compliant with medication for hypertension. On statin. No side effects. Will obtain records from endo. Prior h/o bladder cancer. Following urology. Elevated PSA. Will follow their recommendations. No urinary  Complains. ROS: see HPI     Physical Exam   Constitutional: He is oriented to person, place, and time. No distress. Cardiovascular: Normal rate, regular rhythm and normal heart sounds. Pulmonary/Chest:   CTA   Abdominal: Soft. Bowel sounds are normal. There is no tenderness. Musculoskeletal: He exhibits no edema. Neurological: He is oriented to person, place, and time. Psychiatric: His behavior is normal.       ASSESSMENT and PLAN    ICD-10-CM ICD-9-CM    1. Type 2 diabetes mellitus without complication, with long-term current use of insulin ([address]): poorly controlled diabetes. Trying to be more compliant with diet. Seen endo. Will follow their recommendations. Recheck labs. F/u after lab result.   E11.9 250.00 atorvastatin (LIPITOR) 10 mg tablet    Z79.4 V58.67 losartan-hydroCHLOROthiazide (HYZAAR) 100-25 mg per tablet HEMOGLOBIN A1C WITH EAG      METABOLIC PANEL, COMPREHENSIVE      LIPID PANEL   2. Essential hypertension: stable at this time. Low salt diet. Exercise as tolerated. Will continue current plan. I10 401.9 losartan-hydroCHLOROthiazide (HYZAAR) 100-25 mg per tablet   3. Gastroesophageal reflux disease without esophagitis: stable. Given medication refill. K21.9 530.81 omeprazole (PRILOSEC) 40 mg capsule   4. Other insomnia: stable at this time. Prn medication. G47.09 780.52    5. Elevated PSA\" following urology. No urinary complains. Pt understood and agree with the plan   Review HM   Follow-up Disposition:  Return in about 3 months (around 4/8/2019).

## 2019-01-16 ENCOUNTER — HOSPITAL ENCOUNTER (OUTPATIENT)
Dept: LAB | Age: 69
Discharge: HOME | End: 2019-01-16

## 2019-01-16 LAB — XX-LABCORP SPECIMEN COL,LCBCF: NORMAL

## 2019-01-16 PROCEDURE — 99001 SPECIMEN HANDLING PT-LAB: CPT

## 2019-01-17 ENCOUNTER — TELEPHONE (OUTPATIENT)
Dept: FAMILY MEDICINE CLINIC | Age: 69
End: 2019-01-17

## 2019-01-17 LAB
ALBUMIN SERPL-MCNC: 4.1 G/DL (ref 3.6–4.8)
ALBUMIN/GLOB SERPL: 1.5 {RATIO} (ref 1.2–2.2)
ALP SERPL-CCNC: 82 IU/L (ref 39–117)
ALT SERPL-CCNC: 16 IU/L (ref 0–44)
AST SERPL-CCNC: 16 IU/L (ref 0–40)
BILIRUB SERPL-MCNC: 0.2 MG/DL (ref 0–1.2)
BUN SERPL-MCNC: 15 MG/DL (ref 8–27)
BUN/CREAT SERPL: 16 (ref 10–24)
CALCIUM SERPL-MCNC: 9.2 MG/DL (ref 8.6–10.2)
CHLORIDE SERPL-SCNC: 103 MMOL/L (ref 96–106)
CHOLEST SERPL-MCNC: 155 MG/DL (ref 100–199)
CO2 SERPL-SCNC: 23 MMOL/L (ref 20–29)
CREAT SERPL-MCNC: 0.91 MG/DL (ref 0.76–1.27)
EST. AVERAGE GLUCOSE BLD GHB EST-MCNC: 226 MG/DL
GLOBULIN SER CALC-MCNC: 2.8 G/DL (ref 1.5–4.5)
GLUCOSE SERPL-MCNC: 171 MG/DL (ref 65–99)
HBA1C MFR BLD: 9.5 % (ref 4.8–5.6)
HDLC SERPL-MCNC: 49 MG/DL
INTERPRETATION, 910389: NORMAL
LDLC SERPL CALC-MCNC: 78 MG/DL (ref 0–99)
Lab: NORMAL
POTASSIUM SERPL-SCNC: 4.4 MMOL/L (ref 3.5–5.2)
PROT SERPL-MCNC: 6.9 G/DL (ref 6–8.5)
SODIUM SERPL-SCNC: 143 MMOL/L (ref 134–144)
SPECIMEN STATUS REPORT, ROLRST: NORMAL
TRIGL SERPL-MCNC: 141 MG/DL (ref 0–149)
VLDLC SERPL CALC-MCNC: 28 MG/DL (ref 5–40)

## 2019-01-17 NOTE — PROGRESS NOTES
Contacted patient and verified identity using name and date of birth (2- identifiers)  Spoke with patient and he verbalized understanding of A1c very high and insulin needs adjusting by 2 units daily if fasting blood sugar is >120. Advised of dietary compliance.

## 2019-01-17 NOTE — PROGRESS NOTES
HBA1C is > 9. For now adjust insulin dose. Go up on 2 units every other day if fasting sugar is staying above 120. Need to lower blood sugar and please ask to be more compliant with diet.  Ask if he would like to see nutritionist.

## 2019-01-17 NOTE — TELEPHONE ENCOUNTER
Contacted patient and verified identity using name and date of birth (2- identifiers)  See results note for call completion.

## 2019-01-23 DIAGNOSIS — E11.65 POORLY CONTROLLED DIABETES MELLITUS (HCC): Primary | ICD-10-CM

## 2019-02-06 ENCOUNTER — TELEPHONE (OUTPATIENT)
Dept: FAMILY MEDICINE CLINIC | Age: 69
End: 2019-02-06

## 2019-02-06 NOTE — TELEPHONE ENCOUNTER
I called patient to schedule him with Lito Mcarthur. While on the phone he stated he recently saw the endocrinologist and they were recommending another blood test to see how much insulin he was producing. He said he thought they already sent communication to Dr. Gómez Cruz but would like to know if something else is needed. He can be reached at [phone].

## 2019-02-07 NOTE — TELEPHONE ENCOUNTER
Message    LEFT MESSAGE FOR PATIENT TO CALL BACK TO COME IN SOONER TO BE CLEARED FOR A DENTAL PROCEDURE..     Signatures   Electronically signed by : LOURDES Elise; Apr 7 2017  3:51PM CST     Notes in for review by Dr. Christiano Garcia

## 2019-02-07 NOTE — TELEPHONE ENCOUNTER
Called and spoke with Dr. Renetta Bonilla (Endocrine) was seen 1/20/19 and had labs done. Notes and results requested for our review.

## 2019-02-08 NOTE — TELEPHONE ENCOUNTER
Called patient and left message that no additional testing required at this time. Any questions to please call the office.

## 2019-02-27 ENCOUNTER — OFFICE VISIT (OUTPATIENT)
Dept: INTERNAL MEDICINE CLINIC | Age: 69
End: 2019-02-27

## 2019-02-27 DIAGNOSIS — Z79.4 TYPE 2 DIABETES MELLITUS WITHOUT COMPLICATION, WITH LONG-TERM CURRENT USE OF INSULIN (HCC): Primary | ICD-10-CM

## 2019-02-27 DIAGNOSIS — E11.9 TYPE 2 DIABETES MELLITUS WITHOUT COMPLICATION, WITH LONG-TERM CURRENT USE OF INSULIN (HCC): Primary | ICD-10-CM

## 2019-02-27 RX ORDER — METFORMIN HYDROCHLORIDE 500 MG/1
TABLET, EXTENDED RELEASE ORAL
Qty: 120 TAB | Refills: 6 | Status: SHIPPED | OUTPATIENT
Start: 2019-02-27 | End: 2019-11-15 | Stop reason: SDUPTHER

## 2019-02-27 RX ORDER — METFORMIN HYDROCHLORIDE 500 MG/1
TABLET, EXTENDED RELEASE ORAL
Qty: 120 TAB | Refills: 6 | Status: SHIPPED | OUTPATIENT
Start: 2019-02-27 | End: 2019-02-27 | Stop reason: SDUPTHER

## 2019-02-27 NOTE — PROGRESS NOTES
Pharmacy Note - Diabetes   02/27/19       Patient name: Belkis Nobles (26 y.o., male)  YOB: 1950    Referred by: Yolanda Akhtar MD for diabetes education   Past Medical History:   Diagnosis Date    Bladder cancer ([facility] 7/15/13    Clinical Stage [id number] HG UC    Bladder tumor     Diabetes ([facility]     Essential hypertension     History of kidney stones     Hyperlipidemia     Peripheral neuropathy     SBO (small bowel obstruction) ([facility]     Spinal stenosis     Spondylolisthesis, grade 1      Allergies:   No Known Allergies  Subjective / Objective      Medications:   Current medications for diabetes include:      Key Antihyperglycemic Medications             insulin glargine (LANTUS,BASAGLAR) 100 unit/mL (3 mL) inpn 10 units at bedtime    glimepiride (AMARYL) 4 mg tablet Take 1 Tab by mouth two (2) times a day. SITagliptin (JANUVIA) 100 mg tablet Take 1 Tab by mouth daily. pioglitazone (ACTOS) 30 mg tablet Take once a day        Previous medications used for diabetes management include:   [x] metformin (doesn't remember why this was stopped) [x] SGLT-2 Inhibitors (Jardiance - increased urination)    [] sulfonylureas [] TZDs   [] DPP-IV inhibitors [] insulin   [] GLP-1 agonists  [] none     Patient reports adherence with his medications. Blood Glucose Findings:   He checks his blood glucose readings 2 times daily. Patient did not bring his blood sugar log to today's visit.      - fasting range: 120 - 170 lately     - preprandial range: 200's or 300    Hypoglycemic episodes: None noted     His last A1c value(s) noted to be:      Lab Results   Component Value Date/Time    Hemoglobin A1c 9.5 (H) 01/16/2019 10:09 AM    Hemoglobin A1c 7.6 (H) 03/01/2018 12:00 AM    Hemoglobin A1c 8.0 (H) 12/07/2017 09:29 AM    Hemoglobin A1c (POC) 8.6 08/13/2018 02:50 PM    Hemoglobin A1c (POC) 7.9 01/19/2016 02:40 PM    Hemoglobin A1c (POC) 7.8 10/23/2015 12:00 PM    Hemoglobin A1c, External 8.4 06/20/2016     Dietary Considerations:   Does eat a largely vegetarian diet. Will eat occasional chicken. Does eat rice and potatoes, lentil soup, bread, vegetables. Screenings/Prevention Parameters:  -Diabetic Eye and Foot Exams:      Diabetic Foot and Eye Exam HM Status   Topic Date Due    Diabetic Foot Care  08/30/2018    Eye Exam  09/11/2020     -Microalbumin / Creatinine ratio:       Lab Results   Component Value Date/Time    Microalbumin/Creat ratio (mg/g creat) 12 06/28/2017 09:30 AM    Microalbumin,urine random 1.91 06/28/2017 09:30 AM     -Immunizations:      Immunization History   Administered Date(s) Administered    Hep A Vaccine 10/31/2002, 01/11/2016    Influenza Vaccine 10/01/2016    Influenza Vaccine (Quad) PF 10/23/2015    Influenza Vaccine (Tri) Adjuvanted 10/08/2018    Pneumococcal Conjugate (PCV-13) 06/29/2016    Pneumococcal Polysaccharide (PPSV-23) 08/30/2017    Tdap 04/20/2015    Typhoid Vaccine 10/31/2002, 01/11/2016    Yellow Fever Vaccine 01/11/2016       Additional Laboratory Parameters of Interest:     Estimation of renal function:  Lab Results   Component Value Date/Time    Creatinine 0.91 01/16/2019 10:09 AM    Creatinine 0.81 03/01/2018 12:00 AM    Creatinine 0.91 12/07/2017 09:29 AM    GFR est  01/16/2019 10:09 AM    GFR est  03/01/2018 12:00 AM    GFR est  12/07/2017 09:29 AM    GFR est non-AA 88 01/16/2019 10:09 AM    GFR est non-AA 93 03/01/2018 12:00 AM    GFR est non-AA 88 12/07/2017 09:29 AM     Wt Readings from Last 3 Encounters:   01/08/19 161 lb (73 kg)   10/08/18 157 lb 12.8 oz (71.6 kg)   09/07/18 156 lb (70.8 kg)     Ht Readings from Last 1 Encounters:   01/08/19 5' 6\" (1.676 m)     Assessment / Plan        1. Diabetes:  Goal A1C: < 7%.  Patient's most recent A1c is not at their current goal. During the visit today reviewed with patient: self-monitoring blood glucose fasting/post-prandial goals, importance of blood glucose control in avoidance of diabetic complications or further progression if already present, signs/symptoms/management of hypoglycemia, and diet and health maintenance items explained below. Patient noted that he is using about 14 units of Lantus daily. He has been checking pre-prandial readings occasionally recently after meeting with his endocrinologist. Before lunch readings usually in the 200's and usually similar or slightly higher if he checks before dinner. Also noted that he has [place] recently which he thinks has contributed to the loss of control of his A1c. Patient was interested in trying metformin again. He doesn't remember having any side effects from it and wasn't sure why it was stopped in the past. Verbal order obtained from patient's PCP for metformin and sent to patient's pharmacy of choice. Follow up visit planned with patient in roughly 1 month. 2. Diet/lifestyle:  Reviewed with patient utilization of the plate method as a way to encourage healthy eating. Reviewed carbohydrate and non-carbohydrate rich foods, carbohydrate content of various foods, appropriate serving sizes for carbohydrates (15 grams = 1 carb serving), reading the nutrition label focusing on total carbohydrates while being mindful of serving size, recommended serving sizes for carbohydrates for meals and snacks (45-60g with meals and less than or equal to 15g for snacks ), and discussion regarding fruits as a carbohydrate. Patient education materials were provided and reviewed with the patient for their future reference and use. Patient verbalized understanding of the information presented and all of the patients questions were answered. AVS was handed to the patient and information reviewed. Patient advised to call the office with any additional questions or concerns. Notification of recommendations will be sent to Saleem Hayward MD for review.     Future Appointments   Date Time Provider Ruben Chavez   3/13/2019  9:00 AM MD Cassie Plascencia   4/3/2019 11:00 AM [place], [address]   4/24/2019  1:30 PM Roscoe Felder MD [address]   6/26/2019  9:15 AM Deon Floyd MD [facility].     Thank you for the consult,  Wesley Hutchinson, PharmD, BCPS, BCACP, BC-ADM

## 2019-03-15 DIAGNOSIS — E11.9 TYPE 2 DIABETES MELLITUS WITHOUT COMPLICATION, WITH LONG-TERM CURRENT USE OF INSULIN (HCC): ICD-10-CM

## 2019-03-15 DIAGNOSIS — Z79.4 TYPE 2 DIABETES MELLITUS WITHOUT COMPLICATION, WITH LONG-TERM CURRENT USE OF INSULIN (HCC): ICD-10-CM

## 2019-03-18 RX ORDER — PIOGLITAZONEHYDROCHLORIDE 30 MG/1
TABLET ORAL
Qty: 90 TAB | Refills: 1 | Status: SHIPPED | OUTPATIENT
Start: 2019-03-18 | End: 2019-07-31 | Stop reason: SDUPTHER

## 2019-03-18 RX ORDER — INSULIN GLARGINE 100 [IU]/ML
INJECTION, SOLUTION SUBCUTANEOUS
Qty: 15 ML | Refills: 1 | Status: SHIPPED | OUTPATIENT
Start: 2019-03-18 | End: 2019-07-31 | Stop reason: SDUPTHER

## 2019-03-18 RX ORDER — PEN NEEDLE, DIABETIC 31 GX5/16"
NEEDLE, DISPOSABLE MISCELLANEOUS
Qty: 1 PACKAGE | Refills: 6 | Status: SHIPPED | OUTPATIENT
Start: 2019-03-18 | End: 2020-03-30

## 2019-03-18 RX ORDER — SITAGLIPTIN 100 MG/1
TABLET, FILM COATED ORAL
Qty: 90 TAB | Refills: 1 | Status: SHIPPED | OUTPATIENT
Start: 2019-03-18 | End: 2019-07-31 | Stop reason: SDUPTHER

## 2019-04-03 ENCOUNTER — OFFICE VISIT (OUTPATIENT)
Dept: INTERNAL MEDICINE CLINIC | Age: 69
End: 2019-04-03

## 2019-04-03 DIAGNOSIS — E11.9 TYPE 2 DIABETES MELLITUS WITHOUT COMPLICATION, WITH LONG-TERM CURRENT USE OF INSULIN (HCC): Primary | ICD-10-CM

## 2019-04-03 DIAGNOSIS — Z79.4 TYPE 2 DIABETES MELLITUS WITHOUT COMPLICATION, WITH LONG-TERM CURRENT USE OF INSULIN (HCC): Primary | ICD-10-CM

## 2019-04-03 NOTE — PROGRESS NOTES
Pharmacy Note - Diabetes   04/03/19       Patient name: Carmel Pop (9 y.o., male)  YOB: 1950    Referred by: Kika Saunders MD for diabetes education / management   Past Medical History:   Diagnosis Date    Bladder cancer ([facility] [address] 7/15/13    Clinical Stage [id number] HG UC    Bladder tumor     Diabetes ([facility] [address]     Essential hypertension     History of kidney stones     Hyperlipidemia     Peripheral neuropathy     SBO (small bowel obstruction) ([facility] [address]     Spinal stenosis     Spondylolisthesis, grade 1      Allergies:   No Known Allergies  Subjective / Objective      Medications:   Current medications for diabetes include:      Key Antihyperglycemic Medications             pioglitazone (ACTOS) 30 mg tablet TAKE 1 TABLET EVERY DAY    insulin glargine (LANTUS SOLOSTAR U-100 INSULIN) 100 unit/mL (3 mL) inpn INJECT  10 UNITS SUBCUTANEOUSLY AT BEDTIME. DISCARD AND BEGIN NEW PEN AFTER 28 DAYS. JANUVIA 100 mg tablet TAKE 1 TABLET EVERY DAY    metFORMIN ER (GLUCOPHAGE XR) 500 mg tablet Take 1 tablet daily for 1 week then increase to 2 tablets daily for 1 week then take 4 tablets daily thereafter    glimepiride (AMARYL) 4 mg tablet Take 1 Tab by mouth two (2) times a day. Patient reports adherence with his medications. Has been taking 1 metformin in the morning and usually 2 at dinner. Blood Glucose Findings:   He checks his blood glucose readings 1-2 times daily. Patient did not bring his blood sugar log to today's visit. - fasting range: 105, usually less than 115 - 120    - pre-prandial range: before dinner usually in the 220 - 260 range    Hypoglycemic episodes: Once noted being sweaty in the morning, checked and he was in the 60's. Noted that he hadn't had much for dinner the night before.      His last A1c value(s) noted to be:      Lab Results   Component Value Date/Time    Hemoglobin A1c 9.5 (H) 01/16/2019 10:09 AM    Hemoglobin A1c 7.6 (H) 03/01/2018 12:00 AM    Hemoglobin A1c 8.0 (H) 12/07/2017 09:29 AM    Hemoglobin A1c (POC) 8.6 08/13/2018 02:50 PM    Hemoglobin A1c (POC) 7.9 01/19/2016 02:40 PM    Hemoglobin A1c (POC) 7.8 10/23/2015 12:00 PM    Hemoglobin A1c, External 8.4 06/20/2016     Assessment / Plan      1. Diabetes: Noted that he is tolerating metformin well and his morning numbers seem to have improved since starting. Post-prandial readings seem to be uncontrolled and when repeat A1c is recheck, may contribute to lack of desired control. Could consider changing Januvia to a GLP-1 agonist like Trulicity for improved control. Patient will continue to follow with his PCP and endocrinologist. Follow up appointment with me made in 3 months at patient request. Patient also noted that he had recently purchased the [facility] and will likely apply it soon. Patient verbalized understanding of the information presented and all of the patients questions were answered. AVS was handed to the patient and information reviewed. Patient advised to call the office with any additional questions or concerns. Notification of recommendations will be sent to Dora Oh MD for review.     Future Appointments   Date Time Provider Ruben Chavez   4/11/2019  1:40 PM [facility] NURSE [facility] ED[facility] SCHED   4/24/2019  1:30 PM Dora Oh MD [facility] EDITH SCHED   6/26/2019  9:15 AM Raul Nation MD [facility] EDITH SCHED   9/10/2019  8:30 AM [facility] CT RM 1 [facility]   3/11/2020 10:45 AM Jordyn Moore MD [address] [place] [address]       Thank you for the consult,  Nayely Aguilar, PharmD, BCPS, BCACP, BC-ADM

## 2019-04-24 ENCOUNTER — OFFICE VISIT (OUTPATIENT)
Dept: FAMILY MEDICINE CLINIC | Age: 69
End: 2019-04-24

## 2019-04-24 ENCOUNTER — HOSPITAL ENCOUNTER (OUTPATIENT)
Dept: LAB | Age: 69
Discharge: HOME | End: 2019-04-24

## 2019-04-24 VITALS
WEIGHT: 161 LBS | RESPIRATION RATE: 16 BRPM | HEIGHT: 66 IN | OXYGEN SATURATION: 97 % | DIASTOLIC BLOOD PRESSURE: 72 MMHG | BODY MASS INDEX: 25.88 KG/M2 | SYSTOLIC BLOOD PRESSURE: 114 MMHG | HEART RATE: 90 BPM | TEMPERATURE: 98.2 F

## 2019-04-24 DIAGNOSIS — E11.65 POORLY CONTROLLED DIABETES MELLITUS (HCC): Primary | ICD-10-CM

## 2019-04-24 LAB — XX-LABCORP SPECIMEN COL,LCBCF: NORMAL

## 2019-04-24 PROCEDURE — 99001 SPECIMEN HANDLING PT-LAB: CPT

## 2019-04-24 NOTE — PROGRESS NOTES
1. Have you been to the ER, urgent care clinic since your last visit? Hospitalized since your last visit? No    2. Have you seen or consulted any other health care providers outside of the [address] [place] [address] since your last visit? Include any pap smears or colon screening.  Dr. Luis E Mckinley LOV: 10 days ago

## 2019-04-24 NOTE — PROGRESS NOTES
HISTORY OF PRESENT ILLNESS  Estevan Tierney is a 81 y.o. male. HPI: here for follow up. Poorly controlled diabetes. Following endo. fluctuating blood sugar. Also was non complaint with taking insulin, diet modification. Now trying to be more compliant. Elevated HBA1C more than 9. Today he has the digital meter. Checking sugar 163 , 3 hours post meal.   Discussed importance of diet modification. He is trying to work on compliance. Also has lab orders to be completed from endo. Lab Results   Component Value Date/Time    Hemoglobin A1c 9.5 (H) 01/16/2019 10:09 AM    Hemoglobin A1c (POC) 8.6 08/13/2018 02:50 PM    Hemoglobin A1c, External 8.4 06/20/2016   Denies any headache, dizziness, no chest pain or trouble breathing, no arm or leg weakness. No nausea or vomiting, no weight or appetite changes, no mood changes . No urine or bowel complains, no palpitation, no diaphoresis. No abdominal pain. No cold or cough. No leg swelling. No fever. No sleep trouble. Also seen pharmacist for med check and recommended to restart metformin. He is tolerating it well.   following urology for his bladder cancer and elevated PSA> no urinary complains at this time. Lab Results   Component Value Date/Time    Prostate Specific Ag 4.0 04/11/2019 01:59 PM    Prostate Specific Ag 5.1 (H) 03/01/2018 11:35 AM    Prostate Specific Ag 5.38 (H) 08/03/2016 12:06 PM       ROS: see HPI     Physical Exam   Constitutional: He is oriented to person, place, and time. No distress. Cardiovascular: Normal heart sounds. Pulmonary/Chest: No respiratory distress. He has no wheezes. Abdominal: Soft. There is no tenderness. Musculoskeletal: He exhibits no edema. Neurological: He is oriented to person, place, and time. Psychiatric: His behavior is normal.       ASSESSMENT and PLAN    ICD-10-CM ICD-9-CM    1. Poorly controlled diabetes mellitus ([facility] [address]): for now recheck labs. Said fluctuating blood sugar. Did not bring log.  Discussed diet modification and complaint with it. Discussed to take lantus 12 units for now instead of taking sometimes 10.12 or 14. Continue oral medication. F/u next visit. Advised to bring log / meter with him. F/u after lab result. E11.65 250.00    Pt understood and agree with the plan   Review    Follow-up and Dispositions    · Return in about 2 months (around 6/24/2019).

## 2019-06-19 DIAGNOSIS — Z79.4 TYPE 2 DIABETES MELLITUS WITHOUT COMPLICATION, WITH LONG-TERM CURRENT USE OF INSULIN (HCC): ICD-10-CM

## 2019-06-19 DIAGNOSIS — E11.9 TYPE 2 DIABETES MELLITUS WITHOUT COMPLICATION, WITH LONG-TERM CURRENT USE OF INSULIN (HCC): ICD-10-CM

## 2019-06-19 DIAGNOSIS — I10 ESSENTIAL HYPERTENSION: ICD-10-CM

## 2019-06-20 RX ORDER — LOSARTAN POTASSIUM AND HYDROCHLOROTHIAZIDE 25; 100 MG/1; MG/1
TABLET ORAL
Qty: 90 TAB | Refills: 1 | Status: SHIPPED | OUTPATIENT
Start: 2019-06-20 | End: 2019-12-16 | Stop reason: SDUPTHER

## 2019-06-20 RX ORDER — ATORVASTATIN CALCIUM 10 MG/1
TABLET, FILM COATED ORAL
Qty: 90 TAB | Refills: 1 | Status: SHIPPED | OUTPATIENT
Start: 2019-06-20 | End: 2019-10-01 | Stop reason: SDUPTHER

## 2019-06-26 ENCOUNTER — OFFICE VISIT (OUTPATIENT)
Dept: FAMILY MEDICINE CLINIC | Age: 69
End: 2019-06-26

## 2019-06-26 ENCOUNTER — OFFICE VISIT (OUTPATIENT)
Dept: CARDIOLOGY CLINIC | Age: 69
End: 2019-06-26

## 2019-06-26 VITALS
SYSTOLIC BLOOD PRESSURE: 127 MMHG | DIASTOLIC BLOOD PRESSURE: 71 MMHG | WEIGHT: 157.4 LBS | HEART RATE: 89 BPM | BODY MASS INDEX: 25.3 KG/M2 | HEIGHT: 66 IN

## 2019-06-26 VITALS
BODY MASS INDEX: 25.23 KG/M2 | DIASTOLIC BLOOD PRESSURE: 78 MMHG | RESPIRATION RATE: 16 BRPM | WEIGHT: 157 LBS | OXYGEN SATURATION: 97 % | HEIGHT: 66 IN | TEMPERATURE: 98.2 F | SYSTOLIC BLOOD PRESSURE: 100 MMHG | HEART RATE: 87 BPM

## 2019-06-26 DIAGNOSIS — Z79.4 TYPE 2 DIABETES MELLITUS WITHOUT COMPLICATION, WITH LONG-TERM CURRENT USE OF INSULIN (HCC): ICD-10-CM

## 2019-06-26 DIAGNOSIS — E11.9 TYPE 2 DIABETES MELLITUS WITHOUT COMPLICATION, WITH LONG-TERM CURRENT USE OF INSULIN (HCC): ICD-10-CM

## 2019-06-26 DIAGNOSIS — C67.9 MALIGNANT NEOPLASM OF URINARY BLADDER, UNSPECIFIED SITE (HCC): ICD-10-CM

## 2019-06-26 DIAGNOSIS — R97.20 ELEVATED PSA: ICD-10-CM

## 2019-06-26 DIAGNOSIS — E78.2 MIXED HYPERLIPIDEMIA: ICD-10-CM

## 2019-06-26 DIAGNOSIS — I10 ESSENTIAL HYPERTENSION, BENIGN: ICD-10-CM

## 2019-06-26 DIAGNOSIS — I10 ESSENTIAL HYPERTENSION, BENIGN: Primary | ICD-10-CM

## 2019-06-26 DIAGNOSIS — R05.9 COUGH: ICD-10-CM

## 2019-06-26 DIAGNOSIS — E11.65 POORLY CONTROLLED DIABETES MELLITUS (HCC): Primary | ICD-10-CM

## 2019-06-26 RX ORDER — MONTELUKAST SODIUM 10 MG/1
10 TABLET ORAL DAILY
Qty: 30 TAB | Refills: 0 | Status: SHIPPED | OUTPATIENT
Start: 2019-06-26 | End: 2020-01-03

## 2019-06-26 RX ORDER — GLIPIZIDE 10 MG/1
10 TABLET ORAL DAILY
COMMUNITY
End: 2019-07-17 | Stop reason: DRUGHIGH

## 2019-06-26 NOTE — PATIENT INSTRUCTIONS
Nutrition Tips for Diabetes: After Your Visit  Your Care Instructions  A healthy diet is important to manage diabetes. It helps you lose weight (if you need to) and keep it off. It gives you the nutrition and energy your body needs and helps prevent heart disease. But a diet for diabetes does not mean that you have to eat special foods. You can eat what your family eats, including occasional sweets and other favorites. But you do have to pay attention to how often you eat and how much you eat of certain foods. The right plan for you will give you meals that help you keep your blood sugar at healthy levels. Try to eat a variety of foods and to spread carbohydrate throughout the day. Carbohydrate raises blood sugar higher and more quickly than any other nutrient does. Carbohydrate is found in sugar, breads and cereals, fruit, starchy vegetables such as potatoes and corn, and milk and yogurt. You may want to work with a dietitian or diabetes educator to help you plan meals and snacks. A dietitian or diabetes educator also can help you lose weight if that is one of your goals. The following tips can help you enjoy your meals and stay healthy. Follow-up care is a key part of your treatment and safety. Be sure to make and go to all appointments, and call your doctor if you are having problems. Its also a good idea to know your test results and keep a list of the medicines you take. How can you care for yourself at home? · Learn which foods have carbohydrate and how much carbohydrate to eat. A dietitian or diabetes educator can help you learn to keep track of how much carbohydrate you eat. · Spread carbohydrate throughout the day. Eat some carbohydrate at all meals, but do not eat too much at any one time. · Plan meals to include food from all the food groups.  These are the food groups and some example portion sizes:  ¨ Grains: 1 slice of bread (1 ounce), ½ cup of cooked cereal, and 1/3 cup of cooked pasta or rice. These have about 15 grams of carbohydrate in a serving. Choose whole grains such as whole wheat bread or crackers, oatmeal, and brown rice more often than refined grains. ¨ Fruit: 1 small fresh fruit, such as an apple or orange; ½ of a banana; ½ cup of chopped, cooked, or canned fruit; ½ cup of fruit juice; 1 cup of melon or raspberries; and 2 tablespoons of dried fruit. These have about 15 grams of carbohydrate in a serving. ¨ Dairy: 1 cup of nonfat or low-fat milk and 2/3 cup of plain yogurt. These have about 15 grams of carbohydrate in a serving. ¨ Protein foods: Beef, chicken, turkey, fish, eggs, tofu, cheese, cottage cheese, and peanut butter. A serving size of meat is 3 ounces, which is about the size of a deck of cards. Examples of meat substitute serving sizes (equal to 1 ounce of meat) are 1/4 cup of cottage cheese, 1 egg, 1 tablespoon of peanut butter, and ½ cup of tofu. These have very little or no carbohydrate per serving. ¨ Vegetables: Starchy vegetables such as ½ cup of cooked dried beans, peas, potatoes, or corn have about 15 grams of carbohydrate. Nonstarchy vegetables have very little carbohydrate, such as 1 cup of raw leafy vegetables (such as spinach), ½ cup of other vegetables (cooked or chopped), and 3/4 cup of vegetable juice. · Use the plate format to plan meals. It is a good, quick way to make sure that you have a balanced meal. It also helps you spread carbohydrate throughout the day. You divide your plate by types of foods. Put vegetables on half the plate, meat or meat substitutes on one-quarter of the plate, and a grain or starchy vegetable (such as brown rice or a potato) in the final quarter of the plate. To this you can add a small piece of fruit and 1 cup of milk or yogurt, depending on how much carbohydrate you are supposed to eat at a meal.  · Talk to your dietitian or diabetes educator about ways to add limited amounts of sweets into your meal plan.  You can eat these foods now and then, as long as you include the amount of carbohydrate they have in your daily carbohydrate allowance. · If you drink alcohol, limit it to no more than 1 drink a day for women and 2 drinks a day for men. If you are pregnant, no amount of alcohol is known to be safe. · Protein, fat, and fiber do not raise blood sugar as much as carbohydrate does. If you eat a lot of these nutrients in a meal, your blood sugar will rise more slowly than it would otherwise. · Limit saturated fats, such as those from meat and dairy products. Try to replace it with monounsaturated fat, such as olive oil. This is a healthier choice because people who have diabetes are at higher-than-average risk of heart disease. But use a modest amount of olive oil. A tablespoon of olive oil has 14 grams of fat and 120 calories. · Exercise lowers blood sugar. If you take insulin by shots or pump, you can use less than you would if you were not exercising. Keep in mind that timing matters. If you exercise within 1 hour after a meal, your body may need less insulin for that meal than it would if you exercised 3 hours after the meal. Test your blood sugar to find out how exercise affects your need for insulin. · Exercise on most days of the week. Aim for at least 30 minutes. Exercise helps you stay at a healthy weight and helps your body use insulin. Walking is an easy way to get exercise. Gradually increase the amount you walk every day. You also may want to swim, bike, or do other activities. When you eat out  · Learn to estimate the serving sizes of foods that have carbohydrate. If you measure food at home, it will be easier to estimate the amount in a serving of restaurant food. · If the meal you order has too much carbohydrate (such as potatoes, corn, or baked beans), ask to have a low-carbohydrate food instead. Ask for a salad or green vegetables.   · If you use insulin, check your blood sugar before and after eating out to help you plan how much to eat in the future. · If you eat more carbohydrate at a meal than you had planned, take a walk or do other exercise. This will help lower your blood sugar. Where can you learn more? Go to LGL/LatinMedios.be  Enter B792 in the search box to learn more about \"Nutrition Tips for Diabetes: After Your Visit. \"   © 1157-0283 Healthwise, Incorporated. Care instructions adapted under license by [facility] [employer] (which disclaims liability or warranty for this information). This care instruction is for use with your licensed healthcare professional. If you have questions about a medical condition or this instruction, always ask your healthcare professional. [address] any warranty or liability for your use of this information. Content Version: 24.7.220504; Current as of: June 4, 2014                 Allergies: Care Instructions  Your Care Instructions    Allergies occur when your body's defense system (immune system) overreacts to certain substances. The immune system treats a harmless substance as if it were a harmful germ or virus. Many things can cause this overreaction, including pollens, medicine, food, dust, animal dander, and mold. Allergies can be mild or severe. Mild allergies can be managed with home treatment. But medicine may be needed to prevent problems. Managing your allergies is an important part of staying healthy. Your doctor may suggest that you have allergy testing to help find out what is causing your allergies. When you know what things trigger your symptoms, you can avoid them. This can prevent allergy symptoms and other health problems. For severe allergies that cause reactions that affect your whole body (anaphylactic reactions), your doctor may prescribe a shot of epinephrine to carry with you in case you have a severe reaction. Learn how to give yourself the shot and keep it with you at all times.  Make sure it is not . Follow-up care is a key part of your treatment and safety. Be sure to make and go to all appointments, and call your doctor if you are having problems. It's also a good idea to know your test results and keep a list of the medicines you take. How can you care for yourself at home? · If you have been told by your doctor that dust or dust mites are causing your allergy, decrease the dust around your bed:  ? Wash sheets, pillowcases, and other bedding in hot water every week. ? Use dust-proof covers for pillows, duvets, and mattresses. Avoid plastic covers because they tear easily and do not \"breathe. \" Wash as instructed on the label. ? Do not use any blankets and pillows that you do not need. ? Use blankets that you can wash in your washing machine. ? Consider removing drapes and carpets, which attract and hold dust, from your bedroom. · If you are allergic to house dust and mites, do not use home humidifiers. Your doctor can suggest ways you can control dust and mites. · Look for signs of cockroaches. Cockroaches cause allergic reactions. Use cockroach baits to get rid of them. Then, clean your home well. Cockroaches like areas where grocery bags, newspapers, empty bottles, or cardboard boxes are stored. Do not keep these inside your home, and keep trash and food containers sealed. Seal off any spots where cockroaches might enter your home. · If you are allergic to mold, get rid of furniture, rugs, and drapes that smell musty. Check for mold in the bathroom. · If you are allergic to outdoor pollen or mold spores, use air-conditioning. Change or clean all filters every month. Keep windows closed. · If you are allergic to pollen, stay inside when pollen counts are high. Use a vacuum  with a HEPA filter or a double-thickness filter at least two times each week. · Stay inside when air pollution is bad. Avoid paint fumes, perfumes, and other strong odors.   · Avoid conditions that make your allergies worse. Stay away from smoke. Do not smoke or let anyone else smoke in your house. Do not use fireplaces or wood-burning stoves. · If you are allergic to your pets, change the air filter in your furnace every month. Use high-efficiency filters. · If you are allergic to pet dander, keep pets outside or out of your bedroom. Old carpet and cloth furniture can hold a lot of animal dander. You may need to replace them. When should you call for help? Give an epinephrine shot if:    · You think you are having a severe allergic reaction.     · You have symptoms in more than one body area, such as mild nausea and an itchy mouth.    After giving an epinephrine shot call 911, even if you feel better.   Call 911 if:    · You have symptoms of a severe allergic reaction. These may include:  ? Sudden raised, red areas (hives) all over your body. ? Swelling of the throat, mouth, lips, or tongue. ? Trouble breathing. ? Passing out (losing consciousness). Or you may feel very lightheaded or suddenly feel weak, confused, or restless.     · You have been given an epinephrine shot, even if you feel better.    Call your doctor now or seek immediate medical care if:    · You have symptoms of an allergic reaction, such as:  ? A rash or hives (raised, red areas on the skin). ? Itching. ? Swelling. ? Belly pain, nausea, or vomiting.    Watch closely for changes in your health, and be sure to contact your doctor if:    · You do not get better as expected. Where can you learn more? Go to http://jah-lorenzo.info/. Enter X981 in the search box to learn more about \"Allergies: Care Instructions. \"  Current as of: June 27, 2018  Content Version: 11.9  © 4541-3262 [employer]. Care instructions adapted under license by [employer] (which disclaims liability or warranty for this information).  If you have questions about a medical condition or this instruction, always ask your healthcare professional. [address] any warranty or liability for your use of this information.

## 2019-06-26 NOTE — PROGRESS NOTES
HISTORY OF PRESENT ILLNESS  Estevan Eng is a 81 y.o. male. HPI: Here for follow up. Poorly controlled diabetes. Very non compliant with diet modification. Compliant with medication now as also past history of non complaint with taking insulin. Currently started following endocrinologist and changed few oral medications. He was told to do diet modification and also stop lantus but he restarted it again as elevated blood sugar around 300. Brought blood sugar log and fasting is still high around 150-180 and some in 200's. Said lantus he is taking 10 or 14 units. I have told her to take 15 units daily at bedtime. Do not keep changing dose. Also diet modification is the key which he understands but will try to be more compliant. Ankita Salgado he has been travelling a lot and that is not helping him. H/o hypertension. Stable. Compliant with medication. Asymptomatic. Also discussed high BMI. Discussed importance of exercise and diet modification. importance of weight loss discussed. Following urology for elevated PSA and h/o bladder cancer. Under surveillance. No urinary complains. Said on and off cough . More in the morning and need to clear throat. Ankita Salgado could be from travelling? ? Not taking any seasonal allergy medication. No sputum. No fever. No chest congestion or trouble breathing. No wheezing. No sputum. For now discuss to take Singulair and he is going to try. Visit Vitals  /78 (BP 1 Location: Left arm, BP Patient Position: Sitting)   Pulse 87   Temp 98.2 °F (36.8 °C) (Oral)   Resp 16   Ht 5' 6\" (1.676 m)   Wt 157 lb (71.2 kg)   SpO2 97%   BMI 25.34 kg/m²     Review medication list, vitals, problem list,allergies. Review labs from care everywhere. Renal function stable wnl. Also HBA1C 9.2       ROS: Denies any headache, dizziness, no chest pain or trouble breathing, no arm or leg weakness. No nausea or vomiting, no weight or appetite changes, no mood changes .  No urine or bowel complains, no palpitation, no diaphoresis. No abdominal pain. No cold . No leg swelling. No fever. No sleep trouble. Physical Exam   Constitutional: He is oriented to person, place, and time. No distress. Neck: No thyromegaly present. Cardiovascular: Normal rate, regular rhythm and normal heart sounds. Pulmonary/Chest:   CTA   Abdominal: Soft. Bowel sounds are normal. There is no tenderness. Musculoskeletal: He exhibits no edema. Lymphadenopathy:     He has no cervical adenopathy. Neurological: He is oriented to person, place, and time. Psychiatric: His behavior is normal.       ASSESSMENT and PLAN    ICD-10-CM ICD-9-CM    1. Poorly controlled diabetes mellitus ([address]): following endo. Also discussed to take lantus 15 units at bedtime and more compliance with diet and taking insulin. Will f/u next visit and repeat labs in a month. E11.65 250.00 MICROALBUMIN, UR, RAND W/ MICROALB/CREAT RATIO      HEMOGLOBIN A1C WITH EAG      METABOLIC PANEL, COMPREHENSIVE   2. BMI 25.0-25.9,adult: discussed high BMI. Discussed diet modification, calorie count and exercise. Discussed importance of weight loss. agree to do exercise and life style modification. Diet and exercise hand out given in AVS.    Z68.25 V85.21    3. Essential hypertension, benign: well controlled. Continue current dose of medication and low salt diet. Exercise as tolerated. I10 401.1    4. Mixed hyperlipidemia: on statin. Diet modification discussed. E78.2 272.2    5. Elevated PSA: following urology. Asymptomatic. Stable on flomax. R97.20 790.93    6. Malignant neoplasm of urinary bladder, unspecified site [facility]): following urology  C67.9 188.9     follwoing urology. on survillence    7. Cough; for now given Singulair. will f/u sooner if needed. R05 786.2    Pt understood and agree with the plan   Review    Follow-up and Dispositions    · Return in about 3 months (around 9/26/2019).

## 2019-06-26 NOTE — PROGRESS NOTES
1. Have you been to the ER, urgent care clinic since your last visit? Hospitalized since your last visit? No    2. Have you seen or consulted any other health care providers outside of the [address] [place] [address] since your last visit? Include any pap smears or colon screening.  Endocrinology, Dr. Graciela Loyd: 4/30/19    Chief Complaint   Patient presents with    Diabetes     requests Rx Metformin 1000 mg tab     Cough     dry cough x 10 days    Headache    Fatigue    Fever

## 2019-06-26 NOTE — PROGRESS NOTES
1. Have you been to the ER, urgent care clinic since your last visit? Hospitalized since your last visit? No    2. Have you seen or consulted any other health care providers outside of the [address] [place] [address] since your last visit? Include any pap smears or colon screening.  Yes Where: Endocrine Reason for visit: Routine Visit

## 2019-06-26 NOTE — PROGRESS NOTES
HISTORY OF PRESENT ILLNESS  Estevan Estrada is a 81 y.o. male. Patient with htn,dm,hyperlipidemia. On follow up patient denies any chest pains,sob, palpitation or other significant symptoms. Hypertension   The history is provided by the patient. This is a chronic problem. The problem occurs constantly. The problem has not changed since onset. Pertinent negatives include no chest pain, no abdominal pain, no headaches and no shortness of breath. Cholesterol Problem   The history is provided by the patient. This is a chronic problem. The problem occurs constantly. Pertinent negatives include no chest pain, no abdominal pain, no headaches and no shortness of breath. Review of Systems   Constitutional: Negative for chills and fever. HENT: Negative for nosebleeds. Eyes: Negative for blurred vision and double vision. Respiratory: Negative for cough, hemoptysis, sputum production, shortness of breath and wheezing. Cardiovascular: Negative for chest pain, palpitations, orthopnea, claudication, leg swelling and PND. Gastrointestinal: Negative for abdominal pain, heartburn, nausea and vomiting. Musculoskeletal: Negative for myalgias. Skin: Negative for rash. Neurological: Negative for dizziness, weakness and headaches. Endo/Heme/Allergies: Does not bruise/bleed easily.      Family History   Problem Relation Age of Onset    Hypertension Mother     Heart Attack Neg Hx     Sudden Death Neg Hx        Past Medical History:   Diagnosis Date    Bladder cancer ([address] 7/15/13    Clinical Stage TaN0M0 HG UC    Bladder tumor     Diabetes ([facility] [address]     Essential hypertension     History of kidney stones     Hyperlipidemia     Peripheral neuropathy     SBO (small bowel obstruction) ([address]     Spinal stenosis     Spondylolisthesis, grade 1        Past Surgical History:   Procedure Laterality Date    HX UROLOGICAL  7/15/13    [facility]URBT, Dr. Rachel James, [facility]    HX UROLOGICAL  1/4/16    Cysto, Dr. Rachel James, [facility]    HX WRIST FRACTURE TX  4/27/15    (L) wrist       No Known Allergies    Current Outpatient Medications   Medication Sig    glipiZIDE (GLUCOTROL) 10 mg tablet Take 10 mg by mouth daily.  atorvastatin (LIPITOR) 10 mg tablet TAKE 1 TABLET EVERY DAY    losartan-hydroCHLOROthiazide (HYZAAR) 100-25 mg per tablet TAKE 1 TABLET EVERY DAY    flash glucose scanning reader (FREESTYLE SASHA 10 DAY READER) misc 1 Each.  insulin glargine (LANTUS SOLOSTAR U-100 INSULIN) 100 unit/mL (3 mL) inpn INJECT  10 UNITS SUBCUTANEOUSLY AT BEDTIME. DISCARD AND BEGIN NEW PEN AFTER 28 DAYS. (Patient taking differently: daily. INJECT  10 UNITS SUBCUTANEOUSLY AT BEDTIME. DISCARD AND BEGIN NEW PEN AFTER 28 DAYS.)    BD ULTRA-FINE SHORT PEN NEEDLE 31 gauge x 5/16\" ndle USE  TO INJECT ONE TIME DAILY    JANUVIA 100 mg tablet TAKE 1 TABLET EVERY DAY    metFORMIN ER (GLUCOPHAGE XR) 500 mg tablet Take 1 tablet daily for 1 week then increase to 2 tablets daily for 1 week then take 4 tablets daily thereafter (Patient taking differently: 1,000 mg BID)    omeprazole (PRILOSEC) 40 mg capsule Take 1 Cap by mouth daily.  glucose blood VI test strips (ACCU-CHEK HALIMA PLUS TEST STRP) strip Use 1 daily for blood glucose monitoring DX: E11.9    traZODone (DESYREL) 50 mg tablet Take 1 Tab by mouth nightly.  prednisoLONE acetate (PRED FORTE) 1 % ophthalmic suspension INSTILL ONE DROP IN EACH EYE 2 TIMES A DAY FOR 10 DAYS THEN INSTILL ONE DROP ONCE DAILY FOR 10 DAYS    cholecalciferol, vitamin D3, (VITAMIN D3 PO) Take  by mouth.  multivitamin (ONE A DAY) tablet Take 1 Tab by mouth daily.  melatonin tab tablet Take 10 mg by mouth nightly.     Lancets (ACCU-CHEK SOFTCLIX LANCETS) misc Use 1 daily for blood glucose monitoring DX: E11.9    Blood-Glucose Meter (ACCU-CHEK HALIMA PLUS METER) misc Use 1 daily for blood glucose monitoring DX: E11.9    glucose blood VI test strips (ONETOUCH ULTRA TEST) strip Check twice daily    tadalafil (CIALIS) 20 mg tablet Take 1 Tab by mouth daily as needed.  CINNAMON BARK (CINNAMON PO) Take  by mouth.  TURMERIC ROOT EXTRACT PO Take  by mouth.  GINGER ROOT (GINGER EXTRACT PO) Take  by mouth.  Insulin Needles, Disposable, (BD INSULIN PEN NEEDLE UF MINI) 31 gauge x 3/16\" ndle Check twice daily    cyanocobalamin (VITAMIN B-12) 1,000 mcg Subl Take 1,000 mcg by mouth daily.  aspirin 81 mg chewable tablet Take 81 mg by mouth daily.  pioglitazone (ACTOS) 30 mg tablet TAKE 1 TABLET EVERY DAY    glimepiride (AMARYL) 4 mg tablet Take 1 Tab by mouth two (2) times a day.  ERGOCALCIFEROL, VITAMIN D2, (VITAMIN D2 PO) Take 1 Cap by mouth daily. No current facility-administered medications for this visit. Visit Vitals  /71   Pulse 89   Ht 5' 6\" (1.676 m)   Wt 71.4 kg (157 lb 6.4 oz)   BMI 25.41 kg/m²         Physical Exam   Constitutional: He is oriented to person, place, and time. He appears well-developed and well-nourished. HENT:   Head: Normocephalic and atraumatic. Eyes: Conjunctivae are normal.   Neck: Neck supple. No JVD present. No tracheal deviation present. No thyromegaly present. Cardiovascular: Normal rate, regular rhythm and normal heart sounds. Exam reveals no gallop and no friction rub. No murmur heard. Pulmonary/Chest: Breath sounds normal. No respiratory distress. He has no wheezes. He has no rales. He exhibits no tenderness. Abdominal: Soft. There is no tenderness. Musculoskeletal: He exhibits no edema. Neurological: He is alert and oriented to person, place, and time. Skin: Skin is warm and dry. Psychiatric: He has a normal mood and affect. Mr. Freddy Del Valle has a reminder for a \"due or due soon\" health maintenance. I have asked that he contact his primary care provider for follow-up on this health maintenance. Conclusion: 4/2013:stress test  1. Normal perfusion scan. 2. Normal wall motion and ejection fraction.   I Have personally reviewed recent relevant labs available and discussed with patient  3/2018  Assessment       ICD-10-CM ICD-9-CM    1. Essential hypertension, benign I10 401.1     Blood pressure controlled continue treatment   2. Mixed hyperlipidemia E78.2 272.2     LDL 70 continue treatment follow-up with PCP labs reviewed   3. Type 2 diabetes mellitus without complication, with long-term current use of insulin (HCC) E11.9 250.00     Z79.4 V58.67     Uncontrolled on treatment followed by endocrine       There are no discontinued medications. No orders of the defined types were placed in this encounter. Follow-up and Dispositions    · Return in about 1 year (around 6/26/2020).

## 2019-07-17 ENCOUNTER — OFFICE VISIT (OUTPATIENT)
Dept: INTERNAL MEDICINE CLINIC | Age: 69
End: 2019-07-17

## 2019-07-17 DIAGNOSIS — E11.9 TYPE 2 DIABETES MELLITUS WITHOUT COMPLICATION, WITH LONG-TERM CURRENT USE OF INSULIN (HCC): Primary | ICD-10-CM

## 2019-07-17 DIAGNOSIS — Z79.4 TYPE 2 DIABETES MELLITUS WITHOUT COMPLICATION, WITH LONG-TERM CURRENT USE OF INSULIN (HCC): Primary | ICD-10-CM

## 2019-07-17 RX ORDER — GLIPIZIDE 10 MG/1
10 TABLET, FILM COATED, EXTENDED RELEASE ORAL DAILY
COMMUNITY
End: 2019-10-01

## 2019-07-17 NOTE — PROGRESS NOTES
Pharmacy Note - Diabetes   07/17/19       Patient name: Oly Pineda (73 y.o., male)  YOB: 1950    Referred by: Nav Turcios MD for diabetes education / management   Past Medical History:   Diagnosis Date    Bladder cancer ([facility] [address] 7/15/13    Clinical Stage TaN0M0 HG UC    Bladder tumor     Diabetes ([facility] [address]     Essential hypertension     History of kidney stones     Hyperlipidemia     Peripheral neuropathy     SBO (small bowel obstruction) ([facility] [address]     Spinal stenosis     Spondylolisthesis, grade 1      Allergies:   No Known Allergies  Subjective / Objective      Medications:   Current medications for diabetes include:      Key Antihyperglycemic Medications             glipiZIDE SR (GLUCOTROL XL) 10 mg CR tablet (Taking) Take 10 mg by mouth daily. pioglitazone (ACTOS) 30 mg tablet TAKE 1 TABLET EVERY DAY    insulin glargine (LANTUS SOLOSTAR U-100 INSULIN) 100 unit/mL (3 mL) inpn INJECT  10 UNITS SUBCUTANEOUSLY AT BEDTIME. DISCARD AND BEGIN NEW PEN AFTER 28 DAYS. JANUVIA 100 mg tablet TAKE 1 TABLET EVERY DAY    metFORMIN ER (GLUCOPHAGE XR) 500 mg tablet Take 1 tablet daily for 1 week then increase to 2 tablets daily for 1 week then take 4 tablets daily thereafter        Patient reports adherence with his medications. Blood Glucose Findings:   He checks his blood glucose readings once daily for the past several weeks due to travel. Patient did bring his blood sugar log to today's visit. - fasting range: 96 - 154. One reading of 70 noted. 11 readings noted to be within goal this month (out of the 17)    Hypoglycemic episodes: Yes - 1 time per month.      His last A1c value(s) noted to be:      Lab Results   Component Value Date/Time    Hemoglobin A1c 9.5 (H) 01/16/2019 10:09 AM    Hemoglobin A1c 7.6 (H) 03/01/2018 12:00 AM    Hemoglobin A1c 8.0 (H) 12/07/2017 09:29 AM    Hemoglobin A1c (POC) 8.6 08/13/2018 02:50 PM    Hemoglobin A1c (POC) 7.9 01/19/2016 02:40 PM    Hemoglobin A1c (POC) 7.8 10/23/2015 12:00 PM    Hemoglobin A1c, External 8.4 06/20/2016     Dietary Considerations:   Has struggled with this. Per patient, endocrinologist stated for him to avoid rices and breads which would be hard for him based his typical cultural diet      Assessment / Plan      1. Diabetes: Being followed by his endocrinologist. Lex Sarah prandial readings still seem to be a large contributor to the level of uncontrol. Could consider in the future addition of an SGLT-2 inhibitor to help target post prandial readings. No medication adjustments made at this time and patient will continue to follow with his PCP. Encouraged him to watch portion sizes and again reviewed dietary measures working to count carbohydrates. No orders of the defined types were placed in this encounter. Patient verbalized understanding of the information presented and all of the patients questions were answered. AVS was handed to the patient and information reviewed. Patient advised to call the office with any additional questions or concerns. Notification of recommendations will be sent to Michelle Zambrano MD for review.     Future Appointments   Date Time Provider Ruben Chavez   9/10/2019  8:30 AM HBV CT RM 1 [facility]   10/1/2019  1:30 PM Michelle Zambrano MD [facility] SCHED   [facility]2020 [facility]   6/24/2020 11:00 AM Zena Pyle MD [facility].       Thank you for the consult,  Cliffton Foot, PharmD, BCPS, BCACP, BC-ADM

## 2019-07-29 ENCOUNTER — TELEPHONE (OUTPATIENT)
Dept: FAMILY MEDICINE CLINIC | Age: 69
End: 2019-07-29

## 2019-07-29 NOTE — TELEPHONE ENCOUNTER
Pt states that he is coming in tomorrow for labs and states that he still has runny nose and cough in the morning and would like to know if Dr Jose Antonio Leyva would like to add any labs to his order before he comes in. Please advise.

## 2019-07-30 NOTE — TELEPHONE ENCOUNTER
Contacted patient and verified identity using name and date of birth (2- identifiers)  Spoke with patient and he verbalized understanding to complete labs ordered.

## 2019-07-30 NOTE — TELEPHONE ENCOUNTER
Darren Gardner MD  You 23 hours ago (4:45 PM)      Please let him know . For now do what has ordered.      [address] [facility]     Routing comment

## 2019-07-31 ENCOUNTER — HOSPITAL ENCOUNTER (OUTPATIENT)
Dept: LAB | Age: 69
Discharge: HOME | End: 2019-07-31

## 2019-07-31 DIAGNOSIS — K21.9 GASTROESOPHAGEAL REFLUX DISEASE WITHOUT ESOPHAGITIS: ICD-10-CM

## 2019-07-31 DIAGNOSIS — Z79.4 TYPE 2 DIABETES MELLITUS WITHOUT COMPLICATION, WITH LONG-TERM CURRENT USE OF INSULIN (HCC): ICD-10-CM

## 2019-07-31 DIAGNOSIS — E11.9 TYPE 2 DIABETES MELLITUS WITHOUT COMPLICATION, WITH LONG-TERM CURRENT USE OF INSULIN (HCC): ICD-10-CM

## 2019-07-31 LAB — XX-LABCORP SPECIMEN COL,LCBCF: NORMAL

## 2019-07-31 PROCEDURE — 99001 SPECIMEN HANDLING PT-LAB: CPT

## 2019-07-31 RX ORDER — SITAGLIPTIN 100 MG/1
TABLET, FILM COATED ORAL
Qty: 90 TAB | Refills: 1 | Status: SHIPPED | OUTPATIENT
Start: 2019-07-31 | End: 2019-10-01 | Stop reason: SDUPTHER

## 2019-07-31 RX ORDER — PIOGLITAZONEHYDROCHLORIDE 30 MG/1
TABLET ORAL
Qty: 90 TAB | Refills: 1 | Status: SHIPPED | OUTPATIENT
Start: 2019-07-31 | End: 2020-01-08

## 2019-07-31 RX ORDER — OMEPRAZOLE 40 MG/1
CAPSULE, DELAYED RELEASE ORAL
Qty: 90 CAP | Refills: 1 | Status: SHIPPED | OUTPATIENT
Start: 2019-07-31 | End: 2020-01-08

## 2019-07-31 RX ORDER — INSULIN GLARGINE 100 [IU]/ML
INJECTION, SOLUTION SUBCUTANEOUS
Qty: 5 PEN | Refills: 3 | Status: SHIPPED | OUTPATIENT
Start: 2019-07-31 | End: 2022-01-19 | Stop reason: SDUPTHER

## 2019-08-01 LAB
ALBUMIN SERPL-MCNC: 4 G/DL (ref 3.6–4.8)
ALBUMIN/CREAT UR: 9.8 MG/G CREAT (ref 0–30)
ALBUMIN/GLOB SERPL: 1.7 {RATIO} (ref 1.2–2.2)
ALP SERPL-CCNC: 41 IU/L (ref 39–117)
ALT SERPL-CCNC: 13 IU/L (ref 0–44)
AST SERPL-CCNC: 17 IU/L (ref 0–40)
BILIRUB SERPL-MCNC: 0.3 MG/DL (ref 0–1.2)
BUN SERPL-MCNC: 13 MG/DL (ref 8–27)
BUN/CREAT SERPL: 17 (ref 10–24)
CALCIUM SERPL-MCNC: 9.2 MG/DL (ref 8.6–10.2)
CHLORIDE SERPL-SCNC: 103 MMOL/L (ref 96–106)
CO2 SERPL-SCNC: 25 MMOL/L (ref 20–29)
CREAT SERPL-MCNC: 0.75 MG/DL (ref 0.76–1.27)
CREAT UR-MCNC: 137.2 MG/DL
EST. AVERAGE GLUCOSE BLD GHB EST-MCNC: 192 MG/DL
GLOBULIN SER CALC-MCNC: 2.4 G/DL (ref 1.5–4.5)
GLUCOSE SERPL-MCNC: 96 MG/DL (ref 65–99)
HBA1C MFR BLD: 8.3 % (ref 4.8–5.6)
MICROALBUMIN UR-MCNC: 13.5 UG/ML
POTASSIUM SERPL-SCNC: 4.2 MMOL/L (ref 3.5–5.2)
PROT SERPL-MCNC: 6.4 G/DL (ref 6–8.5)
SODIUM SERPL-SCNC: 141 MMOL/L (ref 134–144)

## 2019-08-01 NOTE — PROGRESS NOTES
Let pt know that improvement in diabetes test. Continue current plan and further discussion on follow up visit.

## 2019-10-01 ENCOUNTER — OFFICE VISIT (OUTPATIENT)
Dept: FAMILY MEDICINE CLINIC | Age: 69
End: 2019-10-01

## 2019-10-01 VITALS
DIASTOLIC BLOOD PRESSURE: 60 MMHG | TEMPERATURE: 98.1 F | OXYGEN SATURATION: 98 % | WEIGHT: 150.2 LBS | HEART RATE: 78 BPM | BODY MASS INDEX: 24.14 KG/M2 | RESPIRATION RATE: 16 BRPM | SYSTOLIC BLOOD PRESSURE: 114 MMHG | HEIGHT: 66 IN

## 2019-10-01 DIAGNOSIS — R09.81 SINUS CONGESTION: ICD-10-CM

## 2019-10-01 DIAGNOSIS — Z79.4 TYPE 2 DIABETES MELLITUS WITHOUT COMPLICATION, WITH LONG-TERM CURRENT USE OF INSULIN (HCC): Primary | ICD-10-CM

## 2019-10-01 DIAGNOSIS — N52.8 OTHER MALE ERECTILE DYSFUNCTION: ICD-10-CM

## 2019-10-01 DIAGNOSIS — R05.9 COUGH: ICD-10-CM

## 2019-10-01 DIAGNOSIS — K21.9 GASTROESOPHAGEAL REFLUX DISEASE WITHOUT ESOPHAGITIS: ICD-10-CM

## 2019-10-01 DIAGNOSIS — R97.20 ELEVATED PSA: ICD-10-CM

## 2019-10-01 DIAGNOSIS — C67.9 MALIGNANT NEOPLASM OF URINARY BLADDER, UNSPECIFIED SITE (HCC): ICD-10-CM

## 2019-10-01 DIAGNOSIS — E78.2 MIXED HYPERLIPIDEMIA: ICD-10-CM

## 2019-10-01 DIAGNOSIS — I10 ESSENTIAL HYPERTENSION, BENIGN: ICD-10-CM

## 2019-10-01 DIAGNOSIS — E11.9 TYPE 2 DIABETES MELLITUS WITHOUT COMPLICATION, WITH LONG-TERM CURRENT USE OF INSULIN (HCC): Primary | ICD-10-CM

## 2019-10-01 RX ORDER — ATORVASTATIN CALCIUM 10 MG/1
10 TABLET, FILM COATED ORAL
Qty: 90 TAB | Refills: 1 | Status: SHIPPED | OUTPATIENT
Start: 2019-10-01 | End: 2020-07-13 | Stop reason: SDUPTHER

## 2019-10-01 RX ORDER — DOXYCYCLINE 100 MG/1
100 CAPSULE ORAL 2 TIMES DAILY
COMMUNITY
End: 2019-10-15 | Stop reason: ALTCHOICE

## 2019-10-01 NOTE — PROGRESS NOTES
HISTORY OF PRESENT ILLNESS  Estevan Bradshaw is a 81 y.o. male. HPI; here for routine follow up but since September feeling cough, sinus congestion, nasal discharge. Brenda Andersen he was on a long trip to [facility] and everyone was sick in the bus and has been sick during visit. No wheezing, no chest congestion . No sob. No chest pain. More frontal headache, nasal congestion, greenish discharge and on and off dry cough. [facility] and [facility]. He was started on doxycyclin. He has started it since yesterday and given for 10 days. No x-ray or labs done. Sitting comfortable on exam table. Said feeling on and off body ache and fatigue but no fever. No nausea or vomiting. No urinary or bowel complains. H/o hypertension. Stable vitals. Compliant with medication. Also following endo regarding diabetes management. Gradually HBA1C coming down. No hypoglycemia. Back on januvia. Lost some weight on metformin as felt appetite is little low even before he got sick. Taking actos. No hypoglycemia. Did not bring log but said fasting sugar is around 110-120. No abdominal pain. No nausea or vomiting. No dizziness. Following urology regarding his bladder cancer and elevated PSA> will follow their recommendations. No urinary complains at this time. His GERD symptoms been stable. Seen GI notes. No new recommendations. Also review endo notes. Visit Vitals  /60 (BP 1 Location: Left arm, BP Patient Position: Sitting)   Pulse 78   Temp 98.1 °F (36.7 °C) (Oral)   Resp 16   Ht 5' 6\" (1.676 m)   Wt 150 lb 3.2 oz (68.1 kg)   SpO2 98%   BMI 24.24 kg/m²     Review medication list, vitals, problem list,allergies.      Lab Results   Component Value Date/Time    Sodium 141 07/31/2019 09:40 AM    Potassium 4.2 07/31/2019 09:40 AM    Chloride 103 07/31/2019 09:40 AM    CO2 25 07/31/2019 09:40 AM    Anion gap 6 06/28/2017 09:30 AM    Glucose 96 07/31/2019 09:40 AM    BUN 13 07/31/2019 09:40 AM    Creatinine 0.75 (L) 07/31/2019 09:40 AM    BUN/Creatinine ratio 17 07/31/2019 09:40 AM    GFR est  07/31/2019 09:40 AM    GFR est non-AA 95 07/31/2019 09:40 AM    Calcium 9.2 07/31/2019 09:40 AM    Bilirubin, total 0.3 07/31/2019 09:40 AM    AST (SGOT) 17 07/31/2019 09:40 AM    Alk. phosphatase 41 07/31/2019 09:40 AM    Protein, total 6.4 07/31/2019 09:40 AM    Albumin 4.0 07/31/2019 09:40 AM    Globulin 3.2 06/28/2017 09:30 AM    A-G Ratio 1.7 07/31/2019 09:40 AM    ALT (SGPT) 13 07/31/2019 09:40 AM     Lab Results   Component Value Date/Time    Cholesterol, total 155 01/16/2019 10:09 AM    HDL Cholesterol 49 01/16/2019 10:09 AM    LDL, calculated 78 01/16/2019 10:09 AM    VLDL, calculated 28 01/16/2019 10:09 AM    Triglyceride 141 01/16/2019 10:09 AM    CHOL/HDL Ratio 2.7 06/28/2017 09:30 AM     Lab Results   Component Value Date/Time    TSH 1.410 03/01/2018 12:00 AM     Lab Results   Component Value Date/Time    Hemoglobin A1c 8.3 (H) 07/31/2019 09:40 AM    Hemoglobin A1c (POC) 8.6 08/13/2018 02:50 PM    Hemoglobin A1c, External 8.4 06/20/2016       ROS: see HPI     Physical Exam   Constitutional: He is oriented to person, place, and time. No distress. Neck: No thyromegaly present. Cardiovascular: Normal heart sounds. Pulmonary/Chest: No respiratory distress. He has no wheezes. Abdominal: There is no tenderness. Musculoskeletal: He exhibits no edema. Lymphadenopathy:     He has no cervical adenopathy. Neurological: He is oriented to person, place, and time. Psychiatric: His behavior is normal.       ASSESSMENT and PLAN    ICD-10-CM ICD-9-CM    1. Type 2 diabetes mellitus without complication, with long-term current use of insulin ([facility] [address]): HBA1C elevated but gradually improving. Forgot log but said fasting sugar is now around 110-120. Has lost some weight. Complaint with medication and insulin. Diet modification and f/u next visit. On ARB and statin.   E11.9 250.00 atorvastatin (LIPITOR) 10 mg tablet    Z79.4 V58.67 SITagliptin (JANUVIA) 100 mg tablet   2. Malignant neoplasm of urinary bladder, unspecified site [facility]): following urology recommendations. No urinary complains. C67.9 188.9    3. Elevated PSA: following urology  R97.20 790.93    4. Other male erectile dysfunction; following urology  N52.8 607.84    5. Essential hypertension, benign: well controlled. Continue current dose of medication and low salt diet. Exercise as tolerated. I10 401.1    6. Mixed hyperlipidemia: on statin. No side effects. E78.2 272.2    7. Cough: probably from post nasal drip. Nasal spray. Complete doxycycline given by urgent care. Sooner appt if no improvement. R05 786.2    8. Gastroesophageal reflux disease without esophagitis: stable on current PPI. Asymptomatic. K21.9 530.81     seen GI. last EGD? gunner's esphagus . suggested PPI and one year f/u    9. Sinus congestion R09.81 478.19     greenish nasal discharge. Pt understood and agree with the plan   Review hM   Follow-up and Dispositions    · Return in about 2 weeks (around 10/15/2019), or if symptoms worsen or fail to improve, for other justice 3 months. Charles Shresthaly

## 2019-10-01 NOTE — PROGRESS NOTES
1. Have you been to the ER, urgent care clinic since your last visit? Hospitalized since your last visit? [facility] 9/30/19 - Erna Harrison for sick symptoms. 2. Have you seen or consulted any other health care providers outside of the [facility] since your last visit? Include any pap smears or colon screening. No    Chief Complaint   Patient presents with    Diabetes    Hypertension    Cholesterol Problem    Elevated PSA    Other     malignant neoplasm of urinary bladder    Cough     productive cough with green mucus    Headache    Sore Throat    Nasal Discharge     green mucus       Last dm foot exam - not completed    Patient states he was told by physician to stop Januvia and start Victoza. He tried Victoza but discontinued med because he didn't like the way it made him feel. He restarted his Januvia and is asking for refill today.

## 2019-10-01 NOTE — PATIENT INSTRUCTIONS
Nutrition Tips for Diabetes: After Your Visit Your Care Instructions A healthy diet is important to manage diabetes. It helps you lose weight (if you need to) and keep it off. It gives you the nutrition and energy your body needs and helps prevent heart disease. But a diet for diabetes does not mean that you have to eat special foods. You can eat what your family eats, including occasional sweets and other favorites. But you do have to pay attention to how often you eat and how much you eat of certain foods. The right plan for you will give you meals that help you keep your blood sugar at healthy levels. Try to eat a variety of foods and to spread carbohydrate throughout the day. Carbohydrate raises blood sugar higher and more quickly than any other nutrient does. Carbohydrate is found in sugar, breads and cereals, fruit, starchy vegetables such as potatoes and corn, and milk and yogurt. You may want to work with a dietitian or diabetes educator to help you plan meals and snacks. A dietitian or diabetes educator also can help you lose weight if that is one of your goals. The following tips can help you enjoy your meals and stay healthy. Follow-up care is a key part of your treatment and safety. Be sure to make and go to all appointments, and call your doctor if you are having problems. Its also a good idea to know your test results and keep a list of the medicines you take. How can you care for yourself at home? · Learn which foods have carbohydrate and how much carbohydrate to eat. A dietitian or diabetes educator can help you learn to keep track of how much carbohydrate you eat. · Spread carbohydrate throughout the day. Eat some carbohydrate at all meals, but do not eat too much at any one time. · Plan meals to include food from all the food groups. These are the food groups and some example portion sizes: ¨ Grains: 1 slice of bread (1 ounce), ½ cup of cooked cereal, and 1/3 cup of cooked pasta or rice. These have about 15 grams of carbohydrate in a serving. Choose whole grains such as whole wheat bread or crackers, oatmeal, and brown rice more often than refined grains. ¨ Fruit: 1 small fresh fruit, such as an apple or orange; ½ of a banana; ½ cup of chopped, cooked, or canned fruit; ½ cup of fruit juice; 1 cup of melon or raspberries; and 2 tablespoons of dried fruit. These have about 15 grams of carbohydrate in a serving. ¨ Dairy: 1 cup of nonfat or low-fat milk and 2/3 cup of plain yogurt. These have about 15 grams of carbohydrate in a serving. ¨ Protein foods: Beef, chicken, turkey, fish, eggs, tofu, cheese, cottage cheese, and peanut butter. A serving size of meat is 3 ounces, which is about the size of a deck of cards. Examples of meat substitute serving sizes (equal to 1 ounce of meat) are 1/4 cup of cottage cheese, 1 egg, 1 tablespoon of peanut butter, and ½ cup of tofu. These have very little or no carbohydrate per serving. ¨ Vegetables: Starchy vegetables such as ½ cup of cooked dried beans, peas, potatoes, or corn have about 15 grams of carbohydrate. Nonstarchy vegetables have very little carbohydrate, such as 1 cup of raw leafy vegetables (such as spinach), ½ cup of other vegetables (cooked or chopped), and 3/4 cup of vegetable juice. · Use the plate format to plan meals. It is a good, quick way to make sure that you have a balanced meal. It also helps you spread carbohydrate throughout the day. You divide your plate by types of foods. Put vegetables on half the plate, meat or meat substitutes on one-quarter of the plate, and a grain or starchy vegetable (such as brown rice or a potato) in the final quarter of the plate.  To this you can add a small piece of fruit and 1 cup of milk or yogurt, depending on how much carbohydrate you are supposed to eat at a meal. 
· Talk to your dietitian or diabetes educator about ways to add limited amounts of sweets into your meal plan. You can eat these foods now and then, as long as you include the amount of carbohydrate they have in your daily carbohydrate allowance. · If you drink alcohol, limit it to no more than 1 drink a day for women and 2 drinks a day for men. If you are pregnant, no amount of alcohol is known to be safe. · Protein, fat, and fiber do not raise blood sugar as much as carbohydrate does. If you eat a lot of these nutrients in a meal, your blood sugar will rise more slowly than it would otherwise. · Limit saturated fats, such as those from meat and dairy products. Try to replace it with monounsaturated fat, such as olive oil. This is a healthier choice because people who have diabetes are at higher-than-average risk of heart disease. But use a modest amount of olive oil. A tablespoon of olive oil has 14 grams of fat and 120 calories. · Exercise lowers blood sugar. If you take insulin by shots or pump, you can use less than you would if you were not exercising. Keep in mind that timing matters. If you exercise within 1 hour after a meal, your body may need less insulin for that meal than it would if you exercised 3 hours after the meal. Test your blood sugar to find out how exercise affects your need for insulin. · Exercise on most days of the week. Aim for at least 30 minutes. Exercise helps you stay at a healthy weight and helps your body use insulin. Walking is an easy way to get exercise. Gradually increase the amount you walk every day. You also may want to swim, bike, or do other activities. When you eat out · Learn to estimate the serving sizes of foods that have carbohydrate. If you measure food at home, it will be easier to estimate the amount in a serving of restaurant food. · If the meal you order has too much carbohydrate (such as potatoes, corn, or baked beans), ask to have a low-carbohydrate food instead. Ask for a salad or green vegetables. · If you use insulin, check your blood sugar before and after eating out to help you plan how much to eat in the future. · If you eat more carbohydrate at a meal than you had planned, take a walk or do other exercise. This will help lower your blood sugar. Where can you learn more? Go to [employer] Enter H340 in the search box to learn more about \"Nutrition Tips for Diabetes: After Your Visit. \"  
© 0916-5045 [employer]. Care instructions adapted under license by [facility] (which disclaims liability or warranty for this information). This care instruction is for use with your licensed healthcare professional. If you have questions about a medical condition or this instruction, always ask your healthcare professional. [address] any warranty or liability for your use of this information. Content Version: 91.2.584167; Current as of: June 4, 2014 Sinusitis: Care Instructions Your Care Instructions Sinusitis is an infection of the lining of the sinus cavities in your head. Sinusitis often follows a cold. It causes pain and pressure in your head and face. In most cases, sinusitis gets better on its own in 1 to 2 weeks. But some mild symptoms may last for several weeks. Sometimes antibiotics are needed. Follow-up care is a key part of your treatment and safety. Be sure to make and go to all appointments, and call your doctor if you are having problems. It's also a good idea to know your test results and keep a list of the medicines you take. How can you care for yourself at home? · Take an over-the-counter pain medicine, such as acetaminophen (Tylenol), ibuprofen (Advil, Motrin), or naproxen (Aleve). Read and follow all instructions on the label. · If the doctor prescribed antibiotics, take them as directed. Do not stop taking them just because you feel better. You need to take the full course of antibiotics. · Be careful when taking over-the-counter cold or flu medicines and Tylenol at the same time. Many of these medicines have acetaminophen, which is Tylenol. Read the labels to make sure that you are not taking more than the recommended dose. Too much acetaminophen (Tylenol) can be harmful. · Breathe warm, moist air from a steamy shower, a hot bath, or a sink filled with hot water. Avoid cold, dry air. Using a humidifier in your home may help. Follow the directions for cleaning the machine. · Use saline (saltwater) nasal washes to help keep your nasal passages open and wash out mucus and bacteria. You can buy saline nose drops at a grocery store or drugstore. Or you can make your own at home by adding 1 teaspoon of salt and 1 teaspoon of baking soda to 2 cups of distilled water. If you make your own, fill a bulb syringe with the solution, insert the tip into your nostril, and squeeze gently. Ton Else your nose. · Put a hot, wet towel or a warm gel pack on your face 3 or 4 times a day for 5 to 10 minutes each time. · Try a decongestant nasal spray like oxymetazoline (Afrin). Do not use it for more than 3 days in a row. Using it for more than 3 days can make your congestion worse. When should you call for help? Call your doctor now or seek immediate medical care if: 
  · You have new or worse swelling or redness in your face or around your eyes.  
  · You have a new or higher fever.  
 Watch closely for changes in your health, and be sure to contact your doctor if: 
  · You have new or worse facial pain.  
  · The mucus from your nose becomes thicker (like pus) or has new blood in it.  
  · You are not getting better as expected. Where can you learn more? Go to http://jah-lorenzo.info/. Enter B887 in the search box to learn more about \"Sinusitis: Care Instructions. \" Current as of: October 21, 2018 Content Version: 12.2 © 1305-7745 [employer].  Care instructions adapted under license by [address] (which disclaims liability or warranty for this information). If you have questions about a medical condition or this instruction, always ask your healthcare professional. [address] any warranty or liability for your use of this information.

## 2019-10-07 ENCOUNTER — TELEPHONE (OUTPATIENT)
Dept: FAMILY MEDICINE CLINIC | Age: 69
End: 2019-10-07

## 2019-10-07 NOTE — TELEPHONE ENCOUNTER
Fax received from [address] [place] [address] meds stating prior auth is required for the Januvia 100 MG Tablets. Submit a PA request  1. Go to [email or web address] and click \"Enter a Key\"  2. Patient last name: Cortez Kay      : [birth year]      Key: [id number]  3.  Click \"start a PA\", complete the form, and \"send to plan\"

## 2019-10-14 ENCOUNTER — HOSPITAL ENCOUNTER (OUTPATIENT)
Dept: CT IMAGING | Age: 69
Discharge: HOME | End: 2019-10-14
Attending: UROLOGY
Payer: MEDICARE

## 2019-10-14 DIAGNOSIS — C67.9 MALIGNANT NEOPLASM OF URINARY BLADDER, UNSPECIFIED SITE (HCC): ICD-10-CM

## 2019-10-14 LAB — CREAT UR-MCNC: 0.8 MG/DL (ref 0.6–1.3)

## 2019-10-14 PROCEDURE — 74178 CT ABD&PLV WO CNTR FLWD CNTR: CPT

## 2019-10-14 PROCEDURE — 74011636320 HC RX REV CODE- 636/320: Performed by: UROLOGY

## 2019-10-14 PROCEDURE — 82565 ASSAY OF CREATININE: CPT

## 2019-10-14 RX ADMIN — IOPAMIDOL 100 ML: 755 INJECTION, SOLUTION INTRAVENOUS at 11:00

## 2019-10-15 ENCOUNTER — OFFICE VISIT (OUTPATIENT)
Dept: FAMILY MEDICINE CLINIC | Age: 69
End: 2019-10-15

## 2019-10-15 VITALS
TEMPERATURE: 97.9 F | HEIGHT: 66 IN | DIASTOLIC BLOOD PRESSURE: 72 MMHG | BODY MASS INDEX: 23.95 KG/M2 | HEART RATE: 77 BPM | RESPIRATION RATE: 16 BRPM | SYSTOLIC BLOOD PRESSURE: 124 MMHG | WEIGHT: 149 LBS | OXYGEN SATURATION: 99 %

## 2019-10-15 DIAGNOSIS — Z23 ENCOUNTER FOR IMMUNIZATION: ICD-10-CM

## 2019-10-15 DIAGNOSIS — R91.1 LUNG NODULE: Primary | ICD-10-CM

## 2019-10-15 DIAGNOSIS — R05.9 COUGH: ICD-10-CM

## 2019-10-15 DIAGNOSIS — E11.65 POORLY CONTROLLED DIABETES MELLITUS (HCC): ICD-10-CM

## 2019-10-15 NOTE — PROGRESS NOTES
1. Have you been to the ER, urgent care clinic since your last visit? Hospitalized since your last visit? No    2. Have you seen or consulted any other health care providers outside of the [address] [place] [address] since your last visit? Include any pap smears or colon screening.  No

## 2019-10-15 NOTE — PROGRESS NOTES
HISTORY OF PRESENT ILLNESS  Estevan Gil is a 81 y.o. male. HPI: here for follow up. Last visit had cold and generalize fatigue. Had that going on since few weeks during his travel. Said whole travel bus was having same symptoms. Was also having poor appetite. Has lost almost 6 lbs during his 3 wks trip. ? Recently started metformin. Discussed that can cause some weight loss and ? Viral illness as well. HBA1C gradually coming down. Following endo with compliance as well. Working on diet modification which is not much compliant. Currently said fasting sugar is around . No hypoglycemia. He was having cough and was on doxycyline given by urgent care. He is feeling better. No more muscle ache, fever or cough. No chest congestion. No sob. No chest pain. No nausea or vomiting. Appetite has been improved some. Has h/o bladder cancer. Having urinary frequency and waking up during night. No dysuria. following urology closely. Now had CT urogram. Review result. discussed with him that I would let urology discuss the result as he has prior treatment for bladder cancer and result can be better to get interpreted and explained by specialist who has ordered the test.   But noted lung nodule. He never smoked. No chronic cough or cold. No wheezing or chest discomfort. No unusual fatigue before he got sick during recent trip. Discussed to repeat ct chest as per recommendations in 6 months. He understood and agree. If his weight loss persist and does not improve appetite we might consider getting it sooner. Visit Vitals  /72 (BP 1 Location: Left arm, BP Patient Position: Sitting)   Pulse 77   Temp 97.9 °F (36.6 °C) (Oral)   Resp 16   Ht 5' 6\" (1.676 m)   Wt 149 lb (67.6 kg)   SpO2 99%   BMI 24.05 kg/m²     Review medication list, vitals, problem list,allergies.    CT Results (most recent):  Results from [place] encounter on 10/14/19   CT UROGRAM W WO CONT    Narrative EXAM:  CT Urogram.    INDICATION:  Malignant neoplasm of bladder. COMPARISON:  01/11/17, 05/29/15. TECHNIQUE:      - Helically scanned axial volumetric sections through the abdomen and pelvis are  obtained without IV or oral contrast.  Subsequently helically scanned  contrast-enhanced axial volumetric sections are obtained during the  corticomedullary nephrogram phase through the kidneys. Helically scanned  volumetric axial sections are obtained through the abdomen and pelvis with  delayed phase scan. Coronal and sagittal reconstruction images are generated at  the same workstation. 3D reconstructions using urographic technique are created  on a separate imaging workstation consisting of MIPS, VR and 3D shaded surface  images. - IV contrast dose 100 mL Isovue-370.  - Dose optimization techniques are utilized as appropriate to the performed exam  with combination of automated exposure control, adjustment of mA and/or kV  according to patient size, and use of iterative reconstructive technique. FINDINGS:    Pre-Contrast Study Urographic Study:    - Renal Calculi:  None. - Ureteral Calculi:  Punctate calcific focus in the right distal ureter (axial  #139). Normal caliber ureters bilaterally without dilation.  - Bladder Calculi:  No bladder stones. Post-Contrast Study Urographic Study:    - Right Kidney and Ureter:  2.4 x 2.2 cm right renal hypodensity (axial #63),  with simple features by CT (Bosniak 1). No solid mass. No hydronephrosis. No  ureteral dilatation. No filling defects. Questionable tiny focus of  indentation in the right distal ureter (axial #142) along the anterior aspect. - Left Kidney and Ureter:  4.4 x 3.6 cm right renal hypodensity (axial #78). Simple features by CT, i.e. no abnormal enhancement or distinct internal  elements (Bosniak 1).    0.6 x 0.7 cm hypodensity in the right kidney (axial  #84), too small for confident characterization but favorable for a simple renal  cyst.  No solid mass or cystic mass. No hydronephrosis. No ureteral  dilatation. No filling defects.   - Bladder:  Suboptimal distention of the bladder. Atypical contour of the  urinary bladder, a focal area of bladder wall thickening out of proportion to  the remainder of the bladder in the right superior lateral aspect (coronal #56  series #607). Maximal thickness of the urinary bladder wall measures about 0.5  cm. CT Abdomen-Pelvis:       - Lung Bases:  0.3 x 0.3 cm nodule in the right middle lobe (axial #6). Right  posterior sulcus region with another nodule measuring about 0.6 x 0.4 cm (axial  #38). - Liver, Gallbladder, Adrenal Glands, Spleen, Pancreas:  Unremarkable. - GI Tract:  Unremarkable stomach. Nonspecific slightly dilated small bowel in  the central abdominal region but without distinct focus of obstruction. Normal  caliber appendix. No acute colitis or diverticulitis is detected. - Nodes:  No mesenteric or retroperitoneal lymphadenopathy. - Vascular: Normal caliber aorta. - Prostate:  Enlarged prostate, measuring about 4.8 cm in width, 4.0 cm AP  dimension and 4.5 cm in craniocaudal length. - Pelvic Sidewall:  Small bilateral inguinal hernia containing only  properitoneal/perivesical fat. No bowel incarceration.  - Skeletal Structures:  No acute bony abnormalities. Grade 1 spondylolisthesis  at L4-5 on the basis of facet arthropathy. Decreased disc height with vacuum  disc phenomenon at L4-5 and L5-S1 endplate osteophyte development at these  levels. Impression IMPRESSION:    1. Punctate potential calcific density seen only on the precontrast scan in the  distal ureter. Not duplicated on the nephrogram phase scan. Doubtful for a  true ureteral stone in light of absence of significant post-obstructive changes  and inconsistent visualization. Favor artifact. 2.  Bilateral renal cysts. 3.  Questionable subtle luminal indentation at the right distal ureter.   Too  small/ subtle for confident characterization. Favor contour irregularity  related to the slight bent to the distal ureteral course. 4.  Bladder contour abnormality with broad-based sessile thickening over the  right superior lateral aspect. 5.  Right lung nodules. Recommend 6-12 month followup CT chest to ascertain  stability. Lab Results   Component Value Date/Time    WBC 17.5 (H) 03/22/2015 12:45 PM    HGB 13.3 03/22/2015 12:45 PM    HCT 41.4 03/22/2015 12:45 PM    PLATELET 629 35/31/2093 12:45 PM    MCV 90.6 03/22/2015 12:45 PM     Lab Results   Component Value Date/Time    Sodium 141 07/31/2019 09:40 AM    Potassium 4.2 07/31/2019 09:40 AM    Chloride 103 07/31/2019 09:40 AM    CO2 25 07/31/2019 09:40 AM    Anion gap 6 06/28/2017 09:30 AM    Glucose 96 07/31/2019 09:40 AM    BUN 13 07/31/2019 09:40 AM    Creatinine 0.75 (L) 07/31/2019 09:40 AM    BUN/Creatinine ratio 17 07/31/2019 09:40 AM    GFR est  07/31/2019 09:40 AM    GFR est non-AA 95 07/31/2019 09:40 AM    Calcium 9.2 07/31/2019 09:40 AM    Bilirubin, total 0.3 07/31/2019 09:40 AM    AST (SGOT) 17 07/31/2019 09:40 AM    Alk.  phosphatase 41 07/31/2019 09:40 AM    Protein, total 6.4 07/31/2019 09:40 AM    Albumin 4.0 07/31/2019 09:40 AM    Globulin 3.2 06/28/2017 09:30 AM    A-G Ratio 1.7 07/31/2019 09:40 AM    ALT (SGPT) 13 07/31/2019 09:40 AM     Lab Results   Component Value Date/Time    Cholesterol, total 155 01/16/2019 10:09 AM    HDL Cholesterol 49 01/16/2019 10:09 AM    LDL, calculated 78 01/16/2019 10:09 AM    VLDL, calculated 28 01/16/2019 10:09 AM    Triglyceride 141 01/16/2019 10:09 AM    CHOL/HDL Ratio 2.7 06/28/2017 09:30 AM     Lab Results   Component Value Date/Time    TSH 1.410 03/01/2018 12:00 AM     Lab Results   Component Value Date/Time    Hemoglobin A1c 8.3 (H) 07/31/2019 09:40 AM    Hemoglobin A1c (POC) 8.6 08/13/2018 02:50 PM    Hemoglobin A1c, External 8.4 06/20/2016         Lab Results   Component Value Date/Time Microalbumin/Creat ratio (mg/g creat) 12 06/28/2017 09:30 AM    Microalb/Creat ratio (ug/mg creat.) 9.8 07/31/2019 09:40 AM    Microalbumin,urine random 1.91 06/28/2017 09:30 AM         ROS: see HPI     Physical Exam   Constitutional: He is oriented to person, place, and time. No distress. Neck: No thyromegaly present. Cardiovascular: Normal rate, regular rhythm and normal heart sounds. Pulmonary/Chest:   CTA   Abdominal: Soft. Bowel sounds are normal. There is no tenderness. Musculoskeletal: He exhibits no edema. Lymphadenopathy:     He has no cervical adenopathy. Neurological: He is oriented to person, place, and time. Psychiatric: His behavior is normal.       ASSESSMENT and PLAN    ICD-10-CM ICD-9-CM    1. Lung nodule: repeat CT scan in 6 months. See HPI. Pt agrees  R91.1 793.11 CT CHEST WO CONT   2. Encounter for immunization Z23 V03.89 INFLUENZA VACCINE INACTIVATED (IIV), SUBUNIT, ADJUVANTED, IM      ADMIN INFLUENZA VIRUS VAC   3. Poorly controlled diabetes mellitus ([address]): following endo. Diet modification. Continue specialist recommendations. Up to date with eye exam. On statin and ARB and statin. E11.65 250.00    4. Cough: resolved completely. Probably due to post bronchitis. Done with antibiotic. No side effects. R05 786.2    Pt understood and agree with the plan   Review    Follow-up and Dispositions    · Return in about 1 month (around 11/15/2019).

## 2019-10-15 NOTE — PATIENT INSTRUCTIONS
Vaccine Information Statement    Influenza (Flu) Vaccine (Inactivated or Recombinant): What You Need to Know    Many Vaccine Information Statements are available in Persian and other languages. See www.immunize.org/vis  Hojas de información sobre vacunas están disponibles en español y en muchos otros idiomas. Visite www.immunize.org/vis    1. Why get vaccinated? Influenza vaccine can prevent influenza (flu). Flu is a contagious disease that spreads around the [facility] every year, usually between October and May. Anyone can get the flu, but it is more dangerous for some people. Infants and young children, people 72years of age and older, pregnant women, and people with certain health conditions or a weakened immune system are at greatest risk of flu complications. Pneumonia, bronchitis, sinus infections and ear infections are examples of flu-related complications. If you have a medical condition, such as heart disease, cancer or diabetes, flu can make it worse. Flu can cause fever and chills, sore throat, muscle aches, fatigue, cough, headache, and runny or stuffy nose. Some people may have vomiting and diarrhea, though this is more common in children than adults. Each year thousands of people in the [facility] die from flu, and many more are hospitalized. Flu vaccine prevents millions of illnesses and flu-related visits to the doctor each year. 2. Influenza vaccines     CDC recommends everyone 10months of age and older get vaccinated every flu season. Children 6 months through 6years of age may need 2 doses during a single flu season. Everyone else needs only 1 dose each flu season. It takes about 2 weeks for protection to develop after vaccination. There are many flu viruses, and they are always changing. Each year a new flu vaccine is made to protect against three or four viruses that are likely to cause disease in the upcoming flu season.  Even when the vaccine doesnt exactly match these viruses, it may still provide some protection. Influenza vaccine does not cause flu. Influenza vaccine may be given at the same time as other vaccines. 3. Talk with your health care provider    Tell your vaccine provider if the person getting the vaccine:   Has had an allergic reaction after a previous dose of influenza vaccine, or has any severe, life-threatening allergies.  Has ever had Guillain-Barré Syndrome (also called GBS). In some cases, your health care provider may decide to postpone influenza vaccination to a future visit. People with minor illnesses, such as a cold, may be vaccinated. People who are moderately or severely ill should usually wait until they recover before getting influenza vaccine. Your health care provider can give you more information. 4. Risks of a reaction     Soreness, redness, and swelling where shot is given, fever, muscle aches, and headache can happen after influenza vaccine.  There may be a very small increased risk of Guillain-Barré Syndrome (GBS) after inactivated influenza vaccine (the flu shot). [facility] children who get the flu shot along with pneumococcal vaccine (PCV13), and/or DTaP vaccine at the same time might be slightly more likely to have a seizure caused by fever. Tell your health care provider if a child who is getting flu vaccine has ever had a seizure. People sometimes faint after medical procedures, including vaccination. Tell your provider if you feel dizzy or have vision changes or ringing in the ears. As with any medicine, there is a very remote chance of a vaccine causing a severe allergic reaction, other serious injury, or death. 5. What if there is a serious problem? An allergic reaction could occur after the vaccinated person leaves the clinic.  If you see signs of a severe allergic reaction (hives, swelling of the face and throat, difficulty breathing, a fast heartbeat, dizziness, or weakness), call 9-1-1 and get the person to the nearest hospital.    For other signs that concern you, call your health care provider. Adverse reactions should be reported to the Vaccine Adverse Event Reporting System (VAERS). Your health care provider will usually file this report, or you can do it yourself. Visit the VAERS website at www.vaers. [facility].gov or call 0-628.203.3759. VAERS is only for reporting reactions, and VAERS staff do not give medical advice. 6. The National Vaccine Injury Compensation Program    The [facility] Vaccine Injury Compensation Program (VICP) is a federal program that was created to compensate people who may have been injured by certain vaccines. Visit the VICP website at www.[facility].gov/vaccinecompensation or call 9-721.582.1687 to learn about the program and about filing a claim. There is a time limit to file a claim for compensation. 7. How can I learn more?  Ask your health care provider.  Call your local or state health department.  Contact the Centers for Disease Control and Prevention (CDC):  - Call 0-101.833.2296 (1-800-CDC-INFO) or  - Visit CDCs influenza website at www.cdc.gov/flu    Vaccine Information Statement (Interim)  Inactivated Influenza Vaccine   8/15/2019  42 SHANITA Valdez [id number]   Department of Health and Human Services  Centers for Disease Control and Prevention    Office Use Only

## 2019-11-15 ENCOUNTER — OFFICE VISIT (OUTPATIENT)
Dept: FAMILY MEDICINE CLINIC | Age: 69
End: 2019-11-15

## 2019-11-15 VITALS
WEIGHT: 153.4 LBS | RESPIRATION RATE: 16 BRPM | OXYGEN SATURATION: 98 % | SYSTOLIC BLOOD PRESSURE: 110 MMHG | BODY MASS INDEX: 24.65 KG/M2 | HEIGHT: 66 IN | DIASTOLIC BLOOD PRESSURE: 70 MMHG | TEMPERATURE: 98.3 F | HEART RATE: 76 BPM

## 2019-11-15 DIAGNOSIS — R91.1 LUNG NODULE: Primary | ICD-10-CM

## 2019-11-15 DIAGNOSIS — E11.65 POORLY CONTROLLED DIABETES MELLITUS (HCC): ICD-10-CM

## 2019-11-15 DIAGNOSIS — R35.0 FREQUENCY OF URINATION: ICD-10-CM

## 2019-11-15 LAB
BILIRUB UR QL STRIP: NEGATIVE
GLUCOSE UR-MCNC: NORMAL MG/DL
KETONES P FAST UR STRIP-MCNC: NEGATIVE MG/DL
PH UR STRIP: 5.5 [PH] (ref 4.6–8)
PROT UR QL STRIP: NEGATIVE
SP GR UR STRIP: 1.02 (ref 1–1.03)
UA UROBILINOGEN AMB POC: NORMAL (ref 0.2–1)
URINALYSIS CLARITY POC: CLEAR
URINALYSIS COLOR POC: YELLOW
URINE BLOOD POC: NEGATIVE
URINE LEUKOCYTES POC: NEGATIVE
URINE NITRITES POC: NEGATIVE

## 2019-11-15 RX ORDER — TAMSULOSIN HYDROCHLORIDE 0.4 MG/1
0.4 CAPSULE ORAL DAILY
Qty: 30 CAP | Refills: 1 | Status: SHIPPED | OUTPATIENT
Start: 2019-11-15 | End: 2019-12-16 | Stop reason: SDUPTHER

## 2019-11-15 RX ORDER — METFORMIN HYDROCHLORIDE 500 MG/1
1000 TABLET, EXTENDED RELEASE ORAL
Qty: 180 TAB | Refills: 1 | Status: SHIPPED | OUTPATIENT
Start: 2019-11-15 | End: 2019-11-26

## 2019-11-15 RX ORDER — GLIPIZIDE 5 MG/1
5 TABLET ORAL DAILY
Qty: 90 TAB | Refills: 0 | Status: SHIPPED | OUTPATIENT
Start: 2019-11-15 | End: 2020-01-23

## 2019-11-15 NOTE — PROGRESS NOTES
1. Have you been to the ER, urgent care clinic since your last visit? Hospitalized since your last visit? No    2. Have you seen or consulted any other health care providers outside of the [address] [place] [address] since your last visit? Include any pap smears or colon screening. Dentist Dr. Ilsa Toscano LOV: 11/13/19 [facility].     Chief Complaint   Patient presents with    Lung Nodule    Diabetes    Cough

## 2019-11-15 NOTE — PROGRESS NOTES
HISTORY OF PRESENT ILLNESS  Estevan Palumbo is a 81 y.o. male. HPI: here with concern of frequency of urination. Said this week was worse. Not able to sleep at night time. Denies any dysuria. No blood in urine. H/o bladder cancer. Following urology. Recently had cysto urogram.   Right now no sooner follow up appt with him scheduled yet. Done UA today. No signs of infection. Also h/o mild BPH> following PSA with urology. Said mild narrow stream.   Discussed to start flomax. Discussed medication side effects and also advised to have schedule follow up with urology sooner. Also for diabetes following endocrinology. Said he ran out of metformin and glipizide and asking for refill. Said glipizide was started by endo as it was not in our list.   Denies any hypoglycemia. He is working on diet modification. Also being complaint with taking medications. Lab Results   Component Value Date/Time    Hemoglobin A1c 8.3 (H) 07/31/2019 09:40 AM    Hemoglobin A1c (POC) 8.6 08/13/2018 02:50 PM    Hemoglobin A1c, External 8.4 06/20/2016     Visit Vitals  /70 (BP 1 Location: Left arm, BP Patient Position: Sitting)   Pulse 76   Temp 98.3 °F (36.8 °C) (Oral)   Resp 16   Ht 5' 6\" (1.676 m)   Wt 153 lb 6.4 oz (69.6 kg)   SpO2 98%   BMI 24.76 kg/m²     Review medication list, vitals, problem list,allergies. ROS :Denies any headache, dizziness, no chest pain or trouble breathing, no arm or leg weakness. No nausea or vomiting, no weight or appetite changes, no mood changes . No  bowel complains, no palpitation, no diaphoresis. No abdominal pain. No cold or cough. No leg swelling. No fever. Physical Exam   Constitutional: He is oriented to person, place, and time. No distress. Cardiovascular: Normal heart sounds. Pulmonary/Chest: No respiratory distress. He has no wheezes. Abdominal: Soft. There is no tenderness. Neurological: He is oriented to person, place, and time.        ASSESSMENT and PLAN    ICD-10-CM ICD-9-CM    1. Lung nodule: noted on cystourethrogram. For now repeat ct scan in 6 months. R91.1 793.11     pending Ct scan to be done in 6 months. 2. Poorly controlled diabetes mellitus ([facility] [address]): given medication refill. Advised to bring log with him. Urine frequency could be some part of poorly controlled blood sugar. Will f/u next visit and advised follow endo recommendations. E11.65 250.00 metFORMIN ER (GLUCOPHAGE XR) 500 mg tablet      glipiZIDE (GLUCOTROL) 5 mg tablet   3. Frequency of urination: UA negative for infection. For now will consider flomax as mild enlarge prostate and advised to have f/u appt with urology  R35.0 788.41 tamsulosin (FLOMAX) 0.4 mg capsule      AMB POC URINALYSIS DIP STICK AUTO W/O MICRO      CANCELED: AMB POC URINALYSIS DIP STICK AUTO W/ MICRO   Pt understood and agree with the plan     Follow-up and Dispositions    · Return in about 1 month (around 12/15/2019).

## 2019-11-26 DIAGNOSIS — E11.65 POORLY CONTROLLED DIABETES MELLITUS (HCC): ICD-10-CM

## 2019-11-26 RX ORDER — METFORMIN HYDROCHLORIDE 500 MG/1
1000 TABLET, EXTENDED RELEASE ORAL 2 TIMES DAILY
Qty: 360 TAB | Refills: 0 | Status: SHIPPED | OUTPATIENT
Start: 2019-11-26 | End: 2019-12-02

## 2019-11-29 DIAGNOSIS — E11.65 POORLY CONTROLLED DIABETES MELLITUS (HCC): ICD-10-CM

## 2019-11-29 RX ORDER — METFORMIN HYDROCHLORIDE 500 MG/1
1000 TABLET, EXTENDED RELEASE ORAL 2 TIMES DAILY
Qty: 360 TAB | Refills: 0 | Status: CANCELLED | OUTPATIENT
Start: 2019-11-29

## 2019-11-29 NOTE — TELEPHONE ENCOUNTER
This patient contacted office for the following prescriptions to be filled:    Medication requested :   Requested Prescriptions     Pending Prescriptions Disp Refills    metFORMIN ER (GLUCOPHAGE XR) 500 mg tablet 360 Tab 0     Sig: Take 2 Tabs by mouth two (2) times a day.  1,000 mg BID     PCP: Saji Brito or Print: [facility]   Mail order or Local pharmacy [id number] [facility]     Scheduled appointment if not seen by current providers in office:  LOV 1/15/2019 f/u 12/16/2019

## 2019-12-02 DIAGNOSIS — E11.65 POORLY CONTROLLED DIABETES MELLITUS (HCC): ICD-10-CM

## 2019-12-02 RX ORDER — METFORMIN HYDROCHLORIDE 500 MG/1
1000 TABLET, EXTENDED RELEASE ORAL 2 TIMES DAILY
Qty: 360 TAB | Refills: 0 | Status: SHIPPED | OUTPATIENT
Start: 2019-12-02 | End: 2021-11-10 | Stop reason: SDUPTHER

## 2019-12-16 ENCOUNTER — OFFICE VISIT (OUTPATIENT)
Dept: FAMILY MEDICINE CLINIC | Age: 69
End: 2019-12-16

## 2019-12-16 ENCOUNTER — HOSPITAL ENCOUNTER (OUTPATIENT)
Dept: LAB | Age: 69
Discharge: HOME | End: 2019-12-16
Payer: MEDICARE

## 2019-12-16 VITALS
HEART RATE: 110 BPM | BODY MASS INDEX: 24.11 KG/M2 | WEIGHT: 150 LBS | TEMPERATURE: 99 F | OXYGEN SATURATION: 97 % | SYSTOLIC BLOOD PRESSURE: 120 MMHG | HEIGHT: 66 IN | RESPIRATION RATE: 16 BRPM | DIASTOLIC BLOOD PRESSURE: 78 MMHG

## 2019-12-16 DIAGNOSIS — Z79.4 TYPE 2 DIABETES MELLITUS WITHOUT COMPLICATION, WITH LONG-TERM CURRENT USE OF INSULIN (HCC): ICD-10-CM

## 2019-12-16 DIAGNOSIS — R35.0 FREQUENCY OF URINATION: ICD-10-CM

## 2019-12-16 DIAGNOSIS — E11.9 TYPE 2 DIABETES MELLITUS WITHOUT COMPLICATION, WITH LONG-TERM CURRENT USE OF INSULIN (HCC): ICD-10-CM

## 2019-12-16 DIAGNOSIS — Z00.00 MEDICARE ANNUAL WELLNESS VISIT, SUBSEQUENT: Primary | ICD-10-CM

## 2019-12-16 DIAGNOSIS — I10 ESSENTIAL HYPERTENSION: ICD-10-CM

## 2019-12-16 LAB
ALBUMIN SERPL-MCNC: 3.9 G/DL (ref 3.4–5)
ALBUMIN/GLOB SERPL: 1.1 {RATIO} (ref 0.8–1.7)
ALP SERPL-CCNC: 61 U/L (ref 45–117)
ALT SERPL-CCNC: 26 U/L (ref 16–61)
ANION GAP SERPL CALC-SCNC: 7 MMOL/L (ref 3–18)
AST SERPL-CCNC: 23 U/L (ref 10–38)
BILIRUB SERPL-MCNC: 0.9 MG/DL (ref 0.2–1)
BUN SERPL-MCNC: 13 MG/DL (ref 7–18)
BUN/CREAT SERPL: 15 (ref 12–20)
CALCIUM SERPL-MCNC: 8.9 MG/DL (ref 8.5–10.1)
CHLORIDE SERPL-SCNC: 104 MMOL/L (ref 100–111)
CO2 SERPL-SCNC: 28 MMOL/L (ref 21–32)
CREAT SERPL-MCNC: 0.85 MG/DL (ref 0.6–1.3)
EST. AVERAGE GLUCOSE BLD GHB EST-MCNC: 200 MG/DL
GLOBULIN SER CALC-MCNC: 3.4 G/DL (ref 2–4)
GLUCOSE SERPL-MCNC: 201 MG/DL (ref 74–99)
HBA1C MFR BLD: 8.6 % (ref 4.2–5.6)
POTASSIUM SERPL-SCNC: 4.4 MMOL/L (ref 3.5–5.5)
PROT SERPL-MCNC: 7.3 G/DL (ref 6.4–8.2)
SODIUM SERPL-SCNC: 139 MMOL/L (ref 136–145)

## 2019-12-16 PROCEDURE — 36415 COLL VENOUS BLD VENIPUNCTURE: CPT

## 2019-12-16 PROCEDURE — 80053 COMPREHEN METABOLIC PANEL: CPT

## 2019-12-16 PROCEDURE — 83036 HEMOGLOBIN GLYCOSYLATED A1C: CPT

## 2019-12-16 RX ORDER — TAMSULOSIN HYDROCHLORIDE 0.4 MG/1
0.4 CAPSULE ORAL DAILY
Qty: 90 CAP | Refills: 1 | Status: SHIPPED | OUTPATIENT
Start: 2019-12-16 | End: 2020-05-26

## 2019-12-16 RX ORDER — LOSARTAN POTASSIUM AND HYDROCHLOROTHIAZIDE 25; 100 MG/1; MG/1
TABLET ORAL
Qty: 90 TAB | Refills: 1 | Status: SHIPPED | OUTPATIENT
Start: 2019-12-16 | End: 2020-06-04

## 2019-12-16 NOTE — PROGRESS NOTES
This is the Subsequent Medicare Annual Wellness Exam, performed 12 months or more after the Initial AWV or the last Subsequent AWV    I have reviewed the patient's medical history in detail and updated the computerized patient record. History     Patient Active Problem List   Diagnosis Code    Chest pain, unspecified R07.9    Essential hypertension, benign I10    Type 2 diabetes mellitus without complication ([facility] [address] C63.4    Mixed hyperlipidemia E78.2    Bladder cancer ([facility] [address] C67.9    Bladder tumor D49.4    S/P colonoscopy with polypectomy/ follwoing GI. done in 2013. stage 4 esophagitis on EGD done in 2013. recently seen GI. Dr. Fely Lam on 7/7/16 Z98.890    Erectile dysfunction N52.9    Elevated PSA R97.20    Cubital tunnel syndrome on left G56.22    Abnormal findings on esophagogastroduodenoscopy (EGD)/ done on 8/1/16. gunner's esophagus . f/u EGD due in 3 years will be 2019 R19.8    Advance directive discussed with patient Z71.89     Past Medical History:   Diagnosis Date    Bladder cancer ([facility] [address] 7/15/13    Clinical Stage [id number] HG UC    Bladder tumor     Diabetes ([facility] [address]     Essential hypertension     History of kidney stones     Hyperlipidemia     Peripheral neuropathy     SBO (small bowel obstruction) ([facility] [address]     Spinal stenosis     Spondylolisthesis, grade 1       Past Surgical History:   Procedure Laterality Date    HX UROLOGICAL  7/15/13    TURBT, Dr. Steve Hernandez, [facility]    HX UROLOGICAL  1/4/16    Cysto, Dr. Steve Hernandez, [facility]    HX WRIST FRACTURE [address]  4/27/15    (L) wrist     Current Outpatient Medications   Medication Sig Dispense Refill    metFORMIN ER (GLUCOPHAGE XR) 500 mg tablet Take 2 Tabs by mouth two (2) times a day. 1,000 mg  Tab 0    AMOXICILLIN PO Take  by mouth.  glipiZIDE (GLUCOTROL) 5 mg tablet Take 1 Tab by mouth daily. 90 Tab 0    tamsulosin (FLOMAX) 0.4 mg capsule Take 1 Cap by mouth daily.  30 Cap 1    atorvastatin (LIPITOR) 10 mg tablet Take 1 Tab by mouth nightly. 90 Tab 1    SITagliptin (JANUVIA) 100 mg tablet Take 1 Tab by mouth daily. 90 Tab 1    omeprazole (PRILOSEC) 40 mg capsule TAKE 1 CAPSULE EVERY DAY 90 Cap 1    insulin glargine (LANTUS SOLOSTAR U-100 INSULIN) 100 unit/mL (3 mL) inpn INJECT  10 UNITS SUBCUTANEOUSLY AT BEDTIME. DISCARD AND BEGIN NEW PEN AFTER 28 DAYS. 5 Pen 3    pioglitazone (ACTOS) 30 mg tablet TAKE 1 TABLET EVERY DAY 90 Tab 1    CALCIUM PO Take  by mouth.  montelukast (SINGULAIR) 10 mg tablet Take 1 Tab by mouth daily. 30 Tab 0    losartan-hydroCHLOROthiazide (HYZAAR) 100-25 mg per tablet TAKE 1 TABLET EVERY DAY 90 Tab 1    BD ULTRA-FINE SHORT PEN NEEDLE 31 gauge x 5/16\" ndle USE  TO INJECT ONE TIME DAILY 1 Package 6    glucose blood VI test strips (ACCU-CHEK HALIAM PLUS TEST STRP) strip Use 1 daily for blood glucose monitoring DX: E11.9 300 Strip 1    cholecalciferol, vitamin D3, (VITAMIN D3 PO) Take  by mouth.  multivitamin (ONE A DAY) tablet Take 1 Tab by mouth daily.  Lancets (ACCU-CHEK SOFTCLIX LANCETS) misc Use 1 daily for blood glucose monitoring DX: E11.9 100 Each 3    Blood-Glucose Meter (ACCU-CHEK HALIMA PLUS METER) misc Use 1 daily for blood glucose monitoring DX: E11.9 1 Each 0    glucose blood VI test strips (ONETOUCH ULTRA TEST) strip Check twice daily 100 Strip 6    tadalafil (CIALIS) 20 mg tablet Take 1 Tab by mouth daily as needed. 6 Tab 3    CINNAMON BARK (CINNAMON PO) Take  by mouth.  TURMERIC ROOT EXTRACT PO Take  by mouth.  GINGER ROOT (GINGER EXTRACT PO) Take  by mouth.  Insulin Needles, Disposable, (BD INSULIN PEN NEEDLE UF MINI) 31 gauge x 3/16\" ndle Check twice daily 100 Pen Needle 1    cyanocobalamin (VITAMIN B-12) 1,000 mcg Subl Take 1,000 mcg by mouth daily.  aspirin 81 mg chewable tablet Take 81 mg by mouth daily.        No Known Allergies    Family History   Problem Relation Age of Onset    Hypertension Mother     Heart Attack Neg Hx     Sudden Death Neg Hx Social History     Tobacco Use    Smoking status: Never Smoker    Smokeless tobacco: Never Used   Substance Use Topics    Alcohol use: Yes     Frequency: 2-4 times a month     Drinks per session: 1 or 2     Binge frequency: Never     Comment: 1-2 drinks/day       Depression Risk Factor Screening:     3 most recent PHQ Screens 10/1/2019   Little interest or pleasure in doing things Not at all   Feeling down, depressed, irritable, or hopeless Not at all   Total Score PHQ 2 0       Alcohol Risk Factor Screening (MALE > 65): Do you average more 1 drink per night or more than 7 drinks a week: It depends on the time of year    In the past three months have you have had more than 4 drinks containing alcohol on one occasion: No      Functional Ability and Level of Safety:   Hearing: Hearing is good. Activities of Daily Living: The home contains: no safety equipment. Patient does total self care    Ambulation: with no difficulty    Fall Risk:  Fall Risk Assessment, last 12 mths 10/1/2019   Able to walk? Yes   Fall in past 12 months? No       Abuse Screen:  Patient is not abused    Cognitive Screening   Has your family/caregiver stated any concerns about your memory: no  Cognitive Screening: Normal -      Patient Care Team   Patient Care Team:  Alberto Sykes MD as PCP - General (Family Practice)  Alberto Sykes MD as PCP - [facility] Empaneled Provider  Merlin Mantis, MD (Urology)  Vijay Strong MD as Consulting Provider (Internal Medicine)  Vanesa Sahu MD as Consulting Provider (Urology)  Candie Kelly MD (Ophthalmology)  Jane Conroy MD (Ophthalmology)  Ana Laura Hoang MD (Endocrinology)  Esteban Coleman MD (Pulmonary Disease)    Assessment/Plan   Education and counseling provided:  Are appropriate based on today's review and evaluation  Review nurses note and assessment. Agree with that. Discussed 5 years health plan and given copy in AVS.  Discussed advance directive.  Pt has an advance directive. He will provide a copy. See ACP note from today. Diagnoses and all orders for this visit:    1.  Medicare annual wellness visit, subsequent        Health Maintenance Due   Topic Date Due    Shingrix Vaccine Age 50> (1 of 2) 03/25/2002    FOOT EXAM Q1  08/30/2018    MEDICARE YEARLY EXAM  08/14/2019    LIPID PANEL Q1  01/16/2020

## 2019-12-16 NOTE — ACP (ADVANCE CARE PLANNING)
Advance Care Planning (ACP) Provider Conversation Snapshot    Date of ACP Conversation: 12/16/19  Persons included in Conversation:  patient  Length of ACP Conversation in minutes:  <16 minutes (Non-Billable)    Authorized Decision Maker (if patient is incapable of making informed decisions): This person is:   wife and then son             For Patients with Decision Making Capacity:   pt has an advance directive.  will provide a copy     Conversation Outcomes / Follow-Up Plan:   see above

## 2019-12-16 NOTE — PATIENT INSTRUCTIONS
Medicare Part B Preventive Services Limitations Recommendation Scheduled   Bone Mass Measurement  (age 72 & older, biennial) Requires diagnosis related to osteoporosis or estrogen deficiency. Biennial benefit unless patient has history of long-term glucocorticoid tx or baseline is needed because initial test was by other method Not indicated    Cardiovascular Screening Blood Tests (every 5 years)  Total cholesterol, HDL, Triglycerides and ECG Order blood work  as a panel if possible and  for adults with routine risk  an electrocardiogram (ECG) at intervals determined by the provider. 1/16/19    Colorectal Cancer Screening  -Fecal occult blood test (annual)  -Flexible sigmoidoscopy (5y)  -Screening colonoscopy (10y)  -Barium Enema Colorectal cancer screening should be done for adults age 54-65 with no increased risk factors for colorectal cancer. There are a number of acceptable methods of screening for this type of cancer. Each test has its own benefits and drawbacks. Discuss with your provider what is most appropriate for you during your annual wellness visit.  The different tests include: colonoscopy (considered the best screening method), a fecal occult blood test, a fecal DNA test, and sigmoidoscopy 8/9/13 Q 10 years   Counseling to Prevent Tobacco Use (up to 8 sessions per year)  - Counseling greater than 3 and up to 10 minutes  - Counseling greater than 10 minutes Patients must be asymptomatic of tobacco-related conditions to receive as preventive service Not indicated    Diabetes Screening Tests (at least every 3 years, Medicare covers annually or at 6-month intervals for prediabetic patients)    Fasting blood sugar (FBS) or glucose tolerance test (GTT) All adults age 38-68 who are overweight should have a diabetes screening test once every three years.   -Other screening tests & preventive services for persons with diabetes include: an eye exam to screen for diabetic retinopathy, a kidney function test, a foot exam, and stricter control over your cholesterol. 7/31/19    Diabetes Self-Management Training (DSMT) (no USPSTF recommendation) Requires referral by treating physician for patient with diabetes or renal disease. 10 hours of initial DSMT session of no less than 30 minutes each in a continuous 12-month period. 2 hours of follow-up DSMT in subsequent years. UTD    Glaucoma Screening (no USPSTF recommendation) Diabetes mellitus, family history, , age 48 or over,  American, age 72 or over 9/11/18 Yearly    Human Immunodeficiency Virus (HIV) Screening (annually for increased risk patients)  HIV-1 and HIV-2 by EIA, MARIEL, rapid antibody test, or oral mucosa transudate Patient must be at increased risk for HIV infection per USPSTF guidelines or pregnant. Tests covered annually for patients at increased risk. Pregnant patients may receive up to 3 test during pregnancy. Not indicated    Medical Nutrition Therapy (MNT) (for diabetes or renal disease not recommended schedule) Requires referral by treating physician for patient with diabetes or renal disease. Can be provided in same year as diabetes self-management training (DSMT), and CMS recommends medical nutrition therapy take place after DSMT. Up to 3 hours for initial year and 2 hours in subsequent years. UTD    Prostate Cancer Screening (annually up to age 76)  - Digital rectal exam (ALBERTO)  - Prostate specific antigen (PSA) Annually (age 48 or over), ALBERTO not paid separately when covered E/M service is provided on same date  Men up to age 76 may need a screening blood test for prostate cancer at certain intervals, depending on their personal and family history. This decision is between the patient and his provider.  Following urology     Seasonal Influenza Vaccination (annually) All adults should have a flu vaccine yearly  10/15/19      Pneumococcal Vaccination (once after 72) All adults  over age 72 should receive the recommended pneumonia vaccines. Current USPSTF guidelines recommend a series of two vaccines for the best pneumonia protection. Completed    Hepatitis B Vaccinations (if medium/high risk) Medium/high risk factors:  End-stage renal disease,  Hemophiliacs who received Factor VIII or IX concentrates, Clients of institutions for the mentally retarded, Persons who live in the same house as a HepB virus carrier, Homosexual men, Illicit injectable drug abusers. 10/23/15    Shingles Vaccination A shingles vaccine is also recommended once in a lifetime after age 61 Due  Will speak to the pharmacist  pharmacist    Ultrasound Screening for Abdominal Aortic Aneurysm (AAA) (once) An Abdominal Aortic Aneurysm (AAA) Screening is recommended for men age 73-68 who has ever smoked in their lifetime. of the following criteria:  - Men who are 73-68 years old and have smoked more than 100 cigarettes in their lifetime.  -Anyone with a FH of AAA  -Anyone recommended for screening by USPSTF Not indicated      Hep C All adults born between [birth year] and 1965 should be screened once for Hepatitis C 10/23/15    Tetanus  All adults should have a tetanus vaccine every 10 years 4/20/15      Nutrition Tips for Diabetes: After Your Visit  Your Care Instructions  A healthy diet is important to manage diabetes. It helps you lose weight (if you need to) and keep it off. It gives you the nutrition and energy your body needs and helps prevent heart disease. But a diet for diabetes does not mean that you have to eat special foods. You can eat what your family eats, including occasional sweets and other favorites. But you do have to pay attention to how often you eat and how much you eat of certain foods. The right plan for you will give you meals that help you keep your blood sugar at healthy levels. Try to eat a variety of foods and to spread carbohydrate throughout the day. Carbohydrate raises blood sugar higher and more quickly than any other nutrient does. Carbohydrate is found in sugar, breads and cereals, fruit, starchy vegetables such as potatoes and corn, and milk and yogurt. You may want to work with a dietitian or diabetes educator to help you plan meals and snacks. A dietitian or diabetes educator also can help you lose weight if that is one of your goals. The following tips can help you enjoy your meals and stay healthy. Follow-up care is a key part of your treatment and safety. Be sure to make and go to all appointments, and call your doctor if you are having problems. Its also a good idea to know your test results and keep a list of the medicines you take. How can you care for yourself at home? · Learn which foods have carbohydrate and how much carbohydrate to eat. A dietitian or diabetes educator can help you learn to keep track of how much carbohydrate you eat. · Spread carbohydrate throughout the day. Eat some carbohydrate at all meals, but do not eat too much at any one time. · Plan meals to include food from all the food groups. These are the food groups and some example portion sizes:  ¨ Grains: 1 slice of bread (1 ounce), ½ cup of cooked cereal, and 1/3 cup of cooked pasta or rice. These have about 15 grams of carbohydrate in a serving. Choose whole grains such as whole wheat bread or crackers, oatmeal, and brown rice more often than refined grains. ¨ Fruit: 1 small fresh fruit, such as an apple or orange; ½ of a banana; ½ cup of chopped, cooked, or canned fruit; ½ cup of fruit juice; 1 cup of melon or raspberries; and 2 tablespoons of dried fruit. These have about 15 grams of carbohydrate in a serving. ¨ Dairy: 1 cup of nonfat or low-fat milk and 2/3 cup of plain yogurt. These have about 15 grams of carbohydrate in a serving. ¨ Protein foods: Beef, chicken, turkey, fish, eggs, tofu, cheese, cottage cheese, and peanut butter. A serving size of meat is 3 ounces, which is about the size of a deck of cards.  Examples of meat substitute serving sizes (equal to 1 ounce of meat) are 1/4 cup of cottage cheese, 1 egg, 1 tablespoon of peanut butter, and ½ cup of tofu. These have very little or no carbohydrate per serving. ¨ Vegetables: Starchy vegetables such as ½ cup of cooked dried beans, peas, potatoes, or corn have about 15 grams of carbohydrate. Nonstarchy vegetables have very little carbohydrate, such as 1 cup of raw leafy vegetables (such as spinach), ½ cup of other vegetables (cooked or chopped), and 3/4 cup of vegetable juice. · Use the plate format to plan meals. It is a good, quick way to make sure that you have a balanced meal. It also helps you spread carbohydrate throughout the day. You divide your plate by types of foods. Put vegetables on half the plate, meat or meat substitutes on one-quarter of the plate, and a grain or starchy vegetable (such as brown rice or a potato) in the final quarter of the plate. To this you can add a small piece of fruit and 1 cup of milk or yogurt, depending on how much carbohydrate you are supposed to eat at a meal.  · Talk to your dietitian or diabetes educator about ways to add limited amounts of sweets into your meal plan. You can eat these foods now and then, as long as you include the amount of carbohydrate they have in your daily carbohydrate allowance. · If you drink alcohol, limit it to no more than 1 drink a day for women and 2 drinks a day for men. If you are pregnant, no amount of alcohol is known to be safe. · Protein, fat, and fiber do not raise blood sugar as much as carbohydrate does. If you eat a lot of these nutrients in a meal, your blood sugar will rise more slowly than it would otherwise. · Limit saturated fats, such as those from meat and dairy products. Try to replace it with monounsaturated fat, such as olive oil. This is a healthier choice because people who have diabetes are at higher-than-average risk of heart disease. But use a modest amount of olive oil.  A tablespoon of olive oil has 14 grams of fat and 120 calories. · Exercise lowers blood sugar. If you take insulin by shots or pump, you can use less than you would if you were not exercising. Keep in mind that timing matters. If you exercise within 1 hour after a meal, your body may need less insulin for that meal than it would if you exercised 3 hours after the meal. Test your blood sugar to find out how exercise affects your need for insulin. · Exercise on most days of the week. Aim for at least 30 minutes. Exercise helps you stay at a healthy weight and helps your body use insulin. Walking is an easy way to get exercise. Gradually increase the amount you walk every day. You also may want to swim, bike, or do other activities. When you eat out  · Learn to estimate the serving sizes of foods that have carbohydrate. If you measure food at home, it will be easier to estimate the amount in a serving of restaurant food. · If the meal you order has too much carbohydrate (such as potatoes, corn, or baked beans), ask to have a low-carbohydrate food instead. Ask for a salad or green vegetables. · If you use insulin, check your blood sugar before and after eating out to help you plan how much to eat in the future. · If you eat more carbohydrate at a meal than you had planned, take a walk or do other exercise. This will help lower your blood sugar. Where can you learn more? Go to [employer]  Enter J016 in the search box to learn more about \"Nutrition Tips for Diabetes: After Your Visit. \"   © 3401-2385 [employer]. Care instructions adapted under license by [employer] (which disclaims liability or warranty for this information).  This care instruction is for use with your licensed healthcare professional. If you have questions about a medical condition or this instruction, always ask your healthcare professional. [place] [address] any warranty or liability for your use of this information. Content Version: 40.3.042585; Current as of: June 4, 2014                 Low Sodium Diet (2,000 Milligram): Care Instructions  Your Care Instructions    Too much sodium causes your body to hold on to extra water. This can raise your blood pressure and force your heart and kidneys to work harder. In very serious cases, this could cause you to be put in the hospital. It might even be life-threatening. By limiting sodium, you will feel better and lower your risk of serious problems. The most common source of sodium is salt. People get most of the salt in their diet from canned, prepared, and packaged foods. Fast food and restaurant meals also are very high in sodium. Your doctor will probably limit your sodium to less than 2,000 milligrams (mg) a day. This limit counts all the sodium in prepared and packaged foods and any salt you add to your food. Follow-up care is a key part of your treatment and safety. Be sure to make and go to all appointments, and call your doctor if you are having problems. It's also a good idea to know your test results and keep a list of the medicines you take. How can you care for yourself at home? Read food labels  · Read labels on cans and food packages. The labels tell you how much sodium is in each serving. Make sure that you look at the serving size. If you eat more than the serving size, you have eaten more sodium. · Food labels also tell you the Percent Daily Value for sodium. Choose products with low Percent Daily Values for sodium. · Be aware that sodium can come in forms other than salt, including monosodium glutamate (MSG), sodium citrate, and sodium bicarbonate (baking soda). MSG is often added to Asian food. When you eat out, you can sometimes ask for food without MSG or added salt.   Buy low-sodium foods  · Buy foods that are labeled \"unsalted\" (no salt added), \"sodium-free\" (less than 5 mg of sodium per serving), or \"low-sodium\" (less than 140 mg of sodium per serving). Foods labeled \"reduced-sodium\" and \"light sodium\" may still have too much sodium. Be sure to read the label to see how much sodium you are getting. · Buy fresh vegetables, or frozen vegetables without added sauces. Buy low-sodium versions of canned vegetables, soups, and other canned goods. Prepare low-sodium meals  · Cut back on the amount of salt you use in cooking. This will help you adjust to the taste. Do not add salt after cooking. One teaspoon of salt has about 2,300 mg of sodium. · Take the salt shaker off the table. · Flavor your food with garlic, lemon juice, onion, vinegar, herbs, and spices. Do not use soy sauce, lite soy sauce, steak sauce, onion salt, garlic salt, celery salt, mustard, or ketchup on your food. · Use low-sodium salad dressings, sauces, and ketchup. Or make your own salad dressings and sauces without adding salt. · Use less salt (or none) when recipes call for it. You can often use half the salt a recipe calls for without losing flavor. Other foods such as rice, pasta, and grains do not need added salt. · Rinse canned vegetables, and cook them in fresh water. This removes some--but not all--of the salt. · Avoid water that is naturally high in sodium or that has been treated with water softeners, which add sodium. Call your local water company to find out the sodium content of your water supply. If you buy bottled water, read the label and choose a sodium-free brand. Avoid high-sodium foods  · Avoid eating:  ? Smoked, cured, salted, and canned meat, fish, and poultry. ? Ham, lenz, hot dogs, and luncheon meats. ? Regular, hard, and processed cheese and regular peanut butter. ? Crackers with salted tops, and other salted snack foods such as pretzels, chips, and salted popcorn. ? Frozen prepared meals, unless labeled low-sodium. ? Canned and dried soups, broths, and bouillon, unless labeled sodium-free or low-sodium. ?  Canned vegetables, unless labeled sodium-free or low-sodium. ? [place] fries, pizza, tacos, and other fast foods. ? Pickles, olives, ketchup, and other condiments, especially soy sauce, unless labeled sodium-free or low-sodium. Where can you learn more? Go to http://jah-lorenzo.info/. Enter U170 in the search box to learn more about \"Low Sodium Diet (2,000 Milligram): Care Instructions. \"  Current as of: November 7, 2018  Content Version: 12.2  © 0816-9596 [employer]. Care instructions adapted under license by [employer] (which disclaims liability or warranty for this information). If you have questions about a medical condition or this instruction, always ask your healthcare professional. [address] any warranty or liability for your use of this information. Benign Prostatic Hyperplasia: Care Instructions  Your Care Instructions    Benign prostatic hyperplasia, or BPH, is an enlarged prostate gland. The prostate is a small gland that makes some of the fluid in semen. Prostate enlargement happens to almost all men as they age. It is usually not serious. BPH does not cause prostate cancer. As the prostate gets bigger, it may partly block the flow of urine. You may have a hard time getting a urine stream started or completely stopped. BPH can cause dribbling. You may have a weak urine stream, or you may have to urinate more often than you used to, especially at night. Most men find these problems easy to manage. You do not need treatment unless your symptoms bother you a lot or you have other problems, such as bladder infections or stones. In these cases, medicines may help. Surgery is not needed unless the urine flow is blocked or the symptoms do not get better with medicine. Follow-up care is a key part of your treatment and safety. Be sure to make and go to all appointments, and call your doctor if you are having problems.  It's also a good idea to know your test results and keep a list of the medicines you take. How can you care for yourself at home? · Take plenty of time to urinate. Try to relax. · Try \"double voiding. \" Urinate as much you can, relax for a few moments, and then try to urinate again. · Sit on the toilet to urinate. · Read or think of other things while you are waiting. · Turn on a faucet, or try to picture running water. Some men find that this helps get their urine flowing. · If dribbling is a problem, wash your penis daily to avoid skin irritation and infection. · Avoid caffeine and alcohol. These drinks will increase how often you need to urinate. Spread your fluid intake throughout the day. If the urge to urinate often wakes you at night, limit your fluid intake in the evening. Urinate right before you go to bed. · Many over-the-counter cold and allergy medicines can make the symptoms of BPH worse. Avoid antihistamines, decongestants, and allergy pills, if you can. Read the warnings on the package. · If you take any prescription medicines, especially tranquilizers or antidepressants, ask your doctor or pharmacist whether they can cause urination problems. There may be other medicines you can use that do not cause urinary problems. · Be safe with medicines. Take your medicines exactly as prescribed. Call your doctor if you think you are having a problem with your medicine. When should you call for help? Call your doctor now or seek immediate medical care if:    · You cannot urinate at all.     · You have symptoms of a urinary infection. For example:  ? You have blood or pus in your urine. ? You have pain in your back just below your rib cage. This is called flank pain. ? You have a fever, chills, or body aches. ? It hurts to urinate. ?  You have groin or belly pain.    Watch closely for changes in your health, and be sure to contact your doctor if:    · It hurts when you ejaculate.     · Your urinary problems get a lot worse or bother you a lot.   Where can you learn more? Go to http://jah-lorenzo.info/. Enter U007 in the search box to learn more about \"Benign Prostatic Hyperplasia: Care Instructions. \"  Current as of: May 28, 2019  Content Version: 12.2  © 2941-0484 [employer]. Care instructions adapted under license by [employer] (which disclaims liability or warranty for this information). If you have questions about a medical condition or this instruction, always ask your healthcare professional. [address] any warranty or liability for your use of this information.

## 2019-12-17 NOTE — PROGRESS NOTES
Elevated HBA1C. For now he has not taken metformin for 3 wks. Will continue current plan and ask him to follow endo. Please send this result to endo as well or copy to patient.

## 2019-12-17 NOTE — PROGRESS NOTES
HISTORY OF PRESENT ILLNESS  Estevan Greenfield is a 81 y.o. male. HPI: here for follow up on urinary frequency. Last visit felt urine stream was narrow. Given trial of Flomax. no side effects. Dearl Reed it has been helping and now improvement in narrow urine stream.   No frequency or urgency . Able to rest at night as well. No dysuria or any blood in urine. No nausea or vomiting. No abdominal pain. Also vitals stable. Visit Vitals  /78 (BP 1 Location: Left arm, BP Patient Position: Sitting)   Pulse (!) 110   Temp 99 °F (37.2 °C) (Oral)   Resp 16   Ht 5' 6\" (1.676 m)   Wt 150 lb (68 kg)   SpO2 97%   BMI 24.21 kg/m²     Review medication list, vitals, problem list,allergies. H/o diabetes. Poorly controlled . Said he ran out of metformin for 3 wks. Called endocrinology office but not able to get refill. Ultimately received mail order pharmacy. Received medication and started taking them since few days. Now blood sugar is 130-140 fasting. No hypoglycemia. Asking for a new endocrinology appt. I have advised him to find out from insurance who can be under coverage and will consider doing a new referral.   He understood and agree with it . Lab Results   Component Value Date/Time    WBC 17.5 (H) 03/22/2015 12:45 PM    HGB 13.3 03/22/2015 12:45 PM    HCT 41.4 03/22/2015 12:45 PM    PLATELET 918 77/54/7705 12:45 PM    MCV 90.6 03/22/2015 12:45 PM     Lab Results   Component Value Date/Time    Sodium 139 12/16/2019 02:51 PM    Potassium 4.4 12/16/2019 02:51 PM    Chloride 104 12/16/2019 02:51 PM    CO2 28 12/16/2019 02:51 PM    Anion gap 7 12/16/2019 02:51 PM    Glucose 201 (H) 12/16/2019 02:51 PM    BUN 13 12/16/2019 02:51 PM    Creatinine 0.85 12/16/2019 02:51 PM    BUN/Creatinine ratio 15 12/16/2019 02:51 PM    GFR est AA >60 12/16/2019 02:51 PM    GFR est non-AA >60 12/16/2019 02:51 PM    Calcium 8.9 12/16/2019 02:51 PM    Bilirubin, total 0.9 12/16/2019 02:51 PM    AST (SGOT) 23 12/16/2019 02:51 PM    Alk. phosphatase 61 12/16/2019 02:51 PM    Protein, total 7.3 12/16/2019 02:51 PM    Albumin 3.9 12/16/2019 02:51 PM    Globulin 3.4 12/16/2019 02:51 PM    A-G Ratio 1.1 12/16/2019 02:51 PM    ALT (SGPT) 26 12/16/2019 02:51 PM     Lab Results   Component Value Date/Time    Cholesterol, total 155 01/16/2019 10:09 AM    HDL Cholesterol 49 01/16/2019 10:09 AM    LDL, calculated 78 01/16/2019 10:09 AM    VLDL, calculated 28 01/16/2019 10:09 AM    Triglyceride 141 01/16/2019 10:09 AM    CHOL/HDL Ratio 2.7 06/28/2017 09:30 AM     Lab Results   Component Value Date/Time    Hemoglobin A1c 8.6 (H) 12/16/2019 02:51 PM    Hemoglobin A1c (POC) 8.6 08/13/2018 02:50 PM    Hemoglobin A1c, External 8.4 06/20/2016     Lab Results   Component Value Date/Time    Microalbumin/Creat ratio (mg/g creat) 12 06/28/2017 09:30 AM    Microalb/Creat ratio (ug/mg creat.) 9.8 07/31/2019 09:40 AM    Microalbumin,urine random 1.91 06/28/2017 09:30 AM     H/o hypertension. Fairly stable vitals. Asymptomatic. Needed medication refill. ROS: no headache or dizziness. No chest pain or sob. No nausea or vomiting . no abdominal pain. No bowel complains. No fever. No back pain. No mood changes. No sleep concern. No appetite or weight changes. Physical Exam  Cardiovascular:      Rate and Rhythm: Normal rate and regular rhythm. Pulmonary:      Effort: Pulmonary effort is normal.      Breath sounds: Normal breath sounds. Abdominal:      Palpations: Abdomen is soft. Tenderness: There is no tenderness. Musculoskeletal:         General: No swelling. Neurological:      Mental Status: He is alert and oriented to person, place, and time. ASSESSMENT and PLAN    ICD-10-CM ICD-9-CM    1. Medicare annual wellness visit, subsequent Z00.00 V70.0    2. Type 2 diabetes mellitus without complication, with long-term current use of insulin ([address]): elevated HBA1C. Today will repeat labs. Ran out of metfromin for 3 wks.  Now restarted it and gradually improving fasting sugar around 130-140. No hypoglycemia. Continue current plan. Follow endocrinology recommendations. For now will also try to do a new endo referral. He will call back regarding list of provider he can go to . E11.9 250.00 losartan-hydroCHLOROthiazide (HYZAAR) 100-25 mg per tablet    Z79.4 V58.67 HEMOGLOBIN A1C WITH EAG      METABOLIC PANEL, COMPREHENSIVE   3. Essential hypertension; well controlled. Continue current dose of medication and low salt diet. Exercise as tolerated. I10 401.9 losartan-hydroCHLOROthiazide (HYZAAR) 100-25 mg per tablet   4. Frequency of urination; given Flomax last visit and it has improved symptoms of difficulty urination and frequency of urination.  Will observe.  R35.0 788.41 tamsulosin (FLOMAX) 0.4 mg capsule   Pt understood and agree with the plan   Review hM

## 2019-12-31 RX ORDER — BLOOD SUGAR DIAGNOSTIC
STRIP MISCELLANEOUS
Qty: 100 STRIP | Refills: 5 | Status: SHIPPED | OUTPATIENT
Start: 2019-12-31 | End: 2021-03-09

## 2019-12-31 NOTE — PROGRESS NOTES
Contacted patient and verified identity using name and date of birth (2- identifiers)  Spoke with patient and he verbalized understanding of elevated A1c @ 8.5. No metformin x 3 weeks. Continue with current plan and keep follow-up with Endocrine. Has appt 1/3/20. Results faxed to Dr. Sagar Bray office for review.

## 2020-01-03 ENCOUNTER — OFFICE VISIT (OUTPATIENT)
Dept: FAMILY MEDICINE CLINIC | Age: 70
End: 2020-01-03

## 2020-01-03 VITALS
DIASTOLIC BLOOD PRESSURE: 70 MMHG | SYSTOLIC BLOOD PRESSURE: 110 MMHG | HEART RATE: 87 BPM | BODY MASS INDEX: 24.4 KG/M2 | WEIGHT: 151.8 LBS | RESPIRATION RATE: 16 BRPM | TEMPERATURE: 98.6 F | OXYGEN SATURATION: 98 % | HEIGHT: 66 IN

## 2020-01-03 DIAGNOSIS — E11.65 POORLY CONTROLLED DIABETES MELLITUS (HCC): ICD-10-CM

## 2020-01-03 DIAGNOSIS — R35.0 FREQUENCY OF URINATION: Primary | ICD-10-CM

## 2020-01-03 DIAGNOSIS — I10 ESSENTIAL HYPERTENSION, BENIGN: ICD-10-CM

## 2020-01-03 NOTE — PROGRESS NOTES
HISTORY OF PRESENT ILLNESS  Estevan Jones is a 81 y.o. male. HPI; Here for follow up on urinary frequency. Said he had to wake up night time 5-6 times. UA negative for UTI and he was given flomax. It has been helping and now frequency down to 2-3 times. No narrow stream. No dysuria or urgency. No abdominal pain. No nausea or vomiting. Prior bladder cancer and also been getting check for prostate yearly with urology. No rectal pain. No fever. H/o hypertension. Vitals stable. Compliant with medication. Trying to be compliant with low salt diet. Poorly controlled diabetes. [facility]. Not happy with their refill and other follow ups. Asking to have a referral with Dr. Salma Stanton. Done referral today. Denies any headache, dizziness, no chest pain or trouble breathing, no arm or leg weakness. No nausea or vomiting, no weight or appetite changes, no mood changes . No urine or bowel complains, no palpitation, no diaphoresis. No abdominal pain. No cold or cough. No leg swelling. No fever. No sleep trouble. Visit Vitals  /70 (BP 1 Location: Left arm, BP Patient Position: Sitting)   Pulse 87   Temp 98.6 °F (37 °C) (Oral)   Resp 16   Ht 5' 6\" (1.676 m)   Wt 151 lb 12.8 oz (68.9 kg)   SpO2 98%   BMI 24.50 kg/m²     Review medication list, vitals, problem list,allergies.    Lab Results   Component Value Date/Time    WBC 17.5 (H) 03/22/2015 12:45 PM    HGB 13.3 03/22/2015 12:45 PM    HCT 41.4 03/22/2015 12:45 PM    PLATELET 204 73/72/2818 12:45 PM    MCV 90.6 03/22/2015 12:45 PM     Lab Results   Component Value Date/Time    Sodium 139 12/16/2019 02:51 PM    Potassium 4.4 12/16/2019 02:51 PM    Chloride 104 12/16/2019 02:51 PM    CO2 28 12/16/2019 02:51 PM    Anion gap 7 12/16/2019 02:51 PM    Glucose 201 (H) 12/16/2019 02:51 PM    BUN 13 12/16/2019 02:51 PM    Creatinine 0.85 12/16/2019 02:51 PM    BUN/Creatinine ratio 15 12/16/2019 02:51 PM    GFR est AA >60 12/16/2019 02:51 PM    GFR est non-AA >60 12/16/2019 02:51 PM    Calcium 8.9 12/16/2019 02:51 PM    Bilirubin, total 0.9 12/16/2019 02:51 PM    AST (SGOT) 23 12/16/2019 02:51 PM    Alk. phosphatase 61 12/16/2019 02:51 PM    Protein, total 7.3 12/16/2019 02:51 PM    Albumin 3.9 12/16/2019 02:51 PM    Globulin 3.4 12/16/2019 02:51 PM    A-G Ratio 1.1 12/16/2019 02:51 PM    ALT (SGPT) 26 12/16/2019 02:51 PM     Lab Results   Component Value Date/Time    Cholesterol, total 155 01/16/2019 10:09 AM    HDL Cholesterol 49 01/16/2019 10:09 AM    LDL, calculated 78 01/16/2019 10:09 AM    VLDL, calculated 28 01/16/2019 10:09 AM    Triglyceride 141 01/16/2019 10:09 AM    CHOL/HDL Ratio 2.7 06/28/2017 09:30 AM     Lab Results   Component Value Date/Time    TSH 1.410 03/01/2018 12:00 AM     Lab Results   Component Value Date/Time    Hemoglobin A1c 8.6 (H) 12/16/2019 02:51 PM    Hemoglobin A1c (POC) 8.6 08/13/2018 02:50 PM    Hemoglobin A1c, External 8.4 06/20/2016     Lab Results   Component Value Date/Time    Microalbumin/Creat ratio (mg/g creat) 12 06/28/2017 09:30 AM    Microalb/Creat ratio (ug/mg creat.) 9.8 07/31/2019 09:40 AM    Microalbumin,urine random 1.91 06/28/2017 09:30 AM     Said fasting sugar stayed little higher last week as he was out of town and eating out in [facility]. Also currently fasting sugar is around 130-140. He was also off metformin for almost 3 weeks as was having hard time getting filled with endocrinology. ROS: see HPI     Physical Exam  Constitutional:       General: He is not in acute distress. Cardiovascular:      Rate and Rhythm: Normal rate and regular rhythm. Heart sounds: Normal heart sounds. Abdominal:      General: Bowel sounds are normal.      Palpations: Abdomen is soft. Tenderness: There is no tenderness. Neurological:      Mental Status: He is oriented to person, place, and time. Psychiatric:         Behavior: Behavior normal.         ASSESSMENT and PLAN    ICD-10-CM ICD-9-CM    1. Frequency of urination: been improved. Will observe. Advised to speak with the urologist while his appt.  R35.0 788.41    2. Essential hypertension, benign: well controlled. Continue current dose of medication and low salt diet. Exercise as tolerated. I10 401.1    3. Poorly controlled diabetes mellitus ([address] for now advised to increase lantus to 12 units and go up on 2 units if fasting sugar is staying above 120-130. If any hypoglycemia cut back 2 units. Done new endo referral.  E11.65 250.00 REFERRAL TO ENDOCRINOLOGY   Pt understood and agree with the plan   Review hM   Follow-up and Dispositions    · Return in about 3 months (around 4/3/2020).

## 2020-01-03 NOTE — PATIENT INSTRUCTIONS
Nutrition Tips for Diabetes: After Your Visit  Your Care Instructions  A healthy diet is important to manage diabetes. It helps you lose weight (if you need to) and keep it off. It gives you the nutrition and energy your body needs and helps prevent heart disease. But a diet for diabetes does not mean that you have to eat special foods. You can eat what your family eats, including occasional sweets and other favorites. But you do have to pay attention to how often you eat and how much you eat of certain foods. The right plan for you will give you meals that help you keep your blood sugar at healthy levels. Try to eat a variety of foods and to spread carbohydrate throughout the day. Carbohydrate raises blood sugar higher and more quickly than any other nutrient does. Carbohydrate is found in sugar, breads and cereals, fruit, starchy vegetables such as potatoes and corn, and milk and yogurt. You may want to work with a dietitian or diabetes educator to help you plan meals and snacks. A dietitian or diabetes educator also can help you lose weight if that is one of your goals. The following tips can help you enjoy your meals and stay healthy. Follow-up care is a key part of your treatment and safety. Be sure to make and go to all appointments, and call your doctor if you are having problems. Its also a good idea to know your test results and keep a list of the medicines you take. How can you care for yourself at home? · Learn which foods have carbohydrate and how much carbohydrate to eat. A dietitian or diabetes educator can help you learn to keep track of how much carbohydrate you eat. · Spread carbohydrate throughout the day. Eat some carbohydrate at all meals, but do not eat too much at any one time. · Plan meals to include food from all the food groups.  These are the food groups and some example portion sizes:  ¨ Grains: 1 slice of bread (1 ounce), ½ cup of cooked cereal, and 1/3 cup of cooked pasta or rice. These have about 15 grams of carbohydrate in a serving. Choose whole grains such as whole wheat bread or crackers, oatmeal, and brown rice more often than refined grains. ¨ Fruit: 1 small fresh fruit, such as an apple or orange; ½ of a banana; ½ cup of chopped, cooked, or canned fruit; ½ cup of fruit juice; 1 cup of melon or raspberries; and 2 tablespoons of dried fruit. These have about 15 grams of carbohydrate in a serving. ¨ Dairy: 1 cup of nonfat or low-fat milk and 2/3 cup of plain yogurt. These have about 15 grams of carbohydrate in a serving. ¨ Protein foods: Beef, chicken, turkey, fish, eggs, tofu, cheese, cottage cheese, and peanut butter. A serving size of meat is 3 ounces, which is about the size of a deck of cards. Examples of meat substitute serving sizes (equal to 1 ounce of meat) are 1/4 cup of cottage cheese, 1 egg, 1 tablespoon of peanut butter, and ½ cup of tofu. These have very little or no carbohydrate per serving. ¨ Vegetables: Starchy vegetables such as ½ cup of cooked dried beans, peas, potatoes, or corn have about 15 grams of carbohydrate. Nonstarchy vegetables have very little carbohydrate, such as 1 cup of raw leafy vegetables (such as spinach), ½ cup of other vegetables (cooked or chopped), and 3/4 cup of vegetable juice. · Use the plate format to plan meals. It is a good, quick way to make sure that you have a balanced meal. It also helps you spread carbohydrate throughout the day. You divide your plate by types of foods. Put vegetables on half the plate, meat or meat substitutes on one-quarter of the plate, and a grain or starchy vegetable (such as brown rice or a potato) in the final quarter of the plate. To this you can add a small piece of fruit and 1 cup of milk or yogurt, depending on how much carbohydrate you are supposed to eat at a meal.  · Talk to your dietitian or diabetes educator about ways to add limited amounts of sweets into your meal plan.  You can eat these foods now and then, as long as you include the amount of carbohydrate they have in your daily carbohydrate allowance. · If you drink alcohol, limit it to no more than 1 drink a day for women and 2 drinks a day for men. If you are pregnant, no amount of alcohol is known to be safe. · Protein, fat, and fiber do not raise blood sugar as much as carbohydrate does. If you eat a lot of these nutrients in a meal, your blood sugar will rise more slowly than it would otherwise. · Limit saturated fats, such as those from meat and dairy products. Try to replace it with monounsaturated fat, such as olive oil. This is a healthier choice because people who have diabetes are at higher-than-average risk of heart disease. But use a modest amount of olive oil. A tablespoon of olive oil has 14 grams of fat and 120 calories. · Exercise lowers blood sugar. If you take insulin by shots or pump, you can use less than you would if you were not exercising. Keep in mind that timing matters. If you exercise within 1 hour after a meal, your body may need less insulin for that meal than it would if you exercised 3 hours after the meal. Test your blood sugar to find out how exercise affects your need for insulin. · Exercise on most days of the week. Aim for at least 30 minutes. Exercise helps you stay at a healthy weight and helps your body use insulin. Walking is an easy way to get exercise. Gradually increase the amount you walk every day. You also may want to swim, bike, or do other activities. When you eat out  · Learn to estimate the serving sizes of foods that have carbohydrate. If you measure food at home, it will be easier to estimate the amount in a serving of restaurant food. · If the meal you order has too much carbohydrate (such as potatoes, corn, or baked beans), ask to have a low-carbohydrate food instead. Ask for a salad or green vegetables.   · If you use insulin, check your blood sugar before and after eating out to help you plan how much to eat in the future. · If you eat more carbohydrate at a meal than you had planned, take a walk or do other exercise. This will help lower your blood sugar. Where can you learn more? Go to [employer]  Enter Z036 in the search box to learn more about \"Nutrition Tips for Diabetes: After Your Visit. \"   © 0668-4210 [employer]. Care instructions adapted under license by [facility] (which disclaims liability or warranty for this information). This care instruction is for use with your licensed healthcare professional. If you have questions about a medical condition or this instruction, always ask your healthcare professional. [place] [address] any warranty or liability for your use of this information. Content Version: 91.9.751607; Current as of: June 4, 2014                 Benign Prostatic Hyperplasia: Care Instructions  Your Care Instructions    Benign prostatic hyperplasia, or BPH, is an enlarged prostate gland. The prostate is a small gland that makes some of the fluid in semen. Prostate enlargement happens to almost all men as they age. It is usually not serious. BPH does not cause prostate cancer. As the prostate gets bigger, it may partly block the flow of urine. You may have a hard time getting a urine stream started or completely stopped. BPH can cause dribbling. You may have a weak urine stream, or you may have to urinate more often than you used to, especially at night. Most men find these problems easy to manage. You do not need treatment unless your symptoms bother you a lot or you have other problems, such as bladder infections or stones. In these cases, medicines may help. Surgery is not needed unless the urine flow is blocked or the symptoms do not get better with medicine. Follow-up care is a key part of your treatment and safety.  Be sure to make and go to all appointments, and call your doctor if you are having problems. It's also a good idea to know your test results and keep a list of the medicines you take. How can you care for yourself at home? · Take plenty of time to urinate. Try to relax. · Try \"double voiding. \" Urinate as much you can, relax for a few moments, and then try to urinate again. · Sit on the toilet to urinate. · Read or think of other things while you are waiting. · Turn on a faucet, or try to picture running water. Some men find that this helps get their urine flowing. · If dribbling is a problem, wash your penis daily to avoid skin irritation and infection. · Avoid caffeine and alcohol. These drinks will increase how often you need to urinate. Spread your fluid intake throughout the day. If the urge to urinate often wakes you at night, limit your fluid intake in the evening. Urinate right before you go to bed. · Many over-the-counter cold and allergy medicines can make the symptoms of BPH worse. Avoid antihistamines, decongestants, and allergy pills, if you can. Read the warnings on the package. · If you take any prescription medicines, especially tranquilizers or antidepressants, ask your doctor or pharmacist whether they can cause urination problems. There may be other medicines you can use that do not cause urinary problems. · Be safe with medicines. Take your medicines exactly as prescribed. Call your doctor if you think you are having a problem with your medicine. When should you call for help? Call your doctor now or seek immediate medical care if:    · You cannot urinate at all.     · You have symptoms of a urinary infection. For example:  ? You have blood or pus in your urine. ? You have pain in your back just below your rib cage. This is called flank pain. ? You have a fever, chills, or body aches. ? It hurts to urinate. ?  You have groin or belly pain.    Watch closely for changes in your health, and be sure to contact your doctor if:    · It hurts when you ejaculate.     · Your urinary problems get a lot worse or bother you a lot. Where can you learn more? Go to http://jah-lorenzo.info/. Enter L567 in the search box to learn more about \"Benign Prostatic Hyperplasia: Care Instructions. \"  Current as of: May 28, 2019  Content Version: 12.2  © 9702-1194 [employer]. Care instructions adapted under license by [employer] (which disclaims liability or warranty for this information). If you have questions about a medical condition or this instruction, always ask your healthcare professional. [address] any warranty or liability for your use of this information. Low Sodium Diet (2,000 Milligram): Care Instructions  Your Care Instructions    Too much sodium causes your body to hold on to extra water. This can raise your blood pressure and force your heart and kidneys to work harder. In very serious cases, this could cause you to be put in the hospital. It might even be life-threatening. By limiting sodium, you will feel better and lower your risk of serious problems. The most common source of sodium is salt. People get most of the salt in their diet from canned, prepared, and packaged foods. Fast food and restaurant meals also are very high in sodium. Your doctor will probably limit your sodium to less than 2,000 milligrams (mg) a day. This limit counts all the sodium in prepared and packaged foods and any salt you add to your food. Follow-up care is a key part of your treatment and safety. Be sure to make and go to all appointments, and call your doctor if you are having problems. It's also a good idea to know your test results and keep a list of the medicines you take. How can you care for yourself at home? Read food labels  · Read labels on cans and food packages. The labels tell you how much sodium is in each serving. Make sure that you look at the serving size.  If you eat more than the serving size, you have eaten more sodium. · Food labels also tell you the Percent Daily Value for sodium. Choose products with low Percent Daily Values for sodium. · Be aware that sodium can come in forms other than salt, including monosodium glutamate (MSG), sodium citrate, and sodium bicarbonate (baking soda). MSG is often added to Asian food. When you eat out, you can sometimes ask for food without MSG or added salt. Buy low-sodium foods  · Buy foods that are labeled \"unsalted\" (no salt added), \"sodium-free\" (less than 5 mg of sodium per serving), or \"low-sodium\" (less than 140 mg of sodium per serving). Foods labeled \"reduced-sodium\" and \"light sodium\" may still have too much sodium. Be sure to read the label to see how much sodium you are getting. · Buy fresh vegetables, or frozen vegetables without added sauces. Buy low-sodium versions of canned vegetables, soups, and other canned goods. Prepare low-sodium meals  · Cut back on the amount of salt you use in cooking. This will help you adjust to the taste. Do not add salt after cooking. One teaspoon of salt has about 2,300 mg of sodium. · Take the salt shaker off the table. · Flavor your food with garlic, lemon juice, onion, vinegar, herbs, and spices. Do not use soy sauce, lite soy sauce, steak sauce, onion salt, garlic salt, celery salt, mustard, or ketchup on your food. · Use low-sodium salad dressings, sauces, and ketchup. Or make your own salad dressings and sauces without adding salt. · Use less salt (or none) when recipes call for it. You can often use half the salt a recipe calls for without losing flavor. Other foods such as rice, pasta, and grains do not need added salt. · Rinse canned vegetables, and cook them in fresh water. This removes some--but not all--of the salt. · Avoid water that is naturally high in sodium or that has been treated with water softeners, which add sodium.  Call your local water company to find out the sodium content of your water supply. If you buy bottled water, read the label and choose a sodium-free brand. Avoid high-sodium foods  · Avoid eating:  ? Smoked, cured, salted, and canned meat, fish, and poultry. ? Ham, lenz, hot dogs, and luncheon meats. ? Regular, hard, and processed cheese and regular peanut butter. ? Crackers with salted tops, and other salted snack foods such as pretzels, chips, and salted popcorn. ? Frozen prepared meals, unless labeled low-sodium. ? Canned and dried soups, broths, and bouillon, unless labeled sodium-free or low-sodium. ? Canned vegetables, unless labeled sodium-free or low-sodium. ? [place] fries, pizza, tacos, and other fast foods. ? Pickles, olives, ketchup, and other condiments, especially soy sauce, unless labeled sodium-free or low-sodium. Where can you learn more? Go to http://jah-lorenzo.info/. Enter L408 in the search box to learn more about \"Low Sodium Diet (2,000 Milligram): Care Instructions. \"  Current as of: November 7, 2018  Content Version: 12.2  © 1629-8637 [employer]. Care instructions adapted under license by [employer] (which disclaims liability or warranty for this information). If you have questions about a medical condition or this instruction, always ask your healthcare professional. [place] [address] any warranty or liability for your use of this information.

## 2020-01-03 NOTE — PROGRESS NOTES
1. Have you been to the ER, urgent care clinic since your last visit? Hospitalized since your last visit? No    2. Have you seen or consulted any other health care providers outside of the [address] [place] [address] since your last visit? Include any pap smears or colon screening. No      Patient requested to be referred to Dr. Bairon Tan; agreed to go back to this endocrinologist.    Chief Complaint   Patient presents with    Urinary Frequency    Results    Referral Request     request referral to Endocrinology - Dr. Bairon Tan       Patient does not recall if he had dm foot exam within past year.

## 2020-01-08 DIAGNOSIS — K21.9 GASTROESOPHAGEAL REFLUX DISEASE WITHOUT ESOPHAGITIS: ICD-10-CM

## 2020-01-08 DIAGNOSIS — Z79.4 TYPE 2 DIABETES MELLITUS WITHOUT COMPLICATION, WITH LONG-TERM CURRENT USE OF INSULIN (HCC): ICD-10-CM

## 2020-01-08 DIAGNOSIS — E11.9 TYPE 2 DIABETES MELLITUS WITHOUT COMPLICATION, WITH LONG-TERM CURRENT USE OF INSULIN (HCC): ICD-10-CM

## 2020-01-08 RX ORDER — PIOGLITAZONEHYDROCHLORIDE 30 MG/1
TABLET ORAL
Qty: 90 TAB | Refills: 1 | Status: SHIPPED | OUTPATIENT
Start: 2020-01-08 | End: 2020-07-06

## 2020-01-08 RX ORDER — OMEPRAZOLE 40 MG/1
CAPSULE, DELAYED RELEASE ORAL
Qty: 90 CAP | Refills: 1 | Status: SHIPPED | OUTPATIENT
Start: 2020-01-08 | End: 2020-07-06

## 2020-01-23 DIAGNOSIS — E11.65 POORLY CONTROLLED DIABETES MELLITUS (HCC): ICD-10-CM

## 2020-01-23 RX ORDER — GLIPIZIDE 5 MG/1
TABLET ORAL
Qty: 90 TAB | Refills: 0 | Status: SHIPPED | OUTPATIENT
Start: 2020-01-23 | End: 2020-03-30

## 2020-03-05 ENCOUNTER — HOSPITAL ENCOUNTER (OUTPATIENT)
Dept: LAB | Age: 70
Discharge: HOME | End: 2020-03-05
Payer: MEDICARE

## 2020-03-05 DIAGNOSIS — R97.20 ELEVATED PSA: ICD-10-CM

## 2020-03-05 LAB — PSA SERPL-MCNC: 3.6 NG/ML (ref 0–4)

## 2020-03-05 PROCEDURE — 36415 COLL VENOUS BLD VENIPUNCTURE: CPT

## 2020-03-05 PROCEDURE — 84153 ASSAY OF PSA TOTAL: CPT

## 2020-03-30 DIAGNOSIS — E11.65 POORLY CONTROLLED DIABETES MELLITUS (HCC): ICD-10-CM

## 2020-03-30 RX ORDER — PEN NEEDLE, DIABETIC 31 GX5/16"
NEEDLE, DISPOSABLE MISCELLANEOUS
Qty: 1 PACKAGE | Refills: 0 | Status: SHIPPED | OUTPATIENT
Start: 2020-03-30 | End: 2020-05-29

## 2020-03-30 RX ORDER — GLIPIZIDE 5 MG/1
TABLET ORAL
Qty: 90 TAB | Refills: 0 | Status: SHIPPED | OUTPATIENT
Start: 2020-03-30 | End: 2020-05-29

## 2020-05-25 DIAGNOSIS — R35.0 FREQUENCY OF URINATION: ICD-10-CM

## 2020-05-26 RX ORDER — TAMSULOSIN HYDROCHLORIDE 0.4 MG/1
CAPSULE ORAL
Qty: 90 CAP | Refills: 1 | Status: SHIPPED | OUTPATIENT
Start: 2020-05-26 | End: 2020-11-16

## 2020-05-29 DIAGNOSIS — E11.65 POORLY CONTROLLED DIABETES MELLITUS (HCC): ICD-10-CM

## 2020-05-29 RX ORDER — GLIPIZIDE 5 MG/1
TABLET ORAL
Qty: 90 TAB | Refills: 0 | Status: SHIPPED | OUTPATIENT
Start: 2020-05-29 | End: 2020-09-18

## 2020-05-29 RX ORDER — PEN NEEDLE, DIABETIC 31 GX5/16"
NEEDLE, DISPOSABLE MISCELLANEOUS
Qty: 1 PACKAGE | Refills: 5 | Status: SHIPPED | OUTPATIENT
Start: 2020-05-29 | End: 2021-09-13

## 2020-05-29 NOTE — TELEPHONE ENCOUNTER
Patient is due for follow-up. Last virtual visit he was no-show.   Please contact the patient to schedule a follow-up appointment

## 2020-06-01 ENCOUNTER — VIRTUAL VISIT (OUTPATIENT)
Dept: FAMILY MEDICINE CLINIC | Age: 70
End: 2020-06-01

## 2020-06-01 DIAGNOSIS — R41.3 MEMORY CHANGES: Primary | ICD-10-CM

## 2020-06-01 DIAGNOSIS — E11.65 POORLY CONTROLLED DIABETES MELLITUS (HCC): ICD-10-CM

## 2020-06-01 DIAGNOSIS — H91.93 HEARING PROBLEM OF BOTH EARS: ICD-10-CM

## 2020-06-01 NOTE — PROGRESS NOTES
Rasheeda Deras is a 78 y.o. male who was seen by synchronous (real-time) audio-video technology on 6/1/2020. Consent: Rasheeda Deras, who was seen by synchronous (real-time) audio-video technology, and/or his healthcare decision maker, is aware that this patient-initiated, Telehealth encounter on 6/1/2020 is a billable service, with coverage as determined by his insurance carrier. He is aware that he may receive a bill and has provided verbal consent to proceed: Yes. Assessment & Plan:   Diagnoses and all orders for this visit:    Memory changes: Was told by family members that he has been forgetful. Short term memory. Asking for referral to [facility]. Per patient request we will send a referral for further evaluation. Discussed that it could be from his hearing. Still managing his finances. Able to drive without getting lost.  -     REFERRAL TO NEUROLOGY    Hearing problem of both ears: No dizziness, headache, ringing in ear. No earache. No balance trouble. No dizziness. We will send to ENT for further evaluation.  -     REFERRAL TO ENT-OTOLARYNGOLOGY    Poorly controlled diabetes mellitus ([facility] [address]): A1c around 8.9. We will recheck the labs. He has been following Endo. Now trying to be compliant with the diet and medication. Advised him to schedule the follow-up appointment with Endo. Meantime we will recheck the labs and further discussion after lab results. On next therapy.  -     HEMOGLOBIN A1C WITH EAG; Future  -     METABOLIC PANEL, COMPREHENSIVE; Future  -     TSH 3RD GENERATION; Future  -     CBC W/O DIFF; Future  -     LIPID PANEL; Future  -     MICROALBUMIN, UR, RAND W/ MICROALB/CREAT RATIO; Future    Pt understood and agree with the plan   Review HM  F/u in 1 month. 712  Subjective:   Rasheeda Deras is a 78 y.o. male who was seen for Follow-up; Hearing Problem; and Memory Loss  Done visit with audiowe did technology. History of diabetes, hypertension, hyperlipidemia.   On medication taking it with compliance. No side effects. No signs of hypo-or hyperglycemia. High A1c around 8.9 back in December. Due for labs. He has been following endocrinology. Not much happy with their response on a medication refill. For now we will check the labs and follow-up after results. He is sitting comfortable during the virtual visit and did not appear in any acute distress. Not checking blood pressure at home. Asymptomatic. Denies any headache, dizziness, no chest pain or trouble breathing, no arm or leg weakness. No nausea or vomiting, no weight or appetite changes, no mood changes . No urine or bowel complains, no palpitation, no diaphoresis. No abdominal pain. No cold or cough. No leg swelling. No fever. No sleep trouble. Taking statin and no side effects. Following urology regarding his bladder cancer and surveillance. Elevated PSA. Recently checked and it is under 4. No urinary complaints. Wanted to get a referral to the neurology as family has been telling him since some time that short-term memory trouble. Still able to manage his finances. Still able to manage the reaction and driving. We will do the referral.  Complains of trouble hearing. No dizziness or any earache. No ringing in ear. No balance trouble. Asking for hearing test evaluation. We will send him to the ENT. Reviewed labs.   Lab Results   Component Value Date/Time    WBC 17.5 (H) 03/22/2015 12:45 PM    HGB 13.3 03/22/2015 12:45 PM    HCT 41.4 03/22/2015 12:45 PM    PLATELET 362 49/63/7955 12:45 PM    MCV 90.6 03/22/2015 12:45 PM     Lab Results   Component Value Date/Time    Sodium 139 12/16/2019 02:51 PM    Potassium 4.4 12/16/2019 02:51 PM    Chloride 104 12/16/2019 02:51 PM    CO2 28 12/16/2019 02:51 PM    Anion gap 7 12/16/2019 02:51 PM    Glucose 201 (H) 12/16/2019 02:51 PM    BUN 13 12/16/2019 02:51 PM    Creatinine 0.85 12/16/2019 02:51 PM    BUN/Creatinine ratio 15 12/16/2019 02:51 PM    GFR est AA >60 12/16/2019 02:51 PM    GFR est non-AA >60 12/16/2019 02:51 PM    Calcium 8.9 12/16/2019 02:51 PM    Bilirubin, total 0.9 12/16/2019 02:51 PM    Alk. phosphatase 61 12/16/2019 02:51 PM    Protein, total 7.3 12/16/2019 02:51 PM    Albumin 3.9 12/16/2019 02:51 PM    Globulin 3.4 12/16/2019 02:51 PM    A-G Ratio 1.1 12/16/2019 02:51 PM    ALT (SGPT) 26 12/16/2019 02:51 PM     Lab Results   Component Value Date/Time    Cholesterol, total 155 01/16/2019 10:09 AM    HDL Cholesterol 49 01/16/2019 10:09 AM    LDL, calculated 78 01/16/2019 10:09 AM    VLDL, calculated 28 01/16/2019 10:09 AM    Triglyceride 141 01/16/2019 10:09 AM    CHOL/HDL Ratio 2.7 06/28/2017 09:30 AM     ,  Lab Results   Component Value Date/Time    TSH 1.410 03/01/2018 12:00 AM     Lab Results   Component Value Date/Time    Hemoglobin A1c 8.6 (H) 12/16/2019 02:51 PM    Hemoglobin A1c (POC) 8.6 08/13/2018 02:50 PM    Hemoglobin A1c, External 8.4 06/20/2016         Lab Results   Component Value Date/Time    Microalbumin/Creat ratio (mg/g creat) 12 06/28/2017 09:30 AM    Microalb/Creat ratio (ug/mg creat.) 9.8 07/31/2019 09:40 AM    Microalbumin,urine random 1.91 06/28/2017 09:30 AM     Lab Results   Component Value Date/Time    Prostate Specific Ag 3.6 03/05/2020 12:55 PM    Prostate Specific Ag 4.0 04/11/2019 01:59 PM    Prostate Specific Ag 5.1 (H) 03/01/2018 11:35 AM    Prostate Specific Ag 5.38 (H) 08/03/2016 12:06 PM         Prior to Admission medications    Medication Sig Start Date End Date Taking? Authorizing Provider   glipiZIDE (GLUCOTROL) 5 mg tablet TAKE 1 TABLET EVERY DAY 5/29/20  Yes Kate Bennett MD   BD Ultra-Fine Short Pen Needle 31 gauge x 5/16\" ndle USE  TO INJECT ONE TIME DAILY 5/29/20  Yes Kate Bennett MD   tamsulosin (FLOMAX) 0.4 mg capsule TAKE 1 CAPSULE EVERY DAY 5/26/20  Yes Kate Bennett MD   finasteride (PROSCAR) 5 mg tablet Take 1 Tab by mouth daily.  3/11/20  Yes Tiffanie Pastor MD   omeprazole ([address] [place] [address]) 40 mg capsule TAKE 1 CAPSULE EVERY DAY 1/8/20  Yes Osmani Ritchie MD   pioglitazone (ACTOS) 30 mg tablet TAKE 1 TABLET EVERY DAY 1/8/20  Yes Osmani Ritchie MD   ACCU-CHEK HALIMA PLUS TEST STRP strip USE DAILY FOR BLOOD GLUCOSE MONITORING  12/31/19  Yes Osmani Ritchie MD   losartan-hydroCHLOROthiazide [facility]) 100-25 mg per tablet TAKE 1 TABLET EVERY DAY 12/16/19  Yes Osmani Ritchie MD   metFORMIN ER (GLUCOPHAGE XR) 500 mg tablet Take 2 Tabs by mouth two (2) times a day. 1,000 mg BID 12/2/19  Yes Osmani Ritchie MD   atorvastatin (LIPITOR) 10 mg tablet Take 1 Tab by mouth nightly. 10/1/19  Yes Osmani Ritchie MD   SITagliptin (JANUVIA) 100 mg tablet Take 1 Tab by mouth daily. 10/1/19  Yes Osmani Ritchie MD   insulin glargine (LANTUS SOLOSTAR U-100 INSULIN) 100 unit/mL (3 mL) inpn INJECT  10 UNITS SUBCUTANEOUSLY AT BEDTIME. DISCARD AND BEGIN NEW PEN AFTER 28 DAYS. 7/31/19  Yes Osmani Ritchie MD   CALCIUM PO Take  by mouth. Yes Provider, Historical   glucose blood VI test strips (ACCU-CHEK HALIMA PLUS TEST STRP) strip Use 1 daily for blood glucose monitoring DX: E11.9 12/19/18  Yes Osmani Ritchie MD   cholecalciferol, vitamin D3, (VITAMIN D3 PO) Take  by mouth. Yes Provider, Historical   multivitamin (ONE A DAY) tablet Take 1 Tab by mouth daily. Yes Provider, Historical   Lancets (ACCU-CHEK SOFTCLIX LANCETS) misc Use 1 daily for blood glucose monitoring DX: E11.9 6/1/18  Yes Osmani Ritchie MD   Blood-Glucose Meter (ACCU-CHEK HALIMA PLUS METER) misc Use 1 daily for blood glucose monitoring DX: E11.9 6/1/18  Yes Osmani Ritchie MD   glucose blood VI test strips (ONETOUCH ULTRA TEST) strip Check twice daily 5/22/18  Yes Osmani Ritchie MD   tadalafil (CIALIS) 20 mg tablet Take 1 Tab by mouth daily as needed. 12/21/17  Yes Dixon Barba MD   CINNAMON BARK (CINNAMON PO) Take  by mouth. Yes Provider, Historical   TURMERIC ROOT EXTRACT PO Take  by mouth.    Yes Provider, Historical   GINGER ROOT (SHOBHA EXTRACT PO) Take  by mouth. Yes Provider, Historical   Insulin Needles, Disposable, (BD INSULIN PEN NEEDLE UF MINI) 31 gauge x 3/16\" ndle Check twice daily 2/8/17  Yes Dinora Covington MD   cyanocobalamin (VITAMIN B-12) 1,000 mcg Subl Take 1,000 mcg by mouth daily. Yes Provider, Historical   aspirin 81 mg chewable tablet Take 81 mg by mouth daily. Yes Provider, Historical     No Known Allergies    Patient Active Problem List    Diagnosis Date Noted    Advance directive discussed with patient 05/31/2017    Abnormal findings on esophagogastroduodenoscopy (EGD)/ done on 8/1/16. gunner's esophagus . f/u EGD due in 3 years will be 2019 08/02/2016    Cubital tunnel syndrome on left 01/12/2016    Elevated PSA 10/23/2015    Erectile dysfunction 07/08/2015    S/P colonoscopy with polypectomy/ follwoing GI. done in 2013. stage 4 esophagitis on EGD done in 2013. recently seen GI. Dr. Steve Bro on 7/7/16 06/24/2015    Bladder cancer ([facility] [address]     Bladder tumor     Chest pain, unspecified 04/24/2013    Essential hypertension, benign 04/24/2013    Type 2 diabetes mellitus without complication ([facility] [address] 81/54/5261    Mixed hyperlipidemia 04/24/2013     Current Outpatient Medications   Medication Sig Dispense Refill    glipiZIDE (GLUCOTROL) 5 mg tablet TAKE 1 TABLET EVERY DAY 90 Tab 0    BD Ultra-Fine Short Pen Needle 31 gauge x 5/16\" ndle USE  TO INJECT ONE TIME DAILY 1 Package 5    tamsulosin (FLOMAX) 0.4 mg capsule TAKE 1 CAPSULE EVERY DAY 90 Cap 1    finasteride (PROSCAR) 5 mg tablet Take 1 Tab by mouth daily.  90 Tab 3    omeprazole (PRILOSEC) 40 mg capsule TAKE 1 CAPSULE EVERY DAY 90 Cap 1    pioglitazone (ACTOS) 30 mg tablet TAKE 1 TABLET EVERY DAY 90 Tab 1    ACCU-CHEK HALIMA PLUS TEST STRP strip USE DAILY FOR BLOOD GLUCOSE MONITORING  100 Strip 5    losartan-hydroCHLOROthiazide (HYZAAR) 100-25 mg per tablet TAKE 1 TABLET EVERY DAY 90 Tab 1    metFORMIN ER (GLUCOPHAGE XR) 500 mg tablet Take 2 Tabs by mouth two (2) times a day. 1,000 mg  Tab 0    atorvastatin (LIPITOR) 10 mg tablet Take 1 Tab by mouth nightly. 90 Tab 1    SITagliptin (JANUVIA) 100 mg tablet Take 1 Tab by mouth daily. 90 Tab 1    insulin glargine (LANTUS SOLOSTAR U-100 INSULIN) 100 unit/mL (3 mL) inpn INJECT  10 UNITS SUBCUTANEOUSLY AT BEDTIME. DISCARD AND BEGIN NEW PEN AFTER 28 DAYS. 5 Pen 3    CALCIUM PO Take  by mouth.  glucose blood VI test strips (ACCU-CHEK HALIMA PLUS TEST STRP) strip Use 1 daily for blood glucose monitoring DX: E11.9 300 Strip 1    cholecalciferol, vitamin D3, (VITAMIN D3 PO) Take  by mouth.  multivitamin (ONE A DAY) tablet Take 1 Tab by mouth daily.  Lancets (ACCU-CHEK SOFTCLIX LANCETS) misc Use 1 daily for blood glucose monitoring DX: E11.9 100 Each 3    Blood-Glucose Meter (ACCU-CHEK HALIMA PLUS METER) misc Use 1 daily for blood glucose monitoring DX: E11.9 1 Each 0    glucose blood VI test strips (ONETOUCH ULTRA TEST) strip Check twice daily 100 Strip 6    tadalafil (CIALIS) 20 mg tablet Take 1 Tab by mouth daily as needed. 6 Tab 3    CINNAMON BARK (CINNAMON PO) Take  by mouth.  TURMERIC ROOT EXTRACT PO Take  by mouth.  GINGER ROOT (GINGER EXTRACT PO) Take  by mouth.  Insulin Needles, Disposable, (BD INSULIN PEN NEEDLE UF MINI) 31 gauge x 3/16\" ndle Check twice daily 100 Pen Needle 1    cyanocobalamin (VITAMIN B-12) 1,000 mcg Subl Take 1,000 mcg by mouth daily.  aspirin 81 mg chewable tablet Take 81 mg by mouth daily.        No Known Allergies  Past Medical History:   Diagnosis Date    Bladder cancer ([facility] [address] 7/15/13    Clinical Stage TaN0M0 HG UC    Bladder tumor     Diabetes ([facility] [address]     Essential hypertension     History of kidney stones     Hyperlipidemia     Peripheral neuropathy     SBO (small bowel obstruction) (HCC)     Spinal stenosis     Spondylolisthesis, grade 1      Social History     Tobacco Use    Smoking status: Never Smoker    Smokeless tobacco: Never Used   Substance Use Topics    Alcohol use: Yes     Frequency: 2-4 times a month     Drinks per session: 1 or 2     Binge frequency: Never     Comment: 1-2 drinks/day       ROS: See HPI      Objective: There were no vitals taken for this visit. General: alert, cooperative, no distress   Mental  status: normal mood, behavior, speech, dress, motor activity, and thought processes, able to follow commands   HENT: NCAT   Neck: no visualized mass   Resp: no respiratory distress   Neuro: no gross deficits   Skin: no discoloration or lesions of concern on visible areas   Psychiatric: normal affect, consistent with stated mood, no evidence of hallucinations     Additional exam findings: We discussed the expected course, resolution and complications of the diagnosis(es) in detail. Medication risks, benefits, costs, interactions, and alternatives were discussed as indicated. I advised him to contact the office if his condition worsens, changes or fails to improve as anticipated. He expressed understanding with the diagnosis(es) and plan. Dereje Smith is a 78 y.o. male who was evaluated by a video visit encounter for concerns as above. Patient identification was verified prior to start of the visit. A caregiver was present when appropriate. Due to this being a TeleHealth encounter (During [facility]- public health emergency), evaluation of the following organ systems was limited: Vitals/Constitutional/EENT/Resp/CV/GI//MS/Neuro/Skin/Heme-Lymph-Imm. Pursuant to the emergency declaration under the [address] [place] [address], [facility] waiver authority and the [employer], this Virtual  Visit was conducted, with patient's (and/or legal guardian's) consent, to reduce the patient's risk of exposure to COVID-19 and provide necessary medical care.      Services were provided through a video synchronous discussion virtually to substitute for in-person clinic visit. Patient and provider were located at their individual homes.       Flaco Goss MD

## 2020-06-03 DIAGNOSIS — E11.9 TYPE 2 DIABETES MELLITUS WITHOUT COMPLICATION, WITH LONG-TERM CURRENT USE OF INSULIN (HCC): ICD-10-CM

## 2020-06-03 DIAGNOSIS — Z79.4 TYPE 2 DIABETES MELLITUS WITHOUT COMPLICATION, WITH LONG-TERM CURRENT USE OF INSULIN (HCC): ICD-10-CM

## 2020-06-03 DIAGNOSIS — I10 ESSENTIAL HYPERTENSION: ICD-10-CM

## 2020-06-04 RX ORDER — LOSARTAN POTASSIUM AND HYDROCHLOROTHIAZIDE 25; 100 MG/1; MG/1
TABLET ORAL
Qty: 90 TAB | Refills: 1 | Status: SHIPPED | OUTPATIENT
Start: 2020-06-04 | End: 2020-08-19 | Stop reason: ALTCHOICE

## 2020-06-24 ENCOUNTER — OFFICE VISIT (OUTPATIENT)
Dept: CARDIOLOGY CLINIC | Age: 70
End: 2020-06-24

## 2020-06-24 VITALS
HEIGHT: 66 IN | HEART RATE: 73 BPM | SYSTOLIC BLOOD PRESSURE: 124 MMHG | DIASTOLIC BLOOD PRESSURE: 77 MMHG | TEMPERATURE: 98.6 F | BODY MASS INDEX: 23.5 KG/M2 | WEIGHT: 146.2 LBS

## 2020-06-24 DIAGNOSIS — E78.2 MIXED HYPERLIPIDEMIA: ICD-10-CM

## 2020-06-24 DIAGNOSIS — I10 ESSENTIAL HYPERTENSION, BENIGN: Primary | ICD-10-CM

## 2020-06-24 DIAGNOSIS — E11.9 TYPE 2 DIABETES MELLITUS WITHOUT COMPLICATION, WITH LONG-TERM CURRENT USE OF INSULIN (HCC): ICD-10-CM

## 2020-06-24 DIAGNOSIS — R60.0 BILATERAL LEG EDEMA: ICD-10-CM

## 2020-06-24 DIAGNOSIS — Z79.4 TYPE 2 DIABETES MELLITUS WITHOUT COMPLICATION, WITH LONG-TERM CURRENT USE OF INSULIN (HCC): ICD-10-CM

## 2020-06-24 NOTE — PROGRESS NOTES
1. Have you been to the ER, urgent care clinic since your last visit? Hospitalized since your last visit?  no    2. Have you seen or consulted any other health care providers outside of the [address] [place] [address] since your last visit? Include any pap smears or colon screening. No     3. Since your last visit, have you had any of the following symptoms?  no

## 2020-06-24 NOTE — PROGRESS NOTES
HISTORY OF PRESENT ILLNESS  Estevan Corley is a 78 y.o. male. Patient with htn,dm,hyperlipidemia. On follow up patient denies any chest pains,sob, palpitation or other significant symptoms. 6/2020  Patient seen today for follow-up. He is complaining of increased leg edema. Denies any shortness of breath or chest pain. Continues to be on current medication he has frequent urination. Hypertension   The history is provided by the patient. This is a chronic problem. The problem occurs constantly. The problem has not changed since onset. Pertinent negatives include no chest pain, no abdominal pain, no headaches and no shortness of breath. Cholesterol Problem   The history is provided by the patient. This is a chronic problem. The problem occurs constantly. Pertinent negatives include no chest pain, no abdominal pain, no headaches and no shortness of breath. Review of Systems   Constitutional: Negative for chills and fever. HENT: Negative for nosebleeds. Eyes: Negative for blurred vision and double vision. Respiratory: Negative for cough, hemoptysis, sputum production, shortness of breath and wheezing. Cardiovascular: Positive for leg swelling. Negative for chest pain, palpitations, orthopnea, claudication and PND. Gastrointestinal: Negative for abdominal pain, heartburn, nausea and vomiting. Musculoskeletal: Negative for myalgias. Skin: Negative for rash. Neurological: Negative for dizziness, weakness and headaches. Endo/Heme/Allergies: Does not bruise/bleed easily.      Family History   Problem Relation Age of Onset    Hypertension Mother     Heart Attack Neg Hx     Sudden Death Neg Hx        Past Medical History:   Diagnosis Date    Bladder cancer ([facility] [address] 7/15/13    Clinical Stage TaN0M0 HG UC    Bladder tumor     Diabetes ([facility] [address]     Essential hypertension     History of kidney stones     Hyperlipidemia     Peripheral neuropathy     SBO (small bowel obstruction) ([facility] [address]     Spinal stenosis     Spondylolisthesis, grade 1        Past Surgical History:   Procedure Laterality Date    HX UROLOGICAL  7/15/13    [facility] Opal [facility], [facility]    HX UROLOGICAL  1/4/16    Cysto, [facility] Opal [facility], [facility]    HX WRIST FRACTURE [place]  4/27/15    (L) wrist       No Known Allergies    Current Outpatient Medications   Medication Sig    trospium (SANCTURA) 20 mg tablet Take 1 Tab by mouth Before breakfast and dinner.  losartan-hydroCHLOROthiazide (HYZAAR) 100-25 mg per tablet TAKE 1 TABLET EVERY DAY    glipiZIDE (GLUCOTROL) 5 mg tablet TAKE 1 TABLET EVERY DAY    BD Ultra-Fine Short Pen Needle 31 gauge x 5/16\" ndle USE  TO INJECT ONE TIME DAILY    tamsulosin (FLOMAX) 0.4 mg capsule TAKE 1 CAPSULE EVERY DAY    finasteride (PROSCAR) 5 mg tablet Take 1 Tab by mouth daily.  omeprazole (PRILOSEC) 40 mg capsule TAKE 1 CAPSULE EVERY DAY    pioglitazone (ACTOS) 30 mg tablet TAKE 1 TABLET EVERY DAY    ACCU-CHEK HALIMA PLUS TEST STRP strip USE DAILY FOR BLOOD GLUCOSE MONITORING     metFORMIN ER (GLUCOPHAGE XR) 500 mg tablet Take 2 Tabs by mouth two (2) times a day. 1,000 mg BID    atorvastatin (LIPITOR) 10 mg tablet Take 1 Tab by mouth nightly.  SITagliptin (JANUVIA) 100 mg tablet Take 1 Tab by mouth daily.  insulin glargine (LANTUS SOLOSTAR U-100 INSULIN) 100 unit/mL (3 mL) inpn INJECT  10 UNITS SUBCUTANEOUSLY AT BEDTIME. DISCARD AND BEGIN NEW PEN AFTER 28 DAYS.  CALCIUM PO Take  by mouth.  glucose blood VI test strips (ACCU-CHEK HALIMA PLUS TEST STRP) strip Use 1 daily for blood glucose monitoring DX: E11.9    cholecalciferol, vitamin D3, (VITAMIN D3 PO) Take  by mouth.  multivitamin (ONE A DAY) tablet Take 1 Tab by mouth daily.     Lancets (ACCU-CHEK SOFTCLIX LANCETS) misc Use 1 daily for blood glucose monitoring DX: E11.9    Blood-Glucose Meter (ACCU-CHEK HALIMA PLUS METER) misc Use 1 daily for blood glucose monitoring DX: E11.9    glucose blood VI test strips (ONETOUCH ULTRA TEST) strip Check twice daily    tadalafil (CIALIS) 20 mg tablet Take 1 Tab by mouth daily as needed.  CINNAMON BARK (CINNAMON PO) Take  by mouth.  TURMERIC ROOT EXTRACT PO Take  by mouth.  GINGER ROOT (GINGER EXTRACT PO) Take  by mouth.  Insulin Needles, Disposable, (BD INSULIN PEN NEEDLE UF MINI) 31 gauge x 3/16\" ndle Check twice daily    cyanocobalamin (VITAMIN B-12) 1,000 mcg Subl Take 1,000 mcg by mouth daily.  aspirin 81 mg chewable tablet Take 81 mg by mouth daily. No current facility-administered medications for this visit. Visit Vitals  /77   Pulse 73   Temp 98.6 °F (37 °C)   Ht 5' 6\" (1.676 m)   Wt 66.3 kg (146 lb 3.2 oz)   BMI 23.60 kg/m²         Physical Exam   Constitutional: He is oriented to person, place, and time. He appears well-developed and well-nourished. HENT:   Head: Normocephalic and atraumatic. Eyes: Conjunctivae are normal.   Neck: Neck supple. No JVD present. No tracheal deviation present. No thyromegaly present. Cardiovascular: Normal rate, regular rhythm and normal heart sounds. Exam reveals no gallop and no friction rub. No murmur heard. Pulmonary/Chest: Breath sounds normal. No respiratory distress. He has no wheezes. He has no rales. He exhibits no tenderness. Abdominal: Soft. There is no abdominal tenderness. Musculoskeletal:         General: No edema. Neurological: He is alert and oriented to person, place, and time. Skin: Skin is warm and dry. Psychiatric: He has a normal mood and affect. Mr. Guy Curran has a reminder for a \"due or due soon\" health maintenance. I have asked that he contact his primary care provider for follow-up on this health maintenance. Conclusion: 4/2013:stress test  1. Normal perfusion scan. 2. Normal wall motion and ejection fraction. I Have personally reviewed recent relevant labs available and discussed with patient  3/2018  Assessment       ICD-10-CM ICD-9-CM    1.  Essential hypertension, benign I10 401.1 ECHO ADULT COMPLETE    Stable continue current treatment   2. Mixed hyperlipidemia E78.2 272.2     Continue treatment follow-up lab with PCP   3. Type 2 diabetes mellitus without complication, with long-term current use of insulin (HCC) E11.9 250.00     Z79.4 V58.67     Continue current treatment lab with PCP   4. Bilateral leg edema R60.0 782.3 ECHO ADULT COMPLETE    Recent increase edema. Currently on HCTZ continue to monitor check echo       There are no discontinued medications. Orders Placed This Encounter    ECHO ADULT COMPLETE     Standing Status:   Future     Standing Expiration Date:   6/24/2021     Order Specific Question:   Contrast Enhancement (Bubble Study, Definity, Optison) may be used if criteria listed in established evidence-based protocol has been identified. Answer:   Yes       Follow-up and Dispositions    · Return for F/u after tests.

## 2020-06-25 LAB
ALBUMIN SERPL-MCNC: 4.2 G/DL (ref 3.8–4.8)
ALBUMIN/CREAT UR: 12 MG/G CREAT (ref 0–29)
ALBUMIN/GLOB SERPL: 1.8 {RATIO} (ref 1.2–2.2)
ALP SERPL-CCNC: 50 IU/L (ref 39–117)
ALT SERPL-CCNC: 14 IU/L (ref 0–44)
AST SERPL-CCNC: 18 IU/L (ref 0–40)
BILIRUB SERPL-MCNC: 0.3 MG/DL (ref 0–1.2)
BUN SERPL-MCNC: 11 MG/DL (ref 8–27)
BUN/CREAT SERPL: 14 (ref 10–24)
CALCIUM SERPL-MCNC: 9.2 MG/DL (ref 8.6–10.2)
CHLORIDE SERPL-SCNC: 107 MMOL/L (ref 96–106)
CHOLEST SERPL-MCNC: 144 MG/DL (ref 100–199)
CO2 SERPL-SCNC: 22 MMOL/L (ref 20–29)
CREAT SERPL-MCNC: 0.77 MG/DL (ref 0.76–1.27)
CREAT UR-MCNC: 112.1 MG/DL
ERYTHROCYTE [DISTWIDTH] IN BLOOD BY AUTOMATED COUNT: 17.3 % (ref 11.6–15.4)
EST. AVERAGE GLUCOSE BLD GHB EST-MCNC: 160 MG/DL
GLOBULIN SER CALC-MCNC: 2.3 G/DL (ref 1.5–4.5)
GLUCOSE SERPL-MCNC: 108 MG/DL (ref 65–99)
HBA1C MFR BLD: 7.2 % (ref 4.8–5.6)
HCT VFR BLD AUTO: 29 % (ref 37.5–51)
HDLC SERPL-MCNC: 58 MG/DL
HGB BLD-MCNC: 8.3 G/DL (ref 13–17.7)
INTERPRETATION, 910389: NORMAL
LDLC SERPL CALC-MCNC: 74 MG/DL (ref 0–99)
Lab: NORMAL
MCH RBC QN AUTO: 22.9 PG (ref 26.6–33)
MCHC RBC AUTO-ENTMCNC: 28.6 G/DL (ref 31.5–35.7)
MCV RBC AUTO: 80 FL (ref 79–97)
MICROALBUMIN UR-MCNC: 13 UG/ML
PLATELET # BLD AUTO: 325 X10E3/UL (ref 150–450)
POTASSIUM SERPL-SCNC: 4.5 MMOL/L (ref 3.5–5.2)
PROT SERPL-MCNC: 6.5 G/DL (ref 6–8.5)
RBC # BLD AUTO: 3.62 X10E6/UL (ref 4.14–5.8)
SODIUM SERPL-SCNC: 142 MMOL/L (ref 134–144)
TRIGL SERPL-MCNC: 61 MG/DL (ref 0–149)
TSH SERPL DL<=0.005 MIU/L-ACNC: 1.88 UIU/ML (ref 0.45–4.5)
VLDLC SERPL CALC-MCNC: 12 MG/DL (ref 5–40)
WBC # BLD AUTO: 6.5 X10E3/UL (ref 3.4–10.8)

## 2020-06-26 NOTE — PROGRESS NOTES
Low H & H. Need to find out time frame to recheck colonoscopy from GI. Sent staff message. Please look in to it. Also ask pt that any blood in urine or bowel movement? ? He needs to schedule an appt to discuss result further. Please obtain info before his visit. Thanks. Take multivitamin and iron rich diet mean time.

## 2020-06-29 ENCOUNTER — TELEPHONE (OUTPATIENT)
Dept: FAMILY MEDICINE CLINIC | Age: 70
End: 2020-06-29

## 2020-06-29 NOTE — TELEPHONE ENCOUNTER
----- Message from David Benjamin MD sent at 6/26/2020 12:23 PM EDT -----  I see pathology results on colonoscopy in the chart but can we find out what the recommendations to repeat colonoscopy? ? He had EGD repeat in 2019 but not sure what was the time frame to repeat colonoscopy. Please find out. Thanks.    Pradeep Anne

## 2020-07-02 DIAGNOSIS — K21.9 GASTROESOPHAGEAL REFLUX DISEASE WITHOUT ESOPHAGITIS: ICD-10-CM

## 2020-07-02 DIAGNOSIS — Z79.4 TYPE 2 DIABETES MELLITUS WITHOUT COMPLICATION, WITH LONG-TERM CURRENT USE OF INSULIN (HCC): ICD-10-CM

## 2020-07-02 DIAGNOSIS — E11.9 TYPE 2 DIABETES MELLITUS WITHOUT COMPLICATION, WITH LONG-TERM CURRENT USE OF INSULIN (HCC): ICD-10-CM

## 2020-07-02 NOTE — PROGRESS NOTES
[phone] 2 patient identifiers verified with Mr. Louise Hou. Mr. Louise Hou was advised that his labs showed low H and H. (8.3/29.0) When ask Mr. Louise Hou confirmed that he has not notice blood in urine or bowel movement. He was advised to continue with taking  multivitamin and iron rich diet in the mean time (voice understanding). Mr. Louise Hou was informed that we have spoken with GI ([facility]-Dr. Chambers office) and that he is overdue for recall on colonoscopy since August of 2018, stress the importance of repeating colonoscopy and (given number to call today [phone]). Tried scheduling a follow up with Dr. Lisa Gonzalez to discuss further and his request was to send all information so that he can read it via email/my-chart.  All information sent via my-chart per Mr. Louise Hou request.

## 2020-07-06 ENCOUNTER — TELEPHONE (OUTPATIENT)
Dept: FAMILY MEDICINE CLINIC | Age: 70
End: 2020-07-06

## 2020-07-06 RX ORDER — OMEPRAZOLE 40 MG/1
CAPSULE, DELAYED RELEASE ORAL
Qty: 90 CAP | Refills: 1 | Status: SHIPPED | OUTPATIENT
Start: 2020-07-06 | End: 2020-11-16

## 2020-07-06 RX ORDER — PIOGLITAZONEHYDROCHLORIDE 30 MG/1
TABLET ORAL
Qty: 90 TAB | Refills: 1 | Status: SHIPPED | OUTPATIENT
Start: 2020-07-06 | End: 2020-11-16

## 2020-07-08 NOTE — TELEPHONE ENCOUNTER
Pt sent email through [employer] appt request, requesting a  referral to GI for colonoscopy regarding anemia. Please advise.

## 2020-07-13 DIAGNOSIS — Z79.4 TYPE 2 DIABETES MELLITUS WITHOUT COMPLICATION, WITH LONG-TERM CURRENT USE OF INSULIN (HCC): ICD-10-CM

## 2020-07-13 DIAGNOSIS — E11.9 TYPE 2 DIABETES MELLITUS WITHOUT COMPLICATION, WITH LONG-TERM CURRENT USE OF INSULIN (HCC): ICD-10-CM

## 2020-07-13 RX ORDER — ATORVASTATIN CALCIUM 10 MG/1
10 TABLET, FILM COATED ORAL
Qty: 90 TAB | Refills: 1 | Status: SHIPPED | OUTPATIENT
Start: 2020-07-13 | End: 2020-11-19 | Stop reason: SDUPTHER

## 2020-07-13 NOTE — PROGRESS NOTES
Spoke with him. No unusual fatigue. Appetite fair. Last A1C 7.2, has lost weight over few months almost 10 lbs but thought working hard at work due to less employee due to pandemic situation. No blood in urine or bowel movement. Nurse to advise to obtain sooner GI appt. He is taking iron supplement. Asking for statin refill which was given. He has already stopped aspirin.

## 2020-07-13 NOTE — TELEPHONE ENCOUNTER
07- Spoke with Dr. Alexis Castellanos (GI), they are waiting for Dr. Alexis Castellanos to give the approval to schedule for direct colonoscopy or office visit first due to low hemoglobin. Will call Mr. Mallika Beth once Dr. Alexis Castellanos give them an answer.

## 2020-07-16 ENCOUNTER — HOSPITAL ENCOUNTER (OUTPATIENT)
Dept: CT IMAGING | Age: 70
Discharge: HOME | End: 2020-07-16
Attending: FAMILY MEDICINE
Payer: MEDICARE

## 2020-07-16 DIAGNOSIS — R91.1 LUNG NODULE: ICD-10-CM

## 2020-07-16 PROCEDURE — 71250 CT THORAX DX C-: CPT

## 2020-07-17 NOTE — PROGRESS NOTES
Please let patient know that CT chest showed stable nodule. No acute findings. Patient was waiting for GI appointment to get seen for need for colonoscopy for recent anemia on labs. Please check with patient while calling regarding appointment.   Thanks

## 2020-07-20 DIAGNOSIS — D50.8 OTHER IRON DEFICIENCY ANEMIA: Primary | ICD-10-CM

## 2020-07-20 DIAGNOSIS — Z12.11 SCREENING FOR COLON CANCER: ICD-10-CM

## 2020-07-20 NOTE — PROGRESS NOTES
[phone] (home) 2 patient identifiers verified with Mr. Tamika Shepard, he was advised that Dr. Mariann Herndon reviewed CT of Chest and it showed nodule is stable and no acute findings. Also inquired about GI appointment and MR. Tamika Shepard states he has received call stating that Dr. Claudia yTler does not participate with his [facility], but they are waiting to see if Dr. Claudia Tyler will still make an exception in seeing him. Called [facility] to very this at [phone] spoke with office and they are waiting on Dr. Claudia Tyler to state he will or will not see Mr. Tamika Shepard. Mr. Tamika Shepard would also like Dr. Mariann Herndon to make his CT of Chest results available on his my-chart for him to view.

## 2020-07-20 NOTE — PROGRESS NOTES
It should be available for him to review Ct chest result. I have done a new GI referral in case he needs one. Thank you for working with him regarding GI appt. Please see if anyone else can see as it would be an insurance issue and I do not think any physician would able to make much exception.

## 2020-08-05 ENCOUNTER — OFFICE VISIT (OUTPATIENT)
Dept: FAMILY MEDICINE CLINIC | Age: 70
End: 2020-08-05

## 2020-08-05 ENCOUNTER — TELEPHONE (OUTPATIENT)
Dept: FAMILY MEDICINE CLINIC | Age: 70
End: 2020-08-05

## 2020-08-05 VITALS
TEMPERATURE: 98.1 F | WEIGHT: 147 LBS | SYSTOLIC BLOOD PRESSURE: 110 MMHG | DIASTOLIC BLOOD PRESSURE: 60 MMHG | RESPIRATION RATE: 16 BRPM | OXYGEN SATURATION: 99 % | HEIGHT: 66 IN | HEART RATE: 66 BPM | BODY MASS INDEX: 23.63 KG/M2

## 2020-08-05 DIAGNOSIS — E11.9 TYPE 2 DIABETES MELLITUS WITHOUT COMPLICATION, WITH LONG-TERM CURRENT USE OF INSULIN (HCC): ICD-10-CM

## 2020-08-05 DIAGNOSIS — Z85.51 PERSONAL HISTORY OF BLADDER CANCER: ICD-10-CM

## 2020-08-05 DIAGNOSIS — Z79.4 TYPE 2 DIABETES MELLITUS WITHOUT COMPLICATION, WITH LONG-TERM CURRENT USE OF INSULIN (HCC): ICD-10-CM

## 2020-08-05 DIAGNOSIS — D50.8 OTHER IRON DEFICIENCY ANEMIA: Primary | ICD-10-CM

## 2020-08-05 RX ORDER — LANOLIN ALCOHOL/MO/W.PET/CERES
CREAM (GRAM) TOPICAL
COMMUNITY
End: 2021-08-10

## 2020-08-05 NOTE — PROGRESS NOTES
Chief Complaint   Patient presents with    Diabetes    Hypertension    Cholesterol Problem     1. Have you been to the ER, urgent care clinic since your last visit? Hospitalized since your last visit? No    2. Have you seen or consulted any other health care providers outside of the [address] [place] [address] since your last visit? Include any pap smears or colon screening.  No

## 2020-08-05 NOTE — PROGRESS NOTES
HISTORY OF PRESENT ILLNESS  Estevan Quintero is a 78 y.o. male. HPI: Here for follow-up after lab results. Had a low H&H. He has started taking iron supplement and stop the aspirin. Denies any blood in bowel movement or any blood in urine. Said appetite is fair. He has lost a more than 10 pounds over more than a year time. Denies any unusual fatigue. No night sweats. No cold cough or fever. No wheezing. Had a concern with the lung nodule and repeat CAT scan was done showed no acute concern. He is overdue for colonoscopy a year ago. Now has an appointment with Dr. Clarke Bundy  to repeat it. See details in assessment and plan. No headache or dizziness, no chest pain or shortness of breath, no palpitation or diaphoresis. No nausea vomiting or abdominal pain. No urinary or bowel complaints. No mood changes. No appetite changes. No extremity weakness. History of diabetes. Following Endo. No hypo-or hyperglycemic symptoms. Sitting comfortable and did not appear in any acute distress during the visit. Personal history of bladder cancer. Treated with BCG. Currently on surveillance. Following Dr. Michael Lanier. No new recommendations at this time. No urinary complaints at this time. Visit Vitals  /60 (BP 1 Location: Left arm, BP Patient Position: Sitting)   Pulse 66   Temp 98.1 °F (36.7 °C) (Oral)   Resp 16   Ht 5' 6\" (1.676 m)   Wt 147 lb (66.7 kg)   SpO2 99%   BMI 23.73 kg/m²     Review medication list, vitals, problem list,allergies.    Lab Results   Component Value Date/Time    WBC 6.5 06/24/2020 08:52 AM    HGB 8.3 (L) 06/24/2020 08:52 AM    HCT 29.0 (L) 06/24/2020 08:52 AM    PLATELET 747 00/74/2031 08:52 AM    MCV 80 06/24/2020 08:52 AM     Lab Results   Component Value Date/Time    Sodium 142 06/24/2020 08:52 AM    Potassium 4.5 06/24/2020 08:52 AM    Chloride 107 (H) 06/24/2020 08:52 AM    CO2 22 06/24/2020 08:52 AM    Anion gap 7 12/16/2019 02:51 PM    Glucose 108 (H) 06/24/2020 08:52 AM BUN 11 06/24/2020 08:52 AM    Creatinine 0.77 06/24/2020 08:52 AM    BUN/Creatinine ratio 14 06/24/2020 08:52 AM    GFR est  06/24/2020 08:52 AM    GFR est non-AA 93 06/24/2020 08:52 AM    Calcium 9.2 06/24/2020 08:52 AM    Bilirubin, total 0.3 06/24/2020 08:52 AM    Alk. phosphatase 50 06/24/2020 08:52 AM    Protein, total 6.5 06/24/2020 08:52 AM    Albumin 4.2 06/24/2020 08:52 AM    Globulin 3.4 12/16/2019 02:51 PM    A-G Ratio 1.8 06/24/2020 08:52 AM    ALT (SGPT) 14 06/24/2020 08:52 AM    AST (SGOT) 18 06/24/2020 08:52 AM     Lab Results   Component Value Date/Time    Cholesterol, total 144 06/24/2020 08:52 AM    HDL Cholesterol 58 06/24/2020 08:52 AM    LDL, calculated 74 06/24/2020 08:52 AM    VLDL, calculated 12 06/24/2020 08:52 AM    Triglyceride 61 06/24/2020 08:52 AM    CHOL/HDL Ratio 2.7 06/28/2017 09:30 AM     Lab Results   Component Value Date/Time    TSH 1.880 06/24/2020 08:52 AM     Lab Results   Component Value Date/Time    Hemoglobin A1c 7.2 (H) 06/24/2020 08:52 AM    Hemoglobin A1c (POC) 8.6 08/13/2018 02:50 PM    Hemoglobin A1c, External 8.4 06/20/2016     Lab Results   Component Value Date/Time    Microalbumin/Creat ratio (mg/g creat) 12 06/28/2017 09:30 AM    Microalb/Creat ratio (ug/mg creat.) 12 06/24/2020 08:52 AM    Microalbumin,urine random 1.91 06/28/2017 09:30 AM         Lab Results   Component Value Date/Time    Prostate Specific Ag 2.0 06/16/2020 01:42 PM    Prostate Specific Ag 3.6 03/05/2020 12:55 PM    Prostate Specific Ag 4.0 04/11/2019 01:59 PM    Prostate Specific Ag 5.1 (H) 03/01/2018 11:35 AM       ROS: See HPI    Physical Exam  Constitutional:       General: He is not in acute distress. Cardiovascular:      Rate and Rhythm: Normal rate and regular rhythm. Heart sounds: Normal heart sounds. Abdominal:      General: Bowel sounds are normal.      Palpations: Abdomen is soft. Tenderness: There is no abdominal tenderness.    Neurological:      Mental Status: He is oriented to person, place, and time. Psychiatric:         Behavior: Behavior normal.         ASSESSMENT and PLAN    ICD-10-CM ICD-9-CM    1. Other iron deficiency anemia recently noted low H&H. He has lost 10 pounds in 1 and half year. Some with the diet control and some with the busy work schedule. For now given the iron supplement. We will repeat the iron profile and CBC. Meantime schedule for GI appointment to repeat the colonoscopy. Iron rich iron diet has been discussed D50.8 280.8 IRON PROFILE      CBC W/O DIFF   2. Type 2 diabetes mellitus without complication, with long-term current use of insulin ([facility] [address]): Elevated A1c. Fluctuating blood sugar. Today was under 120 at home. No hypo-or hyperglycemic symptoms. Following Endo. Will follow their instructions E11.9 250.00     Z79.4 V58.67    3. Personal history of bladder cancer: Review surgeon's recommendation. No new concern. Z85.51 V10.51    :  Patient understood and agree with above plan. He is going to keep appointment with Dr. Ruby Bush for colonoscopy. He is out of his network but wanted to still do repeat colonoscopy with them.   In future if he needs he would consider referral within his insurance company coverage  Nurse to advised to give information of gastroenterology group who comes under his insurance coverage if he changes the mind

## 2020-08-11 DIAGNOSIS — D50.9 IRON DEFICIENCY ANEMIA, UNSPECIFIED IRON DEFICIENCY ANEMIA TYPE: Primary | ICD-10-CM

## 2020-08-11 LAB
ERYTHROCYTE [DISTWIDTH] IN BLOOD BY AUTOMATED COUNT: 17.6 % (ref 11.6–15.4)
HCT VFR BLD AUTO: 33.9 % (ref 37.5–51)
HGB BLD-MCNC: 10.1 G/DL (ref 13–17.7)
IRON SATN MFR SERPL: 5 % (ref 15–55)
IRON SERPL-MCNC: 23 UG/DL (ref 38–169)
MCH RBC QN AUTO: 23.3 PG (ref 26.6–33)
MCHC RBC AUTO-ENTMCNC: 29.8 G/DL (ref 31.5–35.7)
MCV RBC AUTO: 78 FL (ref 79–97)
PLATELET # BLD AUTO: 350 X10E3/UL (ref 150–450)
RBC # BLD AUTO: 4.33 X10E6/UL (ref 4.14–5.8)
TIBC SERPL-MCNC: 451 UG/DL (ref 250–450)
UIBC SERPL-MCNC: 428 UG/DL (ref 111–343)
WBC # BLD AUTO: 6.8 X10E3/UL (ref 3.4–10.8)

## 2020-08-19 ENCOUNTER — OFFICE VISIT (OUTPATIENT)
Dept: CARDIOLOGY CLINIC | Age: 70
End: 2020-08-19

## 2020-08-19 VITALS
BODY MASS INDEX: 23.46 KG/M2 | HEART RATE: 69 BPM | HEIGHT: 66 IN | SYSTOLIC BLOOD PRESSURE: 120 MMHG | OXYGEN SATURATION: 99 % | DIASTOLIC BLOOD PRESSURE: 68 MMHG | WEIGHT: 146 LBS

## 2020-08-19 DIAGNOSIS — I10 ESSENTIAL HYPERTENSION, BENIGN: Primary | ICD-10-CM

## 2020-08-19 DIAGNOSIS — E78.2 MIXED HYPERLIPIDEMIA: ICD-10-CM

## 2020-08-19 DIAGNOSIS — R60.0 BILATERAL LEG EDEMA: ICD-10-CM

## 2020-08-19 RX ORDER — VALSARTAN 320 MG/1
320 TABLET ORAL DAILY
Qty: 90 TAB | Refills: 3 | Status: SHIPPED | OUTPATIENT
Start: 2020-08-19 | End: 2021-06-08

## 2020-08-19 NOTE — PROGRESS NOTES
1. Have you been to the ER, urgent care clinic since your last visit? Hospitalized since your last visit? No    2. Have you seen or consulted any other health care providers outside of the [address] [place] [address] since your last visit? Include any pap smears or colon screening. YES/ PCP-Routine Visit

## 2020-08-19 NOTE — PROGRESS NOTES
HISTORY OF PRESENT ILLNESS  Estevan García is a 78 y.o. male. Patient with htn,dm,hyperlipidemia. On follow up patient denies any chest pains,sob, palpitation or other significant symptoms. 6/2020  Patient seen today for follow-up. He is complaining of increased leg edema. Denies any shortness of breath or chest pain. Continues to be on current medication he has frequent urination. Cholesterol Problem   The history is provided by the patient. This is a chronic problem. The problem occurs constantly. Pertinent negatives include no chest pain, no abdominal pain, no headaches and no shortness of breath. Hypertension   The history is provided by the patient. This is a chronic problem. The problem occurs constantly. The problem has not changed since onset. Pertinent negatives include no chest pain, no abdominal pain, no headaches and no shortness of breath. Review of Systems   Constitutional: Negative for chills and fever. HENT: Negative for nosebleeds. Eyes: Negative for blurred vision and double vision. Respiratory: Negative for cough, hemoptysis, sputum production, shortness of breath and wheezing. Cardiovascular: Positive for leg swelling. Negative for chest pain, palpitations, orthopnea, claudication and PND. Gastrointestinal: Negative for abdominal pain, heartburn, nausea and vomiting. Musculoskeletal: Negative for myalgias. Skin: Negative for rash. Neurological: Negative for dizziness, weakness and headaches. Endo/Heme/Allergies: Does not bruise/bleed easily.      Family History   Problem Relation Age of Onset    Hypertension Mother     Heart Attack Neg Hx     Sudden Death Neg Hx        Past Medical History:   Diagnosis Date    Bladder cancer ([facility] [address] 7/15/13    Clinical Stage TaN0M0 HG UC    Bladder tumor     Diabetes ([facility] [address]     Essential hypertension     History of kidney stones     Hyperlipidemia     Peripheral neuropathy     SBO (small bowel obstruction) ([facility] [address]     Spinal stenosis     Spondylolisthesis, grade 1        Past Surgical History:   Procedure Laterality Date    HX UROLOGICAL  7/15/13    [facility]BT, [facility] Kristie [facility]guello, [facility]    HX UROLOGICAL  1/4/16    Cyst[facility], Dr. Kristie [facility]guello, [facility]    HX WRIST FRACTURE [address]  4/27/15    (L) wrist       No Known Allergies    Current Outpatient Medications   Medication Sig    ferrous sulfate (Iron) 325 mg (65 mg iron) tablet Take  by mouth Daily (before breakfast).  atorvastatin (LIPITOR) 10 mg tablet Take 1 Tab by mouth nightly.  pioglitazone (ACTOS) 30 mg tablet TAKE 1 TABLET EVERY DAY    omeprazole (PRILOSEC) 40 mg capsule TAKE 1 CAPSULE EVERY DAY    trospium (SANCTURA) 20 mg tablet Take 1 Tab by mouth Before breakfast and dinner.  losartan-hydroCHLOROthiazide (HYZAAR) 100-25 mg per tablet TAKE 1 TABLET EVERY DAY    glipiZIDE (GLUCOTROL) 5 mg tablet TAKE 1 TABLET EVERY DAY    BD Ultra-Fine Short Pen Needle 31 gauge x 5/16\" ndle USE  TO INJECT ONE TIME DAILY    tamsulosin (FLOMAX) 0.4 mg capsule TAKE 1 CAPSULE EVERY DAY    finasteride (PROSCAR) 5 mg tablet Take 1 Tab by mouth daily.  ACCU-CHEK HALIMA PLUS TEST STRP strip USE DAILY FOR BLOOD GLUCOSE MONITORING     metFORMIN ER (GLUCOPHAGE XR) 500 mg tablet Take 2 Tabs by mouth two (2) times a day. 1,000 mg BID    SITagliptin (JANUVIA) 100 mg tablet Take 1 Tab by mouth daily.  insulin glargine (LANTUS SOLOSTAR U-100 INSULIN) 100 unit/mL (3 mL) inpn INJECT  10 UNITS SUBCUTANEOUSLY AT BEDTIME. DISCARD AND BEGIN NEW PEN AFTER 28 DAYS.  CALCIUM PO Take  by mouth.  glucose blood VI test strips (ACCU-CHEK HALIMA PLUS TEST STRP) strip Use 1 daily for blood glucose monitoring DX: E11.9    cholecalciferol, vitamin D3, (VITAMIN D3 PO) Take  by mouth.  multivitamin (ONE A DAY) tablet Take 1 Tab by mouth daily.     Lancets (ACCU-CHEK SOFTCLIX LANCETS) misc Use 1 daily for blood glucose monitoring DX: E11.9    Blood-Glucose Meter (ACCU-CHEK HALIMA PLUS METER) misc Use 1 daily for blood glucose monitoring DX: E11.9    tadalafil (CIALIS) 20 mg tablet Take 1 Tab by mouth daily as needed.  CINNAMON BARK (CINNAMON PO) Take  by mouth.  TURMERIC ROOT EXTRACT PO Take  by mouth.  GINGER ROOT (GINGER EXTRACT PO) Take  by mouth.  Insulin Needles, Disposable, (BD INSULIN PEN NEEDLE UF MINI) 31 gauge x 3/16\" ndle Check twice daily    cyanocobalamin (VITAMIN B-12) 1,000 mcg Subl Take 1,000 mcg by mouth daily.  aspirin 81 mg chewable tablet Take 81 mg by mouth daily. No current facility-administered medications for this visit. Visit Vitals  /68 (BP 1 Location: Left arm, BP Patient Position: Sitting)   Pulse 69   Ht 5' 6\" (1.676 m)   Wt 66.2 kg (146 lb)   SpO2 99%   BMI 23.57 kg/m²         Physical Exam   Constitutional: He is oriented to person, place, and time. He appears well-developed and well-nourished. HENT:   Head: Normocephalic and atraumatic. Eyes: Conjunctivae are normal.   Neck: Neck supple. No JVD present. No tracheal deviation present. No thyromegaly present. Cardiovascular: Normal rate, regular rhythm and normal heart sounds. Exam reveals no gallop and no friction rub. No murmur heard. Pulmonary/Chest: Breath sounds normal. No respiratory distress. He has no wheezes. He has no rales. He exhibits no tenderness. Abdominal: Soft. There is no abdominal tenderness. Musculoskeletal:         General: No edema. Neurological: He is alert and oriented to person, place, and time. Skin: Skin is warm and dry. Psychiatric: He has a normal mood and affect. Mr. Fabricio Galeas has a reminder for a \"due or due soon\" health maintenance. I have asked that he contact his primary care provider for follow-up on this health maintenance. Conclusion: 4/2013:stress test  1. Normal perfusion scan. 2. Normal wall motion and ejection fraction.   I Have personally reviewed recent relevant labs available and discussed with patient  3/2018  Interpretation Summary 8/2020    · LV: Estimated LVEF is 55 - 60%. Normal cavity size, wall thickness and systolic function (ejection fraction normal). Wall motion: normal. Mild (grade 1) left ventricular diastolic dysfunction. · LA: Left Atrium volume index is 30.74 mL/m2. · RV: Normal right ventricular size and function. · No hemodynamically significant valvular pathology. Assessment       ICD-10-CM ICD-9-CM    1. Essential hypertension, benign  I10 401.1     Stable continue treatment monitor   2. Bilateral leg edema  R60.0 782.3     Monitor no pulmonary hypertension normal ejection fraction   3. Mixed hyperlipidemia  E78.2 272.2     Continue treatment lab with PCP   8/2020  Cardiac status stable. No evidence of pulmonary hypertension normal ejection fraction continue to monitor  Losartan HCTZ was discontinued due to frequent nighttime urination. I have changed that to valsartan 320 mg  There are no discontinued medications. No orders of the defined types were placed in this encounter. Follow-up and Dispositions    · Return in about 1 year (around 8/19/2021).

## 2020-11-09 ENCOUNTER — TRANSCRIBE ORDER (OUTPATIENT)
Dept: SCHEDULING | Age: 70
End: 2020-11-09

## 2020-11-09 DIAGNOSIS — R63.4 ABNORMAL WEIGHT LOSS: Primary | ICD-10-CM

## 2020-11-09 DIAGNOSIS — D50.0 BLOOD LOSS ANEMIA: ICD-10-CM

## 2020-11-09 DIAGNOSIS — R63.4 LOSING WEIGHT: Primary | ICD-10-CM

## 2020-11-11 ENCOUNTER — HOSPITAL ENCOUNTER (OUTPATIENT)
Dept: CT IMAGING | Age: 70
Discharge: HOME | End: 2020-11-11
Attending: INTERNAL MEDICINE
Payer: MEDICARE

## 2020-11-11 DIAGNOSIS — D50.0 BLOOD LOSS ANEMIA: ICD-10-CM

## 2020-11-11 DIAGNOSIS — R63.4 ABNORMAL WEIGHT LOSS: ICD-10-CM

## 2020-11-11 PROCEDURE — 74011000636 HC RX REV CODE- 636: Performed by: INTERNAL MEDICINE

## 2020-11-11 PROCEDURE — 74177 CT ABD & PELVIS W/CONTRAST: CPT

## 2020-11-11 RX ADMIN — IOPAMIDOL 100 ML: 612 INJECTION, SOLUTION INTRAVENOUS at 10:00

## 2020-11-12 DIAGNOSIS — E11.65 POORLY CONTROLLED DIABETES MELLITUS (HCC): ICD-10-CM

## 2020-11-12 DIAGNOSIS — Z79.4 TYPE 2 DIABETES MELLITUS WITHOUT COMPLICATION, WITH LONG-TERM CURRENT USE OF INSULIN (HCC): ICD-10-CM

## 2020-11-12 DIAGNOSIS — E11.9 TYPE 2 DIABETES MELLITUS WITHOUT COMPLICATION, WITH LONG-TERM CURRENT USE OF INSULIN (HCC): ICD-10-CM

## 2020-11-12 RX ORDER — GLIPIZIDE 5 MG/1
TABLET ORAL
Qty: 90 TAB | Refills: 1 | Status: SHIPPED | OUTPATIENT
Start: 2020-11-12 | End: 2022-06-29

## 2020-11-16 DIAGNOSIS — K21.9 GASTROESOPHAGEAL REFLUX DISEASE WITHOUT ESOPHAGITIS: ICD-10-CM

## 2020-11-16 DIAGNOSIS — R35.0 FREQUENCY OF URINATION: ICD-10-CM

## 2020-11-16 DIAGNOSIS — Z79.4 TYPE 2 DIABETES MELLITUS WITHOUT COMPLICATION, WITH LONG-TERM CURRENT USE OF INSULIN (HCC): ICD-10-CM

## 2020-11-16 DIAGNOSIS — E11.9 TYPE 2 DIABETES MELLITUS WITHOUT COMPLICATION, WITH LONG-TERM CURRENT USE OF INSULIN (HCC): ICD-10-CM

## 2020-11-16 RX ORDER — PIOGLITAZONEHYDROCHLORIDE 30 MG/1
TABLET ORAL
Qty: 90 TAB | Refills: 1 | Status: SHIPPED | OUTPATIENT
Start: 2020-11-16 | End: 2020-12-03

## 2020-11-16 RX ORDER — TAMSULOSIN HYDROCHLORIDE 0.4 MG/1
CAPSULE ORAL
Qty: 90 CAP | Refills: 1 | Status: SHIPPED | OUTPATIENT
Start: 2020-11-16 | End: 2021-04-08

## 2020-11-16 RX ORDER — OMEPRAZOLE 40 MG/1
CAPSULE, DELAYED RELEASE ORAL
Qty: 90 CAP | Refills: 1 | Status: SHIPPED | OUTPATIENT
Start: 2020-11-16 | End: 2021-04-08

## 2020-11-19 DIAGNOSIS — Z79.4 TYPE 2 DIABETES MELLITUS WITHOUT COMPLICATION, WITH LONG-TERM CURRENT USE OF INSULIN (HCC): ICD-10-CM

## 2020-11-19 DIAGNOSIS — E11.9 TYPE 2 DIABETES MELLITUS WITHOUT COMPLICATION, WITH LONG-TERM CURRENT USE OF INSULIN (HCC): ICD-10-CM

## 2020-11-19 RX ORDER — ATORVASTATIN CALCIUM 10 MG/1
TABLET, FILM COATED ORAL
Qty: 90 TAB | Refills: 1 | Status: SHIPPED | OUTPATIENT
Start: 2020-11-19 | End: 2021-04-08

## 2020-12-03 ENCOUNTER — TELEPHONE (OUTPATIENT)
Dept: FAMILY MEDICINE CLINIC | Age: 70
End: 2020-12-03

## 2020-12-03 NOTE — TELEPHONE ENCOUNTER
Received call from patient's endocrinologist, Dr. Collins Suresh. He indicated that he is currently following patient for his D> He reports that in 2019 he took patient off Actos 2' \"It can cause Bladder Cancer\". He reports that the patient was put back on Actos earlier this year and was unsure why. Patient also has personal history of Bladder cancer with recent recurrence and surgery. Dr. Collins Suresh stated that at this time he is DISCONTINUING ACTOS 2' to personal history of Bladder Cancer. He also indicated that he spoke iwht patient at length regarding multiple providers prescribing his medications. Dr. Collins Suresh indicated that he will manage patients diabetic medication going forward. The patient has been advised from Dr. Collins Suresh to only obtain his diabetic medications from their office. Any additional questions or if Dr. Odilon Cam needs to talk to Dr. Collins Suresh, his office number is [phone].

## 2020-12-04 NOTE — TELEPHONE ENCOUNTER
Spoke with pt. He has seen Dr. Yung Saleh since last 2 years. Per his conversation with me , it was hard time getting refill of mediation from his office so asked me as a PCP to refill his medication. Upon asking at that time he said endo stated continue actose and also was not said anything from urology as well so continued medication. Discussed with pt again regarding endo recommendations and now stopping actose. His diabetic medications will be filled by Dr. Yung Saleh office only.

## 2021-02-03 ENCOUNTER — HOSPITAL ENCOUNTER (OUTPATIENT)
Dept: ULTRASOUND IMAGING | Age: 71
Discharge: HOME | End: 2021-02-03
Attending: UROLOGY
Payer: MEDICARE

## 2021-02-03 DIAGNOSIS — R10.9 FLANK PAIN: ICD-10-CM

## 2021-02-03 DIAGNOSIS — N20.0 KIDNEY STONE: ICD-10-CM

## 2021-02-03 PROCEDURE — 76770 US EXAM ABDO BACK WALL COMP: CPT

## 2021-03-09 RX ORDER — BLOOD SUGAR DIAGNOSTIC
STRIP MISCELLANEOUS
Qty: 100 STRIP | Refills: 5 | Status: SHIPPED | OUTPATIENT
Start: 2021-03-09 | End: 2022-08-23

## 2021-04-06 LAB — HBA1C MFR BLD HPLC: 8 %

## 2021-05-04 NOTE — TELEPHONE ENCOUNTER
This pharmacy faxed over request for the following prescriptions to be filled:    Medication requested :  Requested Prescriptions     Pending Prescriptions Disp Refills    benzonatate (TESSALON) 100 mg capsule        Sig: Take 1 Cap by mouth three (3) times daily as needed for Cough for up to 7 days.        PCP: Saji Brito or Print: [address]  Mail order or Local pharmacy [phone]    Scheduled appointment if not seen by current providers in office: lov  4/15/21 nv 7/13/21

## 2021-05-05 RX ORDER — BENZONATATE 100 MG/1
100 CAPSULE ORAL
OUTPATIENT
Start: 2021-05-05 | End: 2021-05-12

## 2021-05-06 NOTE — TELEPHONE ENCOUNTER
This pharmacy faxed over request for the following prescriptions to be filled:    Medication requested :   Requested Prescriptions     Pending Prescriptions Disp Refills    benzonatate (TESSALON) 100 mg capsule        Sig: Take 1 Cap by mouth three (3) times daily as needed for Cough for up to 7 days.      PCP: Saji Brito or Print: [facility]  Mail order or Local pharmacy Mail Order [phone]    Scheduled appointment if not seen by current providers in office: lov 8/5/2020 f/u 5/11/2021    PT STATES HE IS OUT OF MEDICATION

## 2021-05-10 RX ORDER — BENZONATATE 100 MG/1
100 CAPSULE ORAL
OUTPATIENT
Start: 2021-05-10 | End: 2021-05-17

## 2021-05-11 ENCOUNTER — HOSPITAL ENCOUNTER (OUTPATIENT)
Dept: GENERAL RADIOLOGY | Age: 71
Discharge: HOME | End: 2021-05-11
Payer: MEDICARE

## 2021-05-11 ENCOUNTER — HOSPITAL ENCOUNTER (OUTPATIENT)
Dept: LAB | Age: 71
Discharge: HOME | End: 2021-05-11
Payer: MEDICARE

## 2021-05-11 ENCOUNTER — OFFICE VISIT (OUTPATIENT)
Dept: FAMILY MEDICINE CLINIC | Age: 71
End: 2021-05-11
Payer: MEDICARE

## 2021-05-11 VITALS
DIASTOLIC BLOOD PRESSURE: 80 MMHG | RESPIRATION RATE: 16 BRPM | OXYGEN SATURATION: 97 % | SYSTOLIC BLOOD PRESSURE: 138 MMHG | BODY MASS INDEX: 24.27 KG/M2 | HEART RATE: 76 BPM | WEIGHT: 151 LBS | TEMPERATURE: 98.7 F | HEIGHT: 66 IN

## 2021-05-11 DIAGNOSIS — E61.1 IRON DEFICIENCY: ICD-10-CM

## 2021-05-11 DIAGNOSIS — Z85.51 PERSONAL HISTORY OF BLADDER CANCER: ICD-10-CM

## 2021-05-11 DIAGNOSIS — I10 ESSENTIAL HYPERTENSION, BENIGN: ICD-10-CM

## 2021-05-11 DIAGNOSIS — M25.532 LEFT WRIST PAIN: ICD-10-CM

## 2021-05-11 DIAGNOSIS — E11.65 POORLY CONTROLLED DIABETES MELLITUS (HCC): Primary | ICD-10-CM

## 2021-05-11 DIAGNOSIS — E78.2 MIXED HYPERLIPIDEMIA: ICD-10-CM

## 2021-05-11 DIAGNOSIS — R97.20 ELEVATED PSA: ICD-10-CM

## 2021-05-11 DIAGNOSIS — Z00.00 MEDICARE ANNUAL WELLNESS VISIT, SUBSEQUENT: ICD-10-CM

## 2021-05-11 LAB
ERYTHROCYTE [DISTWIDTH] IN BLOOD BY AUTOMATED COUNT: 11.9 % (ref 11.6–14.5)
HCT VFR BLD AUTO: 42.2 % (ref 36–48)
HGB BLD-MCNC: 13.6 G/DL (ref 13–16)
IRON SATN MFR SERPL: 23 % (ref 20–50)
IRON SERPL-MCNC: 82 UG/DL (ref 50–175)
MCH RBC QN AUTO: 31.3 PG (ref 24–34)
MCHC RBC AUTO-ENTMCNC: 32.2 G/DL (ref 31–37)
MCV RBC AUTO: 97 FL (ref 74–97)
PLATELET # BLD AUTO: 305 K/UL (ref 135–420)
PMV BLD AUTO: 10 FL (ref 9.2–11.8)
RBC # BLD AUTO: 4.35 M/UL (ref 4.35–5.65)
TIBC SERPL-MCNC: 349 UG/DL (ref 250–450)
WBC # BLD AUTO: 8.5 K/UL (ref 4.6–13.2)

## 2021-05-11 PROCEDURE — 36415 COLL VENOUS BLD VENIPUNCTURE: CPT

## 2021-05-11 PROCEDURE — G8536 NO DOC ELDER MAL SCRN: HCPCS | Performed by: FAMILY MEDICINE

## 2021-05-11 PROCEDURE — G8427 DOCREV CUR MEDS BY ELIG CLIN: HCPCS | Performed by: FAMILY MEDICINE

## 2021-05-11 PROCEDURE — 83540 ASSAY OF IRON: CPT

## 2021-05-11 PROCEDURE — 1101F PT FALLS ASSESS-DOCD LE1/YR: CPT | Performed by: FAMILY MEDICINE

## 2021-05-11 PROCEDURE — 3052F HG A1C>EQUAL 8.0%<EQUAL 9.0%: CPT | Performed by: FAMILY MEDICINE

## 2021-05-11 PROCEDURE — 85027 COMPLETE CBC AUTOMATED: CPT

## 2021-05-11 PROCEDURE — 99214 OFFICE O/P EST MOD 30 MIN: CPT | Performed by: FAMILY MEDICINE

## 2021-05-11 PROCEDURE — G8752 SYS BP LESS 140: HCPCS | Performed by: FAMILY MEDICINE

## 2021-05-11 PROCEDURE — G0439 PPPS, SUBSEQ VISIT: HCPCS | Performed by: FAMILY MEDICINE

## 2021-05-11 PROCEDURE — G8754 DIAS BP LESS 90: HCPCS | Performed by: FAMILY MEDICINE

## 2021-05-11 PROCEDURE — G8510 SCR DEP NEG, NO PLAN REQD: HCPCS | Performed by: FAMILY MEDICINE

## 2021-05-11 PROCEDURE — G8420 CALC BMI NORM PARAMETERS: HCPCS | Performed by: FAMILY MEDICINE

## 2021-05-11 PROCEDURE — 73100 X-RAY EXAM OF WRIST: CPT

## 2021-05-11 PROCEDURE — 2022F DILAT RTA XM EVC RTNOPTHY: CPT | Performed by: FAMILY MEDICINE

## 2021-05-11 PROCEDURE — 3017F COLORECTAL CA SCREEN DOC REV: CPT | Performed by: FAMILY MEDICINE

## 2021-05-11 RX ORDER — FLUTICASONE PROPIONATE 50 MCG
SPRAY, SUSPENSION (ML) NASAL
Qty: 1 BOTTLE | Refills: 0 | Status: SHIPPED | OUTPATIENT
Start: 2021-05-11 | End: 2021-08-13

## 2021-05-11 RX ORDER — MONTELUKAST SODIUM 10 MG/1
10 TABLET ORAL DAILY
Qty: 90 TAB | Refills: 0 | Status: SHIPPED | OUTPATIENT
Start: 2021-05-11 | End: 2021-08-13

## 2021-05-11 NOTE — PROGRESS NOTES
This is the Subsequent Medicare Annual Wellness Exam, performed 12 months or more after the Initial AWV or the last Subsequent AWV    I have reviewed the patient's medical history in detail and updated the computerized patient record. Assessment/Plan   Education and counseling provided:  Are appropriate based on today's review and evaluation  Discussed 5-year health plan. Discussed advance care directive. See ACP note from today  1. Medicare annual wellness visit, subsequent       Depression Risk Factor Screening     3 most recent PHQ Screens 8/5/2020   Little interest or pleasure in doing things Not at all   Feeling down, depressed, irritable, or hopeless Not at all   Total Score PHQ 2 0       Alcohol Risk Screen    Do you average more than 1 drink per night or more than 7 drinks a week: Yes    In the past three months have you have had more than 4 drinks containing alcohol on one occasion: Yes- at social functions/parties        Functional Ability and Level of Safety    Hearing: Hearing is good. The patient wears hearing aids. Activities of Daily Living: The home contains: handrails  Patient does total self care      Ambulation: with no difficulty     Fall Risk:  Fall Risk Assessment, last 12 mths 8/5/2020   Able to walk? Yes   Fall in past 12 months? No      Abuse Screen:  Patient is not abused       Cognitive Screening    Has your family/caregiver stated any concerns about your memory: no     Cognitive Screening: Normal -      Health Maintenance Due     Health Maintenance Due   Topic Date Due    COVID-19 Vaccine (1) Never done    Shingrix Vaccine Age 50> (1 of 2) Never done    Foot Exam Q1  08/30/2018    Eye Exam Retinal or Dilated  09/11/2020   Will obtain eye exam records. He has next appointment in September this year  Has completed both Covid shot  Foot exam done usually at Endo office.   If not will done next visit  Up-to-date on PSA check    Patient Care Team   Patient Care Team:  Linwood Cardoso, April Harp MD as PCP - General (Family Medicine)  Kristofer Tuttle MD as PCP - [facility] Empaneled Provider  Kaylyn Ferrer MD (Urology)  Laila Yu MD as Consulting Provider (Internal Medicine)  Khalida Eaton MD as Consulting Provider (Urology)  Shamika Gimenez MD (Ophthalmology)  Ildefonso Cuenca MD (Ophthalmology)  Stanley Whitney MD (Endocrinology)  Trang Magana MD (Pulmonary Disease)  Mary Scanlon MD (Gastroenterology)    History     Patient Active Problem List   Diagnosis Code    Chest pain, unspecified R07.9    Essential hypertension, benign I10    Mixed hyperlipidemia E78.2    Bladder tumor D49.4    S/P colonoscopy with polypectomy/ follwoing GI. done in 2013. stage 4 esophagitis on EGD done in 2013. recently seen GI. Dr. Ann Marie Covarrubias on 7/7/16 Z98.890    Erectile dysfunction N52.9    Elevated PSA R97.20    Cubital tunnel syndrome on left G56.22    Abnormal findings on esophagogastroduodenoscopy (EGD)/ done on 8/1/16. gunner's esophagus .  f/u EGD due in 3 years will be 2019 R19.8    Advance directive discussed with patient Z71.89    Type 2 diabetes mellitus without complication, with long-term current use of insulin ([facility]) E11.9, Z79.4    Personal history of bladder cancer Z85.51     Past Medical History:   Diagnosis Date    Bladder cancer ([facility] [address] 7/15/13    Clinical Stage TaN0M0 HG UC    Bladder tumor     Diabetes ([facility] [address]     Essential hypertension     History of kidney stones     Hyperlipidemia     Peripheral neuropathy     SBO (small bowel obstruction) ([address]     Spinal stenosis     Spondylolisthesis, grade 1       Past Surgical History:   Procedure Laterality Date    HX UROLOGICAL  7/15/13    [facility] Charan [facility], [facility]    HX UROLOGICAL  1/4/16    [facility] Charan [facility], [facility]    HX WRIST FRACTURE [address]  4/27/15    (L) wrist     Current Outpatient Medications   Medication Sig Dispense Refill    finasteride (PROSCAR) 5 mg tablet TAKE 1 TABLET EVERY DAY 90 Tab 3    omeprazole (PRILOSEC) 40 mg capsule TAKE 1 CAPSULE EVERY DAY 30 Cap 1    tamsulosin (FLOMAX) 0.4 mg capsule TAKE 1 CAPSULE EVERY DAY 30 Cap 1    atorvastatin (LIPITOR) 10 mg tablet TAKE 1 TABLET EVERY NIGHT 30 Tab 1    Januvia 100 mg tablet TAKE 1 TABLET EVERY DAY 90 Tab 0    Accu-Chek Eliane Plus test strp strip TEST EVERY  Strip 5    glipiZIDE (GLUCOTROL) 5 mg tablet TAKE 1 TABLET EVERY DAY 90 Tab 1    valsartan (DIOVAN) 320 mg tablet Take 1 Tab by mouth daily. 90 Tab 3    ferrous sulfate (Iron) 325 mg (65 mg iron) tablet Take  by mouth Daily (before breakfast).  BD Ultra-Fine Short Pen Needle 31 gauge x 5/16\" ndle USE  TO INJECT ONE TIME DAILY 1 Package 5    metFORMIN ER (GLUCOPHAGE XR) 500 mg tablet Take 2 Tabs by mouth two (2) times a day. 1,000 mg  Tab 0    insulin glargine (LANTUS SOLOSTAR U-100 INSULIN) 100 unit/mL (3 mL) inpn INJECT  10 UNITS SUBCUTANEOUSLY AT BEDTIME. DISCARD AND BEGIN NEW PEN AFTER 28 DAYS. 5 Pen 3    CALCIUM PO Take  by mouth.  glucose blood VI test strips (ACCU-CHEK ELIANE PLUS TEST STRP) strip Use 1 daily for blood glucose monitoring DX: E11.9 300 Strip 1    cholecalciferol, vitamin D3, (VITAMIN D3 PO) Take  by mouth.  multivitamin (ONE A DAY) tablet Take 1 Tab by mouth daily.  Lancets (ACCU-CHEK SOFTCLIX LANCETS) misc Use 1 daily for blood glucose monitoring DX: E11.9 100 Each 3    Blood-Glucose Meter (ACCU-CHEK ELIANE PLUS METER) misc Use 1 daily for blood glucose monitoring DX: E11.9 1 Each 0    tadalafil (CIALIS) 20 mg tablet Take 1 Tab by mouth daily as needed. 6 Tab 3    CINNAMON BARK (CINNAMON PO) Take  by mouth.  TURMERIC ROOT EXTRACT PO Take  by mouth.  GINGER ROOT (GINGER EXTRACT PO) Take  by mouth.  Insulin Needles, Disposable, (BD INSULIN PEN NEEDLE UF MINI) 31 gauge x 3/16\" ndle Check twice daily 100 Pen Needle 1    cyanocobalamin (VITAMIN B-12) 1,000 mcg Subl Take 1,000 mcg by mouth daily.       aspirin 81 mg chewable tablet Take 81 mg by mouth daily.        Allergies   Allergen Reactions    Actos [Pioglitazone] Other (comments)     Personal history of bladder cancer         Family History   Problem Relation Age of Onset    Hypertension Mother     Heart Attack Neg Hx     Sudden Death Neg Hx      Social History     Tobacco Use    Smoking status: Never Smoker    Smokeless tobacco: Never Used   Substance Use Topics    Alcohol use: Not Currently     Frequency: 2-4 times a month     Drinks per session: 1 or 2     Binge frequency: Never     Comment: 1-2 drinks/day         Enedina Morris LPN

## 2021-05-11 NOTE — PATIENT INSTRUCTIONS
Medicare Wellness Visit, Male The best way to live healthy is to have a lifestyle where you eat a well-balanced diet, exercise regularly, limit alcohol use, and quit all forms of tobacco/nicotine, if applicable. Regular preventive services are another way to keep healthy. Preventive services (vaccines, screening tests, monitoring & exams) can help personalize your care plan, which helps you manage your own care. Screening tests can find health problems at the earliest stages, when they are easiest to treat. [facility] follows the current, evidence-based guidelines published by the [facility] Mikey ([facility] [[email or web address]STF) when recommending preventive services for our patients. Because we follow these guidelines, sometimes recommendations change over time as research supports it. (For example, a prostate screening blood test is no longer routinely recommended for men with no symptoms). Of course, you and your doctor may decide to screen more often for some diseases, based on your risk and co-morbidities (chronic disease you are already diagnosed with). Preventive services for you include: - Medicare offers their members a free annual wellness visit, which is time for you and your primary care provider to discuss and plan for your preventive service needs. Take advantage of this benefit every year! 
-All adults over age 72 should receive the recommended pneumonia vaccines. Current USPSTF guidelines recommend a series of two vaccines for the best pneumonia protection.  
-All adults should have a flu vaccine yearly and tetanus vaccine every 10 years. 
-All adults age 48 and older should receive the shingles vaccines (series of two vaccines).       
-All adults age 38-68 who are overweight should have a diabetes screening test once every three years.  
-Other screening tests & preventive services for persons with diabetes include: an eye exam to screen for diabetic retinopathy, a kidney function test, a foot exam, and stricter control over your cholesterol.  
-Cardiovascular screening for adults with routine risk involves an electrocardiogram (ECG) at intervals determined by the provider.  
-Colorectal cancer screening should be done for adults age 54-65 with no increased risk factors for colorectal cancer. There are a number of acceptable methods of screening for this type of cancer. Each test has its own benefits and drawbacks. Discuss with your provider what is most appropriate for you during your annual wellness visit. The different tests include: colonoscopy (considered the best screening method), a fecal occult blood test, a fecal DNA test, and sigmoidoscopy. 
-All adults born between [facility] should be screened once for Hepatitis C. 
-An Abdominal Aortic Aneurysm (AAA) Screening is recommended for men age 73-68 who has ever smoked in their lifetime. Here is a list of your current Health Maintenance items (your personalized list of preventive services) with a due date: 
Health Maintenance Due Topic Date Due  
 COVID-19 Vaccine (1) Never done  Shingles Vaccine (1 of 2) Never done [facility]  08/30/2018 [facility] Eye Exam  09/11/2020 Low Sodium Diet (2,000 Milligram): Care Instructions Overview Limiting sodium can be an important part of managing some health problems. The most common source of sodium is salt. People get most of the salt in their diet from canned, prepared, and packaged foods. Fast food and restaurant meals also are very high in sodium. Your doctor will probably limit your sodium to less than 2,000 milligrams (mg) a day. This limit counts all the sodium in prepared and packaged foods and any salt you add to your food. Follow-up care is a key part of your treatment and safety. Be sure to make and go to all appointments, and call your doctor if you are having problems.  It's also a good idea to know your test results and keep a list of the medicines you take. How can you care for yourself at home? Read food labels · Read labels on cans and food packages. The labels tell you how much sodium is in each serving. Make sure that you look at the serving size. If you eat more than the serving size, you have eaten more sodium. · Food labels also tell you the Percent Daily Value for sodium. Choose products with low Percent Daily Values for sodium. · Be aware that sodium can come in forms other than salt, including monosodium glutamate (MSG), sodium citrate, and sodium bicarbonate (baking soda). MSG is often added to Asian food. When you eat out, you can sometimes ask for food without MSG or added salt. Buy low-sodium foods · Buy foods that are labeled \"unsalted\" (no salt added), \"sodium-free\" (less than 5 mg of sodium per serving), or \"low-sodium\" (140 mg or less of sodium per serving). Foods labeled \"reduced-sodium\" and \"light sodium\" may still have too much sodium. Be sure to read the label to see how much sodium you are getting. · Buy fresh vegetables, or frozen vegetables without added sauces. Buy low-sodium versions of canned vegetables, soups, and other canned goods. Prepare low-sodium meals · Cut back on the amount of salt you use in cooking. This will help you adjust to the taste. Do not add salt after cooking. One teaspoon of salt has about 2,300 mg of sodium. · Take the salt shaker off the table. · Flavor your food with garlic, lemon juice, onion, vinegar, herbs, and spices. Do not use soy sauce, lite soy sauce, steak sauce, onion salt, garlic salt, celery salt, or ketchup on your food. · Use low-sodium salad dressings, sauces, and ketchup. Or make your own salad dressings and sauces without adding salt. · Use less salt (or none) when recipes call for it. You can often use half the salt a recipe calls for without losing flavor. Other foods such as rice, pasta, and grains do not need added salt.  
· Rinse canned vegetables, and cook them in fresh water. This removes somebut not allof the salt. · Avoid water that is naturally high in sodium or that has been treated with water softeners, which add sodium. If you buy bottled water, read the label and choose a sodium-free brand. Avoid high-sodium foods · Avoid eating: 
? Smoked, cured, salted, and canned meat, fish, and poultry. ? Ham, lenz, hot dogs, and luncheon meats. ? Regular, hard, and processed cheese and regular peanut butter. ? Crackers with salted tops, and other salted snack foods such as pretzels, chips, and salted popcorn. ? Frozen prepared meals, unless labeled low-sodium. ? Canned and dried soups, broths, and bouillon, unless labeled sodium-free or low-sodium. ? Canned vegetables, unless labeled sodium-free or low-sodium. ? [place] fries, pizza, tacos, and other fast foods. ? Pickles, olives, ketchup, and other condiments, especially soy sauce, unless labeled sodium-free or low-sodium. Where can you learn more? Go to http://www.gray.com/ Enter P698 in the search box to learn more about \"Low Sodium Diet (2,000 Milligram): Care Instructions. \" Current as of: December 17, 2020               Content Version: 12.8 © 9055-1570 [employer]. Care instructions adapted under license by [employer] (which disclaims liability or warranty for this information). If you have questions about a medical condition or this instruction, always ask your healthcare professional. [place] [address] any warranty or liability for your use of this information. Nutrition Tips for Diabetes: After Your Visit Your Care Instructions A healthy diet is important to manage diabetes. It helps you lose weight (if you need to) and keep it off. It gives you the nutrition and energy your body needs and helps prevent heart disease. But a diet for diabetes does not mean that you have to eat special foods.  You can eat what your family eats, including occasional sweets and other favorites. But you do have to pay attention to how often you eat and how much you eat of certain foods. The right plan for you will give you meals that help you keep your blood sugar at healthy levels. Try to eat a variety of foods and to spread carbohydrate throughout the day. Carbohydrate raises blood sugar higher and more quickly than any other nutrient does. Carbohydrate is found in sugar, breads and cereals, fruit, starchy vegetables such as potatoes and corn, and milk and yogurt. You may want to work with a dietitian or diabetes educator to help you plan meals and snacks. A dietitian or diabetes educator also can help you lose weight if that is one of your goals. The following tips can help you enjoy your meals and stay healthy. Follow-up care is a key part of your treatment and safety. Be sure to make and go to all appointments, and call your doctor if you are having problems. Its also a good idea to know your test results and keep a list of the medicines you take. How can you care for yourself at home? · Learn which foods have carbohydrate and how much carbohydrate to eat. A dietitian or diabetes educator can help you learn to keep track of how much carbohydrate you eat. · Spread carbohydrate throughout the day. Eat some carbohydrate at all meals, but do not eat too much at any one time. · Plan meals to include food from all the food groups. These are the food groups and some example portion sizes: ¨ Grains: 1 slice of bread (1 ounce), ½ cup of cooked cereal, and 1/3 cup of cooked pasta or rice. These have about 15 grams of carbohydrate in a serving. Choose whole grains such as whole wheat bread or crackers, oatmeal, and brown rice more often than refined grains.  
¨ Fruit: 1 small fresh fruit, such as an apple or orange; ½ of a banana; ½ cup of chopped, cooked, or canned fruit; ½ cup of fruit juice; 1 cup of melon or raspberries; and 2 tablespoons of dried fruit. These have about 15 grams of carbohydrate in a serving. ¨ Dairy: 1 cup of nonfat or low-fat milk and 2/3 cup of plain yogurt. These have about 15 grams of carbohydrate in a serving. ¨ Protein foods: Beef, chicken, turkey, fish, eggs, tofu, cheese, cottage cheese, and peanut butter. A serving size of meat is 3 ounces, which is about the size of a deck of cards. Examples of meat substitute serving sizes (equal to 1 ounce of meat) are 1/4 cup of cottage cheese, 1 egg, 1 tablespoon of peanut butter, and ½ cup of tofu. These have very little or no carbohydrate per serving. ¨ Vegetables: Starchy vegetables such as ½ cup of cooked dried beans, peas, potatoes, or corn have about 15 grams of carbohydrate. Nonstarchy vegetables have very little carbohydrate, such as 1 cup of raw leafy vegetables (such as spinach), ½ cup of other vegetables (cooked or chopped), and 3/4 cup of vegetable juice. · Use the plate format to plan meals. It is a good, quick way to make sure that you have a balanced meal. It also helps you spread carbohydrate throughout the day. You divide your plate by types of foods. Put vegetables on half the plate, meat or meat substitutes on one-quarter of the plate, and a grain or starchy vegetable (such as brown rice or a potato) in the final quarter of the plate. To this you can add a small piece of fruit and 1 cup of milk or yogurt, depending on how much carbohydrate you are supposed to eat at a meal. 
· Talk to your dietitian or diabetes educator about ways to add limited amounts of sweets into your meal plan. You can eat these foods now and then, as long as you include the amount of carbohydrate they have in your daily carbohydrate allowance. · If you drink alcohol, limit it to no more than 1 drink a day for women and 2 drinks a day for men. If you are pregnant, no amount of alcohol is known to be safe.  
· Protein, fat, and fiber do not raise blood sugar as much as carbohydrate does. If you eat a lot of these nutrients in a meal, your blood sugar will rise more slowly than it would otherwise. · Limit saturated fats, such as those from meat and dairy products. Try to replace it with monounsaturated fat, such as olive oil. This is a healthier choice because people who have diabetes are at higher-than-average risk of heart disease. But use a modest amount of olive oil. A tablespoon of olive oil has 14 grams of fat and 120 calories. · Exercise lowers blood sugar. If you take insulin by shots or pump, you can use less than you would if you were not exercising. Keep in mind that timing matters. If you exercise within 1 hour after a meal, your body may need less insulin for that meal than it would if you exercised 3 hours after the meal. Test your blood sugar to find out how exercise affects your need for insulin. · Exercise on most days of the week. Aim for at least 30 minutes. Exercise helps you stay at a healthy weight and helps your body use insulin. Walking is an easy way to get exercise. Gradually increase the amount you walk every day. You also may want to swim, bike, or do other activities. When you eat out · Learn to estimate the serving sizes of foods that have carbohydrate. If you measure food at home, it will be easier to estimate the amount in a serving of restaurant food. · If the meal you order has too much carbohydrate (such as potatoes, corn, or baked beans), ask to have a low-carbohydrate food instead. Ask for a salad or green vegetables. · If you use insulin, check your blood sugar before and after eating out to help you plan how much to eat in the future. · If you eat more carbohydrate at a meal than you had planned, take a walk or do other exercise. This will help lower your blood sugar. Where can you learn more? Go to [employer] Enter F678 in the search box to learn more about \"Nutrition Tips for Diabetes: After Your Visit.\"  
© 0761-7192 [employer]wise[employer]. Care instructions adapted under license by [address] [place] [address] (which disclaims liability or warranty for this information). This care instruction is for use with your licensed healthcare professional. If you have questions about a medical condition or this instruction, always ask your healthcare professional. [place] [address] any warranty or liability for your use of this information. Content Version: 50.5.421946; Current as of: June 4, 2014

## 2021-05-11 NOTE — PROGRESS NOTES
HISTORY OF PRESENT ILLNESS  Estevan Barrios is a 73 y.o. male. HPI: Here for follow-up  History of hypertension. Vitals been stable. Asymptomatic. Taking medication with compliance. No side effects. History of diabetes. Fluctuating blood sugar still high up to 190s. Currently no hyper or hypoglycemic symptoms. Following endocrinology. Adjusted medication by them. Advised more compliance with the diet modification and lifestyle modification. On statin. No side effects. Personal history of bladder cancer. Currently on surveillance. Following urology and the recommendations. Elevated PSA. Currently no urinary complaints. Currently having cough, runny nose mainly in the morning. He feels constant clearing of the throat and occurs only in the morning. Denies any acid reflux. No wheezing. No headache or dizziness. No nausea vomiting or any abdominal pain. No chest pain or shortness of breath. He does feel sometimes sweaty with this symptoms. No loss of smell or test.  No dental issues. No ear pain. No sinus congestion. Has been taking Tessalon Perles since long time. Said on and off since 2 years. It was given by GI specialist.  I do not have that medication on the list.  No history of asthma. Does not smoke. Was sitting comfortable without any acute distress during the visit. Noted anemia. He had colonoscopy done. No acute findings. Has seen hematology and also has received iron infusion. We will repeat the labs. Denies any unusual fatigue. No trouble swallowing or change in voice. Has been following GI as a history of Parada's esophagus and recently had a EGD done as well. On PPI. Visit Vitals  /80 (BP 1 Location: Left arm, BP Patient Position: Sitting, BP Cuff Size: Adult)   Pulse 76   Temp 98.7 °F (37.1 °C) (Oral)   Resp 16   Ht 5' 6\" (1.676 m)   Wt 151 lb (68.5 kg)   SpO2 97%   BMI 24.37 kg/m²     Review medication list, vitals, problem list,allergies.    Lab Results Component Value Date/Time    WBC 8.5 05/11/2021 10:16 AM    HGB 13.6 05/11/2021 10:16 AM    HCT 42.2 05/11/2021 10:16 AM    PLATELET 846 98/90/3644 10:16 AM    MCV 97.0 05/11/2021 10:16 AM     Lab Results   Component Value Date/Time    Sodium 142 06/24/2020 08:52 AM    Potassium 4.5 06/24/2020 08:52 AM    Chloride 107 (H) 06/24/2020 08:52 AM    CO2 22 06/24/2020 08:52 AM    Anion gap 7 12/16/2019 02:51 PM    Glucose 108 (H) 06/24/2020 08:52 AM    BUN 11 06/24/2020 08:52 AM    Creatinine 0.77 06/24/2020 08:52 AM    BUN/Creatinine ratio 14 06/24/2020 08:52 AM    GFR est  06/24/2020 08:52 AM    GFR est non-AA 93 06/24/2020 08:52 AM    Calcium 9.2 06/24/2020 08:52 AM    Bilirubin, total 0.3 06/24/2020 08:52 AM    Alk.  phosphatase 50 06/24/2020 08:52 AM    Protein, total 6.5 06/24/2020 08:52 AM    Albumin 4.2 06/24/2020 08:52 AM    Globulin 3.4 12/16/2019 02:51 PM    A-G Ratio 1.8 06/24/2020 08:52 AM    ALT (SGPT) 14 06/24/2020 08:52 AM    AST (SGOT) 18 06/24/2020 08:52 AM     Lab Results   Component Value Date/Time    Cholesterol, total 144 06/24/2020 08:52 AM    HDL Cholesterol 58 06/24/2020 08:52 AM    LDL, calculated 74 06/24/2020 08:52 AM    VLDL, calculated 12 06/24/2020 08:52 AM    Triglyceride 61 06/24/2020 08:52 AM    CHOL/HDL Ratio 2.7 06/28/2017 09:30 AM     Lab Results   Component Value Date/Time    TSH 1.880 06/24/2020 08:52 AM     Lab Results   Component Value Date/Time    Hemoglobin A1c 7.2 (H) 06/24/2020 08:52 AM    Hemoglobin A1c (POC) 8.0 04/06/2021    Hemoglobin A1c, External 8.4 06/20/2016     Lab Results   Component Value Date/Time    Microalbumin/Creat ratio (mg/g creat) 12 06/28/2017 09:30 AM    Microalb/Creat ratio (ug/mg creat.) 12 06/24/2020 08:52 AM    Microalbumin,urine random 1.91 06/28/2017 09:30 AM     Lab Results   Component Value Date/Time    Prostate Specific Ag 1.06 04/15/2021 03:46 PM    Prostate Specific Ag 1.4 09/30/2020 12:07 PM    Prostate Specific Ag 2.0 06/16/2020 01:42 PM    Prostate Specific Ag 3.6 03/05/2020 12:55 PM         ROS: See HPI    Physical Exam  Constitutional:       General: He is not in acute distress. Neck:      Musculoskeletal: Neck supple. Cardiovascular:      Rate and Rhythm: Normal rate and regular rhythm. Heart sounds: Normal heart sounds. Abdominal:      General: Bowel sounds are normal.      Palpations: Abdomen is soft. Tenderness: There is no abdominal tenderness. Musculoskeletal:         General: No swelling. Neurological:      Mental Status: He is oriented to person, place, and time. Psychiatric:         Behavior: Behavior normal.         ASSESSMENT and PLAN    ICD-10-CM ICD-9-CM    1. Medicare annual wellness visit, subsequent  Z00.00 V70.0    2. Poorly controlled diabetes mellitus ([facility] [address]): A1c around 8. Following endocrinology. He was taken off of Actos as personal history of bladder cancer. Will be managed by Endo. Discussed more compliance with the diet modification E11.65 250.00    3. Personal history of bladder cancer: Has been following urology. On surveillance Z85.51 V10.51    4. Elevated PSA: Has been following urology R97.20 790.93    5. Essential hypertension, benign :well controlled. Continue current dose of medication and low salt diet. Exercise as tolerated. I10 401.1    6. Mixed hyperlipidemia: On statin. No side effects E78.2 272.2    7. Iron deficiency: We will repeat the labs. Had iron infusion. Also colonoscopy negative for any acute process. Will recheck labs. He has already follow-up appointment with hematology as well E61.1 280.9 CBC W/O DIFF      IRON PROFILE   8. Left wrist pain: Prior history of injury over the left wrist.  Has plate placement. Now well lifting boxes he has been feeling pain and swelling over the left wrist area. Will obtain the x-ray M25.532 719.43 XR WRIST LT AP/LAT   9. Cough/runny nose/clearing throat mainly in the morning.   At this time I have advised him strongly to stop the Tessalon Perles. Take the Singulair and Flonase as needed. Patient understood agree with the plan  See Medicare wellness note for HM  Follow-up and Dispositions    · Return in about 3 months (around 8/11/2021). Please note that this dictation was completed with [employer], the computer voice recognition software. Quite often unanticipated grammatical, syntax, homophones, and other interpretive errors are inadvertently transcribed by the computer software. Please disregard these errors. Please excuse any errors that have escaped final proofreading.

## 2021-05-11 NOTE — ACP (ADVANCE CARE PLANNING)
Advance Care Planning     General Advance Care Planning (ACP) Conversation      Date of Conversation: 5/11/2021  Conducted with: Patient with Decision Making Capacity    Healthcare Decision Maker:     Click here to complete [address] including selection of the Healthcare Decision Maker Relationship (ie \"Primary\")  Today we discussed advance directive. pt has made it at home.  he will provide a copy     Content/Action Overview:   see above         Length of Voluntary ACP Conversation in minutes:  <16 minutes (Non-Billable)    Luann Cho MD

## 2021-05-12 NOTE — TELEPHONE ENCOUNTER
This pharmacy faxed over request for the following prescriptions to be filled:    Medication requested :   Requested Prescriptions     Pending Prescriptions Disp Refills    benzonatate (TESSALON) 100 mg capsule 21 Cap 0     Sig: Take 1 Cap by mouth three (3) times daily as needed for Cough for up to 7 days.      PCP: Saji Brito or Print: [employer]   Mail order or Local pharmacy Mail Order     Scheduled appointment if not seen by current providers in office:  [address] [place] [address] 5/11/2021 f/u 8/10/2021

## 2021-05-12 NOTE — PROGRESS NOTES
Noted arthritic changes on x-ray. Hardware is intact no acute process.   Please asked the patient if he wants to see the Ortho??  Can do a referral or continue symptomatic treatment with ice and Tylenol as needed

## 2021-05-13 RX ORDER — BENZONATATE 100 MG/1
100 CAPSULE ORAL
Qty: 21 CAP | Refills: 0 | OUTPATIENT
Start: 2021-05-13 | End: 2021-05-20

## 2021-06-08 RX ORDER — VALSARTAN 320 MG/1
TABLET ORAL
Qty: 90 TABLET | Refills: 3 | Status: SHIPPED | OUTPATIENT
Start: 2021-06-08 | End: 2022-05-31 | Stop reason: SDUPTHER

## 2021-08-10 ENCOUNTER — OFFICE VISIT (OUTPATIENT)
Dept: FAMILY MEDICINE CLINIC | Age: 71
End: 2021-08-10
Payer: MEDICARE

## 2021-08-10 ENCOUNTER — HOSPITAL ENCOUNTER (OUTPATIENT)
Dept: GENERAL RADIOLOGY | Age: 71
Discharge: HOME | End: 2021-08-10
Payer: MEDICARE

## 2021-08-10 VITALS
HEIGHT: 66 IN | WEIGHT: 151 LBS | TEMPERATURE: 98.1 F | BODY MASS INDEX: 24.27 KG/M2 | HEART RATE: 88 BPM | OXYGEN SATURATION: 97 % | SYSTOLIC BLOOD PRESSURE: 118 MMHG | RESPIRATION RATE: 16 BRPM | DIASTOLIC BLOOD PRESSURE: 82 MMHG

## 2021-08-10 DIAGNOSIS — N52.8 OTHER MALE ERECTILE DYSFUNCTION: ICD-10-CM

## 2021-08-10 DIAGNOSIS — M25.511 RIGHT SHOULDER PAIN, UNSPECIFIED CHRONICITY: ICD-10-CM

## 2021-08-10 DIAGNOSIS — E11.40 TYPE 2 DIABETES MELLITUS WITH DIABETIC NEUROPATHY, WITH LONG-TERM CURRENT USE OF INSULIN (HCC): Primary | ICD-10-CM

## 2021-08-10 DIAGNOSIS — I10 ESSENTIAL HYPERTENSION, BENIGN: ICD-10-CM

## 2021-08-10 DIAGNOSIS — Z85.51 PERSONAL HISTORY OF BLADDER CANCER: ICD-10-CM

## 2021-08-10 DIAGNOSIS — Z79.4 TYPE 2 DIABETES MELLITUS WITH DIABETIC NEUROPATHY, WITH LONG-TERM CURRENT USE OF INSULIN (HCC): Primary | ICD-10-CM

## 2021-08-10 DIAGNOSIS — M79.671 RIGHT FOOT PAIN: ICD-10-CM

## 2021-08-10 DIAGNOSIS — R97.20 ELEVATED PSA: ICD-10-CM

## 2021-08-10 DIAGNOSIS — J02.9 SORE THROAT: ICD-10-CM

## 2021-08-10 DIAGNOSIS — E78.2 MIXED HYPERLIPIDEMIA: ICD-10-CM

## 2021-08-10 LAB
S PYO AG THROAT QL: NEGATIVE
VALID INTERNAL CONTROL?: YES

## 2021-08-10 PROCEDURE — G8420 CALC BMI NORM PARAMETERS: HCPCS | Performed by: FAMILY MEDICINE

## 2021-08-10 PROCEDURE — 73630 X-RAY EXAM OF FOOT: CPT

## 2021-08-10 PROCEDURE — 1101F PT FALLS ASSESS-DOCD LE1/YR: CPT | Performed by: FAMILY MEDICINE

## 2021-08-10 PROCEDURE — G8427 DOCREV CUR MEDS BY ELIG CLIN: HCPCS | Performed by: FAMILY MEDICINE

## 2021-08-10 PROCEDURE — 73030 X-RAY EXAM OF SHOULDER: CPT

## 2021-08-10 PROCEDURE — 3052F HG A1C>EQUAL 8.0%<EQUAL 9.0%: CPT | Performed by: FAMILY MEDICINE

## 2021-08-10 PROCEDURE — 2022F DILAT RTA XM EVC RTNOPTHY: CPT | Performed by: FAMILY MEDICINE

## 2021-08-10 PROCEDURE — G8754 DIAS BP LESS 90: HCPCS | Performed by: FAMILY MEDICINE

## 2021-08-10 PROCEDURE — G8510 SCR DEP NEG, NO PLAN REQD: HCPCS | Performed by: FAMILY MEDICINE

## 2021-08-10 PROCEDURE — 99214 OFFICE O/P EST MOD 30 MIN: CPT | Performed by: FAMILY MEDICINE

## 2021-08-10 PROCEDURE — G8752 SYS BP LESS 140: HCPCS | Performed by: FAMILY MEDICINE

## 2021-08-10 PROCEDURE — 87880 STREP A ASSAY W/OPTIC: CPT | Performed by: FAMILY MEDICINE

## 2021-08-10 PROCEDURE — G8536 NO DOC ELDER MAL SCRN: HCPCS | Performed by: FAMILY MEDICINE

## 2021-08-10 PROCEDURE — 3017F COLORECTAL CA SCREEN DOC REV: CPT | Performed by: FAMILY MEDICINE

## 2021-08-10 NOTE — PROGRESS NOTES
HISTORY OF PRESENT ILLNESS  Estevan Alvarado is a 73 y.o. male. HPI: Here for follow-up. Also concerned about nasal congestion, runny nose, sore throat since 2 weeks. No cough, chest congestion or wheezing. No fever. No sick contact. No shortness of breath or any chest pain. Sitting without any acute distress. Has taken Sudafed but minimal help. History of diabetes. Following Endo with compliance. Repeat labs. No hyper or hypoglycemic symptoms. Hypertension. Vitals been stable. Asymptomatic. Taking medication with compliance and no side effects. On statin. Tolerating well. Complaining of pain over right foot over her bunion site. Going on since more than a year. Said some time with the prolonged walking or wearing certain kind of shoes. No redness or swelling over affected area. No fall or injury. No history of gout. Personal history of bladder cancer. Following urology and the recommendation. Recent PSA fairly stable. No urinary complaints. Denies any headache or dizziness. No chest pain or shortness of breath. No palpitation or diaphoresis. No nausea vomiting or abdominal pain. No bowel complaint. No appetite or weight changes. No unusual fatigue. Chronic right shoulder pain. In the past done physical therapy and seen Ortho. Currently pain has occurred again. Doing home exercise. We will repeat the x-ray    Visit Vitals  /82 (BP 1 Location: Left arm, BP Patient Position: Sitting, BP Cuff Size: Adult)   Pulse 88   Temp 98.1 °F (36.7 °C) (Oral)   Resp 16   Ht 5' 6\" (1.676 m)   Wt 151 lb (68.5 kg)   SpO2 97%   BMI 24.37 kg/m²     Review medication list, vitals, problem list,allergies.    Lab Results   Component Value Date/Time    WBC 8.5 05/11/2021 10:16 AM    HGB 13.6 05/11/2021 10:16 AM    HCT 42.2 05/11/2021 10:16 AM    PLATELET 076 13/92/5670 10:16 AM    MCV 97.0 05/11/2021 10:16 AM     Lab Results   Component Value Date/Time    Sodium 142 06/24/2020 08:52 AM    Potassium 4.5 06/24/2020 08:52 AM    Chloride 107 (H) 06/24/2020 08:52 AM    CO2 22 06/24/2020 08:52 AM    Anion gap 7 12/16/2019 02:51 PM    Glucose 108 (H) 06/24/2020 08:52 AM    BUN 11 06/24/2020 08:52 AM    Creatinine 0.77 06/24/2020 08:52 AM    BUN/Creatinine ratio 14 06/24/2020 08:52 AM    GFR est  06/24/2020 08:52 AM    GFR est non-AA 93 06/24/2020 08:52 AM    Calcium 9.2 06/24/2020 08:52 AM    Bilirubin, total 0.3 06/24/2020 08:52 AM    Alk. phosphatase 50 06/24/2020 08:52 AM    Protein, total 6.5 06/24/2020 08:52 AM    Albumin 4.2 06/24/2020 08:52 AM    Globulin 3.4 12/16/2019 02:51 PM    A-G Ratio 1.8 06/24/2020 08:52 AM    ALT (SGPT) 14 06/24/2020 08:52 AM    AST (SGOT) 18 06/24/2020 08:52 AM     Lab Results   Component Value Date/Time    Cholesterol, total 144 06/24/2020 08:52 AM    HDL Cholesterol 58 06/24/2020 08:52 AM    LDL, calculated 74 06/24/2020 08:52 AM    VLDL, calculated 12 06/24/2020 08:52 AM    Triglyceride 61 06/24/2020 08:52 AM    CHOL/HDL Ratio 2.7 06/28/2017 09:30 AM     Lab Results   Component Value Date/Time    TSH 1.880 06/24/2020 08:52 AM     Lab Results   Component Value Date/Time    Hemoglobin A1c 7.2 (H) 06/24/2020 08:52 AM    Hemoglobin A1c (POC) 8.0 04/06/2021 12:00 AM    Hemoglobin A1c, External 8.4 06/20/2016 12:00 AM     Lab Results   Component Value Date/Time    Microalbumin/Creat ratio (mg/g creat) 12 06/28/2017 09:30 AM    Microalb/Creat ratio (ug/mg creat.) 12 06/24/2020 08:52 AM    Microalbumin,urine random 1.91 06/28/2017 09:30 AM             ROS: See HPI    Physical Exam  Constitutional:       General: He is not in acute distress. Cardiovascular:      Rate and Rhythm: Normal rate and regular rhythm. Heart sounds: Normal heart sounds. Abdominal:      General: Bowel sounds are normal.      Palpations: Abdomen is soft. Tenderness: There is no abdominal tenderness. Musculoskeletal:      Cervical back: Neck supple.       Comments: Right foot: Localized tenderness over bunion. No redness or swelling. Range of motion over first metatarsal joint within normal limit. Lymphadenopathy:      Cervical: No cervical adenopathy. Neurological:      Mental Status: He is oriented to person, place, and time. Psychiatric:         Behavior: Behavior normal.         ASSESSMENT and PLAN    ICD-10-CM ICD-9-CM    1. Type 2 diabetes mellitus with diabetic neuropathy, with long-term current use of insulin ([address]): Following Endo and the recommendation. We will recheck labs. Continue diet modification. E11.40 250.60 HEMOGLOBIN A1C WITH EAG    J28.7 827.5 METABOLIC PANEL, COMPREHENSIVE     V58.67 TSH 3RD GENERATION      LIPID PANEL      MICROALBUMIN, UR, RAND W/ MICROALB/CREAT RATIO      CBC W/O DIFF   2. Personal history of bladder cancer: Prior BCG treatment. Currently following urology and the recommendation Z85.51 V10.51    3. Elevated PSA: Following urology. Recent TSH has improved R97.20 790.93    4. Other male erectile dysfunction: Following urology N52.8 607.84    5. Essential hypertension, benign :well controlled. Continue current dose of medication and low salt diet. Exercise as tolerated. I10 401.1    6. Mixed hyperlipidemia: On statin. No side effects. Discussed diet modification and lifestyle modification E78.2 272.2    7. Sore throat: Rapid strep negative. At this time saltwater gargle, Singulair which he is taking only as needed. J02.9 462 AMB POC RAPID STREP A   8. Right foot pain: Localized pain over the bunion site. Ice application. GERD divided shoes. Checking uric acid and x-ray M79.671 729.5 XR FOOT RT MIN 3 V      URIC ACID   9. Right shoulder pain, unspecified chronicity: Range of motion limited about 90 degree. In the past done physical therapy.   Will repeat the x-ray M25.511 719.41 XR FOOT RT MIN 3 V      XR SHOULDER RT AP/LAT MIN 2 V   Patient understood agree with the plan  Reminded him to complete the eye exam  Follow-up and Dispositions    · Return in about 3 months (around 11/10/2021). Please note that this dictation was completed with [employer], the computer voice recognition software. Quite often unanticipated grammatical, syntax, homophones, and other interpretive errors are inadvertently transcribed by the computer software. Please disregard these errors. Please excuse any errors that have escaped final proofreading.

## 2021-08-10 NOTE — PATIENT INSTRUCTIONS
Nutrition Tips for Diabetes: After Your Visit  Your Care Instructions  A healthy diet is important to manage diabetes. It helps you lose weight (if you need to) and keep it off. It gives you the nutrition and energy your body needs and helps prevent heart disease. But a diet for diabetes does not mean that you have to eat special foods. You can eat what your family eats, including occasional sweets and other favorites. But you do have to pay attention to how often you eat and how much you eat of certain foods. The right plan for you will give you meals that help you keep your blood sugar at healthy levels. Try to eat a variety of foods and to spread carbohydrate throughout the day. Carbohydrate raises blood sugar higher and more quickly than any other nutrient does. Carbohydrate is found in sugar, breads and cereals, fruit, starchy vegetables such as potatoes and corn, and milk and yogurt. You may want to work with a dietitian or diabetes educator to help you plan meals and snacks. A dietitian or diabetes educator also can help you lose weight if that is one of your goals. The following tips can help you enjoy your meals and stay healthy. Follow-up care is a key part of your treatment and safety. Be sure to make and go to all appointments, and call your doctor if you are having problems. Its also a good idea to know your test results and keep a list of the medicines you take. How can you care for yourself at home? · Learn which foods have carbohydrate and how much carbohydrate to eat. A dietitian or diabetes educator can help you learn to keep track of how much carbohydrate you eat. · Spread carbohydrate throughout the day. Eat some carbohydrate at all meals, but do not eat too much at any one time. · Plan meals to include food from all the food groups.  These are the food groups and some example portion sizes:  ¨ Grains: 1 slice of bread (1 ounce), ½ cup of cooked cereal, and 1/3 cup of cooked pasta or rice. These have about 15 grams of carbohydrate in a serving. Choose whole grains such as whole wheat bread or crackers, oatmeal, and brown rice more often than refined grains. ¨ Fruit: 1 small fresh fruit, such as an apple or orange; ½ of a banana; ½ cup of chopped, cooked, or canned fruit; ½ cup of fruit juice; 1 cup of melon or raspberries; and 2 tablespoons of dried fruit. These have about 15 grams of carbohydrate in a serving. ¨ Dairy: 1 cup of nonfat or low-fat milk and 2/3 cup of plain yogurt. These have about 15 grams of carbohydrate in a serving. ¨ Protein foods: Beef, chicken, turkey, fish, eggs, tofu, cheese, cottage cheese, and peanut butter. A serving size of meat is 3 ounces, which is about the size of a deck of cards. Examples of meat substitute serving sizes (equal to 1 ounce of meat) are 1/4 cup of cottage cheese, 1 egg, 1 tablespoon of peanut butter, and ½ cup of tofu. These have very little or no carbohydrate per serving. ¨ Vegetables: Starchy vegetables such as ½ cup of cooked dried beans, peas, potatoes, or corn have about 15 grams of carbohydrate. Nonstarchy vegetables have very little carbohydrate, such as 1 cup of raw leafy vegetables (such as spinach), ½ cup of other vegetables (cooked or chopped), and 3/4 cup of vegetable juice. · Use the plate format to plan meals. It is a good, quick way to make sure that you have a balanced meal. It also helps you spread carbohydrate throughout the day. You divide your plate by types of foods. Put vegetables on half the plate, meat or meat substitutes on one-quarter of the plate, and a grain or starchy vegetable (such as brown rice or a potato) in the final quarter of the plate. To this you can add a small piece of fruit and 1 cup of milk or yogurt, depending on how much carbohydrate you are supposed to eat at a meal.  · Talk to your dietitian or diabetes educator about ways to add limited amounts of sweets into your meal plan.  You can eat these foods now and then, as long as you include the amount of carbohydrate they have in your daily carbohydrate allowance. · If you drink alcohol, limit it to no more than 1 drink a day for women and 2 drinks a day for men. If you are pregnant, no amount of alcohol is known to be safe. · Protein, fat, and fiber do not raise blood sugar as much as carbohydrate does. If you eat a lot of these nutrients in a meal, your blood sugar will rise more slowly than it would otherwise. · Limit saturated fats, such as those from meat and dairy products. Try to replace it with monounsaturated fat, such as olive oil. This is a healthier choice because people who have diabetes are at higher-than-average risk of heart disease. But use a modest amount of olive oil. A tablespoon of olive oil has 14 grams of fat and 120 calories. · Exercise lowers blood sugar. If you take insulin by shots or pump, you can use less than you would if you were not exercising. Keep in mind that timing matters. If you exercise within 1 hour after a meal, your body may need less insulin for that meal than it would if you exercised 3 hours after the meal. Test your blood sugar to find out how exercise affects your need for insulin. · Exercise on most days of the week. Aim for at least 30 minutes. Exercise helps you stay at a healthy weight and helps your body use insulin. Walking is an easy way to get exercise. Gradually increase the amount you walk every day. You also may want to swim, bike, or do other activities. When you eat out  · Learn to estimate the serving sizes of foods that have carbohydrate. If you measure food at home, it will be easier to estimate the amount in a serving of restaurant food. · If the meal you order has too much carbohydrate (such as potatoes, corn, or baked beans), ask to have a low-carbohydrate food instead. Ask for a salad or green vegetables.   · If you use insulin, check your blood sugar before and after eating out to help you plan how much to eat in the future. · If you eat more carbohydrate at a meal than you had planned, take a walk or do other exercise. This will help lower your blood sugar. Where can you learn more? Go to [employer]  Enter F936 in the search box to learn more about \"Nutrition Tips for Diabetes: After Your Visit. \"   © 7678-6485 [employer]. Care instructions adapted under license by [facility] (which disclaims liability or warranty for this information). This care instruction is for use with your licensed healthcare professional. If you have questions about a medical condition or this instruction, always ask your healthcare professional. [place] [address] any warranty or liability for your use of this information. Content Version: 30.7.221068; Current as of: June 4, 2014                 Low Sodium Diet (2,000 Milligram): Care Instructions  Overview     Limiting sodium can be an important part of managing some health problems. The most common source of sodium is salt. People get most of the salt in their diet from canned, prepared, and packaged foods. Fast food and restaurant meals also are very high in sodium. Your doctor will probably limit your sodium to less than 2,000 milligrams (mg) a day. This limit counts all the sodium in prepared and packaged foods and any salt you add to your food. Follow-up care is a key part of your treatment and safety. Be sure to make and go to all appointments, and call your doctor if you are having problems. It's also a good idea to know your test results and keep a list of the medicines you take. How can you care for yourself at home? Read food labels  · Read labels on cans and food packages. The labels tell you how much sodium is in each serving. Make sure that you look at the serving size. If you eat more than the serving size, you have eaten more sodium.   · Food labels also tell you the Percent Daily Value for sodium. Choose products with low Percent Daily Values for sodium. · Be aware that sodium can come in forms other than salt, including monosodium glutamate (MSG), sodium citrate, and sodium bicarbonate (baking soda). MSG is often added to Asian food. When you eat out, you can sometimes ask for food without MSG or added salt. Buy low-sodium foods  · Buy foods that are labeled \"unsalted\" (no salt added), \"sodium-free\" (less than 5 mg of sodium per serving), or \"low-sodium\" (140 mg or less of sodium per serving). Foods labeled \"reduced-sodium\" and \"light sodium\" may still have too much sodium. Be sure to read the label to see how much sodium you are getting. · Buy fresh vegetables, or frozen vegetables without added sauces. Buy low-sodium versions of canned vegetables, soups, and other canned goods. Prepare low-sodium meals  · Cut back on the amount of salt you use in cooking. This will help you adjust to the taste. Do not add salt after cooking. One teaspoon of salt has about 2,300 mg of sodium. · Take the salt shaker off the table. · Flavor your food with garlic, lemon juice, onion, vinegar, herbs, and spices. Do not use soy sauce, lite soy sauce, steak sauce, onion salt, garlic salt, celery salt, or ketchup on your food. · Use low-sodium salad dressings, sauces, and ketchup. Or make your own salad dressings and sauces without adding salt. · Use less salt (or none) when recipes call for it. You can often use half the salt a recipe calls for without losing flavor. Other foods such as rice, pasta, and grains do not need added salt. · Rinse canned vegetables, and cook them in fresh water. This removes some--but not all--of the salt. · Avoid water that is naturally high in sodium or that has been treated with water softeners, which add sodium. If you buy bottled water, read the label and choose a sodium-free brand.   Avoid high-sodium foods  · Avoid eating:  ? Smoked, cured, salted, and canned meat, fish, and poultry. ? Ham, lenz, hot dogs, and luncheon meats. ? Regular, hard, and processed cheese and regular peanut butter. ? Crackers with salted tops, and other salted snack foods such as pretzels, chips, and salted popcorn. ? Frozen prepared meals, unless labeled low-sodium. ? Canned and dried soups, broths, and bouillon, unless labeled sodium-free or low-sodium. ? Canned vegetables, unless labeled sodium-free or low-sodium. ? [place] fries, pizza, tacos, and other fast foods. ? Pickles, olives, ketchup, and other condiments, especially soy sauce, unless labeled sodium-free or low-sodium. Where can you learn more? Go to http://www.gray.com/  Enter V843 in the search box to learn more about \"Low Sodium Diet (2,000 Milligram): Care Instructions. \"  Current as of: December 17, 2020               Content Version: 12.8  © 6643-6429 [employer]. Care instructions adapted under license by [employer] (which disclaims liability or warranty for this information). If you have questions about a medical condition or this instruction, always ask your healthcare professional. Shawn Delgado any warranty or liability for your use of this information. Bladder Cancer: Care Instructions  Your Care Instructions     Bladder cancer occurs when abnormal cells grow out of control in the bladder. It usually can be cured when it is found early. It is more common in older people. Treatment may include surgery to remove part of the bladder. If the tumor is large, the entire bladder may be removed. You may also have radiation or chemotherapy to kill the cancer cells. Sometimes people get treatment with medicines that help the body's natural defenses, or immune system, fight the cancer. Finding out that you have cancer is scary. You may feel many emotions and may need some help coping. Seek out family, friends, and counselors for support.  You also can do things at home to make yourself feel better while you go through treatment. Call the [facility] (3-542.407.7084) or visit its website at [address] [employer] for more information. Follow-up care is a key part of your treatment and safety. Be sure to make and go to all appointments, and call your doctor if you are having problems. It's also a good idea to know your test results and keep a list of the medicines you take. How can you care for yourself at home? · Take your medicines exactly as prescribed. Call your doctor if you think you are having a problem with your medicine. You may get medicine for nausea and vomiting if you have these side effects. · Eat healthy food. If you are not hungry, try to eat food that has protein and extra calories to keep up your strength and prevent weight loss. Drink liquid meal replacements for extra calories and protein. Try to eat your main meal early. · Get some physical activity every day, but do not get too tired. Keep doing the hobbies you enjoy as your energy allows. · Take steps to control your stress and workload. Learn relaxation techniques. ? Share your feelings. Stress and tension affect our emotions. By expressing your feelings to others, you may be able to understand and cope with them. ? Consider joining a support group. Talking about a problem with your spouse, a good friend, or other people with similar problems is a good way to reduce tension and stress. ? Express yourself through art. Try writing, dance, art, or crafts to relieve tension. Some dance, writing, or art groups may be available just for people who have cancer. ? Be kind to your body and mind. Getting enough sleep, eating a healthy diet, and taking time to do things you enjoy can contribute to an overall feeling of balance in your life and help reduce stress. ? Get help if you need it. Discuss your concerns with your doctor or counselor.   · If you are vomiting or have diarrhea:  ? Drink plenty of fluids to prevent dehydration. Choose water and other caffeine-free clear liquids. If you have kidney, heart, or liver disease and have to limit fluids, talk with your doctor before you increase the amount of fluids you drink. ? When you are able to eat, try clear soups, mild foods, and liquids until all symptoms are gone for 12 to 48 hours. Other good choices include dry toast, crackers, cooked cereal, and gelatin dessert, such as Jell-O.  · Take care of your urinary tract to prevent problems such as infection, which can be caused by bladder cancer and its treatment. Limit drinks with caffeine, drink plenty of fluids, and urinate every 3 to 4 hours. · If you have not already done so, prepare a list of advance directives. Advance directives are instructions to your doctor and family members about what kind of care you want if you become unable to speak for yourself. When should you call for help? Call your doctor now or seek immediate medical care if:    · You have pain that does not get better after taking pain medicine.     · You have symptoms of a kidney infection. These may include:  ? Pain or burning when you urinate. ? A frequent need to urinate without being able to pass much urine. ? Pain in the flank, which is just below the rib cage and above the waist on either side of the back. ? Blood in your urine. ? A fever. Watch closely for changes in your health, and be sure to contact your doctor if:    · You do not get better as expected. Where can you learn more? Go to http://www.gray.com/  Enter M796 in the search box to learn more about \"Bladder Cancer: Care Instructions. \"  Current as of: December 17, 2020               Content Version: 12.8  © 2429-8168 [employer]. Care instructions adapted under license by [employer] (which disclaims liability or warranty for this information).  If you have questions about a medical condition or this instruction, always ask your healthcare professional. [place] [address] any warranty or liability for your use of this information.

## 2021-08-10 NOTE — PROGRESS NOTES
Chief Complaint   Patient presents with    Diabetes    Hypertension    Cholesterol Problem    Allergic Rhinitis     1. Have you been to the ER, urgent care clinic since your last visit? Hospitalized since your last visit? No    2. Have you seen or consulted any other health care providers outside of the [address] [place] [address] since your last visit? Include any pap smears or colon screening.  No

## 2021-08-11 ENCOUNTER — OFFICE VISIT (OUTPATIENT)
Dept: CARDIOLOGY CLINIC | Age: 71
End: 2021-08-11
Payer: MEDICARE

## 2021-08-11 VITALS
HEART RATE: 90 BPM | WEIGHT: 151 LBS | SYSTOLIC BLOOD PRESSURE: 125 MMHG | BODY MASS INDEX: 24.27 KG/M2 | DIASTOLIC BLOOD PRESSURE: 64 MMHG | HEIGHT: 66 IN | OXYGEN SATURATION: 96 %

## 2021-08-11 DIAGNOSIS — Z79.4 TYPE 2 DIABETES MELLITUS WITH DIABETIC NEUROPATHY, WITH LONG-TERM CURRENT USE OF INSULIN (HCC): ICD-10-CM

## 2021-08-11 DIAGNOSIS — R60.0 BILATERAL LEG EDEMA: ICD-10-CM

## 2021-08-11 DIAGNOSIS — I10 ESSENTIAL HYPERTENSION, BENIGN: Primary | ICD-10-CM

## 2021-08-11 DIAGNOSIS — E11.40 TYPE 2 DIABETES MELLITUS WITH DIABETIC NEUROPATHY, WITH LONG-TERM CURRENT USE OF INSULIN (HCC): ICD-10-CM

## 2021-08-11 DIAGNOSIS — E78.2 MIXED HYPERLIPIDEMIA: ICD-10-CM

## 2021-08-11 PROCEDURE — 1101F PT FALLS ASSESS-DOCD LE1/YR: CPT | Performed by: INTERNAL MEDICINE

## 2021-08-11 PROCEDURE — G8536 NO DOC ELDER MAL SCRN: HCPCS | Performed by: INTERNAL MEDICINE

## 2021-08-11 PROCEDURE — 99214 OFFICE O/P EST MOD 30 MIN: CPT | Performed by: INTERNAL MEDICINE

## 2021-08-11 PROCEDURE — G8427 DOCREV CUR MEDS BY ELIG CLIN: HCPCS | Performed by: INTERNAL MEDICINE

## 2021-08-11 PROCEDURE — 3052F HG A1C>EQUAL 8.0%<EQUAL 9.0%: CPT | Performed by: INTERNAL MEDICINE

## 2021-08-11 PROCEDURE — G8752 SYS BP LESS 140: HCPCS | Performed by: INTERNAL MEDICINE

## 2021-08-11 PROCEDURE — 3017F COLORECTAL CA SCREEN DOC REV: CPT | Performed by: INTERNAL MEDICINE

## 2021-08-11 PROCEDURE — G8432 DEP SCR NOT DOC, RNG: HCPCS | Performed by: INTERNAL MEDICINE

## 2021-08-11 PROCEDURE — 2022F DILAT RTA XM EVC RTNOPTHY: CPT | Performed by: INTERNAL MEDICINE

## 2021-08-11 PROCEDURE — G8754 DIAS BP LESS 90: HCPCS | Performed by: INTERNAL MEDICINE

## 2021-08-11 PROCEDURE — G8420 CALC BMI NORM PARAMETERS: HCPCS | Performed by: INTERNAL MEDICINE

## 2021-08-11 RX ORDER — CHROMIUM PICOLINATE 200 MCG
TABLET ORAL
COMMUNITY

## 2021-08-11 NOTE — PROGRESS NOTES
1. Have you been to the ER, urgent care clinic since your last visit? Hospitalized since your last visit? No    2. Have you seen or consulted any other health care providers outside of the [address] [place] [address] since your last visit? Include any pap smears or colon screening.  Yes Where: PCP Routine/ Urology Routine

## 2021-08-11 NOTE — PROGRESS NOTES
HISTORY OF PRESENT ILLNESS  Estevan Bnigham is a 73 y.o. male. Patient with htn,dm,hyperlipidemia. On follow up patient denies any chest pains,sob, palpitation or other significant symptoms. 6/2020  Patient seen today for follow-up. He is complaining of increased leg edema. Denies any shortness of breath or chest pain. Continues to be on current medication he has frequent urination. Hypertension  The history is provided by the patient. This is a chronic problem. The problem occurs constantly. The problem has not changed since onset. Pertinent negatives include no chest pain, no abdominal pain, no headaches and no shortness of breath. Cholesterol Problem  The history is provided by the patient. This is a chronic problem. The problem occurs constantly. Pertinent negatives include no chest pain, no abdominal pain, no headaches and no shortness of breath. Follow-up  Pertinent negatives include no chest pain, no abdominal pain, no headaches and no shortness of breath. Review of Systems   Constitutional: Negative for chills and fever. HENT: Negative for nosebleeds. Eyes: Negative for blurred vision and double vision. Respiratory: Negative for cough, hemoptysis, sputum production, shortness of breath and wheezing. Cardiovascular: Positive for leg swelling. Negative for chest pain, palpitations, orthopnea, claudication and PND. Gastrointestinal: Negative for abdominal pain, heartburn, nausea and vomiting. Musculoskeletal: Negative for myalgias. Skin: Negative for rash. Neurological: Negative for dizziness, weakness and headaches. Endo/Heme/Allergies: Does not bruise/bleed easily.      Family History   Problem Relation Age of Onset    Hypertension Mother     Heart Attack Neg Hx     Sudden Death Neg Hx        Past Medical History:   Diagnosis Date    Bladder cancer [facility] 7/15/13    Clinical Stage [id number] HG UC    Bladder tumor     Diabetes ([facility]     Essential hypertension     History of kidney stones     Hyperlipidemia     Peripheral neuropathy     SBO (small bowel obstruction) (HCC)     Spinal stenosis     Spondylolisthesis, grade 1        Past Surgical History:   Procedure Laterality Date    HX UROLOGICAL  7/15/13    [facility], Dr[facility] Laz Perez, [facility]    HX UROLOGICAL  1/4/16    Cysto, Dr[facility] Laz Perez, [facility]    HX WRIST FRACTURE [address]  4/27/15    (L) wrist       Allergies   Allergen Reactions    Actos [Pioglitazone] Other (comments)     Personal history of bladder cancer         Current Outpatient Medications   Medication Sig    ashwagandha root extract 300 mg cap Take  by mouth.  tadalafiL (CIALIS) 20 mg tablet Take 1 Tablet by mouth as needed for Erectile Dysfunction.  omeprazole (PRILOSEC) 40 mg capsule TAKE 1 CAPSULE EVERY DAY    tamsulosin (FLOMAX) 0.4 mg capsule TAKE 1 CAPSULE EVERY DAY    atorvastatin (LIPITOR) 10 mg tablet TAKE 1 TABLET EVERY NIGHT    valsartan (DIOVAN) 320 mg tablet TAKE 1 TABLET EVERY DAY    montelukast (SINGULAIR) 10 mg tablet Take 1 Tab by mouth daily.  fluticasone propionate (FLONASE) 50 mcg/actuation nasal spray 1 spray each nostril daily as needed for rhinitis    finasteride (PROSCAR) 5 mg tablet TAKE 1 TABLET EVERY DAY (Patient taking differently: 10 mg. TAKE 1 TABLET EVERY DAY)    Januvia 100 mg tablet TAKE 1 TABLET EVERY DAY    Accu-Chek Eliane Plus test strp strip TEST EVERY DAY    glipiZIDE (GLUCOTROL) 5 mg tablet TAKE 1 TABLET EVERY DAY    BD Ultra-Fine Short Pen Needle 31 gauge x 5/16\" ndle USE  TO INJECT ONE TIME DAILY    metFORMIN ER (GLUCOPHAGE XR) 500 mg tablet Take 2 Tabs by mouth two (2) times a day. 1,000 mg BID    insulin glargine (LANTUS SOLOSTAR U-100 INSULIN) 100 unit/mL (3 mL) inpn INJECT  10 UNITS SUBCUTANEOUSLY AT BEDTIME. DISCARD AND BEGIN NEW PEN AFTER 28 DAYS.  CALCIUM PO Take  by mouth.     glucose blood VI test strips (ACCU-CHEK ELIANE PLUS TEST STRP) strip Use 1 daily for blood glucose monitoring DX: E11.9    cholecalciferol, vitamin D3, (VITAMIN D3 PO) Take  by mouth.  multivitamin (ONE A DAY) tablet Take 1 Tab by mouth daily.  Lancets (ACCU-CHEK SOFTCLIX LANCETS) misc Use 1 daily for blood glucose monitoring DX: E11.9    Blood-Glucose Meter (ACCU-CHEK HALIMA PLUS METER) misc Use 1 daily for blood glucose monitoring DX: E11.9    CINNAMON BARK (CINNAMON PO) Take  by mouth.  TURMERIC ROOT EXTRACT PO Take  by mouth.  SHOBHA ROOT (SHOBHA EXTRACT PO) Take  by mouth.  Insulin Needles, Disposable, (BD INSULIN PEN NEEDLE UF MINI) 31 gauge x 3/16\" ndle Check twice daily    cyanocobalamin (VITAMIN B-12) 1,000 mcg Subl Take 1,000 mcg by mouth daily. No current facility-administered medications for this visit. Visit Vitals  /64 (BP 1 Location: Left upper arm, BP Patient Position: Sitting, BP Cuff Size: Adult)   Pulse 90   Ht 5' 6\" (1.676 m)   Wt 68.5 kg (151 lb)   SpO2 96%   BMI 24.37 kg/m²         Physical Exam  Constitutional:       Appearance: He is well-developed. HENT:      Head: Normocephalic and atraumatic. Eyes:      Conjunctiva/sclera: Conjunctivae normal.   Neck:      Thyroid: No thyromegaly. Vascular: No JVD. Trachea: No tracheal deviation. Cardiovascular:      Rate and Rhythm: Normal rate and regular rhythm. Heart sounds: Normal heart sounds. No murmur heard. No friction rub. No gallop. Pulmonary:      Effort: No respiratory distress. Breath sounds: Normal breath sounds. No wheezing or rales. Chest:      Chest wall: No tenderness. Abdominal:      Palpations: Abdomen is soft. Tenderness: There is no abdominal tenderness. Musculoskeletal:      Cervical back: Neck supple. Skin:     General: Skin is warm and dry. Neurological:      Mental Status: He is alert and oriented to person, place, and time. Mr. Cade Marie has a reminder for a \"due or due soon\" health maintenance.  I have asked that he contact his primary care provider for follow-up on this health maintenance. Conclusion: 4/2013:stress test  1. Normal perfusion scan. 2. Normal wall motion and ejection fraction. I Have personally reviewed recent relevant labs available and discussed with patient  3/2018  Interpretation Summary 8/2020    · LV: Estimated LVEF is 55 - 60%. Normal cavity size, wall thickness and systolic function (ejection fraction normal). Wall motion: normal. Mild (grade 1) left ventricular diastolic dysfunction. · LA: Left Atrium volume index is 30.74 mL/m2. · RV: Normal right ventricular size and function. · No hemodynamically significant valvular pathology. I Have personally reviewed recent relevant labs available and discussed with patient  6/2020, 4/2021          Assessment       ICD-10-CM ICD-9-CM    1. Essential hypertension, benign  I10 401.1     Blood pressure controlled continue treatment   2. Bilateral leg edema  R60.0 782. 3     Better on treatment continue therapy     3. Mixed hyperlipidemia  E78.2 272.2     Continue treatment lab with PCP   4. Type 2 diabetes mellitus with diabetic neuropathy, with long-term current use of insulin ([facility])  E11.40 250.60     Z79.4 357.2      V58.67     Elevated hemoglobin A1c continue dietary modification treatment. Follow-up with PCP   8/2020  Cardiac status stable. No evidence of pulmonary hypertension normal ejection fraction continue to monitor  Losartan HCTZ was discontinued due to frequent nighttime urination. I have changed that to valsartan 320 mg  8/2021 stable cardiac status blood pressure controlled. Edema is stable lab will be done with PCP  There are no discontinued medications. No orders of the defined types were placed in this encounter. Follow-up and Dispositions    · Return in about 1 year (around 8/11/2022).

## 2021-08-13 LAB
ALBUMIN SERPL-MCNC: 4.2 G/DL (ref 3.8–4.8)
ALBUMIN/CREAT UR: 21 MG/G CREAT (ref 0–29)
ALBUMIN/GLOB SERPL: 1.6 {RATIO} (ref 1.2–2.2)
ALP SERPL-CCNC: 102 IU/L (ref 48–121)
ALT SERPL-CCNC: 13 IU/L (ref 0–44)
AST SERPL-CCNC: 17 IU/L (ref 0–40)
BILIRUB SERPL-MCNC: 0.6 MG/DL (ref 0–1.2)
BUN SERPL-MCNC: 13 MG/DL (ref 8–27)
BUN/CREAT SERPL: 16 (ref 10–24)
CALCIUM SERPL-MCNC: 9.3 MG/DL (ref 8.6–10.2)
CHLORIDE SERPL-SCNC: 100 MMOL/L (ref 96–106)
CHOLEST SERPL-MCNC: 139 MG/DL (ref 100–199)
CO2 SERPL-SCNC: 24 MMOL/L (ref 20–29)
CREAT SERPL-MCNC: 0.82 MG/DL (ref 0.76–1.27)
CREAT UR-MCNC: 152.4 MG/DL
ERYTHROCYTE [DISTWIDTH] IN BLOOD BY AUTOMATED COUNT: 11.9 % (ref 11.6–15.4)
EST. AVERAGE GLUCOSE BLD GHB EST-MCNC: 212 MG/DL
GLOBULIN SER CALC-MCNC: 2.6 G/DL (ref 1.5–4.5)
GLUCOSE SERPL-MCNC: 160 MG/DL (ref 65–99)
HBA1C MFR BLD: 9 % (ref 4.8–5.6)
HCT VFR BLD AUTO: 43 % (ref 37.5–51)
HDLC SERPL-MCNC: 56 MG/DL
HGB BLD-MCNC: 13.9 G/DL (ref 13–17.7)
IMP & REVIEW OF LAB RESULTS: NORMAL
LDLC SERPL CALC-MCNC: 66 MG/DL (ref 0–99)
MCH RBC QN AUTO: 31 PG (ref 26.6–33)
MCHC RBC AUTO-ENTMCNC: 32.3 G/DL (ref 31.5–35.7)
MCV RBC AUTO: 96 FL (ref 79–97)
MICROALBUMIN UR-MCNC: 31.4 UG/ML
PLATELET # BLD AUTO: 273 X10E3/UL (ref 150–450)
POTASSIUM SERPL-SCNC: 4.7 MMOL/L (ref 3.5–5.2)
PROT SERPL-MCNC: 6.8 G/DL (ref 6–8.5)
RBC # BLD AUTO: 4.48 X10E6/UL (ref 4.14–5.8)
SODIUM SERPL-SCNC: 138 MMOL/L (ref 134–144)
TRIGL SERPL-MCNC: 89 MG/DL (ref 0–149)
TSH SERPL DL<=0.005 MIU/L-ACNC: 1.58 UIU/ML (ref 0.45–4.5)
URATE SERPL-MCNC: 4.1 MG/DL (ref 3.8–8.4)
VLDLC SERPL CALC-MCNC: 17 MG/DL (ref 5–40)
WBC # BLD AUTO: 8.1 X10E3/UL (ref 3.4–10.8)

## 2021-08-13 NOTE — PROGRESS NOTES
A1c went up to nine. Please send these labs to the Endo. Advised him to have a follow-up with them for further discussion and medication adjustment. Hemoglobin is also low but could be from diabetes.   At this time I would advise a multivitamin daily

## 2021-08-13 NOTE — PROGRESS NOTES
Arthritic changes on foot x-ray. Currently symptomatic treatment. If pain is too worse we can send to Ortho for trigger point injection.   Please asked patient if he needs we can do a referral

## 2021-08-19 NOTE — PROGRESS NOTES
Contacted patient and verified identity using name and date of birth (2- identifiers)  Spoke with patient and he verbalized understanding of Arthritic changes on foot x-ray. Currently symptomatic treatment. If pain is too worse we can send to Ortho for trigger point injection.   Please asked patient if he needs we can do a referral     Would like to see Ortho

## 2021-08-19 NOTE — PROGRESS NOTES
Contacted patient and verified identity using name and date of birth (2- identifiers)  Spoke with patient and he verbalized understanding of No acute findings. Ask if wants to go for physical therapy or ortho?   Please place referral to Ortho

## 2021-08-19 NOTE — PROGRESS NOTES
Contacted patient and verified identity using name and date of birth (2- identifiers)  Spoke with patient and he verbalized understanding of A1c went up to nine. Please send these labs to the Endo. Advised him to have a follow-up with them for further discussion and medication adjustment. Hemoglobin is also low but could be from diabetes. At this time I would advise a multivitamin daily. Patient saw Dr. Justo Browning yesterday- he saw results.  Patient reports to taking MVI

## 2021-08-23 DIAGNOSIS — M79.671 RIGHT FOOT PAIN: Primary | ICD-10-CM

## 2021-08-23 DIAGNOSIS — M25.511 RIGHT SHOULDER PAIN, UNSPECIFIED CHRONICITY: ICD-10-CM

## 2021-09-30 ENCOUNTER — APPOINTMENT (OUTPATIENT)
Dept: GENERAL RADIOLOGY | Age: 71
End: 2021-09-30
Attending: EMERGENCY MEDICINE
Payer: MEDICARE

## 2021-09-30 ENCOUNTER — APPOINTMENT (OUTPATIENT)
Dept: CT IMAGING | Age: 71
End: 2021-09-30
Attending: EMERGENCY MEDICINE
Payer: MEDICARE

## 2021-09-30 ENCOUNTER — HOSPITAL ENCOUNTER (EMERGENCY)
Age: 71
Discharge: HOME | End: 2021-09-30
Attending: EMERGENCY MEDICINE
Payer: MEDICARE

## 2021-09-30 VITALS
HEIGHT: 66 IN | OXYGEN SATURATION: 100 % | DIASTOLIC BLOOD PRESSURE: 83 MMHG | BODY MASS INDEX: 24.11 KG/M2 | TEMPERATURE: 98.3 F | WEIGHT: 150 LBS | HEART RATE: 76 BPM | SYSTOLIC BLOOD PRESSURE: 128 MMHG | RESPIRATION RATE: 20 BRPM

## 2021-09-30 DIAGNOSIS — M84.48XA PATHOLOGICAL FRACTURE OF ONE RIB: ICD-10-CM

## 2021-09-30 DIAGNOSIS — S22.41XA CLOSED FRACTURE OF MULTIPLE RIBS OF RIGHT SIDE, INITIAL ENCOUNTER: Primary | ICD-10-CM

## 2021-09-30 DIAGNOSIS — R93.89 ABNORMAL CT SCAN: Primary | ICD-10-CM

## 2021-09-30 DIAGNOSIS — M89.9 LYTIC LESION OF BONE ON X-RAY: ICD-10-CM

## 2021-09-30 LAB
ALBUMIN SERPL-MCNC: 4.1 G/DL (ref 3.4–5)
ALBUMIN/GLOB SERPL: 1.2 {RATIO} (ref 0.8–1.7)
ALP SERPL-CCNC: 100 U/L (ref 45–117)
ALT SERPL-CCNC: 25 U/L (ref 16–61)
ANION GAP SERPL CALC-SCNC: 5 MMOL/L (ref 3–18)
AST SERPL-CCNC: 20 U/L (ref 10–38)
BASOPHILS # BLD: 0.1 K/UL (ref 0–0.1)
BASOPHILS NFR BLD: 1 % (ref 0–2)
BILIRUB DIRECT SERPL-MCNC: 0.2 MG/DL (ref 0–0.2)
BILIRUB SERPL-MCNC: 0.6 MG/DL (ref 0.2–1)
BUN SERPL-MCNC: 15 MG/DL (ref 7–18)
BUN/CREAT SERPL: 18 (ref 12–20)
CA-I BLD-SCNC: 1.21 MMOL/L (ref 1.12–1.32)
CALCIUM SERPL-MCNC: 8.6 MG/DL (ref 8.5–10.1)
CHLORIDE SERPL-SCNC: 105 MMOL/L (ref 100–111)
CO2 SERPL-SCNC: 29 MMOL/L (ref 21–32)
CREAT SERPL-MCNC: 0.84 MG/DL (ref 0.6–1.3)
DIFFERENTIAL METHOD BLD: ABNORMAL
EOSINOPHIL # BLD: 0.4 K/UL (ref 0–0.4)
EOSINOPHIL NFR BLD: 5 % (ref 0–5)
ERYTHROCYTE [DISTWIDTH] IN BLOOD BY AUTOMATED COUNT: 12.4 % (ref 11.6–14.5)
GLOBULIN SER CALC-MCNC: 3.5 G/DL (ref 2–4)
GLUCOSE SERPL-MCNC: 167 MG/DL (ref 74–99)
HCT VFR BLD AUTO: 41.1 % (ref 36–48)
HGB BLD-MCNC: 13.7 G/DL (ref 13–16)
LYMPHOCYTES # BLD: 1.9 K/UL (ref 0.9–3.6)
LYMPHOCYTES NFR BLD: 25 % (ref 21–52)
MAGNESIUM SERPL-MCNC: 2.2 MG/DL (ref 1.6–2.6)
MCH RBC QN AUTO: 31.1 PG (ref 24–34)
MCHC RBC AUTO-ENTMCNC: 33.3 G/DL (ref 31–37)
MCV RBC AUTO: 93.4 FL (ref 78–100)
MONOCYTES # BLD: 0.8 K/UL (ref 0.05–1.2)
MONOCYTES NFR BLD: 11 % (ref 3–10)
NEUTS SEG # BLD: 4.4 K/UL (ref 1.8–8)
NEUTS SEG NFR BLD: 58 % (ref 40–73)
PLATELET # BLD AUTO: 261 K/UL (ref 135–420)
PMV BLD AUTO: 9.9 FL (ref 9.2–11.8)
POTASSIUM SERPL-SCNC: 4.4 MMOL/L (ref 3.5–5.5)
PROT SERPL-MCNC: 7.6 G/DL (ref 6.4–8.2)
RBC # BLD AUTO: 4.4 M/UL (ref 4.35–5.65)
SODIUM SERPL-SCNC: 139 MMOL/L (ref 136–145)
WBC # BLD AUTO: 7.6 K/UL (ref 4.6–13.2)

## 2021-09-30 PROCEDURE — 82330 ASSAY OF CALCIUM: CPT

## 2021-09-30 PROCEDURE — 80076 HEPATIC FUNCTION PANEL: CPT

## 2021-09-30 PROCEDURE — 71250 CT THORAX DX C-: CPT

## 2021-09-30 PROCEDURE — 80048 BASIC METABOLIC PNL TOTAL CA: CPT

## 2021-09-30 PROCEDURE — 71100 X-RAY EXAM RIBS UNI 2 VIEWS: CPT

## 2021-09-30 PROCEDURE — 74011250637 HC RX REV CODE- 250/637: Performed by: EMERGENCY MEDICINE

## 2021-09-30 PROCEDURE — 99283 EMERGENCY DEPT VISIT LOW MDM: CPT

## 2021-09-30 PROCEDURE — 83735 ASSAY OF MAGNESIUM: CPT

## 2021-09-30 PROCEDURE — 74011000636 HC RX REV CODE- 636: Performed by: EMERGENCY MEDICINE

## 2021-09-30 PROCEDURE — 74177 CT ABD & PELVIS W/CONTRAST: CPT

## 2021-09-30 PROCEDURE — 71046 X-RAY EXAM CHEST 2 VIEWS: CPT

## 2021-09-30 PROCEDURE — 85025 COMPLETE CBC W/AUTO DIFF WBC: CPT

## 2021-09-30 RX ORDER — DICLOFENAC EPOLAMINE 0.01 G/1
1 PATCH TOPICAL EVERY 12 HOURS
Qty: 10 PATCH | Refills: 0 | Status: SHIPPED | OUTPATIENT
Start: 2021-09-30 | End: 2021-10-05

## 2021-09-30 RX ORDER — HYDROCODONE BITARTRATE AND ACETAMINOPHEN 7.5; 325 MG/1; MG/1
1 TABLET ORAL
Qty: 15 TABLET | Refills: 0 | Status: SHIPPED | OUTPATIENT
Start: 2021-09-30 | End: 2021-10-05

## 2021-09-30 RX ORDER — NAPROXEN 250 MG/1
250 TABLET ORAL 2 TIMES DAILY WITH MEALS
Qty: 10 TABLET | Refills: 0 | Status: SHIPPED | OUTPATIENT
Start: 2021-09-30 | End: 2021-10-05

## 2021-09-30 RX ORDER — NAPROXEN 250 MG/1
500 TABLET ORAL
Status: COMPLETED | OUTPATIENT
Start: 2021-09-30 | End: 2021-09-30

## 2021-09-30 RX ADMIN — IOPAMIDOL 100 ML: 612 INJECTION, SOLUTION INTRAVENOUS at 12:30

## 2021-09-30 RX ADMIN — NAPROXEN 500 MG: 250 TABLET ORAL at 09:39

## 2021-09-30 NOTE — ED NOTES
PIV inserted into LISA, flushed easily with 10ml of NS. Pt tolerated well. Updated pt on poc. Denies any needs at this time. Call bell in reach.

## 2021-09-30 NOTE — ED NOTES
Pt discharged at this time. Pt IV removed. This RN reviewed discharge instructions at this time with the pt, & pt does not have any questions. Pt ambulatory upon discharge, & in stable condition. Pt armband removed & shredded.

## 2021-09-30 NOTE — ED TRIAGE NOTES
Patient c/o RIGHT RIB PAIN x 3 days    Patient has a lidocaine patch applied    Patient states he injured it

## 2021-09-30 NOTE — ED PROVIDER NOTES
EMERGENCY DEPARTMENT HISTORY AND PHYSICAL EXAM    9:02 AM    Date: 9/30/2021  Patient Name: Paramjit Bee    History of Presenting Illness     Chief Complaint   Patient presents with    Rib Pain       History Provided By: Patient  Location/Duration/Severity/Modifying factors   Patient is a 60-year-old male with past medical history of diabetes, hypertension who presents to the emergency department complaining of right-sided rib pain. Reports that it started approximately 3 or 4 days ago. The patient reports he was leaning over against an object putting pressure on the right rib cage as he was trying to reach for something that was on the ground. Reports he had pain which onset shortly thereafter. Its been intermittent since then, moderate in severity. The patient has trouble sleeping last night as a result of the pain. Worsens with inspiration palpation and movement of the right upper extremity. Denies any PE history, history of coronary artery disease. Did not have any falls. He is not having any shortness of breath or otherwise chest pain. PCP: Santi Garcia MD    Current Outpatient Medications   Medication Sig Dispense Refill    HYDROcodone-acetaminophen (Norco) 7.5-325 mg per tablet Take 1 Tablet by mouth every six (6) hours as needed for Pain for up to 5 days. Max Daily Amount: 4 Tablets. 15 Tablet 0    naproxen (NAPROSYN) 250 mg tablet Take 1 Tablet by mouth two (2) times daily (with meals) for 5 days. 10 Tablet 0    diclofenac (FLECTOR) 1.3 % pt12 1 Patch by TransDERmal route every twelve (12) hours every twelve (12) hours for 5 days.  10 Patch 0    BD Ultra-Fine Short Pen Needle 31 gauge x 5/16\" ndle USE  TO INJECT ONE TIME DAILY 100 Each 5    montelukast (SINGULAIR) 10 mg tablet TAKE 1 TABLET EVERY DAY 90 Tablet 1    fluticasone propionate (FLONASE) 50 mcg/actuation nasal spray USE 1 SPRAY IN EACH NOSTRIL DAILY AS NEEDED FOR RHINITIS 16 g 1    ashwagandha root extract 300 mg cap Take  by mouth.  tadalafiL (CIALIS) 20 mg tablet Take 1 Tablet by mouth as needed for Erectile Dysfunction. 90 Tablet 3    omeprazole (PRILOSEC) 40 mg capsule TAKE 1 CAPSULE EVERY DAY 90 Capsule 1    tamsulosin (FLOMAX) 0.4 mg capsule TAKE 1 CAPSULE EVERY DAY 90 Capsule 1    atorvastatin (LIPITOR) 10 mg tablet TAKE 1 TABLET EVERY NIGHT 180 Tablet 0    valsartan (DIOVAN) 320 mg tablet TAKE 1 TABLET EVERY DAY 90 Tablet 3    finasteride (PROSCAR) 5 mg tablet TAKE 1 TABLET EVERY DAY (Patient taking differently: 10 mg. TAKE 1 TABLET EVERY DAY) 90 Tab 3    Januvia 100 mg tablet TAKE 1 TABLET EVERY DAY 90 Tab 0    Accu-Chek Eliane Plus test strp strip TEST EVERY  Strip 5    glipiZIDE (GLUCOTROL) 5 mg tablet TAKE 1 TABLET EVERY DAY 90 Tab 1    metFORMIN ER (GLUCOPHAGE XR) 500 mg tablet Take 2 Tabs by mouth two (2) times a day. 1,000 mg  Tab 0    insulin glargine (LANTUS SOLOSTAR U-100 INSULIN) 100 unit/mL (3 mL) inpn INJECT  10 UNITS SUBCUTANEOUSLY AT BEDTIME. DISCARD AND BEGIN NEW PEN AFTER 28 DAYS. 5 Pen 3    CALCIUM PO Take  by mouth.  glucose blood VI test strips (ACCU-CHEK ELIANE PLUS TEST STRP) strip Use 1 daily for blood glucose monitoring DX: E11.9 300 Strip 1    cholecalciferol, vitamin D3, (VITAMIN D3 PO) Take  by mouth.  multivitamin (ONE A DAY) tablet Take 1 Tab by mouth daily.  Lancets (ACCU-CHEK SOFTCLIX LANCETS) misc Use 1 daily for blood glucose monitoring DX: E11.9 100 Each 3    Blood-Glucose Meter (ACCU-CHEK ELIANE PLUS METER) misc Use 1 daily for blood glucose monitoring DX: E11.9 1 Each 0    CINNAMON BARK (CINNAMON PO) Take  by mouth.  TURMERIC ROOT EXTRACT PO Take  by mouth.  GINGER ROOT (GINGER EXTRACT PO) Take  by mouth.  Insulin Needles, Disposable, (BD INSULIN PEN NEEDLE UF MINI) 31 gauge x 3/16\" ndle Check twice daily 100 Pen Needle 1    cyanocobalamin (VITAMIN B-12) 1,000 mcg Subl Take 1,000 mcg by mouth daily.          Past History Past Medical History:  Past Medical History:   Diagnosis Date    Bladder cancer ([facility] [address] 7/15/13    Clinical Stage [id number] HG UC    Bladder tumor     Diabetes ([facility] [address]     Essential hypertension     History of kidney stones     Hyperlipidemia     Peripheral neuropathy     SBO (small bowel obstruction) (HCC)     Spinal stenosis     Spondylolisthesis, grade 1        Past Surgical History:  Past Surgical History:   Procedure Laterality Date    HX UROLOGICAL  7/15/13    TURBT, Dr. Shayy Crespo, [facility]    HX UROLOGICAL  1/4/16    Cysto, Dr. Shayy Crespo, [address] [place] [address]    HX WRIST FRACTURE [address]  4/27/15    (L) wrist       Family History:  Family History   Problem Relation Age of Onset    Hypertension Mother     Heart Attack Neg Hx     Sudden Death Neg Hx        Social History:  Social History     Tobacco Use    Smoking status: Never Smoker    Smokeless tobacco: Never Used   Substance Use Topics    Alcohol use: Not Currently     Comment: 1-2 drinks/day    Drug use: No       Allergies: Allergies   Allergen Reactions    Actos [Pioglitazone] Other (comments)     Personal history of bladder cancer         I reviewed and confirmed the above information with patient and updated as necessary. Review of Systems     Review of Systems   Constitutional: Negative for fever. HENT: Negative for congestion and rhinorrhea. Eyes: Negative for visual disturbance. Respiratory: Negative for cough and shortness of breath. Cardiovascular: Negative for chest pain.        + Right-sided rib pain   Gastrointestinal: Negative for abdominal pain, diarrhea, nausea and vomiting. Genitourinary: Negative for dysuria, frequency and urgency. Musculoskeletal: Negative for myalgias. Skin: Negative for rash. Neurological: Negative for headaches.        Physical Exam     Visit Vitals  /83 (BP 1 Location: Left upper arm, BP Patient Position: At rest)   Pulse 76   Temp 98.3 °F (36.8 °C)   Resp 20   Ht 5' 6\" (1.676 m)   Wt 68 kg (150 lb) SpO2 100%   BMI 24.21 kg/m²       Physical Exam  Constitutional:       General: He is not in acute distress. Appearance: Normal appearance. He is normal weight. He is not ill-appearing or toxic-appearing. HENT:      Head: Normocephalic and atraumatic. Right Ear: External ear normal.      Left Ear: External ear normal.      Nose: Nose normal.      Mouth/Throat:      Mouth: Mucous membranes are moist.      Pharynx: No oropharyngeal exudate or posterior oropharyngeal erythema. Eyes:      Conjunctiva/sclera: Conjunctivae normal.      Pupils: Pupils are equal, round, and reactive to light. Cardiovascular:      Rate and Rhythm: Normal rate and regular rhythm. Pulses: Normal pulses. Heart sounds: Normal heart sounds. No murmur heard. No friction rub. Comments: Right-sided rib tenderness, mild no crepitus no step-offs  Pulmonary:      Effort: Pulmonary effort is normal.      Breath sounds: Normal breath sounds. No wheezing, rhonchi or rales. Abdominal:      General: Abdomen is flat. Tenderness: There is no abdominal tenderness. There is no guarding or rebound. Musculoskeletal:         General: No swelling or tenderness. Normal range of motion. Cervical back: Normal range of motion and neck supple. Right lower leg: No edema. Left lower leg: No edema. Skin:     General: Skin is warm and dry. Capillary Refill: Capillary refill takes less than 2 seconds. Neurological:      General: No focal deficit present. Mental Status: He is alert. Motor: No weakness.          Diagnostic Study Results     Labs -  Recent Results (from the past 24 hour(s))   CBC WITH AUTOMATED DIFF    Collection Time: 09/30/21 11:30 AM   Result Value Ref Range    WBC 7.6 4.6 - 13.2 K/uL    RBC 4.40 4.35 - 5.65 M/uL    HGB 13.7 13.0 - 16.0 g/dL    HCT 41.1 36.0 - 48.0 %    MCV 93.4 78.0 - 100.0 FL    MCH 31.1 24.0 - 34.0 PG    MCHC 33.3 31.0 - 37.0 g/dL    RDW 12.4 11.6 - 14.5 % PLATELET 281 528 - 739 K/uL    MPV 9.9 9.2 - 11.8 FL    NEUTROPHILS 58 40 - 73 %    LYMPHOCYTES 25 21 - 52 %    MONOCYTES 11 (H) 3 - 10 %    EOSINOPHILS 5 0 - 5 %    BASOPHILS 1 0 - 2 %    ABS. NEUTROPHILS 4.4 1.8 - 8.0 K/UL    ABS. LYMPHOCYTES 1.9 0.9 - 3.6 K/UL    ABS. MONOCYTES 0.8 0.05 - 1.2 K/UL    ABS. EOSINOPHILS 0.4 0.0 - 0.4 K/UL    ABS. BASOPHILS 0.1 0.0 - 0.1 K/UL    DF AUTOMATED     METABOLIC PANEL, BASIC    Collection Time: 09/30/21 11:30 AM   Result Value Ref Range    Sodium 139 136 - 145 mmol/L    Potassium 4.4 3.5 - 5.5 mmol/L    Chloride 105 100 - 111 mmol/L    CO2 29 21 - 32 mmol/L    Anion gap 5 3.0 - 18 mmol/L    Glucose 167 (H) 74 - 99 mg/dL    BUN 15 7.0 - 18 MG/DL    Creatinine 0.84 0.6 - 1.3 MG/DL    BUN/Creatinine ratio 18 12 - 20      GFR est AA >60 >60 ml/min/1.73m2    GFR est non-AA >60 >60 ml/min/1.73m2    Calcium 8.6 8.5 - 10.1 MG/DL   HEPATIC FUNCTION PANEL    Collection Time: 09/30/21 11:30 AM   Result Value Ref Range    Protein, total 7.6 6.4 - 8.2 g/dL    Albumin 4.1 3.4 - 5.0 g/dL    Globulin 3.5 2.0 - 4.0 g/dL    A-G Ratio 1.2 0.8 - 1.7      Bilirubin, total 0.6 0.2 - 1.0 MG/DL    Bilirubin, direct 0.2 0.0 - 0.2 MG/DL    Alk. phosphatase 100 45 - 117 U/L    AST (SGOT) 20 10 - 38 U/L    ALT (SGPT) 25 16 - 61 U/L   MAGNESIUM    Collection Time: 09/30/21 11:30 AM   Result Value Ref Range    Magnesium 2.2 1.6 - 2.6 mg/dL   IONIZED CALCIUM    Collection Time: 09/30/21 11:46 AM   Result Value Ref Range    Calcium, ionized 1.21 1.12 - 1.32 mmol/L         Radiologic Studies -   CT ABD PELV W CONT   Final Result      1. No CT evidence of malignancy in the abdomen or pelvis. 2. Stable nonspecific chronic mild bladder wall thickening. 3. Hepatic steatosis. 4. Renal cysts. 5. Prostate enlargement. 6. L4-L5 listhesis with spinal stenosis. 7. Fatty inguinal hernias.       CT CHEST WO CONT   Final Result      Nondisplaced right anterolateral seventh rib fracture slightly visualized, better delineated radiographically. Lytic slightly expansile right lateral fifth rib lesion, with focal central   cortical and significant laterally, possibly a subtle pathologic fracture. Any   clinical suspicion for myeloma or metastasis? Recommend further investigation as   clinical warranted. Tiny right middle lobe pulmonary nodules as above, similar to prior. XR RIBS RT UNI 2 V   Final Result      Nondisplaced acute fracture anterior right rib 7. Mildly expansile lucent lesion lateral right rib 5 without definite fracture   seen. Correlate for point tenderness in this location. Again CT chest can be   obtained to further assess for potential associated fracture as warranted   clinically. XR CHEST PA LAT   Final Result      1. No radiographic evidence for an acute cardiopulmonary abnormality. 2. Subtle small bone lesion lateral right rib 5. In the setting of pain, CT   chest is recommended to further evaluate and help exclude possibility of   development of fracture in this area. Medical Decision Making   I am the first provider for this patient. I reviewed the vital signs, available nursing notes, past medical history, past surgical history, family history and social history. Vital Signs-Reviewed the patient's vital signs. Records Reviewed: Nursing Notes, Old Medical Records, Previous Radiology Studies and Previous Laboratory Studies (Time of Review: 9:02 AM)    Provider Notes (Medical Decision Making):   MDM  Number of Diagnoses or Management Options  Closed fracture of multiple ribs of right side, initial encounter  Diagnosis management comments: 42-year-old male presenting with a right-sided rib pain. There was no fall however he did have some mild trauma to the area. Differential considered fracture, contusion, pneumothorax, pleural effusion, pulmonary mass less likely PE ACS or CHF.   We will get a chest x-ray rib rib series and treat the patient with naproxen for now. ED course and results reviewed: The patient was found to have a possibly pathologic fracture of the fifth rib. CT was recommended which confirmed a possible pathologic fracture. Patient was unaware of any possible malignancy. He has not had any weight loss or any other symptoms. Reports he had a colonoscopy done last year. CT abdomen and pelvis was ordered to look for a potential cause and this was negative as well as his blood work not showing any concerning findings for multiple myeloma. I advised the patient of the reassuring work-up. Advised him although his work-up is reassuring we cannot completely rule out the possibility of malignancy and pathologic fracture so he needs to follow-up with his primary care provider which she agreed to. Advised patient of plan of care in which would prescribe pain medications. He was asking about a rib belt advised him these are not recommended anymore, however if he wanted 1 for comfort he could obtain it at a local pharmacy, would also prescribe him with incentive spirometer in addition to analgesia. Patient expresses understanding of the plan and all questions answered. Discussed all incidental findings with the patient directly. At this time, patient is stable and appropriate for discharge home.  Patient demonstrates understanding of current diagnoses and is in agreement with the treatment plan. Teodora Cavazos are advised that while the likelihood of serious underlying condition is low at this point given the evaluation performed today, we cannot fully rule it out. Newarkshirley Smallen are advised to immediately return with any new symptoms or worsening of current condition.  Any Incidental findings were noted on the patient's discharge paperwork as well as verbally directly to the patient, and the appropriate follow-up was given to the patient as far as instructions on testing needed as well as the timeframe.  All questions have been answered.  Patient is given educational material regarding their diagnoses, including danger symptoms and when to return to the ED. This note was dictated utilizing Dragon voice recognition software. Unfortunately this leads to occasional typographical errors. I apologize in advance if the situation occurs. If questions occur please do not hesitate to contact me directly. Gypsy Hoover DO          ED Course: Progress Notes, Reevaluation, and Consults:  ED Course as of Sep 30 1343   Thu Sep 30, 2021   1123 Discussed the findings of possible pathologic fracture with the patient, offered him work-up today versus outpatient work-up he rather do this now since he is here so I will order a CT abdomen and pelvis as well as some basic labs. [TAYLOR]      ED Course User Index  [TAYLOR] Izaiah Fischer DO         Procedures    Critical Care Time: N/A    Diagnosis     Clinical Impression:   1. Closed fracture of multiple ribs of right side, initial encounter        Disposition: Discharge    Follow-up Information     Follow up With Specialties Details Why Contact Info    Solo Cummings MD Family Medicine In 2 days Follow-up with your primary care provider in 1 to 2 days to discuss the CT findings. You likely need to have additional testing done although today all of her testing did not reveal apparent cause for this. Should call this afternoon or tomorrow morning [address] [place], [place] [id number] [phone] [address] [facility] DEPT Emergency Medicine  As needed, If symptoms worsen; or [id number] [phone]           Patient's Medications   Start Taking    DICLOFENAC (FLECTOR) 1.3 % PT12    1 Patch by TransDERmal route every twelve (12) hours every twelve (12) hours for 5 days. HYDROCODONE-ACETAMINOPHEN (NORCO) 7.5-325 MG PER TABLET    Take 1 Tablet by mouth every six (6) hours as needed for Pain for up to 5 days. Max Daily Amount: 4 Tablets. NAPROXEN (NAPROSYN) 250 MG TABLET    Take 1 Tablet by mouth two (2) times daily (with meals) for 5 days. Continue Taking    ACCU-CHEK HALIMA PLUS TEST STRP STRIP    TEST EVERY DAY    ASHWAGANDHA ROOT EXTRACT 300 MG CAP    Take  by mouth. ATORVASTATIN (LIPITOR) 10 MG TABLET    TAKE 1 TABLET EVERY NIGHT    BD ULTRA-FINE SHORT PEN NEEDLE 31 GAUGE X 5/16\" NDLE    USE  TO INJECT ONE TIME DAILY    BLOOD-GLUCOSE METER (ACCU-CHEK HALIMA PLUS METER) MISC    Use 1 daily for blood glucose monitoring DX: E11.9    CALCIUM PO    Take  by mouth. CHOLECALCIFEROL, VITAMIN D3, (VITAMIN D3 PO)    Take  by mouth. CINNAMON BARK (CINNAMON PO)    Take  by mouth. CYANOCOBALAMIN (VITAMIN B-12) 1,000 MCG SUBL    Take 1,000 mcg by mouth daily. FINASTERIDE (PROSCAR) 5 MG TABLET    TAKE 1 TABLET EVERY DAY    FLUTICASONE PROPIONATE (FLONASE) 50 MCG/ACTUATION NASAL SPRAY    USE 1 SPRAY IN EACH NOSTRIL DAILY AS NEEDED FOR RHINITIS    GINGER ROOT (GINGER EXTRACT PO)    Take  by mouth. GLIPIZIDE (GLUCOTROL) 5 MG TABLET    TAKE 1 TABLET EVERY DAY    GLUCOSE BLOOD VI TEST STRIPS (ACCU-CHEK HALIMA PLUS TEST STRP) STRIP    Use 1 daily for blood glucose monitoring DX: E11.9    INSULIN GLARGINE (LANTUS SOLOSTAR U-100 INSULIN) 100 UNIT/ML (3 ML) INPN    INJECT  10 UNITS SUBCUTANEOUSLY AT BEDTIME. DISCARD AND BEGIN NEW PEN AFTER 28 DAYS. INSULIN NEEDLES, DISPOSABLE, (BD INSULIN PEN NEEDLE UF MINI) 31 GAUGE X 3/16\" NDLE    Check twice daily    JANUVIA 100 MG TABLET    TAKE 1 TABLET EVERY DAY    LANCETS (ACCU-CHEK SOFTCLIX LANCETS) MISC    Use 1 daily for blood glucose monitoring DX: E11.9    METFORMIN ER (GLUCOPHAGE XR) 500 MG TABLET    Take 2 Tabs by mouth two (2) times a day. 1,000 mg BID    MONTELUKAST (SINGULAIR) 10 MG TABLET    TAKE 1 TABLET EVERY DAY    MULTIVITAMIN (ONE A DAY) TABLET    Take 1 Tab by mouth daily.     OMEPRAZOLE (PRILOSEC) 40 MG CAPSULE    TAKE 1 CAPSULE EVERY DAY    TADALAFIL (CIALIS) 20 MG TABLET    Take 1 Tablet by mouth as needed for Erectile Dysfunction. TAMSULOSIN (FLOMAX) 0.4 MG CAPSULE    TAKE 1 CAPSULE EVERY DAY    TURMERIC ROOT EXTRACT PO    Take  by mouth. VALSARTAN (DIOVAN) 320 MG TABLET    TAKE 1 TABLET EVERY DAY   These Medications have changed    No medications on file   Stop Taking    No medications on file       Joelle Erickson DO   Emergency Medicine   September 30, 2021, 9:02 AM     This note is dictated utilizing Dragon voice recognition software. Unfortunately this leads to occasional typographical errors using the voice recognition. I apologize in advance if the situation occurs. If questions occur please do not hesitate to contact me directly.     Joelle Erickson, DO

## 2021-09-30 NOTE — DISCHARGE INSTRUCTIONS
As discussed you have 2 rib fractures. The fifth rib was unusual in appearance, possibly suggesting a pathologic fracture. You need to contact your primary care provider to discuss this further, you likely need to have additional testing performed although our testing today did not reveal an obvious source of potential cancer it needs to be ruled out. Take medication as prescribed. You can obtain a rib belt at rest pharmacies, this may help with the symptomatic relief. You should use the incentive spirometer as instructed by the nursing staff. 4.  You need to return if you develop severe worsening pain, high fevers or any worsening of condition.

## 2021-10-06 ENCOUNTER — TRANSCRIBE ORDER (OUTPATIENT)
Dept: SCHEDULING | Age: 71
End: 2021-10-06

## 2021-10-06 DIAGNOSIS — M81.0 AGE-RELATED OSTEOPOROSIS WITHOUT CURRENT PATHOLOGICAL FRACTURE: ICD-10-CM

## 2021-10-06 DIAGNOSIS — D50.0 IRON DEFICIENCY ANEMIA SECONDARY TO BLOOD LOSS (CHRONIC): Primary | ICD-10-CM

## 2021-10-06 DIAGNOSIS — M85.80 OSTEOPENIA: ICD-10-CM

## 2021-10-28 ENCOUNTER — OFFICE VISIT (OUTPATIENT)
Dept: ORTHOPEDIC SURGERY | Age: 71
End: 2021-10-28
Payer: MEDICARE

## 2021-10-28 VITALS
BODY MASS INDEX: 24.36 KG/M2 | RESPIRATION RATE: 16 BRPM | HEIGHT: 66 IN | TEMPERATURE: 96.6 F | OXYGEN SATURATION: 100 % | HEART RATE: 72 BPM | WEIGHT: 151.6 LBS

## 2021-10-28 DIAGNOSIS — R07.81 RIB PAIN ON RIGHT SIDE: ICD-10-CM

## 2021-10-28 DIAGNOSIS — M84.48XA PATHOLOGICAL FRACTURE OF ONE RIB: ICD-10-CM

## 2021-10-28 DIAGNOSIS — M43.06 LUMBAR SPONDYLOLYSIS: Primary | ICD-10-CM

## 2021-10-28 DIAGNOSIS — M89.9 RIB LESION: ICD-10-CM

## 2021-10-28 PROCEDURE — G8420 CALC BMI NORM PARAMETERS: HCPCS | Performed by: SPECIALIST

## 2021-10-28 PROCEDURE — G8536 NO DOC ELDER MAL SCRN: HCPCS | Performed by: SPECIALIST

## 2021-10-28 PROCEDURE — G8756 NO BP MEASURE DOC: HCPCS | Performed by: SPECIALIST

## 2021-10-28 PROCEDURE — 3017F COLORECTAL CA SCREEN DOC REV: CPT | Performed by: SPECIALIST

## 2021-10-28 PROCEDURE — 1101F PT FALLS ASSESS-DOCD LE1/YR: CPT | Performed by: SPECIALIST

## 2021-10-28 PROCEDURE — G8432 DEP SCR NOT DOC, RNG: HCPCS | Performed by: SPECIALIST

## 2021-10-28 PROCEDURE — G8427 DOCREV CUR MEDS BY ELIG CLIN: HCPCS | Performed by: SPECIALIST

## 2021-10-28 PROCEDURE — 99203 OFFICE O/P NEW LOW 30 MIN: CPT | Performed by: SPECIALIST

## 2021-10-28 NOTE — PROGRESS NOTES
Patient: Rufus Herndon                MRN: [id number]       SSN: [id number]  YOB: 1950        AGE: 73 y.o. SEX: male    PCP: Vannessa Duarte MD  10/28/21    CC: RIGHT RIB PAIN    HISTORY:  Rufus Herndon is a 73 y.o. male who is seen for right foot, right shoulder, and right rib pain. He was seen at the ED on 9/30/21 for a sustained rib fracture. Chest x-rays and CT revealed a nondisplaced acute fracture anterior right rib 7 and mildly expansile lucent lesion lateral right rib 5. He felt sudden rib pain as he was leaning on his truck and picking up an object. He saw an oncologist Dr. Rox French on 10/6/2. She scheduled him for a bone density study on 11/5/21. He denies any shortness of breath. He has been experiencing right shoulder pain for the past 2-3 months. He does not recall any injury. He feels shoulder pain with overhead activities and at night. He is right handed. He is also seen for right big toe pain. He was previously seen by Dr. Mynor Urrutia for left shoulder pain. He was treated for back pain while he lived in [facility] 20 years ago but he denies back pain currently. Pain Assessment  10/28/2021   Location of Pain Shoulder; Foot   Location Modifiers Right   Severity of Pain 6   Quality of Pain (No Data)   Quality of Pain Comment just pain   Duration of Pain A few hours   Frequency of Pain Intermittent   Aggravating Factors Other (Comment)   Aggravating Factors Comment driving. ROM   Limiting Behavior -   Relieving Factors Nothing   Result of Injury No     Occupation, etc:  Mr. Wilma Schilling is self employed and works at [employer], [employer], and another [employer]. One of his sons is a hospitalist at [employer] and another son is a ER doctor in [place]. He lives with his wife, gabriella ([place]), eusebio (Keshia Power), and [place] lala-husky mix (Thor) in [place]. He has a 11 month old grandson. He was born in [place] and then lived in [facility] for 20 years.  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 Mr. Shmuel Alegria weighs 151 lbs and is 5'6\" tall. He is an insulin and metformin controlled diabetic. His A1C was 9.0 on 8/12/21. Lab Results   Component Value Date/Time    Hemoglobin A1c 9.0 (H) 08/12/2021 09:17 AM    Hemoglobin A1c (POC) 8.0 04/06/2021 12:00 AM    Hemoglobin A1c, External 8.4 06/20/2016 12:00 AM     Weight Metrics 10/28/2021 9/30/2021 8/11/2021 8/10/2021 7/21/2021 5/11/2021 4/15/2021   Weight 151 lb 9.6 oz 150 lb 151 lb 151 lb 151 lb 151 lb 152 lb   BMI 24.47 kg/m2 24.21 kg/m2 24.37 kg/m2 24.37 kg/m2 24.37 kg/m2 24.37 kg/m2 24.53 kg/m2       Patient Active Problem List   Diagnosis Code    Chest pain, unspecified R07.9    Essential hypertension, benign I10    Mixed hyperlipidemia E78.2    Bladder tumor D49.4    S/P colonoscopy with polypectomy/ follwoing GI. done in 2013. stage 4 esophagitis on EGD done in 2013. recently seen GI. Dr. Ban Zaidi on 7/7/16 Z98.890    Erectile dysfunction N52.9    Elevated PSA R97.20    Cubital tunnel syndrome on left G56.22    Abnormal findings on esophagogastroduodenoscopy (EGD)/ done on 8/1/16. gunner's esophagus .  f/u EGD due in 3 years will be 2019 R19.8    Advance directive discussed with patient Z71.89    Type 2 diabetes mellitus without complication, with long-term current use of insulin (HCC) E11.9, Z79.4    Personal history of bladder cancer Z85.51    Type 2 diabetes mellitus with diabetic neuropathy E11.40     REVIEW OF SYSTEMS:    Constitutional Symptoms: Negative   Eyes: Negative   Ears, Nose, Throat and Mouth: Negative   Cardiovascular: Negative   Respiratory: Negative   Genitourinary: Per HPI   Gastrointestinal: Per HPI   Integumentary (Skin and/or Breast): Negative   Musculoskeletal: Per HPI   Endocrine/Rheumatologic: Negative   Neurological: Per HPI   Hematology/Lymphatic: Negative    Allergic/Immunologic: Negative   Phychiatric: Negative    Social History     Socioeconomic History    Marital status:      Spouse name: Not on file    Number of children: Not on file    Years of education: Not on file    Highest education level: Not on file   Occupational History    Not on file   Tobacco Use    Smoking status: Never Smoker    Smokeless tobacco: Never Used   Substance and Sexual Activity    Alcohol use: Not Currently     Comment: 1-2 drinks/day    Drug use: No    Sexual activity: Not on file   Other Topics Concern    Not on file   Social History Narrative    Not on file     Social Determinants of Health     Financial Resource Strain:     Difficulty of Paying Living Expenses:    Food Insecurity:     Worried About Running Out of Food in the Last Year:     [address] in the Last Year:    Transportation Needs:     Lack of Transportation (Medical):  Lack of Transportation (Non-Medical):    Physical Activity:     Days of Exercise per Week:     Minutes of Exercise per Session:    Stress:     Feeling of Stress :    Social Connections:     Frequency of Communication with Friends and Family:     Frequency of Social Gatherings with Friends and Family:     Attends Religion Services:     Active Member of Clubs or Organizations:     Attends Club or Organization Meetings:     Marital Status:    Intimate Partner Violence:     Fear of Current or Ex-Partner:     Emotionally Abused:     Physically Abused:     Sexually Abused: Allergies   Allergen Reactions    Actos [Pioglitazone] Other (comments)     Personal history of bladder cancer        Current Outpatient Medications   Medication Sig    BD Ultra-Fine Short Pen Needle 31 gauge x 5/16\" ndle USE  TO INJECT ONE TIME DAILY    montelukast (SINGULAIR) 10 mg tablet TAKE 1 TABLET EVERY DAY    fluticasone propionate (FLONASE) 50 mcg/actuation nasal spray USE 1 SPRAY IN EACH NOSTRIL DAILY AS NEEDED FOR RHINITIS    ashwagandha root extract 300 mg cap Take  by mouth.     tadalafiL (CIALIS) 20 mg tablet Take 1 Tablet by mouth as needed for Erectile Dysfunction.  omeprazole (PRILOSEC) 40 mg capsule TAKE 1 CAPSULE EVERY DAY    tamsulosin (FLOMAX) 0.4 mg capsule TAKE 1 CAPSULE EVERY DAY    atorvastatin (LIPITOR) 10 mg tablet TAKE 1 TABLET EVERY NIGHT    valsartan (DIOVAN) 320 mg tablet TAKE 1 TABLET EVERY DAY    finasteride (PROSCAR) 5 mg tablet TAKE 1 TABLET EVERY DAY (Patient taking differently: 10 mg. TAKE 1 TABLET EVERY DAY)    Januvia 100 mg tablet TAKE 1 TABLET EVERY DAY    Accu-Chek Eliane Plus test strp strip TEST EVERY DAY    glipiZIDE (GLUCOTROL) 5 mg tablet TAKE 1 TABLET EVERY DAY    metFORMIN ER (GLUCOPHAGE XR) 500 mg tablet Take 2 Tabs by mouth two (2) times a day. 1,000 mg BID    insulin glargine (LANTUS SOLOSTAR U-100 INSULIN) 100 unit/mL (3 mL) inpn INJECT  10 UNITS SUBCUTANEOUSLY AT BEDTIME. DISCARD AND BEGIN NEW PEN AFTER 28 DAYS.  CALCIUM PO Take  by mouth.  glucose blood VI test strips (ACCU-CHEK ELIANE PLUS TEST STRP) strip Use 1 daily for blood glucose monitoring DX: E11.9    cholecalciferol, vitamin D3, (VITAMIN D3 PO) Take  by mouth.  multivitamin (ONE A DAY) tablet Take 1 Tab by mouth daily.  Lancets (ACCU-CHEK SOFTCLIX LANCETS) misc Use 1 daily for blood glucose monitoring DX: E11.9    Blood-Glucose Meter (ACCU-CHEK ELIANE PLUS METER) misc Use 1 daily for blood glucose monitoring DX: E11.9    CINNAMON BARK (CINNAMON PO) Take  by mouth.  TURMERIC ROOT EXTRACT PO Take  by mouth.  SHOBHA ROOT (SHOBHA EXTRACT PO) Take  by mouth.  Insulin Needles, Disposable, (BD INSULIN PEN NEEDLE UF MINI) 31 gauge x 3/16\" ndle Check twice daily    cyanocobalamin (VITAMIN B-12) 1,000 mcg Subl Take 1,000 mcg by mouth daily. No current facility-administered medications for this visit.       PHYSICAL EXAMINATION:  Visit Vitals  Pulse 72   Temp (!) 96.6 °F (35.9 °C) (Temporal)   Resp 16   Ht 5' 6\" (1.676 m)   Wt 151 lb 9.6 oz (68.8 kg)   SpO2 100%   BMI 24.47 kg/m²      ORTHO EXAMINATION:    Tenderness anterior rib 7  CT CHEST 9/30/21   IMPRESSION  Nondisplaced right anterolateral seventh rib fracture slightly visualized,  better delineated radiographically. Lytic slightly expansile right lateral fifth rib lesion, with focal central  cortical and significant laterally, possibly a subtle pathologic fracture. Any  clinical suspicion for myeloma or metastasis? Recommend further investigation as  clinical warranted. Tiny right middle lobe pulmonary nodules as above    RADIOGRAPHS:  XR CHEST 9/30/21 [facility]  IMPRESSION  Nondisplaced acute fracture anterior right rib 7. Mildly expansile lucent lesion lateral right rib 5 without definite fracture  seen. Correlate for point tenderness in this location. Again CT chest can be obtained to further assess for potential associated fracture as warranted clinically. XR RIGHT FOOT 8/10/21 [facility]  IMPRESSION  Moderate osteoarthritic change of the first metatarsophalangeal joint. XR RIGHT SHOULDER 8/10/21 [facility]  IMPRESSION  No acute findings.       IMPRESSION:      ICD-10-CM ICD-9-CM    1. Lumbar spondylolysis  M43.06 738.4    2. Rib lesion  M89.9 733.90 MRI CHEST WO CONT   3. Rib pain on right side  R07.81 786.50 MRI CHEST WO CONT   4. Pathological fracture of one rib  M84.48XA 733.19 MRI CHEST WO CONT     PLAN:  Workup plan discussed with Dr. Sanaz Gallagher. Pt will be scheduled for CT directed needle bioipsy through Dr. Myrna Jones office. F/U PRN.     Scribed by Varsha Serra ([facility]) as dictated by Quincy Martinez MD

## 2021-10-29 ENCOUNTER — TRANSCRIBE ORDER (OUTPATIENT)
Dept: SCHEDULING | Age: 71
End: 2021-10-29

## 2021-10-29 DIAGNOSIS — R63.4 LOSS OF WEIGHT: Primary | ICD-10-CM

## 2021-10-29 DIAGNOSIS — D50.0 IRON DEFICIENCY ANEMIA SECONDARY TO BLOOD LOSS (CHRONIC): Primary | ICD-10-CM

## 2021-11-05 ENCOUNTER — HOSPITAL ENCOUNTER (OUTPATIENT)
Dept: BONE DENSITY | Age: 71
Discharge: HOME | End: 2021-11-05
Attending: INTERNAL MEDICINE
Payer: MEDICARE

## 2021-11-05 DIAGNOSIS — M81.0 AGE-RELATED OSTEOPOROSIS WITHOUT CURRENT PATHOLOGICAL FRACTURE: ICD-10-CM

## 2021-11-05 DIAGNOSIS — M85.80 OSTEOPENIA: ICD-10-CM

## 2021-11-05 PROCEDURE — 77080 DXA BONE DENSITY AXIAL: CPT

## 2021-11-08 ENCOUNTER — HOSPITAL ENCOUNTER (OUTPATIENT)
Dept: NUCLEAR MEDICINE | Age: 71
Discharge: HOME | End: 2021-11-08
Attending: INTERNAL MEDICINE
Payer: MEDICARE

## 2021-11-08 DIAGNOSIS — R63.4 LOSS OF WEIGHT: ICD-10-CM

## 2021-11-08 PROCEDURE — A9503 TC99M MEDRONATE: HCPCS

## 2021-11-10 ENCOUNTER — OFFICE VISIT (OUTPATIENT)
Dept: FAMILY MEDICINE CLINIC | Age: 71
End: 2021-11-10
Payer: MEDICARE

## 2021-11-10 VITALS
OXYGEN SATURATION: 99 % | WEIGHT: 151 LBS | BODY MASS INDEX: 24.37 KG/M2 | TEMPERATURE: 97.9 F | RESPIRATION RATE: 16 BRPM | HEART RATE: 88 BPM | SYSTOLIC BLOOD PRESSURE: 130 MMHG | DIASTOLIC BLOOD PRESSURE: 76 MMHG

## 2021-11-10 DIAGNOSIS — Z79.4 TYPE 2 DIABETES MELLITUS WITHOUT COMPLICATION, WITH LONG-TERM CURRENT USE OF INSULIN (HCC): Primary | ICD-10-CM

## 2021-11-10 DIAGNOSIS — E11.65 POORLY CONTROLLED DIABETES MELLITUS (HCC): ICD-10-CM

## 2021-11-10 DIAGNOSIS — R94.8 ABNORMAL POSITRON EMISSION TOMOGRAPHY (PET) SCAN: ICD-10-CM

## 2021-11-10 DIAGNOSIS — I10 PRIMARY HYPERTENSION: ICD-10-CM

## 2021-11-10 DIAGNOSIS — R97.20 ELEVATED PSA: ICD-10-CM

## 2021-11-10 DIAGNOSIS — E78.00 PURE HYPERCHOLESTEROLEMIA: ICD-10-CM

## 2021-11-10 DIAGNOSIS — E11.9 TYPE 2 DIABETES MELLITUS WITHOUT COMPLICATION, WITH LONG-TERM CURRENT USE OF INSULIN (HCC): Primary | ICD-10-CM

## 2021-11-10 PROCEDURE — 3052F HG A1C>EQUAL 8.0%<EQUAL 9.0%: CPT | Performed by: FAMILY MEDICINE

## 2021-11-10 PROCEDURE — 2022F DILAT RTA XM EVC RTNOPTHY: CPT | Performed by: FAMILY MEDICINE

## 2021-11-10 PROCEDURE — G8420 CALC BMI NORM PARAMETERS: HCPCS | Performed by: FAMILY MEDICINE

## 2021-11-10 PROCEDURE — G8536 NO DOC ELDER MAL SCRN: HCPCS | Performed by: FAMILY MEDICINE

## 2021-11-10 PROCEDURE — 1101F PT FALLS ASSESS-DOCD LE1/YR: CPT | Performed by: FAMILY MEDICINE

## 2021-11-10 PROCEDURE — G8427 DOCREV CUR MEDS BY ELIG CLIN: HCPCS | Performed by: FAMILY MEDICINE

## 2021-11-10 PROCEDURE — G8754 DIAS BP LESS 90: HCPCS | Performed by: FAMILY MEDICINE

## 2021-11-10 PROCEDURE — 99214 OFFICE O/P EST MOD 30 MIN: CPT | Performed by: FAMILY MEDICINE

## 2021-11-10 PROCEDURE — G8752 SYS BP LESS 140: HCPCS | Performed by: FAMILY MEDICINE

## 2021-11-10 PROCEDURE — G8510 SCR DEP NEG, NO PLAN REQD: HCPCS | Performed by: FAMILY MEDICINE

## 2021-11-10 PROCEDURE — 3017F COLORECTAL CA SCREEN DOC REV: CPT | Performed by: FAMILY MEDICINE

## 2021-11-10 RX ORDER — METFORMIN HYDROCHLORIDE 500 MG/1
1000 TABLET, EXTENDED RELEASE ORAL 2 TIMES DAILY
Qty: 360 TABLET | Refills: 1 | Status: SHIPPED | OUTPATIENT
Start: 2021-11-10 | End: 2022-03-30

## 2021-11-10 RX ORDER — ATORVASTATIN CALCIUM 10 MG/1
10 TABLET, FILM COATED ORAL
Qty: 180 TABLET | Refills: 1 | Status: SHIPPED | OUTPATIENT
Start: 2021-11-10 | End: 2022-10-01

## 2021-11-10 RX ORDER — METFORMIN HYDROCHLORIDE 500 MG/1
1000 TABLET, EXTENDED RELEASE ORAL 2 TIMES DAILY
Qty: 360 TABLET | Refills: 0 | Status: CANCELLED | OUTPATIENT
Start: 2021-11-10

## 2021-11-10 RX ORDER — FINASTERIDE 5 MG/1
5 TABLET, FILM COATED ORAL DAILY
Qty: 90 TABLET | Refills: 1 | Status: SHIPPED | OUTPATIENT
Start: 2021-11-10 | End: 2022-03-30

## 2021-11-10 NOTE — PROGRESS NOTES
HISTORY OF PRESENT ILLNESS  Estevan Corley is a 73 y.o. male. HPI: Here for follow-up. Recent ER visit noted right side fifth rib fracture. Had a bone density and PET scan done. Question about metastasis versus malignancy. Now scheduled for the biopsy. Will follow Dr. Donavon Aguilar and the recommendation. Currently pain over the right side is well controlled. No trouble breathing. No anginal symptoms. No headache or dizziness. Personal history of bladder cancer. Under surveillance. Had a CT scan done back in July and November. Noted no acute finding. Pulmonary nodule which has been stable. No cold cough or wheezing. No fever. No appetite or weight changes. Had anemia on and off. Up-to-date with colonoscopy. Been following hematology as well. Diabetes. On and off issue with the compliance with checking blood sugar and medication. Currently no hyper or hypoglycemic symptoms. Following endocrinology with compliance. We will refill his medication but advised to check the dosage and frequency with endocrinologist.  During visit sitting comfortable without any acute distress. Vitals been stable. Visit Vitals  /76 (BP 1 Location: Left upper arm, BP Patient Position: Sitting, BP Cuff Size: Adult)   Pulse 88   Temp 97.9 °F (36.6 °C) (Temporal)   Resp 16   Wt 151 lb (68.5 kg)   SpO2 99%   BMI 24.37 kg/m²     Review medication list, vitals, problem list,allergies.    Lab Results   Component Value Date/Time    WBC 7.6 09/30/2021 11:30 AM    HGB 13.7 09/30/2021 11:30 AM    HCT 41.1 09/30/2021 11:30 AM    PLATELET 529 00/88/4810 11:30 AM    MCV 93.4 09/30/2021 11:30 AM     Lab Results   Component Value Date/Time    Sodium 139 09/30/2021 11:30 AM    Potassium 4.4 09/30/2021 11:30 AM    Chloride 105 09/30/2021 11:30 AM    CO2 29 09/30/2021 11:30 AM    Anion gap 5 09/30/2021 11:30 AM    Glucose 167 (H) 09/30/2021 11:30 AM    BUN 15 09/30/2021 11:30 AM    Creatinine 0.84 09/30/2021 11:30 AM    BUN/Creatinine ratio 18 09/30/2021 11:30 AM    GFR est AA >60 09/30/2021 11:30 AM    GFR est non-AA >60 09/30/2021 11:30 AM    Calcium 8.6 09/30/2021 11:30 AM    Bilirubin, total 0.6 09/30/2021 11:30 AM    Alk. phosphatase 100 09/30/2021 11:30 AM    Protein, total 7.6 09/30/2021 11:30 AM    Albumin 4.1 09/30/2021 11:30 AM    Globulin 3.5 09/30/2021 11:30 AM    A-G Ratio 1.2 09/30/2021 11:30 AM    ALT (SGPT) 25 09/30/2021 11:30 AM    AST (SGOT) 20 09/30/2021 11:30 AM     Lab Results   Component Value Date/Time    Cholesterol, total 139 08/12/2021 09:17 AM    HDL Cholesterol 56 08/12/2021 09:17 AM    LDL, calculated 66 08/12/2021 09:17 AM    LDL, calculated 74 06/24/2020 08:52 AM    VLDL, calculated 17 08/12/2021 09:17 AM    VLDL, calculated 12 06/24/2020 08:52 AM    Triglyceride 89 08/12/2021 09:17 AM    CHOL/HDL Ratio 2.7 06/28/2017 09:30 AM     Lab Results   Component Value Date/Time    TSH 1.580 08/12/2021 09:17 AM     Lab Results   Component Value Date/Time    Hemoglobin A1c 9.0 (H) 08/12/2021 09:17 AM    Hemoglobin A1c (POC) 8.0 04/06/2021 12:00 AM    Hemoglobin A1c, External 8.4 06/20/2016 12:00 AM     Lab Results   Component Value Date/Time    Microalbumin/Creat ratio (mg/g creat) 12 06/28/2017 09:30 AM    Microalb/Creat ratio (ug/mg creat.) 21 08/12/2021 09:17 AM    Microalbumin,urine random 1.91 06/28/2017 09:30 AM     Lab Results   Component Value Date/Time    Prostate Specific Ag 1.06 04/15/2021 03:46 PM    Prostate Specific Ag 1.4 09/30/2020 12:07 PM    Prostate Specific Ag 2.0 06/16/2020 01:42 PM    Prostate Specific Ag 3.6 03/05/2020 12:55 PM     CT Results (most recent):  Results from Hospital Encounter encounter on 09/30/21    CT ABD PELV W CONT    Narrative  CT ABDOMEN AND PELVIS WITH ENHANCEMENT    INDICATION: Possible right pathologic fracture with malignancy concern.     TECHNIQUE: Axial images obtained of the abdomen and pelvis following the  uneventful administration of 100 cc Isovue-300 intravenous contrast.  Coronal  and sagittal reformatted images were obtained. All CT scans are performed using  dose optimization techniques as appropriate to the performed exam including the  following: Automated exposure control, adjustment of mA and/or kV according to  patient size, and use of iterative reconstructive technique. COMPARISON: Chest CT earlier today. CT chest, abdomen and pelvis 11/11/2020  FINDINGS:  Lung Base: Refer to CT chest report from earlier today    Liver: Mild hepatic steatosis. No mass  Biliary: Unremarkable. Spleen: Unremarkable. Pancreas: Unremarkable. Adrenal Glands: Unremarkable. Kidneys: Nonenhancing 2.4 cm right upper pole cyst. Nonenhancing 4.3 cm left  parapelvic renal cyst. Its mass effect causes mild caliectasis at the left  kidney, a chronic and stable finding. Bladder/ Pelvic Organs: Mild bladder wall thickening is chronic. Hyperenhancing  components on the prior CT are not clearly demonstrated on this exam. Enlarged  prostate. Bowel: Unremarkable. Peritoneum/ Retroperitoneum: Unremarkable. Lymph Nodes: Unremarkable. Vessels: Unremarkable for age. Bones/Soft tissues: No suspicious bone lesions. Grade I anterolisthesis of L4 on  L5 associated with spinal stenosis. Lower lumbar degenerative disc disease. Mild  left greater than right fatty inguinal hernias. Impression  1. No CT evidence of malignancy in the abdomen or pelvis. 2. Stable nonspecific chronic mild bladder wall thickening. 3. Hepatic steatosis. 4. Renal cysts. 5. Prostate enlargement. 6. L4-L5 listhesis with spinal stenosis. 7. Fatty inguinal hernias. US Results (most recent):  Results from [place] encounter on 02/03/21    US RETROPERITONEUM COMP    Narrative  History: Flank pain. TECHNIQUE: Retroperitoneal ultrasound. COMPARISON: CT November 11, 2020. FINDINGS:    Bladder under distended which limits evaluation. Irregular bladder wall  thickening again noted, similar to reference CT.     Prostate is prominent, similar to prior exam. Prostate indents into the bladder  base. Prevoid bladder volume of 115 cc. Postvoid residual of 30 cc. Both kidneys demonstrate increased echogenicity. Right kidney measures 11.2 x  5.2 x 5.1 cm. Kidney measures 11.4 x 6.4 x 5.5 cm. No hydronephrosis. Multiple  bilateral cysts noted. Largest on the right measures 2.5 x 2.1 x 2.4 cm, similar  to prior exam. Largest on the left measures 2.9 x 4.4 x 3.3 cm, similar to prior  exam. Some demonstrate adjacent calcifications which may reflect nephrolithiasis  or calcifications within the cystic wall for example in the upper pole left  kidney measuring 3 mm and within the lower pole measuring 4 mm. Impression  No hydronephrosis. Bilateral renal cystic lesions. Some demonstrate adjacent calcifications which  may reflect calcifications within the wall which went to note a more complex  lesion. Alternatively some of these calcifications may reflect nephrolithiasis. -Follow-up and/or further evaluation with CT urogram can be performed. Bilateral medical renal disease. Bladder demonstrates grossly similar asymmetric bladder wall thickening which is  nonspecific.  -Small post void residual.    Prostatic hypertrophy, grossly unchanged. EXAM: NM BONE SCAN CESAR SPECT     INDICATION: 73 years Male. Right foot, right shoulder, and right rib pain. Diagnosed with rib fracture in the ED on 9/30/2021. Chest radiographs and CT  report for a nondisplaced acute right anterior seventh rib fracture as well as a  mildly expansile lucent lesion in the lateral right fifth rib. ADDITIONAL HISTORY: None.     TECHNIQUE: Following intravenous administration of 26.5 mCi 99mTc-MDP in the  right antecubital fossa, approximately three hour delayed whole body images were  obtained in anterior and posterior projections.   Spot oblique views of the  head/neck and thorax were also performed in multiple projections and spot views  of the pelvis in anterior and posterior projections were also performed. SPECT  images were acquired from the chest through adrenal glands using VivaSmarte  gamma camera, which also has limited CT capability to permit attenuation  correction and improved lesion localization.     COMPARISON: Reference CT chest 9/30/2021, reference CT abdomen/pelvis 9/3/2021,  right rib series 9/30/2021, CT chest 7/16/2020     FINDINGS:      Focal radiotracer uptake within the right anterolateral seventh rib  corresponding to the subacute healing fracture. No definite underlying lytic  lesion appreciated.     There is mild/moderate radiotracer within the lytic, expansile right lateral  fifth rib lesion.     Radiotracer uptake within the bilateral first CMC joints,, bilateral  sternoclavicular joints, bilateral shoulders, left ankle, and right great toe,  likely degenerative. There are several foci of radiotracer uptake within the  maxilla and mandible, likely related to dental disease.     There is radiotracer uptake in the lower cervical spine, likely degenerative. There is also multifocal radiotracer uptake at the right posterior L4/L5 and  L5/S1 levels, likely secondary to facet arthritis.     Radiotracer uptake within the perineum is likely due to contamination artifact.     There is expected radiotracer uptake in the kidneys and urinary bladder.        Limited nondiagnostic CT imaging findings: Lytic mildly expansile lesion within  the lateral right fifth rib, similar in appearance compared to prior CT. Subacute nondisplaced fracture of the right anterolateral fifth rib.      Grossly unchanged subpleural nodule within the right middle lobe (series 7 image  65) is similar when compared to 7/16/2020 CT. Additional right middle lobe  nodule discussed on prior CT chest from 9/30/2021 is not clearly appreciated on  the present nondiagnostic CT.      Coronary artery calcifications. Atrophic pancreas.  Water attenuating cyst at the  upper pole the right kidney. Left renal parapelvic cyst with similar degree of  mild left renal pelvocaliectasis.       IMPRESSION  1. Expansile right lateral fifth rib lesion with mild/moderate radiotracer  uptake. Leading differential considerations include metastasis, myeloma,  enchondroma, and fibrous dysplasia. Consider biopsy. 2.  Radiotracer uptake within the right anterolateral seventh rib fracture. 3.  No additional scintigraphic evidence of osseous metastatic disease.   DEXA BONE DENSITOMETRY, CENTRAL     CLINICAL INDICATION/HISTORY: Post menopausal. 68-year-old female for baseline  study. Recent nontraumatic rib fractures. Hypertension.     TECHNIQUE: Using GE LUNAR Prodigy densitometer, bone density measurement was  performed in the lumbar spine, the proximal left and right femora. T Score  refers to standard deviations above or below average compared to a young adult  of the same sex. Z Score refers to standard deviations above or below average  compared to a patient of the same sex, age, race and weight.      COMPARISON: None available.      FINDINGS:      Lumbar Spine Levels: L1-L4  Mean Bone Mineral Density (BMD):  0.991 g/cm2    T Score: -1.6  Z Score: -1.4     Left Total Proximal Femur BMD: 0.805 g/cm2    T Score: -1.6   Z Score: -1.4      Right Total Proximal Femur BMD: 0.777 g/cm2   T Score: -1.8    Z Score: -1.6      Left Femoral Neck BMD: 0.681  g/cm2    T Score: -2.6  Z Score: -1.8     Right Femoral Neck BMD: 0.676  g/cm2    T Score: -2.6  Z Score: -1.8      IMPRESSION     BMD measures consistent with osteoporosis.     Based upon current ISCD guidelines, the patient's overall diagnostic category,  selected using WHO criteria in postmenopausal women and males aged 48 and above,  is selected based upon the lowest T Score from among the lumbar spine, total  femur, femoral neck, (or distal third radius if measured).     WHO Definition of Osteoporosis and Osteopenia on DXA (specified for post  menopausal  females):       Normal:                     T Score at or above -1 SD    Osteopenia:              T Score between -1 and -2.5 SD    Osteoporosis:          T Score at or below -2.5 SD     The risk of fracture approximately doubles for each 1 SD decrease in T Score. It is important to consider other factors in assessing a patient's risk of  fracture, including age, risk of falling/injury, history of fragility fracture,  family history of osteoporosis, smoking, low weight.       Various fracture risk tools have been developed for adult patients and are  available online. For example, the FRAX tool developed by [facility] is widely used. Reference www.iscd.org.     It is also important to note that DXA measures bone density but does not  distinguish among causes of decreased bone density, which include primary versus  secondary osteoporosis (such as metabolic bone disorders or possible effects of  medications) and also other conditions (such as osteomalacia). Clinical  considerations should determine what additional evaluation may be warranted to  exclude secondary conditions in a patient with low bone density.     Please note that reliable, valid comparisons can not be made between studies  which have been performed on different densitometers. If clinically warranted,  follow up study performed at this site would best permit assessment of trend for  possible change in bone mineral density over time in comparison to this study.     Thank you for this referral.    ROS: See HPI  Physical Exam  Cardiovascular:      Rate and Rhythm: Normal rate. Pulmonary:      Effort: Pulmonary effort is normal. No respiratory distress. Breath sounds: No wheezing. Musculoskeletal:         General: No swelling. Neurological:      Mental Status: He is alert and oriented to person, place, and time. Psychiatric:         Behavior: Behavior normal.         ASSESSMENT and PLAN    ICD-10-CM ICD-9-CM    1.  Type 2 diabetes mellitus without complication, with long-term current use of insulin (HCC)  E11.9 250.00 atorvastatin (LIPITOR) 10 mg tablet    Z79.4 V58.67 SITagliptin (Januvia) 100 mg tablet   2. Poorly controlled diabetes mellitus ([address]): A1c fairly stable. For now given medication refill and he will have a follow-up with endocrinologist. E11.65 250.00 metFORMIN ER (GLUCOPHAGE XR) 500 mg tablet   3. Primary hypertension:well controlled. Continue current dose of medication and low salt diet. Exercise as tolerated. I10 401.9    4. Pure hypercholesterolemia: On statin. E78.00 272.0    5. Elevated PSA: Currently on symptomatic treatment. Prior CAT scan showed prostate hypertrophy R97.20 790.93    6. Abnormal positron emission tomography (PET) scan: Will be going for bone marrow biopsy and also has a follow-up appointment with Dr. Mili Bond for further recommendation. Discussed initial finding with the differential diagnosis of malignancy, metastasis, osteoporosis related fracture etc. R94.8 794.9     5th rib fracture. metastasis    Patient understood and agreed with the plan  -Covid shots and flu shot    Follow-up and Dispositions    · Return in about 3 months (around 2/10/2022). Please note that this dictation was completed with Divided, the computer voice recognition software. Quite often unanticipated grammatical, syntax, homophones, and other interpretive errors are inadvertently transcribed by the computer software. Please disregard these errors. Please excuse any errors that have escaped final proofreading.     Taken covid booster shot and shingle shot at [facility]

## 2021-11-10 NOTE — PATIENT INSTRUCTIONS
Nutrition Tips for Diabetes: After Your Visit  Your Care Instructions  A healthy diet is important to manage diabetes. It helps you lose weight (if you need to) and keep it off. It gives you the nutrition and energy your body needs and helps prevent heart disease. But a diet for diabetes does not mean that you have to eat special foods. You can eat what your family eats, including occasional sweets and other favorites. But you do have to pay attention to how often you eat and how much you eat of certain foods. The right plan for you will give you meals that help you keep your blood sugar at healthy levels. Try to eat a variety of foods and to spread carbohydrate throughout the day. Carbohydrate raises blood sugar higher and more quickly than any other nutrient does. Carbohydrate is found in sugar, breads and cereals, fruit, starchy vegetables such as potatoes and corn, and milk and yogurt. You may want to work with a dietitian or diabetes educator to help you plan meals and snacks. A dietitian or diabetes educator also can help you lose weight if that is one of your goals. The following tips can help you enjoy your meals and stay healthy. Follow-up care is a key part of your treatment and safety. Be sure to make and go to all appointments, and call your doctor if you are having problems. Its also a good idea to know your test results and keep a list of the medicines you take. How can you care for yourself at home? · Learn which foods have carbohydrate and how much carbohydrate to eat. A dietitian or diabetes educator can help you learn to keep track of how much carbohydrate you eat. · Spread carbohydrate throughout the day. Eat some carbohydrate at all meals, but do not eat too much at any one time. · Plan meals to include food from all the food groups.  These are the food groups and some example portion sizes:  ¨ Grains: 1 slice of bread (1 ounce), ½ cup of cooked cereal, and 1/3 cup of cooked pasta or rice. These have about 15 grams of carbohydrate in a serving. Choose whole grains such as whole wheat bread or crackers, oatmeal, and brown rice more often than refined grains. ¨ Fruit: 1 small fresh fruit, such as an apple or orange; ½ of a banana; ½ cup of chopped, cooked, or canned fruit; ½ cup of fruit juice; 1 cup of melon or raspberries; and 2 tablespoons of dried fruit. These have about 15 grams of carbohydrate in a serving. ¨ Dairy: 1 cup of nonfat or low-fat milk and 2/3 cup of plain yogurt. These have about 15 grams of carbohydrate in a serving. ¨ Protein foods: Beef, chicken, turkey, fish, eggs, tofu, cheese, cottage cheese, and peanut butter. A serving size of meat is 3 ounces, which is about the size of a deck of cards. Examples of meat substitute serving sizes (equal to 1 ounce of meat) are 1/4 cup of cottage cheese, 1 egg, 1 tablespoon of peanut butter, and ½ cup of tofu. These have very little or no carbohydrate per serving. ¨ Vegetables: Starchy vegetables such as ½ cup of cooked dried beans, peas, potatoes, or corn have about 15 grams of carbohydrate. Nonstarchy vegetables have very little carbohydrate, such as 1 cup of raw leafy vegetables (such as spinach), ½ cup of other vegetables (cooked or chopped), and 3/4 cup of vegetable juice. · Use the plate format to plan meals. It is a good, quick way to make sure that you have a balanced meal. It also helps you spread carbohydrate throughout the day. You divide your plate by types of foods. Put vegetables on half the plate, meat or meat substitutes on one-quarter of the plate, and a grain or starchy vegetable (such as brown rice or a potato) in the final quarter of the plate. To this you can add a small piece of fruit and 1 cup of milk or yogurt, depending on how much carbohydrate you are supposed to eat at a meal.  · Talk to your dietitian or diabetes educator about ways to add limited amounts of sweets into your meal plan.  You can eat these foods now and then, as long as you include the amount of carbohydrate they have in your daily carbohydrate allowance. · If you drink alcohol, limit it to no more than 1 drink a day for women and 2 drinks a day for men. If you are pregnant, no amount of alcohol is known to be safe. · Protein, fat, and fiber do not raise blood sugar as much as carbohydrate does. If you eat a lot of these nutrients in a meal, your blood sugar will rise more slowly than it would otherwise. · Limit saturated fats, such as those from meat and dairy products. Try to replace it with monounsaturated fat, such as olive oil. This is a healthier choice because people who have diabetes are at higher-than-average risk of heart disease. But use a modest amount of olive oil. A tablespoon of olive oil has 14 grams of fat and 120 calories. · Exercise lowers blood sugar. If you take insulin by shots or pump, you can use less than you would if you were not exercising. Keep in mind that timing matters. If you exercise within 1 hour after a meal, your body may need less insulin for that meal than it would if you exercised 3 hours after the meal. Test your blood sugar to find out how exercise affects your need for insulin. · Exercise on most days of the week. Aim for at least 30 minutes. Exercise helps you stay at a healthy weight and helps your body use insulin. Walking is an easy way to get exercise. Gradually increase the amount you walk every day. You also may want to swim, bike, or do other activities. When you eat out  · Learn to estimate the serving sizes of foods that have carbohydrate. If you measure food at home, it will be easier to estimate the amount in a serving of restaurant food. · If the meal you order has too much carbohydrate (such as potatoes, corn, or baked beans), ask to have a low-carbohydrate food instead. Ask for a salad or green vegetables.   · If you use insulin, check your blood sugar before and after eating out to help you plan how much to eat in the future. · If you eat more carbohydrate at a meal than you had planned, take a walk or do other exercise. This will help lower your blood sugar. Where can you learn more? Go to [employer]  Enter O374 in the search box to learn more about \"Nutrition Tips for Diabetes: After Your Visit. \"   © 8719-9844 Healthwise, [employer]. Care instructions adapted under license by [address] [place] [address] (which disclaims liability or warranty for this information). This care instruction is for use with your licensed healthcare professional. If you have questions about a medical condition or this instruction, always ask your healthcare professional. [place] [address] any warranty or liability for your use of this information. Content Version: 27.1.760487; Current as of: June 4, 2014                 Low Sodium Diet (2,000 Milligram): Care Instructions  Overview     Limiting sodium can be an important part of managing some health problems. The most common source of sodium is salt. People get most of the salt in their diet from canned, prepared, and packaged foods. Fast food and restaurant meals also are very high in sodium. Your doctor will probably limit your sodium to less than 2,000 milligrams (mg) a day. This limit counts all the sodium in prepared and packaged foods and any salt you add to your food. Follow-up care is a key part of your treatment and safety. Be sure to make and go to all appointments, and call your doctor if you are having problems. It's also a good idea to know your test results and keep a list of the medicines you take. How can you care for yourself at home? Read food labels  · Read labels on cans and food packages. The labels tell you how much sodium is in each serving. Make sure that you look at the serving size. If you eat more than the serving size, you have eaten more sodium.   · Food labels also tell you the Percent Daily Value for sodium. Choose products with low Percent Daily Values for sodium. · Be aware that sodium can come in forms other than salt, including monosodium glutamate (MSG), sodium citrate, and sodium bicarbonate (baking soda). MSG is often added to Asian food. When you eat out, you can sometimes ask for food without MSG or added salt. Buy low-sodium foods  · Buy foods that are labeled \"unsalted\" (no salt added), \"sodium-free\" (less than 5 mg of sodium per serving), or \"low-sodium\" (140 mg or less of sodium per serving). Foods labeled \"reduced-sodium\" and \"light sodium\" may still have too much sodium. Be sure to read the label to see how much sodium you are getting. · Buy fresh vegetables, or frozen vegetables without added sauces. Buy low-sodium versions of canned vegetables, soups, and other canned goods. Prepare low-sodium meals  · Cut back on the amount of salt you use in cooking. This will help you adjust to the taste. Do not add salt after cooking. One teaspoon of salt has about 2,300 mg of sodium. · Take the salt shaker off the table. · Flavor your food with garlic, lemon juice, onion, vinegar, herbs, and spices. Do not use soy sauce, lite soy sauce, steak sauce, onion salt, garlic salt, celery salt, or ketchup on your food. · Use low-sodium salad dressings, sauces, and ketchup. Or make your own salad dressings and sauces without adding salt. · Use less salt (or none) when recipes call for it. You can often use half the salt a recipe calls for without losing flavor. Other foods such as rice, pasta, and grains do not need added salt. · Rinse canned vegetables, and cook them in fresh water. This removes some--but not all--of the salt. · Avoid water that is naturally high in sodium or that has been treated with water softeners, which add sodium. If you buy bottled water, read the label and choose a sodium-free brand.   Avoid high-sodium foods  · Avoid eating:  ? Smoked, cured, salted, and canned meat, fish, and poultry. ? Ham, lenz, hot dogs, and luncheon meats. ? Regular, hard, and processed cheese and regular peanut butter. ? Crackers with salted tops, and other salted snack foods such as pretzels, chips, and salted popcorn. ? Frozen prepared meals, unless labeled low-sodium. ? Canned and dried soups, broths, and bouillon, unless labeled sodium-free or low-sodium. ? Canned vegetables, unless labeled sodium-free or low-sodium. ? [place] fries, pizza, tacos, and other fast foods. ? Pickles, olives, ketchup, and other condiments, especially soy sauce, unless labeled sodium-free or low-sodium. Where can you learn more? Go to http://www.gray.com/  Enter V843 in the search box to learn more about \"Low Sodium Diet (2,000 Milligram): Care Instructions. \"  Current as of: December 17, 2020               Content Version: 13.0  © 2006-2021 Healthwise, [facility]. Care instructions adapted under license by [employer] (which disclaims liability or warranty for this information). If you have questions about a medical condition or this instruction, always ask your healthcare professional. [place] [address] any warranty or liability for your use of this information.

## 2021-11-10 NOTE — PROGRESS NOTES
1. Have you been to the ER, urgent care clinic since your last visit? Hospitalized since your last visit? [facility] 9/30/21    2. Have you seen or consulted any other health care providers outside of the [address] [place] [address] since your last visit? Include any pap smears or colon screening. Dr. Montez Norwood LOV: 10/2021.     Chief Complaint   Patient presents with    Diabetes    Hypertension    Cholesterol Problem    Other     elevated PSA

## 2021-11-18 ENCOUNTER — TRANSCRIBE ORDER (OUTPATIENT)
Dept: INTERVENTIONAL RADIOLOGY/VASCULAR | Age: 71
End: 2021-11-18

## 2021-12-01 ENCOUNTER — ANESTHESIA EVENT (OUTPATIENT)
Dept: CT IMAGING | Age: 71
End: 2021-12-01
Payer: MEDICARE

## 2021-12-02 ENCOUNTER — HOSPITAL ENCOUNTER (OUTPATIENT)
Dept: CT IMAGING | Age: 71
Discharge: HOME | End: 2021-12-02
Attending: RADIOLOGY | Admitting: RADIOLOGY
Payer: MEDICARE

## 2021-12-02 ENCOUNTER — APPOINTMENT (OUTPATIENT)
Dept: GENERAL RADIOLOGY | Age: 71
End: 2021-12-02
Attending: RADIOLOGY
Payer: MEDICARE

## 2021-12-02 ENCOUNTER — ANESTHESIA (OUTPATIENT)
Dept: CT IMAGING | Age: 71
End: 2021-12-02
Payer: MEDICARE

## 2021-12-02 VITALS
HEIGHT: 66 IN | HEART RATE: 56 BPM | RESPIRATION RATE: 18 BRPM | WEIGHT: 150 LBS | OXYGEN SATURATION: 100 % | SYSTOLIC BLOOD PRESSURE: 100 MMHG | BODY MASS INDEX: 24.11 KG/M2 | DIASTOLIC BLOOD PRESSURE: 63 MMHG | TEMPERATURE: 97.6 F

## 2021-12-02 DIAGNOSIS — D50.0 IRON DEFICIENCY ANEMIA SECONDARY TO BLOOD LOSS (CHRONIC): ICD-10-CM

## 2021-12-02 LAB
ANION GAP SERPL CALC-SCNC: 6 MMOL/L (ref 3–18)
APTT PPP: 28.5 SEC (ref 23–36.4)
BUN SERPL-MCNC: 16 MG/DL (ref 7–18)
BUN/CREAT SERPL: 19 (ref 12–20)
CALCIUM SERPL-MCNC: 9.3 MG/DL (ref 8.5–10.1)
CHLORIDE SERPL-SCNC: 108 MMOL/L (ref 100–111)
CO2 SERPL-SCNC: 25 MMOL/L (ref 21–32)
CREAT SERPL-MCNC: 0.84 MG/DL (ref 0.6–1.3)
ERYTHROCYTE [DISTWIDTH] IN BLOOD BY AUTOMATED COUNT: 12.5 % (ref 11.6–14.5)
GLUCOSE BLD STRIP.AUTO-MCNC: 147 MG/DL (ref 70–110)
GLUCOSE BLD STRIP.AUTO-MCNC: 159 MG/DL (ref 70–110)
GLUCOSE SERPL-MCNC: 171 MG/DL (ref 74–99)
HCT VFR BLD AUTO: 41.2 % (ref 36–48)
HGB BLD-MCNC: 13.2 G/DL (ref 13–16)
INR PPP: 1 (ref 0.8–1.2)
MCH RBC QN AUTO: 29.8 PG (ref 24–34)
MCHC RBC AUTO-ENTMCNC: 32 G/DL (ref 31–37)
MCV RBC AUTO: 93 FL (ref 78–100)
NRBC # BLD: 0 K/UL (ref 0–0.01)
NRBC BLD-RTO: 0 PER 100 WBC
PLATELET # BLD AUTO: 237 K/UL (ref 135–420)
PMV BLD AUTO: 10 FL (ref 9.2–11.8)
POTASSIUM SERPL-SCNC: 5 MMOL/L (ref 3.5–5.5)
PROTHROMBIN TIME: 13.3 SEC (ref 11.5–15.2)
RBC # BLD AUTO: 4.43 M/UL (ref 4.35–5.65)
SODIUM SERPL-SCNC: 139 MMOL/L (ref 136–145)
WBC # BLD AUTO: 6.9 K/UL (ref 4.6–13.2)

## 2021-12-02 PROCEDURE — 71045 X-RAY EXAM CHEST 1 VIEW: CPT

## 2021-12-02 PROCEDURE — 74011000258 HC RX REV CODE- 258: Performed by: NURSE ANESTHETIST, CERTIFIED REGISTERED

## 2021-12-02 PROCEDURE — 74011250636 HC RX REV CODE- 250/636: Performed by: NURSE ANESTHETIST, CERTIFIED REGISTERED

## 2021-12-02 PROCEDURE — 88311 DECALCIFY TISSUE: CPT

## 2021-12-02 PROCEDURE — 88342 IMHCHEM/IMCYTCHM 1ST ANTB: CPT

## 2021-12-02 PROCEDURE — 74011000250 HC RX REV CODE- 250: Performed by: NURSE ANESTHETIST, CERTIFIED REGISTERED

## 2021-12-02 PROCEDURE — 00450 ANES PX CLAV&SCAPULA NOS: CPT | Performed by: STUDENT IN AN ORGANIZED HEALTH CARE EDUCATION/TRAINING PROGRAM

## 2021-12-02 PROCEDURE — 85610 PROTHROMBIN TIME: CPT

## 2021-12-02 PROCEDURE — 76060000033 HC ANESTHESIA 1 TO 1.5 HR

## 2021-12-02 PROCEDURE — 77030028872 CT BX BONE DP NDL/TROCAR

## 2021-12-02 PROCEDURE — 76210000022 HC REC RM PH II 1.5 TO 2 HR

## 2021-12-02 PROCEDURE — 82962 GLUCOSE BLOOD TEST: CPT

## 2021-12-02 PROCEDURE — 85027 COMPLETE CBC AUTOMATED: CPT

## 2021-12-02 PROCEDURE — 88341 IMHCHEM/IMCYTCHM EA ADD ANTB: CPT

## 2021-12-02 PROCEDURE — 00450 ANES PX CLAV&SCAPULA NOS: CPT | Performed by: NURSE ANESTHETIST, CERTIFIED REGISTERED

## 2021-12-02 PROCEDURE — 85730 THROMBOPLASTIN TIME PARTIAL: CPT

## 2021-12-02 PROCEDURE — 74011250637 HC RX REV CODE- 250/637: Performed by: NURSE ANESTHETIST, CERTIFIED REGISTERED

## 2021-12-02 PROCEDURE — 80048 BASIC METABOLIC PNL TOTAL CA: CPT

## 2021-12-02 PROCEDURE — 88307 TISSUE EXAM BY PATHOLOGIST: CPT

## 2021-12-02 RX ORDER — NALBUPHINE HYDROCHLORIDE 10 MG/ML
5 INJECTION, SOLUTION INTRAMUSCULAR; INTRAVENOUS; SUBCUTANEOUS
Status: DISCONTINUED | OUTPATIENT
Start: 2021-12-02 | End: 2021-12-02 | Stop reason: HOSPADM

## 2021-12-02 RX ORDER — FAMOTIDINE 20 MG/1
20 TABLET, FILM COATED ORAL ONCE
Status: COMPLETED | OUTPATIENT
Start: 2021-12-02 | End: 2021-12-02

## 2021-12-02 RX ORDER — LIDOCAINE HYDROCHLORIDE 10 MG/ML
0.1 INJECTION, SOLUTION EPIDURAL; INFILTRATION; INTRACAUDAL; PERINEURAL AS NEEDED
Status: DISCONTINUED | OUTPATIENT
Start: 2021-12-02 | End: 2021-12-02 | Stop reason: HOSPADM

## 2021-12-02 RX ORDER — SODIUM CHLORIDE 9 MG/ML
20 INJECTION, SOLUTION INTRAVENOUS CONTINUOUS
Status: DISCONTINUED | OUTPATIENT
Start: 2021-12-02 | End: 2021-12-02 | Stop reason: HOSPADM

## 2021-12-02 RX ORDER — FENTANYL CITRATE 50 UG/ML
25 INJECTION, SOLUTION INTRAMUSCULAR; INTRAVENOUS AS NEEDED
Status: DISCONTINUED | OUTPATIENT
Start: 2021-12-02 | End: 2021-12-02 | Stop reason: HOSPADM

## 2021-12-02 RX ORDER — SODIUM CHLORIDE 0.9 % (FLUSH) 0.9 %
5-40 SYRINGE (ML) INJECTION EVERY 8 HOURS
Status: DISCONTINUED | OUTPATIENT
Start: 2021-12-02 | End: 2021-12-02 | Stop reason: HOSPADM

## 2021-12-02 RX ORDER — MIDAZOLAM HYDROCHLORIDE 1 MG/ML
INJECTION, SOLUTION INTRAMUSCULAR; INTRAVENOUS AS NEEDED
Status: DISCONTINUED | OUTPATIENT
Start: 2021-12-02 | End: 2021-12-02 | Stop reason: HOSPADM

## 2021-12-02 RX ORDER — MAGNESIUM SULFATE 100 %
4 CRYSTALS MISCELLANEOUS AS NEEDED
Status: DISCONTINUED | OUTPATIENT
Start: 2021-12-02 | End: 2021-12-02 | Stop reason: HOSPADM

## 2021-12-02 RX ORDER — SODIUM CHLORIDE, SODIUM LACTATE, POTASSIUM CHLORIDE, CALCIUM CHLORIDE 600; 310; 30; 20 MG/100ML; MG/100ML; MG/100ML; MG/100ML
25 INJECTION, SOLUTION INTRAVENOUS CONTINUOUS
Status: DISCONTINUED | OUTPATIENT
Start: 2021-12-02 | End: 2021-12-02 | Stop reason: HOSPADM

## 2021-12-02 RX ORDER — SODIUM CHLORIDE 0.9 % (FLUSH) 0.9 %
5-40 SYRINGE (ML) INJECTION AS NEEDED
Status: DISCONTINUED | OUTPATIENT
Start: 2021-12-02 | End: 2021-12-02 | Stop reason: HOSPADM

## 2021-12-02 RX ORDER — MIDAZOLAM HYDROCHLORIDE 1 MG/ML
INJECTION, SOLUTION INTRAMUSCULAR; INTRAVENOUS
Status: DISCONTINUED
Start: 2021-12-02 | End: 2021-12-02 | Stop reason: HOSPADM

## 2021-12-02 RX ORDER — DIPHENHYDRAMINE HYDROCHLORIDE 50 MG/ML
12.5 INJECTION, SOLUTION INTRAMUSCULAR; INTRAVENOUS
Status: DISCONTINUED | OUTPATIENT
Start: 2021-12-02 | End: 2021-12-02 | Stop reason: HOSPADM

## 2021-12-02 RX ORDER — INSULIN LISPRO 100 [IU]/ML
INJECTION, SOLUTION INTRAVENOUS; SUBCUTANEOUS ONCE
Status: DISCONTINUED | OUTPATIENT
Start: 2021-12-02 | End: 2021-12-02 | Stop reason: HOSPADM

## 2021-12-02 RX ORDER — DEXTROSE 50 % IN WATER (D50W) INTRAVENOUS SYRINGE
25-50 AS NEEDED
Status: DISCONTINUED | OUTPATIENT
Start: 2021-12-02 | End: 2021-12-02 | Stop reason: HOSPADM

## 2021-12-02 RX ORDER — PROPOFOL 10 MG/ML
VIAL (ML) INTRAVENOUS
Status: DISCONTINUED | OUTPATIENT
Start: 2021-12-02 | End: 2021-12-02 | Stop reason: HOSPADM

## 2021-12-02 RX ORDER — SODIUM CHLORIDE, SODIUM LACTATE, POTASSIUM CHLORIDE, CALCIUM CHLORIDE 600; 310; 30; 20 MG/100ML; MG/100ML; MG/100ML; MG/100ML
75 INJECTION, SOLUTION INTRAVENOUS CONTINUOUS
Status: DISCONTINUED | OUTPATIENT
Start: 2021-12-02 | End: 2021-12-02 | Stop reason: HOSPADM

## 2021-12-02 RX ORDER — ONDANSETRON 2 MG/ML
4 INJECTION INTRAMUSCULAR; INTRAVENOUS ONCE
Status: DISCONTINUED | OUTPATIENT
Start: 2021-12-02 | End: 2021-12-02 | Stop reason: HOSPADM

## 2021-12-02 RX ADMIN — MIDAZOLAM HYDROCHLORIDE 2 MG: 2 INJECTION, SOLUTION INTRAMUSCULAR; INTRAVENOUS at 11:08

## 2021-12-02 RX ADMIN — FAMOTIDINE 20 MG: 20 TABLET ORAL at 09:18

## 2021-12-02 RX ADMIN — PROPOFOL 25 MCG/KG/MIN: 10 INJECTION, EMULSION INTRAVENOUS at 11:08

## 2021-12-02 RX ADMIN — SODIUM CHLORIDE, SODIUM LACTATE, POTASSIUM CHLORIDE, AND CALCIUM CHLORIDE 25 ML/HR: 600; 310; 30; 20 INJECTION, SOLUTION INTRAVENOUS at 08:50

## 2021-12-02 RX ADMIN — SODIUM CHLORIDE 10 MCG/KG/HR: 9 INJECTION, SOLUTION INTRAVENOUS at 11:08

## 2021-12-02 NOTE — DISCHARGE INSTRUCTIONS
Patient Education        Bone Biopsy: What to Expect at Home  Your Recovery  A bone biopsy is a procedure in which a small sample of bone is taken from the body and looked at under a microscope for cancer, infection, or other bone disorders. There are two types of bone biopsies: closed (or needle) biopsy and open biopsy. In either case, be sure to have someone drive you home afterwards. If you had a closed or needle biopsy, a needle was inserted through the skin and into the bone. You may go home shortly after the procedure. If you got a sedative, you may need to stay longer. The biopsy site may be sore and tender for up to a week. If you had an open biopsy, a cut (incision) was made through the skin to expose an area of the bone. You may need to stay in the hospital overnight. The biopsy site may be sore and tender for up to a week. This care sheet gives you a general idea about how long it will take for you to recover. But each person recovers at a different pace. Follow the steps below to get better as quickly as possible. How can you care for yourself at home? Activity    · Rest when you feel tired.     · You can do your normal activities when it feels okay to do so.     · Be active. Walking is a good choice.     · Your doctor may have you avoid certain activities for a while based on where the biopsy was done and whether it was open or closed. Diet    · You can eat your normal diet. If your stomach is upset, try bland, low-fat foods like plain rice, broiled chicken, toast, and yogurt.     · If your bowel movements are not regular right after the procedure, try to avoid constipation and straining. Drink plenty of water. Your doctor may suggest fiber, a stool softener, or a mild laxative. Medicines    · Be safe with medicines. Read and follow all instructions on the label. ? If the doctor gave you a prescription medicine for pain, take it as prescribed.   ? If you are not taking a prescription pain medicine, ask your doctor if you can take an over-the-counter medicine.     · If you take aspirin or some other blood thinner, ask your doctor if and when to start taking it again. Make sure that you understand exactly what your doctor wants you to do.     · Your doctor will tell you if and when you can restart your medicines. He or she will also give you instructions about taking any new medicines. Incision care    · You will have a dressing over the biopsy site. A dressing helps the site heal and protects it. Your doctor will tell you how to take care of this.     · If you have strips of tape on the biopsy site, leave the tape on for a week or until it falls off.     · If you had stitches, your doctor will tell you when to come back to have them removed.     · Change the bandage every day.     · Wash the biopsy site daily with warm, soapy water, and pat it dry. Don't use hydrogen peroxide or alcohol. They can slow healing.     · You may shower 24 to 48 hours after the procedure. Pat the biopsy site dry. Don't swim or take a bath for the first 2 weeks, or until your doctor tells you it is okay. Follow-up care is a key part of your treatment and safety. Be sure to make and go to all appointments, and call your doctor if you are having problems. It's also a good idea to know your test results and keep a list of the medicines you take. When should you call for help? Call 911 anytime you think you may need emergency care. For example, call if:    · You passed out (lost consciousness). Call your doctor now or seek immediate medical care if:    · You are bleeding through your dressing. A small amount of blood is normal.     · You have symptoms of infection, such as:  ? Increased pain, swelling, warmth, or redness. ? Red streaks leading from the biopsy site. ? Pus draining from the site. ? A fever.    Watch closely for changes in your health, and be sure to contact your doctor if:    · Your biopsy site comes open.     · You are not getting better as expected. Where can you learn more? Go to http://www.gray.com/  Enter H876 in the search box to learn more about \"Bone Biopsy: What to Expect at Home. \"  Current as of: June 17, 2021               Content Version: 13.0  © 6149-7418 [employer] Care instructions adapted under license by [facility] (which disclaims liability or warranty for this information). If you have questions about a medical condition or this instruction, always ask your healthcare professional. [address] any warranty or liability for your use of this information. DISCHARGE SUMMARY from Nurse    PATIENT INSTRUCTIONS:    After general anesthesia or intravenous sedation, for 24 hours or while taking prescription Narcotics:  · Limit your activities  · Do not drive and operate hazardous machinery  · Do not make important personal or business decisions  · Do  not drink alcoholic beverages  · If you have not urinated within 8 hours after discharge, please contact your surgeon on call. Report the following to your surgeon:  · Excessive pain, swelling, redness or odor of or around the surgical area  · Temperature over 100.5  · Nausea and vomiting lasting longer than 4 hours or if unable to take medications  · Any signs of decreased circulation or nerve impairment to extremity: change in color, persistent  numbness, tingling, coldness or increase pain  · Any questions    What to do at Home:  Recommended activity: Activity as tolerated. *  Please give a list of your current medications to your Primary Care Provider. *  Please update this list whenever your medications are discontinued, doses are      changed, or new medications (including over-the-counter products) are added. *  Please carry medication information at all times in case of emergency situations.     These are general instructions for a healthy lifestyle:    No smoking/ No tobacco products/ Avoid exposure to second hand smoke  Surgeon General's Warning:  Quitting smoking now greatly reduces serious risk to your health. Obesity, smoking, and sedentary lifestyle greatly increases your risk for illness    A healthy diet, regular physical exercise & weight monitoring are important for maintaining a healthy lifestyle    You may be retaining fluid if you have a history of heart failure or if you experience any of the following symptoms:  Weight gain of 3 pounds or more overnight or 5 pounds in a week, increased swelling in our hands or feet or shortness of breath while lying flat in bed. Please call your doctor as soon as you notice any of these symptoms; do not wait until your next office visit. The discharge information has been reviewed with the patient. The patient verbalized understanding. Discharge medications reviewed with the patient and appropriate educational materials and side effects teaching were provided.   ___________________________________________________________________________________________________________________________________

## 2021-12-02 NOTE — ANESTHESIA PREPROCEDURE EVALUATION
Relevant Problems   CARDIOVASCULAR   (+) Essential hypertension, benign      ENDOCRINE   (+) Type 2 diabetes mellitus with diabetic neuropathy   (+) Type 2 diabetes mellitus without complication, with long-term current use of insulin (HCC)       Anesthetic History   No history of anesthetic complications            Review of Systems / Medical History  Patient summary reviewed, nursing notes reviewed and pertinent labs reviewed    Pulmonary  Within defined limits                 Neuro/Psych   Within defined limits           Cardiovascular    Hypertension: well controlled                   GI/Hepatic/Renal  Within defined limits              Endo/Other    Diabetes: well controlled, type 2         Other Findings   Comments: Spinal stenosis           Physical Exam    Airway  Mallampati: II  TM Distance: 4 - 6 cm  Neck ROM: normal range of motion   Mouth opening: Normal     Cardiovascular    Rhythm: regular  Rate: normal         Dental    Dentition: Upper partial plate     Pulmonary  Breath sounds clear to auscultation               Abdominal  GI exam deferred       Other Findings            Anesthetic Plan    ASA: 2  Anesthesia type: MAC and general - backup          Induction: Intravenous  Anesthetic plan and risks discussed with: Patient

## 2021-12-02 NOTE — PROCEDURES
RADIOLOGY POST PROCEDURE NOTE     December 2, 2021       12:23 PM     Preoperative Diagnosis:   Right rib lytic lesion. Postoperative Diagnosis:  Same. :  Dr. Cole Go    Assistant:  None. Type of Anesthesia: 1% plain lidocaine and deep sedation per anaesthesia. Procedure/Description:  Image guided right 5th rib lytic lesion core needle Bx. Findings:   No bleeding or PTX. Estimated blood Loss:  Minimal    Specimen Removed:   yes    Blood transfusions:  None. Implants:  None.     Complications: None    Condition: Stable    Discharge Plan:  discharge home  if stable and no PTX    Rin López MD

## 2021-12-02 NOTE — PERIOP NOTES
Assumed care of pt from Radiology via stretcher. Awaiting repeat CXR to be done. Pt with no c/o pain or discomforts.

## 2021-12-02 NOTE — H&P
History and Physical    Patient: Arnie Solorzano           Sex: male       DOA: 12/2/2021  YOB: 1950      Age:  73 y.o.     LOS:  LOS: 0 days        HPI:     Arnie Solorzano is a 73 y.o. male who has history of bladder cancer and new right rib fracture with lesion here for a right rib lesion biopsy with anesthesia support. Past Medical History:   Diagnosis Date    Bladder cancer ([facility] 7/15/13    Clinical Stage TaN0M0 HG UC    Bladder tumor     Diabetes ([facility] [address]     Essential hypertension     History of kidney stones     Hyperlipidemia     Peripheral neuropathy     SBO (small bowel obstruction) (HCC)     Spinal stenosis     Spondylolisthesis, grade 1        Past Surgical History:   Procedure Laterality Date    HX UROLOGICAL  7/15/13    TURBT, Dr. Dean Collado, [place] [address]    HX UROLOGICAL  1/4/16    Cysto, Dr. Dean Collado, [address] [place] [address]  4/27/15    (L) wrist       Family History   Problem Relation Age of Onset    Hypertension Mother     Heart Attack Neg Hx     Sudden Death Neg Hx        Social History     Socioeconomic History    Marital status:    Tobacco Use    Smoking status: Never Smoker    Smokeless tobacco: Never Used   Substance and Sexual Activity    Alcohol use: Not Currently     Comment: 1-2 drinks/day    Drug use: No       Prior to Admission medications    Medication Sig Start Date End Date Taking? Authorizing Provider   atorvastatin (LIPITOR) 10 mg tablet Take 1 Tablet by mouth nightly. 11/10/21  Yes Pepper John MD   finasteride (PROSCAR) 5 mg tablet Take 1 Tablet by mouth daily. TAKE 1 TABLET EVERY DAY 11/10/21  Yes Pepper John MD   SITagliptin (Januvia) 100 mg tablet Take 1 Tablet by mouth daily. 11/10/21  Yes Pepper John MD   metFORMIN ER (GLUCOPHAGE XR) 500 mg tablet Take 2 Tablets by mouth two (2) times a day. 1,000 mg BID 11/10/21  Yes Pepper John MD   ashwagandha root extract 300 mg cap Take  by mouth.    Yes Provider, Historical tadalafiL (CIALIS) 20 mg tablet Take 1 Tablet by mouth as needed for Erectile Dysfunction. 7/21/21  Yes Daniella Rabago PA-C   omeprazole (PRILOSEC) 40 mg capsule TAKE 1 CAPSULE EVERY DAY 7/16/21  Yes Feliciano Aiken MD   tamsulosin (FLOMAX) 0.4 mg capsule TAKE 1 CAPSULE EVERY DAY 7/16/21  Yes Feliciano Aiken MD   valsartan (DIOVAN) 320 mg tablet TAKE 1 TABLET EVERY DAY 6/8/21  Yes Angelique Hale NP   glipiZIDE (GLUCOTROL) 5 mg tablet TAKE 1 TABLET EVERY DAY  Patient taking differently: Take 10 mg by mouth daily. TAKE 1 TABLET EVERY DAY. 10mg 11/12/20  Yes Feliciano Aiken MD   insulin glargine (LANTUS SOLOSTAR U-100 INSULIN) 100 unit/mL (3 mL) inpn INJECT  10 UNITS SUBCUTANEOUSLY AT BEDTIME. DISCARD AND BEGIN NEW PEN AFTER 28 DAYS. 7/31/19  Yes Feliciano Aiken MD   CALCIUM PO Take  by mouth. Yes Provider, Historical   cholecalciferol, vitamin D3, (VITAMIN D3 PO) Take  by mouth. Yes Provider, Historical   multivitamin (ONE A DAY) tablet Take 1 Tab by mouth daily. Yes Provider, Historical   CINNAMON BARK (CINNAMON PO) Take  by mouth. Yes Provider, Historical   TURMERIC ROOT EXTRACT PO Take  by mouth. Yes Provider, Historical   GINGER ROOT (GINGER EXTRACT PO) Take  by mouth. Yes Provider, Historical   cyanocobalamin (VITAMIN B-12) 1,000 mcg Subl Take 1,000 mcg by mouth daily.    Yes Provider, Historical   BD Ultra-Fine Short Pen Needle 31 gauge x 5/16\" ndle USE  TO INJECT ONE TIME DAILY 9/13/21   Feliciano Aiken MD   montelukast (SINGULAIR) 10 mg tablet TAKE 1 TABLET EVERY DAY 8/13/21   Feliciano Aiken MD   fluticasone propionate (FLONASE) 50 mcg/actuation nasal spray USE 1 SPRAY IN EACH NOSTRIL DAILY AS NEEDED FOR RHINITIS  Patient not taking: Reported on 12/2/2021 8/13/21   Feliciano Aiken MD   Accu-Chek Eliane Plus test strp strip TEST EVERY DAY 3/9/21   Feliciano Aiken MD   glucose blood VI test strips (ACCU-CHEK ELIANE PLUS TEST STRP) strip Use 1 daily for blood glucose monitoring DX: E11.9 12/19/18   Pepper John MD   Lancets (ACCU-CHEK SOFTCLIX LANCETS) misc Use 1 daily for blood glucose monitoring DX: E11.9 6/1/18   Pepper John MD   Blood-Glucose Meter (ACCU-CHEK HALIMA PLUS METER) misc Use 1 daily for blood glucose monitoring DX: E11.9 6/1/18   Pepper John MD   Insulin Needles, Disposable, (BD INSULIN PEN NEEDLE UF MINI) 31 gauge x 3/16\" ndle Check twice daily 2/8/17   Pepper John MD       Allergies   Allergen Reactions    Actos [Pioglitazone] Other (comments)     Personal history of bladder cancer         Physical Exam:      Visit Vitals  BP (!) 155/79 (BP 1 Location: Right arm, BP Patient Position: At rest)   Pulse 78   Temp 97 °F (36.1 °C)   Resp 20   Ht 5' 6\" (1.676 m)   Wt 68 kg (150 lb)   SpO2 96%   BMI 24.21 kg/m²     Physical Exam:  General: A&O x 4, NAD  Heart: RRR  Lungs: Normal work of breathing on room air    Labs Reviewed: All lab results for the last 24 hours reviewed.     Assessment/Plan     The patient is an appropriate candidate to undergo the planned procedure    CYRUS Horton

## 2021-12-03 NOTE — ANESTHESIA POSTPROCEDURE EVALUATION
* No procedures listed *. MAC, general - backup    Anesthesia Post Evaluation      Multimodal analgesia: multimodal analgesia used between 6 hours prior to anesthesia start to PACU discharge  Patient location during evaluation: bedside  Patient participation: complete - patient participated  Level of consciousness: awake  Pain management: adequate  Airway patency: patent  Anesthetic complications: no  Cardiovascular status: stable  Respiratory status: acceptable  Hydration status: acceptable  Post anesthesia nausea and vomiting:  controlled      INITIAL Post-op Vital signs:   Vitals Value Taken Time   /63 12/02/21 1311   Temp 36.4 °C (97.6 °F) 12/02/21 1201   Pulse 58 12/02/21 1320   Resp 22 12/02/21 1320   SpO2 100 % 12/02/21 1320   Vitals shown include unvalidated device data.

## 2021-12-07 RX ORDER — FLUTICASONE PROPIONATE 50 MCG
SPRAY, SUSPENSION (ML) NASAL
Qty: 16 G | Refills: 1 | Status: SHIPPED | OUTPATIENT
Start: 2021-12-07 | End: 2022-04-07

## 2021-12-23 DIAGNOSIS — R35.0 FREQUENCY OF URINATION: ICD-10-CM

## 2021-12-23 DIAGNOSIS — K21.9 GASTROESOPHAGEAL REFLUX DISEASE WITHOUT ESOPHAGITIS: ICD-10-CM

## 2021-12-23 RX ORDER — TAMSULOSIN HYDROCHLORIDE 0.4 MG/1
CAPSULE ORAL
Qty: 90 CAPSULE | Refills: 1 | Status: SHIPPED | OUTPATIENT
Start: 2021-12-23 | End: 2022-10-01

## 2021-12-23 RX ORDER — OMEPRAZOLE 40 MG/1
CAPSULE, DELAYED RELEASE ORAL
Qty: 90 CAPSULE | Refills: 1 | Status: SHIPPED | OUTPATIENT
Start: 2021-12-23 | End: 2022-07-20 | Stop reason: SDUPTHER

## 2022-01-10 RX ORDER — MONTELUKAST SODIUM 10 MG/1
TABLET ORAL
Qty: 90 TABLET | Refills: 1 | Status: SHIPPED | OUTPATIENT
Start: 2022-01-10 | End: 2022-07-21 | Stop reason: SDUPTHER

## 2022-01-19 DIAGNOSIS — Z79.4 TYPE 2 DIABETES MELLITUS WITHOUT COMPLICATION, WITH LONG-TERM CURRENT USE OF INSULIN (HCC): ICD-10-CM

## 2022-01-19 DIAGNOSIS — E11.9 TYPE 2 DIABETES MELLITUS WITHOUT COMPLICATION, WITH LONG-TERM CURRENT USE OF INSULIN (HCC): ICD-10-CM

## 2022-01-19 RX ORDER — LANCETS
EACH MISCELLANEOUS
Qty: 100 EACH | Refills: 3 | Status: SHIPPED | OUTPATIENT
Start: 2022-01-19

## 2022-01-19 RX ORDER — BLOOD SUGAR DIAGNOSTIC
STRIP MISCELLANEOUS
Qty: 300 STRIP | Refills: 1 | Status: SHIPPED | OUTPATIENT
Start: 2022-01-19

## 2022-01-19 RX ORDER — BLOOD-GLUCOSE METER
EACH MISCELLANEOUS
Qty: 1 EACH | Refills: 0 | Status: SHIPPED | OUTPATIENT
Start: 2022-01-19

## 2022-01-19 RX ORDER — INSULIN GLARGINE 100 [IU]/ML
INJECTION, SOLUTION SUBCUTANEOUS
Qty: 5 PEN | Refills: 3 | Status: SHIPPED | OUTPATIENT
Start: 2022-01-19

## 2022-02-14 ENCOUNTER — TRANSCRIBE ORDER (OUTPATIENT)
Dept: SCHEDULING | Age: 72
End: 2022-02-14

## 2022-02-14 DIAGNOSIS — D50.0 IRON DEFICIENCY ANEMIA SECONDARY TO BLOOD LOSS (CHRONIC): Primary | ICD-10-CM

## 2022-02-15 ENCOUNTER — TRANSCRIBE ORDER (OUTPATIENT)
Dept: SCHEDULING | Age: 72
End: 2022-02-15

## 2022-02-15 DIAGNOSIS — C79.9 METASTASIS (HCC): Primary | ICD-10-CM

## 2022-02-17 ENCOUNTER — TRANSCRIBE ORDER (OUTPATIENT)
Dept: SCHEDULING | Age: 72
End: 2022-02-17

## 2022-02-17 DIAGNOSIS — M84.48XA: ICD-10-CM

## 2022-02-17 DIAGNOSIS — Z85.51 HX OF BLADDER CANCER: ICD-10-CM

## 2022-02-17 DIAGNOSIS — R93.89 IMAGING ABNORMALITY: Primary | ICD-10-CM

## 2022-02-17 DIAGNOSIS — R63.4 WEIGHT LOSS: ICD-10-CM

## 2022-03-09 ENCOUNTER — HOSPITAL ENCOUNTER (OUTPATIENT)
Dept: CT IMAGING | Age: 72
Discharge: HOME | End: 2022-03-09
Attending: INTERNAL MEDICINE
Payer: MEDICARE

## 2022-03-09 DIAGNOSIS — M84.48XA: ICD-10-CM

## 2022-03-09 DIAGNOSIS — R63.4 WEIGHT LOSS: ICD-10-CM

## 2022-03-09 DIAGNOSIS — R93.89 IMAGING ABNORMALITY: ICD-10-CM

## 2022-03-09 DIAGNOSIS — Z85.51 HX OF BLADDER CANCER: ICD-10-CM

## 2022-03-09 PROCEDURE — 82565 ASSAY OF CREATININE: CPT

## 2022-03-09 PROCEDURE — 74011000636 HC RX REV CODE- 636: Performed by: INTERNAL MEDICINE

## 2022-03-09 PROCEDURE — 71260 CT THORAX DX C+: CPT

## 2022-03-09 RX ADMIN — IOPAMIDOL 75 ML: 612 INJECTION, SOLUTION INTRAVENOUS at 15:03

## 2022-03-10 LAB — CREAT UR-MCNC: 0.7 MG/DL (ref 0.6–1.3)

## 2022-03-18 PROBLEM — Z79.4 TYPE 2 DIABETES MELLITUS WITHOUT COMPLICATION, WITH LONG-TERM CURRENT USE OF INSULIN (HCC): Status: ACTIVE | Noted: 2020-08-05

## 2022-03-18 PROBLEM — Z85.51 PERSONAL HISTORY OF BLADDER CANCER: Status: ACTIVE | Noted: 2020-08-05

## 2022-03-18 PROBLEM — E11.9 TYPE 2 DIABETES MELLITUS WITHOUT COMPLICATION, WITH LONG-TERM CURRENT USE OF INSULIN (HCC): Status: ACTIVE | Noted: 2020-08-05

## 2022-03-20 PROBLEM — Z71.89 ADVANCE DIRECTIVE DISCUSSED WITH PATIENT: Status: ACTIVE | Noted: 2017-05-31

## 2022-03-20 PROBLEM — E11.40 TYPE 2 DIABETES MELLITUS WITH DIABETIC NEUROPATHY (HCC): Status: ACTIVE | Noted: 2021-08-10

## 2022-03-30 DIAGNOSIS — E11.65 POORLY CONTROLLED DIABETES MELLITUS (HCC): ICD-10-CM

## 2022-03-30 RX ORDER — METFORMIN HYDROCHLORIDE 500 MG/1
TABLET, EXTENDED RELEASE ORAL
Qty: 360 TABLET | Refills: 1 | Status: SHIPPED | OUTPATIENT
Start: 2022-03-30 | End: 2022-06-21

## 2022-03-30 RX ORDER — FINASTERIDE 5 MG/1
TABLET, FILM COATED ORAL
Qty: 90 TABLET | Refills: 1 | Status: SHIPPED | OUTPATIENT
Start: 2022-03-30 | End: 2022-09-02

## 2022-04-07 RX ORDER — FLUTICASONE PROPIONATE 50 MCG
SPRAY, SUSPENSION (ML) NASAL
Qty: 16 G | Refills: 1 | Status: SHIPPED | OUTPATIENT
Start: 2022-04-07 | End: 2022-05-31 | Stop reason: ALTCHOICE

## 2022-05-31 RX ORDER — VALSARTAN 320 MG/1
TABLET ORAL
Qty: 90 TABLET | Refills: 3 | OUTPATIENT
Start: 2022-05-31

## 2022-05-31 RX ORDER — VALSARTAN 320 MG/1
320 TABLET ORAL DAILY
Qty: 90 TABLET | Refills: 3 | Status: SHIPPED | OUTPATIENT
Start: 2022-05-31

## 2022-06-02 DIAGNOSIS — E11.65 POORLY CONTROLLED DIABETES MELLITUS (HCC): Primary | ICD-10-CM

## 2022-06-03 ENCOUNTER — HOSPITAL ENCOUNTER (OUTPATIENT)
Dept: LAB | Age: 72
Discharge: HOME | End: 2022-06-03
Payer: MEDICARE

## 2022-06-03 DIAGNOSIS — E11.65 POORLY CONTROLLED DIABETES MELLITUS (HCC): ICD-10-CM

## 2022-06-03 LAB
ALBUMIN SERPL-MCNC: 3.6 G/DL (ref 3.4–5)
ALBUMIN/GLOB SERPL: 1.1 {RATIO} (ref 0.8–1.7)
ALP SERPL-CCNC: 85 U/L (ref 45–117)
ALT SERPL-CCNC: 39 U/L (ref 16–61)
ANION GAP SERPL CALC-SCNC: 6 MMOL/L (ref 3–18)
AST SERPL-CCNC: 27 U/L (ref 10–38)
BILIRUB SERPL-MCNC: 0.7 MG/DL (ref 0.2–1)
BUN SERPL-MCNC: 18 MG/DL (ref 7–18)
BUN/CREAT SERPL: 21 (ref 12–20)
CALCIUM SERPL-MCNC: 9.2 MG/DL (ref 8.5–10.1)
CHLORIDE SERPL-SCNC: 106 MMOL/L (ref 100–111)
CHOLEST SERPL-MCNC: 153 MG/DL
CO2 SERPL-SCNC: 26 MMOL/L (ref 21–32)
CREAT SERPL-MCNC: 0.85 MG/DL (ref 0.6–1.3)
ERYTHROCYTE [DISTWIDTH] IN BLOOD BY AUTOMATED COUNT: 11.6 % (ref 11.6–14.5)
EST. AVERAGE GLUCOSE BLD GHB EST-MCNC: 217 MG/DL
GLOBULIN SER CALC-MCNC: 3.3 G/DL (ref 2–4)
GLUCOSE SERPL-MCNC: 168 MG/DL (ref 74–99)
HBA1C MFR BLD: 9.2 % (ref 4.2–5.6)
HCT VFR BLD AUTO: 43.4 % (ref 36–48)
HDLC SERPL-MCNC: 48 MG/DL (ref 40–60)
HDLC SERPL: 3.2 {RATIO} (ref 0–5)
HGB BLD-MCNC: 14.4 G/DL (ref 13–16)
LDLC SERPL CALC-MCNC: 65 MG/DL (ref 0–100)
LIPID PROFILE,FLP: ABNORMAL
MCH RBC QN AUTO: 32.6 PG (ref 24–34)
MCHC RBC AUTO-ENTMCNC: 33.2 G/DL (ref 31–37)
MCV RBC AUTO: 98.2 FL (ref 78–100)
NRBC # BLD: 0 K/UL (ref 0–0.01)
NRBC BLD-RTO: 0 PER 100 WBC
PLATELET # BLD AUTO: 224 K/UL (ref 135–420)
PMV BLD AUTO: 10.2 FL (ref 9.2–11.8)
POTASSIUM SERPL-SCNC: 4.4 MMOL/L (ref 3.5–5.5)
PROT SERPL-MCNC: 6.9 G/DL (ref 6.4–8.2)
RBC # BLD AUTO: 4.42 M/UL (ref 4.35–5.65)
SODIUM SERPL-SCNC: 138 MMOL/L (ref 136–145)
TRIGL SERPL-MCNC: 200 MG/DL (ref ?–150)
TSH SERPL DL<=0.05 MIU/L-ACNC: 1.82 UIU/ML (ref 0.36–3.74)
VLDLC SERPL CALC-MCNC: 40 MG/DL
WBC # BLD AUTO: 7.4 K/UL (ref 4.6–13.2)

## 2022-06-03 PROCEDURE — 84443 ASSAY THYROID STIM HORMONE: CPT

## 2022-06-03 PROCEDURE — 85027 COMPLETE CBC AUTOMATED: CPT

## 2022-06-03 PROCEDURE — 83036 HEMOGLOBIN GLYCOSYLATED A1C: CPT

## 2022-06-03 PROCEDURE — 80053 COMPREHEN METABOLIC PANEL: CPT

## 2022-06-03 PROCEDURE — 80061 LIPID PANEL: CPT

## 2022-06-03 PROCEDURE — 36415 COLL VENOUS BLD VENIPUNCTURE: CPT

## 2022-06-07 NOTE — PROGRESS NOTES
Tried to call pt to speak with him. Let him know that A1C is 9.2. high. If he has planned surgery he needs to contact endocrinology to discuss clearance. Need to be complaint with diet and medication. Further discussion on follow up visit.

## 2022-06-11 ENCOUNTER — APPOINTMENT (OUTPATIENT)
Dept: VASCULAR SURGERY | Age: 72
End: 2022-06-11
Attending: EMERGENCY MEDICINE
Payer: MEDICARE

## 2022-06-11 ENCOUNTER — APPOINTMENT (OUTPATIENT)
Dept: GENERAL RADIOLOGY | Age: 72
End: 2022-06-11
Attending: EMERGENCY MEDICINE
Payer: MEDICARE

## 2022-06-11 ENCOUNTER — HOSPITAL ENCOUNTER (EMERGENCY)
Age: 72
Discharge: HOME | End: 2022-06-11
Attending: EMERGENCY MEDICINE
Payer: MEDICARE

## 2022-06-11 VITALS
DIASTOLIC BLOOD PRESSURE: 75 MMHG | OXYGEN SATURATION: 97 % | BODY MASS INDEX: 26.66 KG/M2 | HEART RATE: 74 BPM | WEIGHT: 160 LBS | HEIGHT: 65 IN | SYSTOLIC BLOOD PRESSURE: 133 MMHG | RESPIRATION RATE: 18 BRPM | TEMPERATURE: 98 F

## 2022-06-11 DIAGNOSIS — M79.605 LEFT LEG PAIN: Primary | ICD-10-CM

## 2022-06-11 PROCEDURE — 99284 EMERGENCY DEPT VISIT MOD MDM: CPT

## 2022-06-11 PROCEDURE — 73552 X-RAY EXAM OF FEMUR 2/>: CPT

## 2022-06-11 PROCEDURE — 93971 EXTREMITY STUDY: CPT

## 2022-06-11 NOTE — ED PROVIDER NOTES
EMERGENCY DEPARTMENT HISTORY AND PHYSICAL EXAM    1:04 PM  Date: 6/11/2022  Patient Name: Neva Dent    History of Presenting Illness       History Provided By:     HPI: Neva Dent is a 81 y.o. male with medical history as below including bladder cancer presents with left lateral and anterior thigh pain  that started a week ago. Pain is mild severity, constant worse when he ambulates, no associated symptoms modifying factors. Patient denies any current injury. However states that he had an left thigh injury in the past and at times he has pain on the side. PCP: El Gary MD    Past History     Past Medical History:  Past Medical History:   Diagnosis Date    Bladder cancer ([facility] [address] 7/15/13    Clinical Stage TaN0M0 HG UC    Bladder tumor     Diabetes ([facility] [address]     Essential hypertension     History of kidney stones     Hyperlipidemia     Peripheral neuropathy     SBO (small bowel obstruction) (HCC)     Spinal stenosis     Spondylolisthesis, grade 1        Past Surgical History:  Past Surgical History:   Procedure Laterality Date    HX UROLOGICAL  7/15/13    TURBT, Dr. Racheal Jackson, [facility]    HX UROLOGICAL  1/4/16    Cysto, Dr. Racheal Jackson, [address] [place] [address]    HX WRIST FRACTURE [address]  4/27/15    (L) wrist       Family History:  Family History   Problem Relation Age of Onset    Hypertension Mother     Heart Attack Neg Hx     Sudden Death Neg Hx        Social History:  Social History     Tobacco Use    Smoking status: Never Smoker    Smokeless tobacco: Never Used   Substance Use Topics    Alcohol use: Not Currently     Comment: 1-2 drinks/day    Drug use: No       Allergies: Allergies   Allergen Reactions    Actos [Pioglitazone] Other (comments)     Personal history of bladder cancer         Review of Systems   Review of Systems   Constitutional: Negative for activity change, appetite change and chills. HENT: Negative for congestion, ear discharge, ear pain and sore throat.     Eyes: Negative for photophobia and pain. Respiratory: Negative for cough and choking. Cardiovascular: Negative for palpitations and leg swelling. Gastrointestinal: Negative for anal bleeding and rectal pain. Endocrine: Negative for polydipsia and polyuria. Genitourinary: Negative for genital sores and urgency. Musculoskeletal: Negative for arthralgias and myalgias. Neurological: Negative for dizziness, seizures and speech difficulty. Psychiatric/Behavioral: Negative for hallucinations, self-injury and suicidal ideas. Physical Exam     Patient Vitals for the past 12 hrs:   Temp Pulse Resp BP SpO2   06/11/22 1244 98 °F (36.7 °C) 74 18 133/75 97 %       Physical Exam  Vitals and nursing note reviewed. Constitutional:       Appearance: He is well-developed. HENT:      Head: Normocephalic and atraumatic. Eyes:      General:         Right eye: No discharge. Left eye: No discharge. Cardiovascular:      Rate and Rhythm: Normal rate and regular rhythm. Heart sounds: Normal heart sounds. No murmur heard. Pulmonary:      Effort: Pulmonary effort is normal. No respiratory distress. Breath sounds: Normal breath sounds. No stridor. No wheezing or rales. Chest:      Chest wall: No tenderness. Abdominal:      General: Bowel sounds are normal. There is no distension. Palpations: Abdomen is soft. Tenderness: There is no abdominal tenderness. There is no guarding or rebound. Musculoskeletal:         General: Normal range of motion. Cervical back: Normal range of motion and neck supple. Comments: Left anterior lateral thigh tenderness along the femur  No hip tenderness  No skin changes  Left lower extremity neurovascularly intact   Skin:     General: Skin is warm and dry. Neurological:      Mental Status: He is alert and oriented to person, place, and time.          Diagnostic Study Results     Labs -  No results found for this or any previous visit (from the past 12 hour(s)). Radiologic Studies -   No results found. Medical Decision Making     ED Course: Progress Notes, Reevaluation, and Consults:    1:04 PM Initial assessment performed. The patients presenting problems have been discussed, and they/their family are in agreement with the care plan formulated and outlined with them. I have encouraged them to ask questions as they arise throughout their visit. Provider Notes (Medical Decision Making):   Patient presents with left anterior lateral thigh pain for a week  X-ray femur no acute abnormalities  DVT study negative  Patient advised to follow-up with orthopedics. Strict return precautions given if symptoms worsen            Vital Signs-Reviewed the patient's vital signs. Reviewed pt's pulse ox reading. Records Reviewed: old medical records  -I am the first provider for this patient.  -I reviewed the vital signs, available nursing notes, past medical history, past surgical history, family history and social history. Current Outpatient Medications   Medication Sig Dispense Refill    valsartan (DIOVAN) 320 mg tablet Take 1 Tablet by mouth daily. 90 Tablet 3    finasteride (PROSCAR) 5 mg tablet TAKE 1 TABLET EVERY DAY 90 Tablet 1    metFORMIN ER (GLUCOPHAGE XR) 500 mg tablet TAKE 2 TABLETS TWICE DAILY 360 Tablet 1    lancets (Accu-Chek Softclix Lancets) misc Use 1 daily for blood glucose monitoring DX: E11.9 100 Each 3    Blood-Glucose Meter (Accu-Chek Eliane Plus Meter) misc Use 1 daily for blood glucose monitoring DX: E11.9 1 Each 0    glucose blood VI test strips (Accu-Chek Eliane Plus test strp) strip Use 1 daily for blood glucose monitoring DX: E11.9 300 Strip 1    insulin glargine (Lantus Solostar U-100 Insulin) 100 unit/mL (3 mL) inpn INJECT  10 UNITS SUBCUTANEOUSLY AT BEDTIME. DISCARD AND BEGIN NEW PEN AFTER 28 DAYS. 5 Pen 3    SITagliptin (Januvia) 100 mg tablet Take 1 Tablet by mouth daily.  90 Tablet 1    montelukast (SINGULAIR) 10 mg tablet TAKE 1 TABLET EVERY DAY 90 Tablet 1    omeprazole (PRILOSEC) 40 mg capsule TAKE 1 CAPSULE EVERY DAY 90 Capsule 1    tamsulosin (FLOMAX) 0.4 mg capsule TAKE 1 CAPSULE EVERY DAY 90 Capsule 1    atorvastatin (LIPITOR) 10 mg tablet Take 1 Tablet by mouth nightly. 180 Tablet 1    BD Ultra-Fine Short Pen Needle 31 gauge x 5/16\" ndle USE  TO INJECT ONE TIME DAILY 100 Each 5    ashwagandha root extract 300 mg cap Take  by mouth.  tadalafiL (CIALIS) 20 mg tablet Take 1 Tablet by mouth as needed for Erectile Dysfunction. 90 Tablet 3    Accu-Chek Eliane Plus test strp strip TEST EVERY  Strip 5    glipiZIDE (GLUCOTROL) 5 mg tablet TAKE 1 TABLET EVERY DAY (Patient taking differently: Take 10 mg by mouth daily. TAKE 1 TABLET EVERY DAY. 10mg) 90 Tab 1    CALCIUM PO Take  by mouth.  cholecalciferol, vitamin D3, (VITAMIN D3 PO) Take  by mouth.  multivitamin (ONE A DAY) tablet Take 1 Tab by mouth daily.  CINNAMON BARK (CINNAMON PO) Take  by mouth.  TURMERIC ROOT EXTRACT PO Take  by mouth.  GINGER ROOT (GINGER EXTRACT PO) Take  by mouth.  Insulin Needles, Disposable, (BD INSULIN PEN NEEDLE UF MINI) 31 gauge x 3/16\" ndle Check twice daily 100 Pen Needle 1    cyanocobalamin (VITAMIN B-12) 1,000 mcg Subl Take 1,000 mcg by mouth daily. Clinical Impression     Clinical Impression: No diagnosis found. Disposition:    Pt has been reexamined. Patient has no new complaints, changes, or physical findings. Care plan outlined and precautions discussed. Results were reviewed with the patient. All medications were reviewed with the patient; will d/c home with PMD f/u. All of pt's questions and concerns were addressed. Patient was instructed and agrees to follow up with PMD, as well as to return to the ED upon further deterioration. Patient is ready to go home. This note was dictated utilizing voice recognition software which may lead to typographical errors.   I apologize in advance if the situation occurs. If questions arise please do not hesitate to contact me or call our department. This note was dictated utilizing voice recognition software which may lead to typographical errors. I apologize in advance if the situation occurs. If questions arise please do not hesitate to contact me or call our department.     Gordon Killian MD  1:04 PM

## 2022-06-11 NOTE — ED TRIAGE NOTES
Patient states that he has been through pre-op testing related to scheduled surgery for bladder cancer next Friday. He states experiencing pain to left anterior thigh x one week. Denies injury. Patient noted to have limp with ambulation.

## 2022-06-16 NOTE — PROGRESS NOTES
Left a voice message asking patient to return call to [facility] in regards to result, [phone] press option # 3. Ask to speak with Zoe or Dr. Tiana Steen.

## 2022-06-16 NOTE — PROGRESS NOTES
Left a voice message asking patient to return call to hospitals in regards to result, [phone] press option # 3. Ask to speak with Zoe or Dr. Gabriel Payton.

## 2022-06-21 ENCOUNTER — HOSPITAL ENCOUNTER (OUTPATIENT)
Dept: GENERAL RADIOLOGY | Age: 72
Discharge: HOME | End: 2022-06-21
Payer: MEDICARE

## 2022-06-21 ENCOUNTER — OFFICE VISIT (OUTPATIENT)
Dept: FAMILY MEDICINE CLINIC | Age: 72
End: 2022-06-21
Payer: MEDICARE

## 2022-06-21 VITALS
DIASTOLIC BLOOD PRESSURE: 70 MMHG | HEIGHT: 66 IN | TEMPERATURE: 96.9 F | RESPIRATION RATE: 16 BRPM | SYSTOLIC BLOOD PRESSURE: 122 MMHG | WEIGHT: 155.4 LBS | HEART RATE: 74 BPM | OXYGEN SATURATION: 95 % | BODY MASS INDEX: 24.98 KG/M2

## 2022-06-21 DIAGNOSIS — I10 ESSENTIAL HYPERTENSION, BENIGN: ICD-10-CM

## 2022-06-21 DIAGNOSIS — E78.2 MIXED HYPERLIPIDEMIA: ICD-10-CM

## 2022-06-21 DIAGNOSIS — C67.9 MALIGNANT NEOPLASM OF URINARY BLADDER, UNSPECIFIED SITE (HCC): ICD-10-CM

## 2022-06-21 DIAGNOSIS — M79.652 PAIN OF LEFT THIGH: ICD-10-CM

## 2022-06-21 DIAGNOSIS — E11.65 POORLY CONTROLLED DIABETES MELLITUS (HCC): Primary | ICD-10-CM

## 2022-06-21 DIAGNOSIS — Z00.00 MEDICARE ANNUAL WELLNESS VISIT, SUBSEQUENT: ICD-10-CM

## 2022-06-21 PROCEDURE — 1123F ACP DISCUSS/DSCN MKR DOCD: CPT | Performed by: FAMILY MEDICINE

## 2022-06-21 PROCEDURE — 73502 X-RAY EXAM HIP UNI 2-3 VIEWS: CPT

## 2022-06-21 PROCEDURE — G8752 SYS BP LESS 140: HCPCS | Performed by: FAMILY MEDICINE

## 2022-06-21 PROCEDURE — G8427 DOCREV CUR MEDS BY ELIG CLIN: HCPCS | Performed by: FAMILY MEDICINE

## 2022-06-21 PROCEDURE — G8754 DIAS BP LESS 90: HCPCS | Performed by: FAMILY MEDICINE

## 2022-06-21 PROCEDURE — G8417 CALC BMI ABV UP PARAM F/U: HCPCS | Performed by: FAMILY MEDICINE

## 2022-06-21 PROCEDURE — 2022F DILAT RTA XM EVC RTNOPTHY: CPT | Performed by: FAMILY MEDICINE

## 2022-06-21 PROCEDURE — G0439 PPPS, SUBSEQ VISIT: HCPCS | Performed by: FAMILY MEDICINE

## 2022-06-21 PROCEDURE — G8510 SCR DEP NEG, NO PLAN REQD: HCPCS | Performed by: FAMILY MEDICINE

## 2022-06-21 PROCEDURE — 3046F HEMOGLOBIN A1C LEVEL >9.0%: CPT | Performed by: FAMILY MEDICINE

## 2022-06-21 PROCEDURE — G8536 NO DOC ELDER MAL SCRN: HCPCS | Performed by: FAMILY MEDICINE

## 2022-06-21 PROCEDURE — 1101F PT FALLS ASSESS-DOCD LE1/YR: CPT | Performed by: FAMILY MEDICINE

## 2022-06-21 PROCEDURE — 99214 OFFICE O/P EST MOD 30 MIN: CPT | Performed by: FAMILY MEDICINE

## 2022-06-21 PROCEDURE — 3017F COLORECTAL CA SCREEN DOC REV: CPT | Performed by: FAMILY MEDICINE

## 2022-06-21 RX ORDER — METFORMIN HYDROCHLORIDE 750 MG/1
750 TABLET, EXTENDED RELEASE ORAL
Qty: 90 TABLET | Refills: 1 | Status: SHIPPED | OUTPATIENT
Start: 2022-06-21 | End: 2022-07-27

## 2022-06-21 NOTE — PROGRESS NOTES
1. Have you been to the ER, urgent care clinic since your last visit? Hospitalized since your last visit? [facility] 6/11/22 L leg pain and [facility] 12/02/21 iron deficiency anemia to blood loss    2. Have you seen or consulted any other health care providers outside of the [address] [place] [address] since your last visit? Include any pap smears or colon screening.  Urology and bladder surgery    Chief Complaint   Patient presents with    Diabetes    Hypertension    Cholesterol Problem    Elevated PSA    Other     abnormal PET Scan    Leg Pain     left thigh pain off/on - no pain today    Medication Evaluation     wants to discuss changing to Metformin 1000 mg

## 2022-06-21 NOTE — PROGRESS NOTES
This is the Subsequent Medicare Annual Wellness Exam, performed 12 months or more after the Initial AWV or the last Subsequent AWV    I have reviewed the patient's medical history in detail and updated the computerized patient record. Assessment/Plan   Education and counseling provided:  Are appropriate based on today's review and evaluation  Discussed 5 years health plan. Discussed advance directive. See ACP note from today    1. Poorly controlled diabetes mellitus ([facility] [address]  -     REFERRAL TO PHARMACIST  2. Malignant neoplasm of urinary bladder, unspecified site ([facility] [address]  3. Essential hypertension, benign  4. Mixed hyperlipidemia  5. Medicare annual wellness visit, subsequent       Depression Risk Factor Screening     3 most recent PHQ Screens 6/21/2022   Little interest or pleasure in doing things Not at all   Feeling down, depressed, irritable, or hopeless Not at all   Total Score PHQ 2 0       Alcohol & Drug Abuse Risk Screen    Do you average more than 1 drink per night or more than 7 drinks a week: Yes    In the past three months have you have had more than 4 drinks containing alcohol on one occasion: No          Functional Ability and Level of Safety    Hearing: has hearing AId bilaterally. Usually wears rt side. Currently no concern from hearing. Activities of Daily Living: The home contains: no safety equipment. Patient does total self care      Ambulation: with no difficulty     Fall Risk:  Fall Risk Assessment, last 12 mths 6/21/2022   Able to walk? Yes   Fall in past 12 months? 0   Do you feel unsteady? 0   Are you worried about falling 0   Is the gait abnormal? -   Number of falls in past 12 months -   Fall with injury?  -      Abuse Screen:  Patient is not abused       Cognitive Screening    Has your family/caregiver stated any concerns about your memory: no         Health Maintenance Due     Health Maintenance Due   Topic Date Due    Shingrix Vaccine Age 50> (1 of 2) Never done    Foot Exam Q1  08/30/2018    Eye Exam Retinal or Dilated  09/11/2020   will schedule an eye exam.     Patient Care Team   Patient Care Team:  Dede Lemos MD as PCP - General (Family Medicine)  Dede Lemos MD as PCP - [facility] Provider  Landon Driver MD (Urology)  Justice Kee MD as Consulting Provider (Internal Medicine Physician)  Igor Barry MD as Consulting Provider (Urology)  Simin Meraz MD (Ophthalmology)  Paloma Swift MD (Ophthalmology)  David Romeo MD (Endocrinology Physician)  Meek Stubbs MD (Gastroenterology)    History     Patient Active Problem List   Diagnosis Code    Chest pain, unspecified R07.9    Essential hypertension, benign I10    Mixed hyperlipidemia E78.2    Bladder tumor D49.4    S/P colonoscopy with polypectomy/ follwoing GI. done in 2013. stage 4 esophagitis on EGD done in 2013. recently seen GI. Dr. Roxanna Payan on 7/7/16 Z98.890    Erectile dysfunction N52.9    Elevated PSA R97.20    Cubital tunnel syndrome on left G56.22    Abnormal findings on esophagogastroduodenoscopy (EGD)/ done on 8/1/16. gunner's esophagus .  f/u EGD due in 3 years will be 2019 R19.8    Advance directive discussed with patient Z71.89    Type 2 diabetes mellitus without complication, with long-term current use of insulin (HCC) E11.9, Z79.4    Personal history of bladder cancer Z85.51    Type 2 diabetes mellitus with diabetic neuropathy E11.40     Past Medical History:   Diagnosis Date    Bladder cancer ([facility] [address] 7/15/13    Clinical Stage TaN0M0 HG UC    Bladder tumor     Diabetes ([facility] [address]     Essential hypertension     History of kidney stones     Hyperlipidemia     Peripheral neuropathy     SBO (small bowel obstruction) ([address]     Spinal stenosis     Spondylolisthesis, grade 1       Past Surgical History:   Procedure Laterality Date    HX UROLOGICAL  7/15/13    TURBT, Dr. Allison Jacobo, [facility]    HX UROLOGICAL  1/4/16    Cysto, Dr. Allison Jacobo, [address] [place] [address] 4/27/15    (L) wrist     Current Outpatient Medications   Medication Sig Dispense Refill    CALC-D3-MAGNES-P6-[id number]-FQ-MELVIN PO       valsartan (DIOVAN) 320 mg tablet Take 1 Tablet by mouth daily. 90 Tablet 3    finasteride (PROSCAR) 5 mg tablet TAKE 1 TABLET EVERY DAY 90 Tablet 1    metFORMIN ER (GLUCOPHAGE XR) 500 mg tablet TAKE 2 TABLETS TWICE DAILY 360 Tablet 1    lancets (Accu-Chek Softclix Lancets) misc Use 1 daily for blood glucose monitoring DX: E11.9 100 Each 3    Blood-Glucose Meter (Accu-Chek Eliane Plus Meter) misc Use 1 daily for blood glucose monitoring DX: E11.9 1 Each 0    glucose blood VI test strips (Accu-Chek Eliane Plus test strp) strip Use 1 daily for blood glucose monitoring DX: E11.9 300 Strip 1    insulin glargine (Lantus Solostar U-100 Insulin) 100 unit/mL (3 mL) inpn INJECT  10 UNITS SUBCUTANEOUSLY AT BEDTIME. DISCARD AND BEGIN NEW PEN AFTER 28 DAYS. 5 Pen 3    SITagliptin (Januvia) 100 mg tablet Take 1 Tablet by mouth daily. 90 Tablet 1    montelukast (SINGULAIR) 10 mg tablet TAKE 1 TABLET EVERY DAY 90 Tablet 1    omeprazole (PRILOSEC) 40 mg capsule TAKE 1 CAPSULE EVERY DAY 90 Capsule 1    tamsulosin (FLOMAX) 0.4 mg capsule TAKE 1 CAPSULE EVERY DAY 90 Capsule 1    atorvastatin (LIPITOR) 10 mg tablet Take 1 Tablet by mouth nightly. 180 Tablet 1    BD Ultra-Fine Short Pen Needle 31 gauge x 5/16\" ndle USE  TO INJECT ONE TIME DAILY 100 Each 5    ashwagandha root extract 300 mg cap Take  by mouth.  tadalafiL (CIALIS) 20 mg tablet Take 1 Tablet by mouth as needed for Erectile Dysfunction. 90 Tablet 3    Accu-Chek Eliane Plus test strp strip TEST EVERY  Strip 5    CALCIUM PO Take  by mouth.  cholecalciferol, vitamin D3, (VITAMIN D3 PO) Take  by mouth.  multivitamin (ONE A DAY) tablet Take 1 Tab by mouth daily.  CINNAMON BARK (CINNAMON PO) Take  by mouth.  TURMERIC ROOT EXTRACT PO Take  by mouth.  GINGER ROOT (GINGER EXTRACT PO) Take  by mouth.  Insulin Needles, Disposable, (BD INSULIN PEN NEEDLE UF MINI) 31 gauge x 3/16\" ndle Check twice daily 100 Pen Needle 1    cyanocobalamin (VITAMIN B-12) 1,000 mcg Subl Take 1,000 mcg by mouth daily.  glipiZIDE (GLUCOTROL) 5 mg tablet TAKE 1 TABLET EVERY DAY (Patient taking differently: Take 10 mg by mouth daily. TAKE 1 TABLET EVERY DAY.  10mg) 90 Tab 1     Allergies   Allergen Reactions    Actos [Pioglitazone] Other (comments)     Personal history of bladder cancer         Family History   Problem Relation Age of Onset    Hypertension Mother     Heart Attack Neg Hx     Sudden Death Neg Hx      Social History     Tobacco Use    Smoking status: Never Smoker    Smokeless tobacco: Never Used   Substance Use Topics    Alcohol use: Yes     Comment: occ         James Blanco MD

## 2022-06-21 NOTE — PATIENT INSTRUCTIONS
Nutrition Tips for Diabetes: After Your Visit  Your Care Instructions  A healthy diet is important to manage diabetes. It helps you lose weight (if you need to) and keep it off. It gives you the nutrition and energy your body needs and helps prevent heart disease. But a diet for diabetes does not mean that you have to eat special foods. You can eat what your family eats, including occasional sweets and other favorites. But you do have to pay attention to how often you eat and how much you eat of certain foods. The right plan for you will give you meals that help you keep your blood sugar at healthy levels. Try to eat a variety of foods and to spread carbohydrate throughout the day. Carbohydrate raises blood sugar higher and more quickly than any other nutrient does. Carbohydrate is found in sugar, breads and cereals, fruit, starchy vegetables such as potatoes and corn, and milk and yogurt. You may want to work with a dietitian or diabetes educator to help you plan meals and snacks. A dietitian or diabetes educator also can help you lose weight if that is one of your goals. The following tips can help you enjoy your meals and stay healthy. Follow-up care is a key part of your treatment and safety. Be sure to make and go to all appointments, and call your doctor if you are having problems. Its also a good idea to know your test results and keep a list of the medicines you take. How can you care for yourself at home? · Learn which foods have carbohydrate and how much carbohydrate to eat. A dietitian or diabetes educator can help you learn to keep track of how much carbohydrate you eat. · Spread carbohydrate throughout the day. Eat some carbohydrate at all meals, but do not eat too much at any one time. · Plan meals to include food from all the food groups.  These are the food groups and some example portion sizes:  ¨ Grains: 1 slice of bread (1 ounce), ½ cup of cooked cereal, and 1/3 cup of cooked pasta or rice. These have about 15 grams of carbohydrate in a serving. Choose whole grains such as whole wheat bread or crackers, oatmeal, and brown rice more often than refined grains. ¨ Fruit: 1 small fresh fruit, such as an apple or orange; ½ of a banana; ½ cup of chopped, cooked, or canned fruit; ½ cup of fruit juice; 1 cup of melon or raspberries; and 2 tablespoons of dried fruit. These have about 15 grams of carbohydrate in a serving. ¨ Dairy: 1 cup of nonfat or low-fat milk and 2/3 cup of plain yogurt. These have about 15 grams of carbohydrate in a serving. ¨ Protein foods: Beef, chicken, turkey, fish, eggs, tofu, cheese, cottage cheese, and peanut butter. A serving size of meat is 3 ounces, which is about the size of a deck of cards. Examples of meat substitute serving sizes (equal to 1 ounce of meat) are 1/4 cup of cottage cheese, 1 egg, 1 tablespoon of peanut butter, and ½ cup of tofu. These have very little or no carbohydrate per serving. ¨ Vegetables: Starchy vegetables such as ½ cup of cooked dried beans, peas, potatoes, or corn have about 15 grams of carbohydrate. Nonstarchy vegetables have very little carbohydrate, such as 1 cup of raw leafy vegetables (such as spinach), ½ cup of other vegetables (cooked or chopped), and 3/4 cup of vegetable juice. · Use the plate format to plan meals. It is a good, quick way to make sure that you have a balanced meal. It also helps you spread carbohydrate throughout the day. You divide your plate by types of foods. Put vegetables on half the plate, meat or meat substitutes on one-quarter of the plate, and a grain or starchy vegetable (such as brown rice or a potato) in the final quarter of the plate. To this you can add a small piece of fruit and 1 cup of milk or yogurt, depending on how much carbohydrate you are supposed to eat at a meal.  · Talk to your dietitian or diabetes educator about ways to add limited amounts of sweets into your meal plan.  You can eat these foods now and then, as long as you include the amount of carbohydrate they have in your daily carbohydrate allowance. · If you drink alcohol, limit it to no more than 1 drink a day for women and 2 drinks a day for men. If you are pregnant, no amount of alcohol is known to be safe. · Protein, fat, and fiber do not raise blood sugar as much as carbohydrate does. If you eat a lot of these nutrients in a meal, your blood sugar will rise more slowly than it would otherwise. · Limit saturated fats, such as those from meat and dairy products. Try to replace it with monounsaturated fat, such as olive oil. This is a healthier choice because people who have diabetes are at higher-than-average risk of heart disease. But use a modest amount of olive oil. A tablespoon of olive oil has 14 grams of fat and 120 calories. · Exercise lowers blood sugar. If you take insulin by shots or pump, you can use less than you would if you were not exercising. Keep in mind that timing matters. If you exercise within 1 hour after a meal, your body may need less insulin for that meal than it would if you exercised 3 hours after the meal. Test your blood sugar to find out how exercise affects your need for insulin. · Exercise on most days of the week. Aim for at least 30 minutes. Exercise helps you stay at a healthy weight and helps your body use insulin. Walking is an easy way to get exercise. Gradually increase the amount you walk every day. You also may want to swim, bike, or do other activities. When you eat out  · Learn to estimate the serving sizes of foods that have carbohydrate. If you measure food at home, it will be easier to estimate the amount in a serving of restaurant food. · If the meal you order has too much carbohydrate (such as potatoes, corn, or baked beans), ask to have a low-carbohydrate food instead. Ask for a salad or green vegetables.   · If you use insulin, check your blood sugar before and after eating out to help you plan how much to eat in the future. · If you eat more carbohydrate at a meal than you had planned, take a walk or do other exercise. This will help lower your blood sugar. Where can you learn more? Go to Trendmeon.be  Enter H100 in the search box to learn more about \"Nutrition Tips for Diabetes: After Your Visit. \"   © 9395-3860 Healthwise, Incorporated. Care instructions adapted under license by [facility] (which disclaims liability or warranty for this information). This care instruction is for use with your licensed healthcare professional. If you have questions about a medical condition or this instruction, always ask your healthcare professional. [address] any warranty or liability for your use of this information. Content Version: 91.6.079387; Current as of: June 4, 2014              Medicare Wellness Visit, Male    The best way to live healthy is to have a lifestyle where you eat a well-balanced diet, exercise regularly, limit alcohol use, and quit all forms of tobacco/nicotine, if applicable. Regular preventive services are another way to keep healthy. Preventive services (vaccines, screening tests, monitoring & exams) can help personalize your care plan, which helps you manage your own care. Screening tests can find health problems at the earliest stages, when they are easiest to treat. Be follows the current, evidence-based guidelines published by the [place] [employer] [employer] (USPSTF) when recommending preventive services for our patients. Because we follow these guidelines, sometimes recommendations change over time as research supports it. (For example, a prostate screening blood test is no longer routinely recommended for men with no symptoms).   Of course, you and your doctor may decide to screen more often for some diseases, based on your risk and co-morbidities (chronic disease you are already diagnosed with). Preventive services for you include:  - Medicare offers their members a free annual wellness visit, which is time for you and your primary care provider to discuss and plan for your preventive service needs. Take advantage of this benefit every year!  -All adults over age 72 should receive the recommended pneumonia vaccines. Current USPSTF guidelines recommend a series of two vaccines for the best pneumonia protection.   -All adults should have a flu vaccine yearly and tetanus vaccine every 10 years.  -All adults age 48 and older should receive the shingles vaccines (series of two vaccines). -All adults age 38-68 who are overweight should have a diabetes screening test once every three years.   -Other screening tests & preventive services for persons with diabetes include: an eye exam to screen for diabetic retinopathy, a kidney function test, a foot exam, and stricter control over your cholesterol.   -Cardiovascular screening for adults with routine risk involves an electrocardiogram (ECG) at intervals determined by the provider.   -Colorectal cancer screening should be done for adults age 54-65 with no increased risk factors for colorectal cancer. There are a number of acceptable methods of screening for this type of cancer. Each test has its own benefits and drawbacks. Discuss with your provider what is most appropriate for you during your annual wellness visit. The different tests include: colonoscopy (considered the best screening method), a fecal occult blood test, a fecal DNA test, and sigmoidoscopy.  -All adults born between [facility] should be screened once for Hepatitis C.  -An Abdominal Aortic Aneurysm (AAA) Screening is recommended for men age 73-68 who has ever smoked in their lifetime.      Here is a list of your current Health Maintenance items (your personalized list of preventive services) with a due date:  Health Maintenance Due   Topic Date Due    Shingles Vaccine (1 of 2) Never done    Diabetic Foot Care  08/30/2018    Eye Exam  09/11/2020        Hip Bursitis: Exercises  Introduction  Here are some examples of exercises for you to try. The exercises may be suggested for a condition or for rehabilitation. Start each exercise slowly. Ease off the exercises if you start to have pain. You will be told when to start these exercises and which ones will work best for you. How to do the exercises  Hip rotator stretch    1. Lie on your back with both knees bent and your feet flat on the floor. 2. Put the ankle of your affected leg on your opposite thigh near your knee. 3. Use your hand to gently push your knee away from your body until you feel a gentle stretch around your hip. 4. Hold the stretch for 15 to 30 seconds. 5. Repeat 2 to 4 times. 6. Repeat steps 1 through 5, but this time use your hand to gently pull your knee toward your opposite shoulder. Iliotibial band stretch    1. Lean sideways against a wall. If you are not steady on your feet, hold on to a chair or counter. 2. Stand on the leg with the affected hip, with that leg close to the wall. Then cross your other leg in front of it. 3. Let your affected hip drop out to the side of your body and against wall. Then lean away from your affected hip until you feel a stretch. 4. Hold the stretch for 15 to 30 seconds. 5. Repeat 2 to 4 times. Straight-leg raises to the outside    1. Lie on your side, with your affected hip on top. 2. Tighten the front thigh muscles of your top leg to keep your knee straight. 3. Keep your hip and your leg straight in line with the rest of your body, and keep your knee pointing forward. Do not drop your hip back. 4. Lift your top leg straight up toward the ceiling, about 12 inches off the floor. Hold for about 6 seconds, then slowly lower your leg. 5. Repeat 8 to 12 times. Clamshell    1. Lie on your side, with your affected hip on top and your head propped on a pillow. Keep your feet and knees together and your knees bent. 2. Raise your top knee, but keep your feet together. Do not let your hips roll back. Your legs should open up like a clamshell. 3. Hold for 6 seconds. 4. Slowly lower your knee back down. Rest for 10 seconds. 5. Repeat 8 to 12 times. Follow-up care is a key part of your treatment and safety. Be sure to make and go to all appointments, and call your doctor if you are having problems. It's also a good idea to know your test results and keep a list of the medicines you take. Where can you learn more? Go to http://www.escobar.com/  Enter H674 in the search box to learn more about \"Hip Bursitis: Exercises. \"  Current as of: July 1, 2021               Content Version: 13.2  © 7950-2119 Healthwise, Incorporated. Care instructions adapted under license by [employer] (which disclaims liability or warranty for this information). If you have questions about a medical condition or this instruction, always ask your healthcare professional. [address] any warranty or liability for your use of this information.

## 2022-06-21 NOTE — PROGRESS NOTES
HISTORY OF PRESENT ILLNESS  Marti Hand is a 81 y.o. male. HPI: Here for follow-up. Diabetes. A1c went up compared to before and now it is 9.3. Mentioned that does Sometimes nighttime insulin. I have discussed the importance of compliance with medication and well-controlled diabetes. He is working on diet modification. Recent diagnosis of recurrent bladder cancer. Post procedure, cystoscopy with transurethral resection of bladder neck. No complications of surgery. No urinary complaint. No abdominal pain. No nausea or vomiting. Appetite is fair. Lost 5 pounds. No cold cough wheezing. No chest pain or shortness of breath. No palpitation or diaphoresis. No extremity weakness. No headache or dizziness. Vitals been stable. Sitting comfortable without any acute distress. Has been following endocrinology but was late for her last appointment so we will need to reschedule an appointment. Hyperlipidemia. On statin. Denies any hyper or hypoglycemic symptoms. Does check fasting sugar at home. Fluctuating blood sugar. Used to be about 160 and now coming down under 120. Trying to be compliant with taking medication and insulin. Visit Vitals  /70 (BP 1 Location: Left upper arm, BP Patient Position: Sitting, BP Cuff Size: Adult)   Pulse 74   Temp 96.9 °F (36.1 °C) (Temporal)   Resp 16   Ht 5' 6\" (1.676 m)   Wt 155 lb 6.4 oz (70.5 kg)   SpO2 95%   BMI 25.08 kg/m²     Review medication list, vitals, problem list,allergies.    Lab Results   Component Value Date/Time    WBC 7.4 06/03/2022 08:21 AM    HGB 14.4 06/03/2022 08:21 AM    HCT 43.4 06/03/2022 08:21 AM    PLATELET 375 58/88/2058 08:21 AM    MCV 98.2 06/03/2022 08:21 AM     Lab Results   Component Value Date/Time    Sodium 138 06/03/2022 08:21 AM    Potassium 4.4 06/03/2022 08:21 AM    Chloride 106 06/03/2022 08:21 AM    CO2 26 06/03/2022 08:21 AM    Anion gap 6 06/03/2022 08:21 AM    Glucose 168 (H) 06/03/2022 08:21 AM    BUN 18 06/03/2022 08:21 AM    Creatinine 0.85 06/03/2022 08:21 AM    BUN/Creatinine ratio 21 (H) 06/03/2022 08:21 AM    GFR est AA >60 06/03/2022 08:21 AM    GFR est non-AA >60 06/03/2022 08:21 AM    Calcium 9.2 06/03/2022 08:21 AM    Bilirubin, total 0.7 06/03/2022 08:21 AM    Alk. phosphatase 85 06/03/2022 08:21 AM    Protein, total 6.9 06/03/2022 08:21 AM    Albumin 3.6 06/03/2022 08:21 AM    Globulin 3.3 06/03/2022 08:21 AM    A-G Ratio 1.1 06/03/2022 08:21 AM    ALT (SGPT) 39 06/03/2022 08:21 AM    AST (SGOT) 27 06/03/2022 08:21 AM     Lab Results   Component Value Date/Time    Cholesterol, total 153 06/03/2022 08:21 AM    HDL Cholesterol 48 06/03/2022 08:21 AM    LDL-C, External 52 06/20/2016 12:00 AM    LDL, calculated 65 06/03/2022 08:21 AM    VLDL, calculated 40 06/03/2022 08:21 AM    Triglyceride 200 (H) 06/03/2022 08:21 AM    CHOL/HDL Ratio 3.2 06/03/2022 08:21 AM     Lab Results   Component Value Date/Time    TSH 1.82 06/03/2022 08:21 AM     Lab Results   Component Value Date/Time    Hemoglobin A1c 9.2 (H) 06/03/2022 08:21 AM    Hemoglobin A1c (POC) 8.0 04/06/2021 12:00 AM    Hemoglobin A1c, External 8.4 06/20/2016 12:00 AM     Lab Results   Component Value Date/Time    Prostate Specific Ag 1.06 04/15/2021 03:46 PM    Prostate Specific Ag 1.4 09/30/2020 12:07 PM    Prostate Specific Ag 2.0 06/16/2020 01:42 PM    Prostate Specific Ag 3.6 03/05/2020 12:55 PM         ROS: See HPI    Physical Exam  Constitutional:       General: He is not in acute distress. Cardiovascular:      Rate and Rhythm: Normal rate and regular rhythm. Heart sounds: Normal heart sounds. Abdominal:      General: Bowel sounds are normal.      Palpations: Abdomen is soft. Tenderness: There is no abdominal tenderness. Musculoskeletal:         General: No swelling. Cervical back: Neck supple. Neurological:      Mental Status: He is oriented to person, place, and time.    Psychiatric:         Behavior: Behavior normal. ASSESSMENT and PLAN    ICD-10-CM ICD-9-CM    1. Poorly controlled diabetes mellitus ([facility] [address]): A1c around 9.3. He has been taking metformin extended release 4 tablets a day which I have reduced to once a day. Increase the dose to 750. At this time discussed hypoglycemic side effects of glipizide. If any advised to cut down the dose. I have advised him to go up on her Lantus 2 units every other day if fasting sugar is staying above 140. Discussed importance of compliance with diet. Sending to the pharmacist for diabetic education and further adjustment in medication E11.65 250.00 REFERRAL TO PHARMACIST      metFORMIN ER (GLUCOPHAGE XR) 750 mg tablet   2. Malignant neoplasm of urinary bladder, unspecified site [facility]): Post cystoscopy and transurethral resection of bladder neck. Will be following urology and the recommendation C67.9 188.9    3. Essential hypertension, benign:well controlled. Continue current dose of medication and low salt diet. Exercise as tolerated. I10 401.1    4. Mixed hyperlipidemia: On statin. Continue lifestyle and diet modification E78.2 272.2    5. Medicare annual wellness visit, subsequent  Z00.00 V70.0    6. Pain of left thigh: Going on since last few weeks. He went to emergency room and DVT study was done. No DVT. Since then having a sharp pain on and off. No trouble ambulation. Not using any support. No fall or injury. I will obtain the hip x-ray. Further discussion after results M79.652 729.5 XR HIP LT W OR WO PELV 2-3 VWS   Patient understood and agreed with the plan  Will complete an eye exam  Follow-up and Dispositions    · Return in about 3 months (around 9/21/2022) for please given an appt with Daniella/ pharmacist .     Please note that this dictation was completed with [employer], the [employer] voice recognition software. Quite often unanticipated grammatical, syntax, homophones, and other interpretive errors are inadvertently transcribed by the computer software. Please disregard these errors. Please excuse any errors that have escaped final proofreading.

## 2022-06-21 NOTE — ACP (ADVANCE CARE PLANNING)
Advance Care Planning     General Advance Care Planning (ACP) Conversation      Date of Conversation: 6/21/2022  Conducted with: Patient with Decision Making Capacity    Healthcare Decision Maker:     Primary Decision Maker: Reid Crouch, [address] [place] [address] [phone] - [phone]    Primary Decision Maker: Roxanna Yarbrough [facility] [phone]  Click here to complete [address] including selection of the Healthcare Decision Maker Relationship (ie \"Primary\")      Today we discussed advance directive.  he wants to be a full code. he has made an adavance directive and he will provide a copy     Content/Action Overview:   see above   Reviewed DNR/DNI and patient elects Full Code (Attempt Resuscitation)    Length of Voluntary ACP Conversation in minutes:  <16 minutes (Non-Billable)    Justin Davies MD

## 2022-06-23 ENCOUNTER — VIRTUAL VISIT (OUTPATIENT)
Dept: FAMILY MEDICINE CLINIC | Age: 72
End: 2022-06-23

## 2022-06-23 DIAGNOSIS — E11.9 TYPE 2 DIABETES MELLITUS WITHOUT COMPLICATION, WITH LONG-TERM CURRENT USE OF INSULIN (HCC): Primary | ICD-10-CM

## 2022-06-23 DIAGNOSIS — Z79.4 TYPE 2 DIABETES MELLITUS WITHOUT COMPLICATION, WITH LONG-TERM CURRENT USE OF INSULIN (HCC): Primary | ICD-10-CM

## 2022-06-23 NOTE — PROGRESS NOTES
Mild arthritis over hip joint. Could be referred pain to thigh. Please ask , can consider physical therapy.

## 2022-06-23 NOTE — PROGRESS NOTES
Pharmacy Progress Note - Diabetes Management    Assessment / Plan:   Diabetes Management:  - Per ADA guidelines, Pt's A1c is not at goal of < 7%. - Current SMBG trend shows patient's fasting BG is mostly at goal. No post prandial readings available to assess PPG.  - Discussed that since his FBG are mostly at/near goal, this suggests his PPG is contributing the most to his A1c elevation. Increasing Lantus will only do so much to improve A1c and will mostly just lower fasting blood glucose. Need additional therapy to improve post prandial glucose. Recommend GLP-1 RA; patient did not tolerate Victoza in the past, but may tolerate other agents within the class. Reviewed options (oral vs once weekly injection) and the side effects of the medications. Educated patient that these side effects are often temporary while his body adjusts to the medication. Patient agreeable to trying oral GLP-1 RA. - Stop Januvia. Start Rybelsus 3 mg once daily in the morning. Educated patient on side effects and specific administration instructions. Dose will need titrated after 1 month to 7 mg daily. - Continue metformin, glipizide, and Lantus at current doses. - Recommend checking BG twice daily (morning and 1-2 hours after a meal). Educated patient that testing after a meal will give his providers more information about his diabetes control and help decide on the best therapy. - Patient denies need for further education at this time, states he has a good understanding of diet for diabetes. Will follow up with patient in 4 weeks. S/O: Mr. Chantel Corrigan 81 y.o. male, referred by Guillermo Man MD, was contacted today for diabetes management visit. Patient's last A1c was 9.2% in June 2022. This is an increase from 9.0% in August 2021. Brief History: Patient following with endocrinology; however, he has not been satisfied with his care as they are only seeing him every 6 months.  Patient reports testing his blood sugars once daily in the morning. States they are running between 120-160; FBG this morning was 125. Patient reports adherence to all 4 of his diabetes medications. States he sometimes forgets the evening dose of metformin, but his PCP is switching him to metformin ER that he can take once daily in the morning. Patient has tried Victoza in the past but did not like the way it made him feel. Patient wondering if we can just continue to increase Lantus to get diabetes under good control. Current anti-hyperglycemic regimen includes:    - Januvia 100 mg daily  - Metformin  mg once daily  - Glipizide 10 mg once daily  - Lantus 10 units daily      Past Medical History:   Diagnosis Date    Bladder cancer ([facility] [address] 7/15/13    Clinical Stage [id number] HG UC    Bladder tumor     Diabetes ([facility] [address]     Essential hypertension     History of kidney stones     Hyperlipidemia     Peripheral neuropathy     SBO (small bowel obstruction) ([facility] [address]     Spinal stenosis     Spondylolisthesis, grade 1        Patient Active Problem List   Diagnosis Code    Chest pain, unspecified R07.9    Essential hypertension, benign I10    Mixed hyperlipidemia E78.2    Bladder tumor D49.4    S/P colonoscopy with polypectomy/ follwoing GI. done in 2013. stage 4 esophagitis on EGD done in 2013. recently seen GI. Dr. Silvia Campos on 7/7/16 Z98.890    Erectile dysfunction N52.9    Elevated PSA R97.20    Cubital tunnel syndrome on left G56.22    Abnormal findings on esophagogastroduodenoscopy (EGD)/ done on 8/1/16. gunner's esophagus . f/u EGD due in 3 years will be 2019 R19.8    Advance directive discussed with patient Z71.89    Type 2 diabetes mellitus without complication, with long-term current use of insulin (HCC) E11.9, Z79.4    Personal history of bladder cancer Z85.51    Type 2 diabetes mellitus with diabetic neuropathy E11.40       Labs/Vitals:   Wt Readings from Last 3 Encounters:   06/21/22 155 lb 6.4 oz (70.5 kg)   06/11/22 160 lb (72.6 kg)   05/12/22 150 lb (68 kg)     BP Readings from Last 3 Encounters:   06/21/22 122/70   06/11/22 133/75   12/02/21 (P) 104/71       Lab Results   Component Value Date/Time    Sodium 138 06/03/2022 08:21 AM    Potassium 4.4 06/03/2022 08:21 AM    Chloride 106 06/03/2022 08:21 AM    CO2 26 06/03/2022 08:21 AM    Anion gap 6 06/03/2022 08:21 AM    Glucose 168 (H) 06/03/2022 08:21 AM    BUN 18 06/03/2022 08:21 AM    Creatinine 0.85 06/03/2022 08:21 AM    BUN/Creatinine ratio 21 (H) 06/03/2022 08:21 AM    GFR est AA >60 06/03/2022 08:21 AM    GFR est non-AA >60 06/03/2022 08:21 AM    Calcium 9.2 06/03/2022 08:21 AM    Bilirubin, total 0.7 06/03/2022 08:21 AM    Alk. phosphatase 85 06/03/2022 08:21 AM    Protein, total 6.9 06/03/2022 08:21 AM    Albumin 3.6 06/03/2022 08:21 AM    Globulin 3.3 06/03/2022 08:21 AM    A-G Ratio 1.1 06/03/2022 08:21 AM    ALT (SGPT) 39 06/03/2022 08:21 AM       Lab Results   Component Value Date/Time    Cholesterol, total 153 06/03/2022 08:21 AM    HDL Cholesterol 48 06/03/2022 08:21 AM    LDL, calculated 65 06/03/2022 08:21 AM    VLDL, calculated 40 06/03/2022 08:21 AM    Triglyceride 200 (H) 06/03/2022 08:21 AM    CHOL/HDL Ratio 3.2 06/03/2022 08:21 AM       HbA1c:  Lab Results   Component Value Date/Time    Hemoglobin A1c 9.2 (H) 06/03/2022 08:21 AM    Hemoglobin A1c (POC) 8.0 04/06/2021 12:00 AM    Hemoglobin A1c, External 8.4 06/20/2016 12:00 AM        ----------    Medication reconciliation was completed during the visit. Medications Discontinued During This Encounter   Medication Reason    SITagliptin (Januvia) 100 mg tablet Alternate Therapy     Orders Placed This Encounter    semaglutide (Rybelsus) 3 mg tablet     Sig: Take 1 Tablet by mouth Daily (before breakfast). Take 30 minutes before the first food, beverage, or other oral medications of the day with < 4 oz of plain water only.      Dispense:  30 Tablet     Refill:  0     This medication will replace Januvia       Patient verbalized understanding of the information presented and all of the patients questions were answered. AVS was handed to the patient. Patient advised to call the office with any additional questions or concerns. Notifications of recommendations will be sent to Sandra Burton MD for review. Thank you,  Lance Kumar 36 in place:  Yes   Recommendation Provided To: Patient/Caregiver: 3 via Telephone   Intervention Detail: Discontinued Rx: 1, reason: Therapy Complete, New Rx: 1, reason: Needs Additional Therapy and Scheduled Appointment   Intervention Accepted By: Patient/Caregiver: 3   Time Spent (min): 60

## 2022-06-27 NOTE — PROGRESS NOTES
Left a voice message on patient's phone asking for return call to [facility] in regards to results, [phone] press option # 3 for . Ask to speak with Zoe.

## 2022-06-27 NOTE — PROGRESS NOTES
Patient returned call to [facility] (two identifiers verified) to advise xray showed mild arthritis over hip joint. Could be referred pain to thigh. Patient verbalized understanding. He agreed to go to physical therapy. Patient is aware I will route message to Dr. Jesus Corley requested PT order. Once order is placed, PT office will call him directly to schedule. Patient verbalized understanding. He is aware Dr. Jesus Corley is out of the office until July 5th and order probably will not be placed until that time. Patient verbalized understanding.

## 2022-07-20 ENCOUNTER — TELEPHONE (OUTPATIENT)
Dept: FAMILY MEDICINE CLINIC | Age: 72
End: 2022-07-20

## 2022-07-20 DIAGNOSIS — K21.9 GASTROESOPHAGEAL REFLUX DISEASE WITHOUT ESOPHAGITIS: ICD-10-CM

## 2022-07-20 NOTE — TELEPHONE ENCOUNTER
Pt would like to know if he can take his Metformin 750 mg twice a day. He states that it was cut back to 750 mg once at dinner and her thinks that is not working. Please advise.

## 2022-07-21 RX ORDER — MONTELUKAST SODIUM 10 MG/1
TABLET ORAL
Qty: 90 TABLET | Refills: 1 | Status: SHIPPED | OUTPATIENT
Start: 2022-07-21

## 2022-07-21 RX ORDER — OMEPRAZOLE 40 MG/1
40 CAPSULE, DELAYED RELEASE ORAL DAILY
Qty: 90 CAPSULE | Refills: 1 | Status: SHIPPED | OUTPATIENT
Start: 2022-07-21

## 2022-07-21 NOTE — TELEPHONE ENCOUNTER
Pt states that he already tried the increase of insulin and did not help. He states that they pharmacist took him off the Januvia and replaced it with Rybelsus 3mg but he had not received it from [facility] and I called [employer] and pt has a co-pay with this medication and his card on file needed to be updated. Apparently [employer] notified him of this and he didn't reply. Pt said that he will call and update the card an get the medication. He also said that he was taking the Metformin 2 times daily anyway trying to balance his sugars. Gave him the message that Dr Denis Thopmson had left for him and told him to contact his Endocrinologist and he voiced understanding. Please advise.

## 2022-07-22 RX ORDER — FLUTICASONE PROPIONATE 50 MCG
SPRAY, SUSPENSION (ML) NASAL
Qty: 16 G | Refills: 0 | Status: SHIPPED | OUTPATIENT
Start: 2022-07-22 | End: 2022-10-25

## 2022-07-22 RX ORDER — BLOOD SUGAR DIAGNOSTIC
STRIP MISCELLANEOUS
Qty: 100 STRIP | Refills: 0 | Status: SHIPPED | OUTPATIENT
Start: 2022-07-22

## 2022-07-27 ENCOUNTER — TELEPHONE (OUTPATIENT)
Dept: FAMILY MEDICINE CLINIC | Age: 72
End: 2022-07-27

## 2022-07-27 DIAGNOSIS — E11.9 TYPE 2 DIABETES MELLITUS WITHOUT COMPLICATION, WITH LONG-TERM CURRENT USE OF INSULIN (HCC): Primary | ICD-10-CM

## 2022-07-27 DIAGNOSIS — E11.65 POORLY CONTROLLED DIABETES MELLITUS (HCC): ICD-10-CM

## 2022-07-27 DIAGNOSIS — Z79.4 TYPE 2 DIABETES MELLITUS WITHOUT COMPLICATION, WITH LONG-TERM CURRENT USE OF INSULIN (HCC): Primary | ICD-10-CM

## 2022-07-27 RX ORDER — METFORMIN HYDROCHLORIDE 750 MG/1
750 TABLET, EXTENDED RELEASE ORAL 2 TIMES DAILY WITH MEALS
Qty: 180 TABLET | Refills: 1 | Status: SHIPPED | OUTPATIENT
Start: 2022-07-27 | End: 2022-09-20 | Stop reason: DRUGHIGH

## 2022-07-27 NOTE — TELEPHONE ENCOUNTER
Pharmacy Progress Note - Telephone Encounter    S/O: Mr. Elke Boo 81 y.o. male contacted me via an inbound telephone call today regarding his diabetes medications. Patient states that he started Rybelsus today and stopped Januvia. He was concerned about why he only received a 30 days supply of this medication. Additionally, when metformin was decreased from 2000 mg daily to 750 mg daily by PCP, patient felt this was not working as well and has been taking 750 mg twice daily instead. Patient would like to get the prescription changed to 750 mg twice daily as he feels this works better for his blood sugars. Key Antihyperglycemic Medications               metFORMIN ER (GLUCOPHAGE XR) 750 mg tablet (Taking) Take 1 Tablet by mouth two (2) times daily (with meals). semaglutide (Rybelsus) 3 mg tablet (Taking) Take 1 Tablet by mouth Daily (before breakfast). glipiZIDE SR (GLUCOTROL XL) 10 mg CR tablet 10 mg once daily    insulin glargine (Lantus Solostar U-100 Insulin) 100 unit/mL (3 mL) inpn INJECT  10 UNITS SUBCUTANEOUSLY AT BEDTIME. Lab Results   Component Value Date/Time    Sodium 138 06/03/2022 08:21 AM    Potassium 4.4 06/03/2022 08:21 AM    Chloride 106 06/03/2022 08:21 AM    CO2 26 06/03/2022 08:21 AM    Anion gap 6 06/03/2022 08:21 AM    Glucose 168 (H) 06/03/2022 08:21 AM    BUN 18 06/03/2022 08:21 AM    Creatinine 0.85 06/03/2022 08:21 AM    BUN/Creatinine ratio 21 (H) 06/03/2022 08:21 AM    GFR est AA >60 06/03/2022 08:21 AM    GFR est non-AA >60 06/03/2022 08:21 AM    Calcium 9.2 06/03/2022 08:21 AM    Bilirubin, total 0.7 06/03/2022 08:21 AM    Alk.  phosphatase 85 06/03/2022 08:21 AM    Protein, total 6.9 06/03/2022 08:21 AM    Albumin 3.6 06/03/2022 08:21 AM    Globulin 3.3 06/03/2022 08:21 AM    A-G Ratio 1.1 06/03/2022 08:21 AM    ALT (SGPT) 39 06/03/2022 08:21 AM    AST (SGOT) 27 06/03/2022 08:21 AM       Lab Results   Component Value Date/Time    Hemoglobin A1c 9.2 (H) 06/03/2022 08:21 AM    Hemoglobin A1c (POC) 8.0 04/06/2021 12:00 AM    Hemoglobin A1c, External 8.4 06/20/2016 12:00 AM         A/P:  - Patient just started Rybelsus today, will take several weeks to start seeing effect and most effect will not be seen until dose increased to 7 mg. Educated patient that the 3 mg Rybelsus dose is not intended to be used longer then 30 days and is only used to decrease chance of GI side effects. I will send in 90 day supply of 7 mg dose. After completing this dose, can consider increasing to max dose of 14 mg daily if further glycemic lowering needed. - Agree with increasing metformin ER dose to 750 mg twice daily, prescription sent to patient's pharmacy. - Continue Lantus and glipizide at current doses for now. - Recommend checking glucose at least twice daily.  - Patient interested in meeting with PharmD in person occasionally. Discussed that I am not located in his region, but we will have a new PharmD in his region soon. I will make sure to connect patient with new PharmD so patient can be seen in person if desired. - Patient endorses understanding to the provided information. All questions answered at this time. Medications Discontinued During This Encounter   Medication Reason    semaglutide (Rybelsus) 3 mg tablet DOSE ADJUSTMENT    metFORMIN ER (GLUCOPHAGE XR) 750 mg tablet      Orders Placed This Encounter    metFORMIN ER (GLUCOPHAGE XR) 750 mg tablet     Sig: Take 1 Tablet by mouth two (2) times daily (with meals). Dispense:  180 Tablet     Refill:  1    semaglutide (Rybelsus) 7 mg tablet     Sig: Take 1 Tablet by mouth Daily (before breakfast). Start after finishing 30 days of 3 mg dose.      Dispense:  90 Tablet     Refill:  0     Thank you,  Casper Boykin, PharmD        For Pharmacy Admin Tracking Only    CPA in place: Yes  Recommendation Provided To: Patient/Caregiver: 5 via Telephone  Intervention Detail: Discontinued Rx: 1, reason: Therapy Complete, Dose Adjustment: 2, reason: Therapy Optimization, and New Rx: 1, reason: Needs Additional Therapy  Intervention Accepted By: Patient/Caregiver: 5  Time Spent (min): 30

## 2022-07-28 ENCOUNTER — TELEPHONE (OUTPATIENT)
Dept: FAMILY MEDICINE CLINIC | Age: 72
End: 2022-07-28

## 2022-07-28 NOTE — TELEPHONE ENCOUNTER
Fax received from [address] [place] [address] meds stating prior Leobardo Wynn is required for the Rybelsus. 7 MG Tablets Submit a PA request  1. Go to [employer] and click \"Enter a Key\"  2. Patient last name: James Ricketts      : 1950      Key: [id number]  3.  Click \"start a PA\", complete the form, and \"send to plan\"

## 2022-07-29 NOTE — TELEPHONE ENCOUNTER
Rybelsus PA approved. PA Case: [id number].  Coverage Starts on: 1/1/2022 12:00:00 AM. Coverage Ends on: 12/31/2022 12:00:00 AM.

## 2022-07-29 NOTE — TELEPHONE ENCOUNTER
Pharmacy Progress Note     Patient contacted me today regarding Rybelsus. States his pharmacy notified him Rybelsus needs a prior authorization. PA submitted today via CoverMyMeds. Awaiting coverage determination.     Thank you,  Shanta Christianson. 41 in place: Yes  Recommendation Provided To: Patient/Caregiver: 1 via Telephone  Intervention Detail: Patient Access Assistance/Sample Provided  Intervention Accepted By: Patient/Caregiver: 1  Time Spent (min): 10

## 2022-08-01 ENCOUNTER — TELEPHONE (OUTPATIENT)
Dept: FAMILY MEDICINE CLINIC | Age: 72
End: 2022-08-01

## 2022-08-01 NOTE — TELEPHONE ENCOUNTER
Pharmacy Progress Note - Telephone Call    Patient contacted me via inbound telephone call regarding the status of his diabetes medications. Patient states he has not heard from [facility] so wanted to see if his prescriptions had been sent. Patient also concerned because his blood sugars have been higher recently since decreasing metformin and switching from Januvia to Rybelsus. States his fasting blood sugars used to run in the 140-160s, but are now in the 180-200s. Because of this, he increased Lantus to 12 units daily as previously directed by PCP. I informed patient that I sent prescriptions for Rybelsus and metformin last week and submitted PA for Rybelsus. The PA for Rybelsus  has been approved. Educated patient that Rybelsus 3 mg dose does not provide effective glycemic lowering and is only intended to reduce the incidence of GI side effects. Expect blood sugars to improve with 7 mg and 14 mg doses. Additionally, Antione Timpson has a greater A1c lowering potential than Januvia, which should also lead to better control once the dose is optimized. Patient expressed understanding, all questions answered at this time.     Thank you,  hSanta Penaloza. 41 in place: Yes  Recommendation Provided To: Patient/Caregiver: 0 via Telephone  Intervention Accepted By: Patient/Caregiver: 0  Time Spent (min): 20

## 2022-08-03 ENCOUNTER — OFFICE VISIT (OUTPATIENT)
Dept: CARDIOLOGY CLINIC | Age: 72
End: 2022-08-03
Payer: MEDICARE

## 2022-08-03 VITALS
HEART RATE: 72 BPM | SYSTOLIC BLOOD PRESSURE: 134 MMHG | DIASTOLIC BLOOD PRESSURE: 78 MMHG | OXYGEN SATURATION: 97 % | HEIGHT: 66 IN | WEIGHT: 151 LBS | BODY MASS INDEX: 24.27 KG/M2

## 2022-08-03 DIAGNOSIS — I10 ESSENTIAL HYPERTENSION, BENIGN: ICD-10-CM

## 2022-08-03 DIAGNOSIS — R60.0 BILATERAL LEG EDEMA: ICD-10-CM

## 2022-08-03 DIAGNOSIS — Z79.4 TYPE 2 DIABETES MELLITUS WITH DIABETIC NEUROPATHY, WITH LONG-TERM CURRENT USE OF INSULIN (HCC): ICD-10-CM

## 2022-08-03 DIAGNOSIS — I25.84 CORONARY ARTERY CALCIFICATION: Primary | ICD-10-CM

## 2022-08-03 DIAGNOSIS — E11.40 TYPE 2 DIABETES MELLITUS WITH DIABETIC NEUROPATHY, WITH LONG-TERM CURRENT USE OF INSULIN (HCC): ICD-10-CM

## 2022-08-03 DIAGNOSIS — E78.2 MIXED HYPERLIPIDEMIA: ICD-10-CM

## 2022-08-03 DIAGNOSIS — I25.10 CORONARY ARTERY CALCIFICATION: Primary | ICD-10-CM

## 2022-08-03 PROCEDURE — G8432 DEP SCR NOT DOC, RNG: HCPCS | Performed by: INTERNAL MEDICINE

## 2022-08-03 PROCEDURE — 1101F PT FALLS ASSESS-DOCD LE1/YR: CPT | Performed by: INTERNAL MEDICINE

## 2022-08-03 PROCEDURE — G8752 SYS BP LESS 140: HCPCS | Performed by: INTERNAL MEDICINE

## 2022-08-03 PROCEDURE — 99214 OFFICE O/P EST MOD 30 MIN: CPT | Performed by: INTERNAL MEDICINE

## 2022-08-03 PROCEDURE — G8427 DOCREV CUR MEDS BY ELIG CLIN: HCPCS | Performed by: INTERNAL MEDICINE

## 2022-08-03 PROCEDURE — G8420 CALC BMI NORM PARAMETERS: HCPCS | Performed by: INTERNAL MEDICINE

## 2022-08-03 PROCEDURE — G8536 NO DOC ELDER MAL SCRN: HCPCS | Performed by: INTERNAL MEDICINE

## 2022-08-03 PROCEDURE — 1123F ACP DISCUSS/DSCN MKR DOCD: CPT | Performed by: INTERNAL MEDICINE

## 2022-08-03 PROCEDURE — G8754 DIAS BP LESS 90: HCPCS | Performed by: INTERNAL MEDICINE

## 2022-08-03 PROCEDURE — 2022F DILAT RTA XM EVC RTNOPTHY: CPT | Performed by: INTERNAL MEDICINE

## 2022-08-03 PROCEDURE — 3046F HEMOGLOBIN A1C LEVEL >9.0%: CPT | Performed by: INTERNAL MEDICINE

## 2022-08-03 PROCEDURE — 3017F COLORECTAL CA SCREEN DOC REV: CPT | Performed by: INTERNAL MEDICINE

## 2022-08-03 NOTE — PROGRESS NOTES
1. Have you been to the ER, urgent care clinic since your last visit? Hospitalized since your last visit? Yes When: 6/11/22 Where: [address] Reason for visit: leg pain    2. Have you seen or consulted any other health care providers outside of the [address] [place] [address] since your last visit? Include any pap smears or colon screening.       Yes Where: Dr. Lex Nelson Reason for visit: endocrinologist

## 2022-08-23 PROBLEM — M81.0 OSTEOPOROSIS: Status: ACTIVE | Noted: 2022-08-23

## 2022-09-02 RX ORDER — FINASTERIDE 5 MG/1
TABLET, FILM COATED ORAL
Qty: 90 TABLET | Refills: 1 | Status: SHIPPED | OUTPATIENT
Start: 2022-09-02

## 2022-09-08 NOTE — PROGRESS NOTES
Contacted patient and verified identity using name and date of birth (2- identifiers)  Spoke with patient and he verbalized understanding of some improvement in H & H to continue with supplements. Low iron Count and need for Heme appointment. Referral has been faxed to [facility] and patient provided with contact number for scheduling.
Some improvement in H & H. For now continue iron supplement and low iron count so sending him to hematology as well. Please try to provide contact info for hematology as well so he can try for sooner appt as well.  Has an appt for colonoscopy with  Dr. Woody Miller
88 y/o female BIBEMS c/o SOB. As per EMS report "Pt coming from [facility] for respiratory distress and SOB, as per staff pt was heard wheezing and gave 1 duoneb at unk time, pt received 1 duoneb in route". Pt presents to [facility] A&Ox2-3 pt alert to self, place, situation, disoriented to time, pt placed on continuous cardiac monitoring and pulse oximetry, upon arrival pt experiencing increased WOB, unable to speak in complete sentences, tachypneic and hypoxic, 20 G IV placed in RLE by MD due to inability to gain access in UE's. 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 sided breast CA s/p masectomy. Pt denies chest pain, fever/chills, HA, abd pain, N/V/D, lightheadedness/dizziness, numbness/tingling. Pt A&Ox2-3, IV locked and patent, bed locked and in lowest position.

## 2022-09-20 ENCOUNTER — VIRTUAL VISIT (OUTPATIENT)
Dept: FAMILY MEDICINE CLINIC | Age: 72
End: 2022-09-20

## 2022-09-20 DIAGNOSIS — Z79.4 TYPE 2 DIABETES MELLITUS WITHOUT COMPLICATION, WITH LONG-TERM CURRENT USE OF INSULIN (HCC): Primary | ICD-10-CM

## 2022-09-20 DIAGNOSIS — E11.9 TYPE 2 DIABETES MELLITUS WITHOUT COMPLICATION, WITH LONG-TERM CURRENT USE OF INSULIN (HCC): Primary | ICD-10-CM

## 2022-09-20 RX ORDER — METFORMIN HYDROCHLORIDE 1000 MG/1
1000 TABLET ORAL 2 TIMES DAILY WITH MEALS
Qty: 180 TABLET | Refills: 1 | Status: SHIPPED | OUTPATIENT
Start: 2022-09-20

## 2022-09-20 NOTE — PROGRESS NOTES
Pharmacy Progress Note - Telephone Encounter    S/O: Mr. Holly Fregoso 81 y.o. male contacted me via an inbound telephone call today to discuss diabetes management. Patient states his blood glucose readings are consistently in the 200s in the morning. He has been taking Rybelsus 7 mg daily for several weeks and has not noticed improvement in his blood sugars. States he takes the medication each morning 1 hour before eating or taking his other medications. States he has been taking 12 to 14  units of Lantus daily but his glucose readings are still elevated. Patient also states he wants to go back to taking metformin 1000 mg twice daily, as he felt that this worked better for him. Key Antihyperglycemic Medications               semaglutide (Rybelsus) 7 mg tablet (Taking) Take 1 Tablet by mouth Daily (before breakfast). metFORMIN (GLUCOPHAGE) 1,000 mg tablet (Taking) Take 1 Tablet by mouth two (2) times daily (with meals). glipiZIDE SR (GLUCOTROL XL) 10 mg CR tablet Take 1 tablet by mouth daily    insulin glargine (Lantus Solostar U-100 Insulin) 100 unit/mL (3 mL) inpn INJECT  10 UNITS SUBCUTANEOUSLY AT BEDTIME. DISCARD AND BEGIN NEW PEN AFTER 28 DAYS. Lab Results   Component Value Date/Time    Hemoglobin A1c 9.2 (H) 06/03/2022 08:21 AM    Hemoglobin A1c (POC) 8.0 04/06/2021 12:00 AM    Hemoglobin A1c, External 8.4 06/20/2016 12:00 AM     Lab Results   Component Value Date/Time    Sodium 138 06/03/2022 08:21 AM    Potassium 4.4 06/03/2022 08:21 AM    Chloride 106 06/03/2022 08:21 AM    CO2 26 06/03/2022 08:21 AM    Anion gap 6 06/03/2022 08:21 AM    Glucose 168 (H) 06/03/2022 08:21 AM    BUN 18 06/03/2022 08:21 AM    Creatinine 0.85 06/03/2022 08:21 AM    BUN/Creatinine ratio 21 (H) 06/03/2022 08:21 AM    GFR est AA >60 06/03/2022 08:21 AM    GFR est non-AA >60 06/03/2022 08:21 AM    Calcium 9.2 06/03/2022 08:21 AM    Bilirubin, total 0.7 06/03/2022 08:21 AM    Alk.  phosphatase 85 06/03/2022 08:21 AM    Protein, total 6.9 06/03/2022 08:21 AM    Albumin 3.6 06/03/2022 08:21 AM    Globulin 3.3 06/03/2022 08:21 AM    A-G Ratio 1.1 06/03/2022 08:21 AM    ALT (SGPT) 39 06/03/2022 08:21 AM    AST (SGOT) 27 06/03/2022 08:21 AM       A/P:  - Patient not seeing improvement in glycemic control as evidenced by fasting blood glucose readings in the 200s. - Educated patient that Lantus should be titrated to reach FBG goal of < 130. Recommend patient increase Lantus to 20 units nightly due to major elevation in FBG. However, patient declined changing insulin dose at this time.  - Recommend increasing Rybelsus to 14 mg once daily. Confirmed patient is administering correctly. If patient not seeing improvement in readings after 4 weeks on this medication, will likely need to intensify insulin therapy. May also consider CGM. - Patient feels that metformin worked better for him when he took 1000 mg twice daily. Patient would like to switch back to this regimen. Start metformin 1000 mg twice daily. Discussed higher incidence of GI side effects possible with IR version; however, most insurances do not cover the extended release versions of 1000 mg tablets due to higher cost so immediate release version used. - Plan to transition patient to new PharmD for further diabetes management. Will follow up with patient again via phone in 3 weeks and schedule appt with new PharmD at that time. - Patient endorses understanding to the provided information. All questions answered at this time. Medications Discontinued During This Encounter   Medication Reason    semaglutide (Rybelsus) 7 mg tablet DOSE ADJUSTMENT    metFORMIN ER (GLUCOPHAGE XR) 750 mg tablet DOSE ADJUSTMENT     Orders Placed This Encounter    semaglutide (Rybelsus) 14 mg tablet     Sig: Take 1 Tablet by mouth Daily (before breakfast).      Dispense:  90 Tablet     Refill:  1    metFORMIN (GLUCOPHAGE) 1,000 mg tablet     Sig: Take 1 Tablet by mouth two (2) times daily (with meals).      Dispense:  180 Tablet     Refill:  1     Thank you,  Edd Vance, PharmD          For Pharmacy Admin Tracking Only    CPA in place: Yes  Recommendation Provided To: Patient/Caregiver:  8  via Telephone  Intervention Detail: Adherence Monitorin, Discontinued Rx: 2, reason: Therapy Complete, Dose Adjustment: 3, reason: Therapy Optimization, New Rx: 2, reason: Needs Additional Therapy, and Scheduled Appointment  Intervention Accepted By: Patient/Caregiver:  7  Time Spent (min): 45

## 2022-09-21 ENCOUNTER — OFFICE VISIT (OUTPATIENT)
Dept: FAMILY MEDICINE CLINIC | Age: 72
End: 2022-09-21
Payer: MEDICARE

## 2022-09-21 VITALS
RESPIRATION RATE: 16 BRPM | OXYGEN SATURATION: 97 % | WEIGHT: 150.8 LBS | SYSTOLIC BLOOD PRESSURE: 128 MMHG | BODY MASS INDEX: 24.23 KG/M2 | HEIGHT: 66 IN | HEART RATE: 79 BPM | DIASTOLIC BLOOD PRESSURE: 70 MMHG | TEMPERATURE: 97.6 F

## 2022-09-21 DIAGNOSIS — Z79.4 TYPE 2 DIABETES MELLITUS WITH DIABETIC NEUROPATHY, WITH LONG-TERM CURRENT USE OF INSULIN (HCC): ICD-10-CM

## 2022-09-21 DIAGNOSIS — E78.2 MIXED HYPERLIPIDEMIA: ICD-10-CM

## 2022-09-21 DIAGNOSIS — Z85.51 PERSONAL HISTORY OF BLADDER CANCER: ICD-10-CM

## 2022-09-21 DIAGNOSIS — Z79.4 TYPE 2 DIABETES MELLITUS WITHOUT COMPLICATION, WITH LONG-TERM CURRENT USE OF INSULIN (HCC): Primary | ICD-10-CM

## 2022-09-21 DIAGNOSIS — I10 ESSENTIAL HYPERTENSION, BENIGN: ICD-10-CM

## 2022-09-21 DIAGNOSIS — R97.20 ELEVATED PSA: ICD-10-CM

## 2022-09-21 DIAGNOSIS — E11.9 TYPE 2 DIABETES MELLITUS WITHOUT COMPLICATION, WITH LONG-TERM CURRENT USE OF INSULIN (HCC): Primary | ICD-10-CM

## 2022-09-21 DIAGNOSIS — E11.40 TYPE 2 DIABETES MELLITUS WITH DIABETIC NEUROPATHY, WITH LONG-TERM CURRENT USE OF INSULIN (HCC): ICD-10-CM

## 2022-09-21 LAB — HBA1C MFR BLD HPLC: 8.6 %

## 2022-09-21 PROCEDURE — 2022F DILAT RTA XM EVC RTNOPTHY: CPT | Performed by: FAMILY MEDICINE

## 2022-09-21 PROCEDURE — 1101F PT FALLS ASSESS-DOCD LE1/YR: CPT | Performed by: FAMILY MEDICINE

## 2022-09-21 PROCEDURE — G8754 DIAS BP LESS 90: HCPCS | Performed by: FAMILY MEDICINE

## 2022-09-21 PROCEDURE — 99214 OFFICE O/P EST MOD 30 MIN: CPT | Performed by: FAMILY MEDICINE

## 2022-09-21 PROCEDURE — 83036 HEMOGLOBIN GLYCOSYLATED A1C: CPT | Performed by: FAMILY MEDICINE

## 2022-09-21 PROCEDURE — G8427 DOCREV CUR MEDS BY ELIG CLIN: HCPCS | Performed by: FAMILY MEDICINE

## 2022-09-21 PROCEDURE — 1123F ACP DISCUSS/DSCN MKR DOCD: CPT | Performed by: FAMILY MEDICINE

## 2022-09-21 PROCEDURE — 3017F COLORECTAL CA SCREEN DOC REV: CPT | Performed by: FAMILY MEDICINE

## 2022-09-21 PROCEDURE — G8420 CALC BMI NORM PARAMETERS: HCPCS | Performed by: FAMILY MEDICINE

## 2022-09-21 PROCEDURE — G8510 SCR DEP NEG, NO PLAN REQD: HCPCS | Performed by: FAMILY MEDICINE

## 2022-09-21 PROCEDURE — 3046F HEMOGLOBIN A1C LEVEL >9.0%: CPT | Performed by: FAMILY MEDICINE

## 2022-09-21 PROCEDURE — G8536 NO DOC ELDER MAL SCRN: HCPCS | Performed by: FAMILY MEDICINE

## 2022-09-21 PROCEDURE — G8752 SYS BP LESS 140: HCPCS | Performed by: FAMILY MEDICINE

## 2022-09-21 NOTE — PROGRESS NOTES
1. Have you been to the ER, urgent care clinic since your last visit? Hospitalized since your last visit? No    2. Have you seen or consulted any other health care providers outside of the [address] [place] [address] since your last visit? Include any pap smears or colon screening. Urology 8/24/22 Dr. Silvino Baez      Chief Complaint   Patient presents with    Hypertension    Diabetes    Cholesterol Problem    Sleep Problem    Headache    Medication Evaluation     Rebylus -  14 mg daily. medication not working; given by pharmacist. He thinks medication is causing sleep and headache issue.

## 2022-09-21 NOTE — PROGRESS NOTES
HISTORY OF PRESENT ILLNESS  Alex Chery is a 81 y.o. male. HPI: Here for follow-up. Post procedure for bladder cancer. Doing well. Prostate biopsy benign. Following urology and the recommendation. Also following Dr. Sania Pena regarding bladder cancer. No urinary complaint at this time. Sitting comfortable without any acute distress  A1c improved little bit and currently 8.9. Fasting sugar about 100. Current medication has been extensive. I have discussed to increase the dose of the Lantus which he understood and wanted to give a trial of new oral medication with the higher dose. No hyper or hypoglycemic symptoms. Denies any headache, dizziness, no chest pain or trouble breathing, no arm or leg weakness. No nausea or vomiting, no weight or appetite changes, no mood changes . No urine or bowel complains, no palpitation, no diaphoresis. No abdominal pain. No cold or cough. No leg swelling. No fever. No sleep trouble. Taking statin without any side effects. Discussed importance of compliance with the medication and diet and lifestyle modification which she will work on it. Visit Vitals  /70 (BP 1 Location: Left upper arm, BP Patient Position: Sitting, BP Cuff Size: Adult)   Pulse 79   Temp 97.6 °F (36.4 °C) (Temporal)   Resp 16   Ht 5' 6\" (1.676 m)   Wt 150 lb 12.8 oz (68.4 kg)   SpO2 97%   BMI 24.34 kg/m²     Review medication list, vitals, problem list,allergies.    Lab Results   Component Value Date/Time    WBC 7.4 06/03/2022 08:21 AM    HGB 14.4 06/03/2022 08:21 AM    HCT 43.4 06/03/2022 08:21 AM    PLATELET 875 83/60/4766 08:21 AM    MCV 98.2 06/03/2022 08:21 AM     Lab Results   Component Value Date/Time    Sodium 138 06/03/2022 08:21 AM    Potassium 4.4 06/03/2022 08:21 AM    Chloride 106 06/03/2022 08:21 AM    CO2 26 06/03/2022 08:21 AM    Anion gap 6 06/03/2022 08:21 AM    Glucose 168 (H) 06/03/2022 08:21 AM    BUN 18 06/03/2022 08:21 AM    Creatinine 0.85 06/03/2022 08:21 AM BUN/Creatinine ratio 21 (H) 06/03/2022 08:21 AM    GFR est AA >60 06/03/2022 08:21 AM    GFR est non-AA >60 06/03/2022 08:21 AM    Calcium 9.2 06/03/2022 08:21 AM    Bilirubin, total 0.7 06/03/2022 08:21 AM    Alk. phosphatase 85 06/03/2022 08:21 AM    Protein, total 6.9 06/03/2022 08:21 AM    Albumin 3.6 06/03/2022 08:21 AM    Globulin 3.3 06/03/2022 08:21 AM    A-G Ratio 1.1 06/03/2022 08:21 AM    ALT (SGPT) 39 06/03/2022 08:21 AM    AST (SGOT) 27 06/03/2022 08:21 AM       Lab Results   Component Value Date/Time    Cholesterol, total 153 06/03/2022 08:21 AM    HDL Cholesterol 48 06/03/2022 08:21 AM    LDL-C, External 52 06/20/2016 12:00 AM    LDL, calculated 65 06/03/2022 08:21 AM    VLDL, calculated 40 06/03/2022 08:21 AM    Triglyceride 200 (H) 06/03/2022 08:21 AM    CHOL/HDL Ratio 3.2 06/03/2022 08:21 AM     Lab Results   Component Value Date/Time    TSH 1.82 06/03/2022 08:21 AM       Lab Results   Component Value Date/Time    Hemoglobin A1c 9.2 (H) 06/03/2022 08:21 AM    Hemoglobin A1c (POC) 8.0 04/06/2021 12:00 AM    Hemoglobin A1c, External 8.4 06/20/2016 12:00 AM     Lab Results   Component Value Date/Time    Microalbumin/Creat ratio (mg/g creat) 12 06/28/2017 09:30 AM    Microalb/Creat ratio (ug/mg creat.) 21 08/12/2021 09:17 AM    Microalbumin,urine random 1.91 06/28/2017 09:30 AM       Lab Results   Component Value Date/Time    Prostate Specific Ag 0.69 08/24/2022 02:36 PM    Prostate Specific Ag 1.06 04/15/2021 03:46 PM    Prostate Specific Ag 1.4 09/30/2020 12:07 PM    Prostate Specific Ag 3.6 03/05/2020 12:55 PM         ROS: See HPI    Physical Exam  Constitutional:       General: He is not in acute distress. Cardiovascular:      Rate and Rhythm: Normal rate and regular rhythm. Heart sounds: Normal heart sounds. Abdominal:      General: Bowel sounds are normal.      Palpations: Abdomen is soft. Tenderness: There is no abdominal tenderness. Musculoskeletal:         General: No swelling. Cervical back: Neck supple. Neurological:      Mental Status: He is oriented to person, place, and time. Psychiatric:         Behavior: Behavior normal.       ASSESSMENT and PLAN    ICD-10-CM ICD-9-CM    1. Type 2 diabetes mellitus without complication, with long-term current use of insulin ([address]): A1c improved little bit. Discussed more compliance with diet and lifestyle modification. He wants to give a trial to semaglutide oral tablets with the higher dose. It is expensive. I have discussed to adjust the insulin which she will work on it as well. Follow-up in a month with the blood sugar log E11.9 250.00 AMB POC HEMOGLOBIN A1C    Z79.4 V58.67       2. Type 2 diabetes mellitus with diabetic neuropathy, with long-term current use of insulin ([facility])  E11.40 250.60     Z79.4 357.2      V58.67       3. Essential hypertension, benign:well controlled. Continue current dose of medication and low salt diet. Exercise as tolerated. I10 401.1       4. Mixed hyperlipidemia: On statin. No side effects. E78.2 272.2       5. Personal history of bladder cancer  Z85.51 V10.51       6. Elevated PSA: Recent PSA within normal limit. Prostate biopsy has been negative. Following urology and the recommendation R97.20 790.93     recnet biopsy benign. following urology       Patient understood and agreed with the plan   Advised to immunization at the pharmacy  Follow-up and Dispositions    Return in about 1 month (around 10/21/2022). Please note that this dictation was completed with [employer], the computer voice recognition software. Quite often unanticipated grammatical, syntax, homophones, and other interpretive errors are inadvertently transcribed by the computer software. Please disregard these errors. Please excuse any errors that have escaped final proofreading.

## 2022-09-29 DIAGNOSIS — E87.5 HYPERKALEMIA: Primary | ICD-10-CM

## 2022-10-01 DIAGNOSIS — Z79.4 TYPE 2 DIABETES MELLITUS WITHOUT COMPLICATION, WITH LONG-TERM CURRENT USE OF INSULIN (HCC): ICD-10-CM

## 2022-10-01 DIAGNOSIS — R35.0 FREQUENCY OF URINATION: ICD-10-CM

## 2022-10-01 DIAGNOSIS — E11.9 TYPE 2 DIABETES MELLITUS WITHOUT COMPLICATION, WITH LONG-TERM CURRENT USE OF INSULIN (HCC): ICD-10-CM

## 2022-10-01 RX ORDER — ATORVASTATIN CALCIUM 10 MG/1
TABLET, FILM COATED ORAL
Qty: 90 TABLET | Refills: 1 | Status: SHIPPED | OUTPATIENT
Start: 2022-10-01

## 2022-10-01 RX ORDER — PEN NEEDLE, DIABETIC 31 GX5/16"
NEEDLE, DISPOSABLE MISCELLANEOUS
Qty: 100 EACH | Refills: 5 | Status: SHIPPED | OUTPATIENT
Start: 2022-10-01

## 2022-10-01 RX ORDER — TAMSULOSIN HYDROCHLORIDE 0.4 MG/1
CAPSULE ORAL
Qty: 90 CAPSULE | Refills: 1 | Status: SHIPPED | OUTPATIENT
Start: 2022-10-01

## 2022-10-05 ENCOUNTER — HOSPITAL ENCOUNTER (OUTPATIENT)
Dept: LAB | Age: 72
Discharge: HOME | End: 2022-10-05
Payer: MEDICARE

## 2022-10-05 DIAGNOSIS — E87.5 HYPERKALEMIA: ICD-10-CM

## 2022-10-05 LAB — POTASSIUM SERPL-SCNC: 4.6 MMOL/L (ref 3.5–5.5)

## 2022-10-05 PROCEDURE — 36415 COLL VENOUS BLD VENIPUNCTURE: CPT

## 2022-10-05 PROCEDURE — 84132 ASSAY OF SERUM POTASSIUM: CPT

## 2022-10-11 ENCOUNTER — VIRTUAL VISIT (OUTPATIENT)
Dept: FAMILY MEDICINE CLINIC | Age: 72
End: 2022-10-11

## 2022-10-11 DIAGNOSIS — E11.9 TYPE 2 DIABETES MELLITUS WITHOUT COMPLICATION, WITH LONG-TERM CURRENT USE OF INSULIN (HCC): Primary | ICD-10-CM

## 2022-10-11 DIAGNOSIS — Z79.4 TYPE 2 DIABETES MELLITUS WITHOUT COMPLICATION, WITH LONG-TERM CURRENT USE OF INSULIN (HCC): Primary | ICD-10-CM

## 2022-10-11 NOTE — PROGRESS NOTES
Pharmacy Progress Note - Diabetes Management    S/O: Mr. Darlene Crews is a 81 y.o. male referred by Valerie Kenney MD who was contacted today for diabetes management follow up. Patient's last A1c was 8.6% in September 2022. Interim update: Patient states he is still not tolerating Rybelsus well and has been feeling very nauseous and even dizzy and confused some days and does not wish to take it any longer. Patient would like to go back to taking Januvia. Patient states he did increase Lantus to 14-15 units daily. Patient reports seeing improvement in his blood sugar readings recently, states his blood sugar was 117 this morning. Current anti-hyperglycemic regimen includes:    - Rybelsus 14 mg daily  - Metformin 1000 mg twice daily  - Glipizide 10 mg daily  - Lantus 14-15 units daily    Past Medical History:   Diagnosis Date    Bladder cancer ([facility] [address] 7/15/13    Clinical Stage [id number] HG UC    Bladder tumor     Diabetes ([facility]     Essential hypertension     History of kidney stones     Hyperlipidemia     Peripheral neuropathy     SBO (small bowel obstruction) (HCC)     Spinal stenosis     Spondylolisthesis, grade 1      Allergies   Allergen Reactions    Actos [Pioglitazone] Other (comments)     Personal history of bladder cancer         Current Outpatient Medications   Medication Sig    SITagliptin (JANUVIA) 100 mg tablet Take 1 Tablet by mouth daily. BD Ultra-Fine Short Pen Needle 31 gauge x 5/16\" ndle USE  TO INJECT ONE TIME DAILY    atorvastatin (LIPITOR) 10 mg tablet TAKE 1 TABLET EVERY NIGHT    tamsulosin (FLOMAX) 0.4 mg capsule TAKE 1 CAPSULE EVERY DAY    metFORMIN (GLUCOPHAGE) 1,000 mg tablet Take 1 Tablet by mouth two (2) times daily (with meals).     finasteride (PROSCAR) 5 mg tablet TAKE 1 TABLET EVERY DAY    trospium (SANCTURA) 20 mg tablet Trospium Oral    1 tab po once daily    active    Accu-Chek Eliane Plus test strp strip TEST EVERY DAY    fluticasone propionate (FLONASE) 50 mcg/actuation nasal spray USE 1 SPRAY IN EACH NOSTRIL DAILY AS NEEDED FOR RHINITIS (Patient not taking: Reported on 9/21/2022)    montelukast (SINGULAIR) 10 mg tablet TAKE 1 TABLET EVERY DAY    omeprazole (PRILOSEC) 40 mg capsule Take 1 Capsule by mouth in the morning. glipiZIDE SR (GLUCOTROL XL) 10 mg CR tablet     CALC-D3-MAGNES-Y3-NC-EZ-MELVIN PO     valsartan (DIOVAN) 320 mg tablet Take 1 Tablet by mouth daily. lancets (Accu-Chek Softclix Lancets) misc Use 1 daily for blood glucose monitoring DX: E11.9    Blood-Glucose Meter (Accu-Chek Eliane Plus Meter) misc Use 1 daily for blood glucose monitoring DX: E11.9    glucose blood VI test strips (Accu-Chek Eliane Plus test strp) strip Use 1 daily for blood glucose monitoring DX: E11.9 (Patient not taking: Reported on 9/21/2022)    insulin glargine (Lantus Solostar U-100 Insulin) 100 unit/mL (3 mL) inpn INJECT  10 UNITS SUBCUTANEOUSLY AT BEDTIME. DISCARD AND BEGIN NEW PEN AFTER 28 DAYS. ashwagandha root extract 300 mg cap Take  by mouth.    tadalafiL (CIALIS) 20 mg tablet Take 1 Tablet by mouth as needed for Erectile Dysfunction. cholecalciferol, vitamin D3, (VITAMIN D3 PO) Take  by mouth.    multivitamin (ONE A DAY) tablet Take 1 Tab by mouth daily. CINNAMON BARK (CINNAMON PO) Take  by mouth. TURMERIC ROOT EXTRACT PO Take  by mouth. GINGER ROOT (GINGER EXTRACT PO) Take  by mouth. Insulin Needles, Disposable, (BD INSULIN PEN NEEDLE UF MINI) 31 gauge x 3/16\" ndle Check twice daily    cyanocobalamin (VITAMIN B-12) 1,000 mcg sublingual tablet Take 1,000 mcg by mouth daily. No current facility-administered medications for this visit. Labs/Vitals:   Wt Readings from Last 3 Encounters:   09/21/22 150 lb 12.8 oz (68.4 kg)   08/24/22 152 lb (68.9 kg)   08/17/22 151 lb (68.5 kg)     BP Readings from Last 3 Encounters:   09/21/22 128/70   08/03/22 134/78   06/21/22 122/70       Lab Results   Component Value Date/Time    Sodium 138 06/03/2022 08:21 AM    Potassium 4.6 10/05/2022 09:43 AM    Chloride 106 06/03/2022 08:21 AM    CO2 26 06/03/2022 08:21 AM    Anion gap 6 06/03/2022 08:21 AM    Glucose 168 (H) 06/03/2022 08:21 AM    BUN 18 06/03/2022 08:21 AM    Creatinine 0.85 06/03/2022 08:21 AM    BUN/Creatinine ratio 21 (H) 06/03/2022 08:21 AM    GFR est AA >60 06/03/2022 08:21 AM    GFR est non-AA >60 06/03/2022 08:21 AM    Calcium 9.2 06/03/2022 08:21 AM    Bilirubin, total 0.7 06/03/2022 08:21 AM    Alk. phosphatase 85 06/03/2022 08:21 AM    Protein, total 6.9 06/03/2022 08:21 AM    Albumin 3.6 06/03/2022 08:21 AM    Globulin 3.3 06/03/2022 08:21 AM    A-G Ratio 1.1 06/03/2022 08:21 AM    ALT (SGPT) 39 06/03/2022 08:21 AM       Lab Results   Component Value Date/Time    Cholesterol, total 153 06/03/2022 08:21 AM    HDL Cholesterol 48 06/03/2022 08:21 AM    LDL, calculated 65 06/03/2022 08:21 AM    VLDL, calculated 40 06/03/2022 08:21 AM    Triglyceride 200 (H) 06/03/2022 08:21 AM    CHOL/HDL Ratio 3.2 06/03/2022 08:21 AM       HbA1c:  Lab Results   Component Value Date/Time    Hemoglobin A1c 9.2 (H) 06/03/2022 08:21 AM    Hemoglobin A1c (POC) 8.6 09/21/2022 04:45 PM    Hemoglobin A1c, External 8.4 06/20/2016 12:00 AM     No components found for: 2     CrCl cannot be calculated (Unknown ideal weight. ). A/P:    Diabetes Management:  - Per ADA guidelines, Pt's A1c is not at goal of < 7%. - Patient is not tolerating Rybelsus, has also historically not tolerated other GLP-1 Low. Discussed that we have very limited options remaining that will adequately lower A1c. Last oral option that remains are SGLT-inhibitors, but these do not provide robust A1c lowering so do not expect addition to get A1c to goal. Patient wants to go back to [facility], educated patient that this has a lower efficacy and do not expect this to get him to goal. However, patient is adamant about restarting Januvia.  Agree to restart Januvia, but educated patient that we must also titrate Lantus to reach FBG goal of . Patient has been reminded to titrate the dose on numerous occasions and has not increased past 15 units, even when his FBG was in the 200s. - Stop Rybelsus. Restart Januiva 100 mg once daily. - Continue Lantus 15 units once daily. Increase by 2 units every 3 days until FBG < 130.  - Continue glipizide and metformin at current doses. May consider adding SGLT2-i if A1c improved and closer to goal at next recheck with insulin titrated to FBG goal. If A1c not improved, consider switching from glipizide to meal time insulin. - Patient will follow up with Gege Sinha PharmD, the new clinical pharmacist in his region, for further diabetes management. Follow up visit scheduled. Medication reconciliation was completed during the visit. Medications Discontinued During This Encounter   Medication Reason    semaglutide (Rybelsus) 14 mg tablet Side Effects     Orders Placed This Encounter    SITagliptin (JANUVIA) 100 mg tablet     Sig: Take 1 Tablet by mouth daily. Dispense:  90 Tablet     Refill:  0     Patient stopping Rybelsus due to side effects       Notifications of recommendations will be sent to Tr Shrestha MD for review.     Thank you,  Keisha Butcher PharmD          For Pharmacy Admin Tracking Only    CPA in place: Yes  Recommendation Provided To: Patient/Caregiver: 5 via Telephone  Intervention Detail: Adherence Monitorin, Discontinued Rx: 1, reason: CINDY, Dose Adjustment: 1, reason: Therapy Optimization, New Rx: 1, reason: Needs Additional Therapy, and Scheduled Appointment  Intervention Accepted By: Patient/Caregiver: 5  Time Spent (min): 45

## 2022-10-20 DIAGNOSIS — Z29.8 NEED FOR MALARIA PROPHYLAXIS: Primary | ICD-10-CM

## 2022-10-20 RX ORDER — MEFLOQUINE HYDROCHLORIDE 250 MG/1
250 TABLET ORAL
Qty: 9 TABLET | Refills: 0 | Status: SHIPPED | OUTPATIENT
Start: 2022-10-20 | End: 2022-12-16

## 2022-10-24 NOTE — PROGRESS NOTES
1. \"Have you been to the ER, urgent care clinic since your last visit? Hospitalized since your last visit? \" No    2. \"Have you seen or consulted any other health care providers outside of the [address] [place] [address] since your last visit? \" No     3. For patients aged 39-70: Has the patient had a colonoscopy / FIT/ Cologuard?  Yes - no Care Gap present 8/26/2020 colonoscopy        Chief Complaint   Patient presents with    Diabetes    Hypertension    Cholesterol Problem    Elevated PSA    Other     Personal h/o bladder cancer

## 2022-10-25 ENCOUNTER — DOCUMENTATION ONLY (OUTPATIENT)
Dept: FAMILY MEDICINE CLINIC | Age: 72
End: 2022-10-25

## 2022-10-25 ENCOUNTER — OFFICE VISIT (OUTPATIENT)
Dept: FAMILY MEDICINE CLINIC | Age: 72
End: 2022-10-25
Payer: MEDICARE

## 2022-10-25 VITALS
SYSTOLIC BLOOD PRESSURE: 130 MMHG | HEART RATE: 87 BPM | OXYGEN SATURATION: 97 % | HEIGHT: 66 IN | DIASTOLIC BLOOD PRESSURE: 70 MMHG | WEIGHT: 150.2 LBS | RESPIRATION RATE: 16 BRPM | BODY MASS INDEX: 24.14 KG/M2 | TEMPERATURE: 97.6 F

## 2022-10-25 DIAGNOSIS — E11.65 POORLY CONTROLLED DIABETES MELLITUS (HCC): Primary | ICD-10-CM

## 2022-10-25 DIAGNOSIS — I10 PRIMARY HYPERTENSION: ICD-10-CM

## 2022-10-25 PROCEDURE — 1123F ACP DISCUSS/DSCN MKR DOCD: CPT | Performed by: FAMILY MEDICINE

## 2022-10-25 PROCEDURE — 2022F DILAT RTA XM EVC RTNOPTHY: CPT | Performed by: FAMILY MEDICINE

## 2022-10-25 PROCEDURE — G8510 SCR DEP NEG, NO PLAN REQD: HCPCS | Performed by: FAMILY MEDICINE

## 2022-10-25 PROCEDURE — 99214 OFFICE O/P EST MOD 30 MIN: CPT | Performed by: FAMILY MEDICINE

## 2022-10-25 PROCEDURE — G8752 SYS BP LESS 140: HCPCS | Performed by: FAMILY MEDICINE

## 2022-10-25 PROCEDURE — G8420 CALC BMI NORM PARAMETERS: HCPCS | Performed by: FAMILY MEDICINE

## 2022-10-25 PROCEDURE — G8754 DIAS BP LESS 90: HCPCS | Performed by: FAMILY MEDICINE

## 2022-10-25 PROCEDURE — 1101F PT FALLS ASSESS-DOCD LE1/YR: CPT | Performed by: FAMILY MEDICINE

## 2022-10-25 PROCEDURE — 3052F HG A1C>EQUAL 8.0%<EQUAL 9.0%: CPT | Performed by: FAMILY MEDICINE

## 2022-10-25 PROCEDURE — G8536 NO DOC ELDER MAL SCRN: HCPCS | Performed by: FAMILY MEDICINE

## 2022-10-25 PROCEDURE — 3017F COLORECTAL CA SCREEN DOC REV: CPT | Performed by: FAMILY MEDICINE

## 2022-10-25 PROCEDURE — G8427 DOCREV CUR MEDS BY ELIG CLIN: HCPCS | Performed by: FAMILY MEDICINE

## 2022-10-25 NOTE — PROGRESS NOTES
Pharmacy Progress Note     Received message from Dr. Bridgette Nogueira regarding PA for this patients Januvia. After looking into PA issue, PA request initiated by insurance because patient previously prescribed Rybelsus and now Januvia is being flagged as duplicate therapy. Patient will not be taking both medications, switching from Rybelsus to [address]. PA request submitted via [employer] [place] [address] of this, PA pending (PA Case ID [id number]).      Thank you,  Shanta Rouse. 41 in place: Yes  Recommendation Provided To: Patient/Caregiver: 1 via Telephone  Intervention Detail: Patient Access Assistance/Sample Provided  Intervention Accepted By: Patient/Caregiver: 1  Time Spent (min): 15

## 2022-10-25 NOTE — PROGRESS NOTES
HISTORY OF PRESENT ILLNESS  Ernie Conroy is a 81 y.o. male. HPI: Here to discuss diabetes management. Last visit advised to go up on Lantus dose. He is taking 13 units. In process to change or add medication to Januvia and. Under prior Auth. Sent staff message to pharmacist to assist with it. Patient is aware. No hypo or hyperglycemic symptoms. Fasting sugar today morning was 118 and yesterday was around 148. Discussed more compliance with the diet and lifestyle modification. He started taking insulin with compliance and now taking in the morning. Blood pressure initially was elevated but repeat was improved. Taking medication with compliance. Sitting in office without any concern  Denies any headache, dizziness, no chest pain or trouble breathing, no arm or leg weakness. No nausea or vomiting, no weight or appetite changes, no mood changes . No urine or bowel complains, no palpitation, no diaphoresis. No abdominal pain. No cold or cough. No leg swelling. No fever. No sleep trouble. Now traveling to [facility]. Given malaria prophylaxis. Discussed hep A vaccine but he will decide to hold off on it. Visit Vitals  /70 (BP 1 Location: Left upper arm, BP Patient Position: Sitting, BP Cuff Size: Adult)   Pulse 87   Temp 97.6 °F (36.4 °C) (Temporal)   Resp 16   Ht 5' 6\" (1.676 m)   Wt 150 lb 3.2 oz (68.1 kg)   SpO2 97%   BMI 24.24 kg/m²   Review medication list, vitals, problem list,allergies.    Lab Results   Component Value Date/Time    Hemoglobin A1c 9.2 (H) 06/03/2022 08:21 AM    Hemoglobin A1c (POC) 8.6 09/21/2022 04:45 PM    Hemoglobin A1c, External 8.4 06/20/2016 12:00 AM     Lab Results   Component Value Date/Time    Sodium 138 06/03/2022 08:21 AM    Potassium 4.6 10/05/2022 09:43 AM    Chloride 106 06/03/2022 08:21 AM    CO2 26 06/03/2022 08:21 AM    Anion gap 6 06/03/2022 08:21 AM    Glucose 168 (H) 06/03/2022 08:21 AM    BUN 18 06/03/2022 08:21 AM    Creatinine 0.85 06/03/2022 08:21 AM BUN/Creatinine ratio 21 (H) 06/03/2022 08:21 AM    GFR est AA >60 06/03/2022 08:21 AM    GFR est non-AA >60 06/03/2022 08:21 AM    Calcium 9.2 06/03/2022 08:21 AM    Bilirubin, total 0.7 06/03/2022 08:21 AM    Alk. phosphatase 85 06/03/2022 08:21 AM    Protein, total 6.9 06/03/2022 08:21 AM    Albumin 3.6 06/03/2022 08:21 AM    Globulin 3.3 06/03/2022 08:21 AM    A-G Ratio 1.1 06/03/2022 08:21 AM    ALT (SGPT) 39 06/03/2022 08:21 AM    AST (SGOT) 27 06/03/2022 08:21 AM         ROS: See HPI    Physical Exam  Pulmonary:      Effort: Pulmonary effort is normal. No respiratory distress. Neurological:      Mental Status: He is alert and oriented to person, place, and time. Psychiatric:         Behavior: Behavior normal.       ASSESSMENT and PLAN    ICD-10-CM ICD-9-CM    1. Poorly controlled diabetes mellitus ([facility] [address]): A1c around 8.9. Discussed more compliant with diet and lifestyle modification. He is in the process for prior Auth for Januvia. Meantime advised to adjust the Lantus dose according to fasting sugar. E11.65 250.00 REFERRAL TO ENDOCRINOLOGY      2. Primary hypertension: Repeat blood pressure has improved. We will continue current plan. Noncompliance with diet modification I10 401.9       Patient understood agreed with the plan  Follow-up and Dispositions    Return in about 3 months (around 1/25/2023). Please note that this dictation was completed with [employer], the [employer] voice recognition software. Quite often unanticipated grammatical, syntax, homophones, and other interpretive errors are inadvertently transcribed by the computer software. Please disregard these errors. Please excuse any errors that have escaped final proofreading.

## 2022-11-03 LAB — HBA1C MFR BLD HPLC: 8.4 %

## 2022-11-10 NOTE — PROGRESS NOTES
Pharmacy Progress Note - Diabetes Management       Assessment / Plan:   Diabetes Management:  Per ADA guidelines, Pt's A1c is not at goal of < 7%. His self-adjustment of Lantus makes assessing his FBG values difficult. He tends to be at FBG goal < 130mg/dL if he injects Lantus 15units. His decrease in Lantus after having a goal FBG value causes his following day to be elevated. He was educated extensively on why consistent dosing was important and he verbalized understanding. Unable to assess prandial control as he is not checking any other time of the day. Will have pt perform staggered SMBG checks to be assessed at next visit. He is tolerating his Rybelsus 14mg qam so will continue this and discontinue the Januvia as it is duplicate tx with a [id number]. Would like to stop his glipizide since he is on insulin and this increases his risk of hypoglycemia. CHILDERS have a finite efficacy that declines over time as well and may no longer be effective at this point. Will reassess with SMBG logs at follow up when he returns from [facility] in about 5 weeks. Hyperlipidemia:  The 10-year ASCVD risk score (Jacquelin DK, et al., 2019) is: 33.7% based on parameters listed. Current lipid treatment guidelines recommend at least moderate-intensity statin doses for all patients with diabetes to decrease overall ASCVD risk. Patient currently qualifies for a high intensity statin therapy based on current recommendations and is currently taking a moderate intensity statin. Recommendations to Haider Houston MD:    - Will continue his Rybelsus as he is tolerating it well at this point without intolerable SE. Will discontinue Januvia as it is duplicate tx with a [id number]. - Advised him to inject Lantus 15units qam every single day and not to adjust daily like he has been. He was advised to contact the clinic if he starts to consistently have BG < 80mg/dL.   Not concerned with some excursions above 180mg/dL until he returns from [facility]. - Plan to discontinue glipizide as it is likely ineffective at this point and may contribute to hypoglycemia. Pt denied stopping for now until returns from [facility]. Nutrition/Lifestyle Modifications:  - Educated pt on the importance of moderating carbohydrate intake. Reviewed sources of carbohydrates and method to help determine appropriate portion sizes (e.g., Diabetes Plate Method). - Advised patient to avoid sugar-sweetened beverages and replace with water or diet/zero sugar option.  - Recommend ~30 minutes consistent, moderately intensive, exercise/day or ~150 minutes/week. Start small, stay consistent, and increase length and types of exercise, as tolerated. Patient will return to clinic in 5 week(s) for follow up. S/O: Mr. Sharonda Mckeon, a 81 y.o. male referred by Chantal Ramos MD,  has a past medical history of Bladder cancer [facility]) (7/15/13), Bladder tumor, Diabetes ([facility] [address] Essential hypertension, History of kidney stones, Hyperlipidemia, Peripheral neuropathy, SBO (small bowel obstruction) ([facility] [address] Spinal stenosis, and Spondylolisthesis, grade 1. Pt was seen today for diabetes management. Patient's last A1c was:   Lab Results   Component Value Date/Time    Hemoglobin A1c 9.2 (H) 06/03/2022 08:21 AM    Hemoglobin A1c (POC) 8.6 09/21/2022 04:45 PM    Hemoglobin A1c, External 8.4 06/20/2016 12:00 AM       Interim update: Pt previously seen by Isabella Metz PharmD, on 11 Oct 2022. Per her A/P: Patient is not tolerating Rybelsus, has also historically not tolerated other GLP-1 Low. Discussed that we have very limited options remaining that will adequately lower A1c.  Last oral option that remains are SGLT-inhibitors, but these do not provide robust A1c lowering so do not expect addition to get A1c to goal. [facility], educated patient that this has a lower efficacy and do not expect this to get him to goal. However, patient is adamant about restarting Januvia. Agree to restart Januvia, but educated patient that we must also titrate Lantus to reach FBG goal of . Patient has been reminded to titrate the dose on numerous occasions and has not increased past 15 units, even when his FBG was in the 200s. - Stop Rybelsus. Restart Januiva 100 mg once daily. - Continue Lantus 15 units once daily. Increase by 2 units every 3 days until FBG < 130.  - Continue glipizide and metformin at current doses. May consider adding SGLT2-i if A1c improved and closer to goal at next recheck with insulin titrated to FBG goal. If A1c not improved, consider switching from glipizide to meal time insulin. Pt was advised that he would be following up with me from now on. Pt was last seen by Ermias Gonsales MD on 25 Oct 2022. He was noted to have switched his dosing of Lantus to qam from [facility]. He was referred to endocrinology. Today: He is still taking the Rybelsus 14mg daily and the SE has gone away for the most part. HA and upset stomach have gone away. He reports taking Lantus at a variable dose based on his FBG values. He will inject between 10-15units. He was educated on the rationale behind injecting the same amount daily and he verbalized understanding. He is amenable to stopping his Januvia after being educated on the benefits of Rybelsus vs Januvia. He did not want to stop his glipizide until after returning from [facility] because he doesn't want to make any big changes before he leaves. Current anti-hyperglycemic regimen includes:    Key Antihyperglycemic Medications               SITagliptin (JANUVIA) 100 mg tablet Take 1 Tablet by mouth daily. metFORMIN (GLUCOPHAGE) 1,000 mg tablet Take 1 Tablet by mouth two (2) times daily (with meals). glipiZIDE SR (GLUCOTROL XL) 10 mg CR tablet     insulin glargine (Lantus Solostar U-100 Insulin) 100 unit/mL (3 mL) inpn INJECT  10 UNITS SUBCUTANEOUSLY AT BEDTIME. DISCARD AND BEGIN NEW PEN AFTER 28 DAYS. Complete current medication regimen includes:  Current Outpatient Medications   Medication Sig    mefloquine (LARIAM) 250 mg tablet Take 1 Tablet by mouth every seven (7) days for 9 doses. Take with food and at least 8 ounces of water. Take 2 wks prior travel, continue taking during travel and 4 weeks post travel. Take it weekly. (Patient not taking: Reported on 10/25/2022)    SITagliptin (JANUVIA) 100 mg tablet Take 1 Tablet by mouth daily. (Patient not taking: Reported on 10/25/2022)    BD Ultra-Fine Short Pen Needle 31 gauge x 5/16\" ndle USE  TO INJECT ONE TIME DAILY    atorvastatin (LIPITOR) 10 mg tablet TAKE 1 TABLET EVERY NIGHT    tamsulosin (FLOMAX) 0.4 mg capsule TAKE 1 CAPSULE EVERY DAY    metFORMIN (GLUCOPHAGE) 1,000 mg tablet Take 1 Tablet by mouth two (2) times daily (with meals). finasteride (PROSCAR) 5 mg tablet TAKE 1 TABLET EVERY DAY    trospium (SANCTURA) 20 mg tablet Trospium Oral    1 tab po once daily    active    Accu-Chek Eliane Plus test strp strip TEST EVERY DAY    montelukast (SINGULAIR) 10 mg tablet TAKE 1 TABLET EVERY DAY    omeprazole (PRILOSEC) 40 mg capsule Take 1 Capsule by mouth in the morning. glipiZIDE SR (GLUCOTROL XL) 10 mg CR tablet     CALC-D3-MAGNES-Z4-YD-CK-MELVIN PO     valsartan (DIOVAN) 320 mg tablet Take 1 Tablet by mouth daily. lancets (Accu-Chek Softclix Lancets) misc Use 1 daily for blood glucose monitoring DX: E11.9    Blood-Glucose Meter (Accu-Chek Eliane Plus Meter) misc Use 1 daily for blood glucose monitoring DX: E11.9    glucose blood VI test strips (Accu-Chek Eliane Plus test strp) strip Use 1 daily for blood glucose monitoring DX: E11.9    insulin glargine (Lantus Solostar U-100 Insulin) 100 unit/mL (3 mL) inpn INJECT  10 UNITS SUBCUTANEOUSLY AT BEDTIME. DISCARD AND BEGIN NEW PEN AFTER 28 DAYS. ashwagandha root extract 300 mg cap Take  by mouth.    tadalafiL (CIALIS) 20 mg tablet Take 1 Tablet by mouth as needed for Erectile Dysfunction. cholecalciferol, vitamin D3, (VITAMIN D3 PO) Take  by mouth.    multivitamin (ONE A DAY) tablet Take 1 Tab by mouth daily. CINNAMON BARK (CINNAMON PO) Take  by mouth. TURMERIC ROOT EXTRACT PO Take  by mouth. SHOBHA ROOT (SHOBHA EXTRACT PO) Take  by mouth. Insulin Needles, Disposable, (BD INSULIN PEN NEEDLE UF MINI) 31 gauge x 3/16\" ndle Check twice daily    cyanocobalamin (VITAMIN B-12) 1,000 mcg sublingual tablet Take 1,000 mcg by mouth daily. No current facility-administered medications for this visit. Allergies: Allergies   Allergen Reactions    Actos [Pioglitazone] Other (comments)     Personal history of bladder cancer         Blood Glucose Monitoring (BGM) or CGM:  - Brought in home glucometer/blood glucose log/CGM reader today:  no  - Conducts/scans: 1x daily in the morning, was checking later in the day when on 7mg daily   - Fasting BG today: 118mg/dL, typical range 116-120mg/dL, but will go up to 170s if ate poorly the night before  - Post-prandial: not checking    ROS:  Today, Pt endorses:  - Symptoms of Hyperglycemia: none  - Symptoms of Hypoglycemia: headache    Vitals/Labs:   Wt Readings from Last 3 Encounters:   10/25/22 150 lb 3.2 oz (68.1 kg)   09/21/22 150 lb 12.8 oz (68.4 kg)   08/24/22 152 lb (68.9 kg)     BP Readings from Last 3 Encounters:   10/25/22 130/70   09/21/22 128/70   08/03/22 134/78     Pulse Readings from Last 3 Encounters:   10/25/22 87   09/21/22 79   08/03/22 72       Lab Results   Component Value Date/Time    Sodium 138 06/03/2022 08:21 AM    Potassium 4.6 10/05/2022 09:43 AM    Chloride 106 06/03/2022 08:21 AM    CO2 26 06/03/2022 08:21 AM    Anion gap 6 06/03/2022 08:21 AM    Glucose 168 (H) 06/03/2022 08:21 AM    BUN 18 06/03/2022 08:21 AM    Creatinine 0.85 06/03/2022 08:21 AM    BUN/Creatinine ratio 21 (H) 06/03/2022 08:21 AM    GFR est AA >60 06/03/2022 08:21 AM    GFR est non-AA >60 06/03/2022 08:21 AM    Calcium 9.2 06/03/2022 08:21 AM    Bilirubin, total 0.7 06/03/2022 08:21 AM    Alk.  phosphatase 85 06/03/2022 08:21 AM    Protein, total 6.9 06/03/2022 08:21 AM    Albumin 3.6 06/03/2022 08:21 AM    Globulin 3.3 06/03/2022 08:21 AM    A-G Ratio 1.1 06/03/2022 08:21 AM    ALT (SGPT) 39 06/03/2022 08:21 AM     Lab Results   Component Value Date/Time    Cholesterol, total 153 06/03/2022 08:21 AM    HDL Cholesterol 48 06/03/2022 08:21 AM    LDL, calculated 65 06/03/2022 08:21 AM    VLDL, calculated 40 06/03/2022 08:21 AM    Triglyceride 200 (H) 06/03/2022 08:21 AM    CHOL/HDL Ratio 3.2 06/03/2022 08:21 AM     Lab Results   Component Value Date/Time    WBC 7.4 06/03/2022 08:21 AM    HGB 14.4 06/03/2022 08:21 AM    HCT 43.4 06/03/2022 08:21 AM    PLATELET 802 25/57/3457 08:21 AM    MCV 98.2 06/03/2022 08:21 AM     Lab Results   Component Value Date/Time    Hemoglobin A1c 9.2 (H) 06/03/2022 08:21 AM    Hemoglobin A1c 9.0 (H) 08/12/2021 09:17 AM    Hemoglobin A1c 7.2 (H) 06/24/2020 08:52 AM    Hemoglobin A1c, External 8.4 06/20/2016 12:00 AM     Hemoglobin A1c (POC)   Date Value Ref Range Status   09/21/2022 8.6 % Final        Screenings/Prevention Parameters:  -Diabetic Eye and Foot Exams:      Diabetic Foot and Eye Exam HM Status   Topic Date Due    Diabetic Foot Care  08/30/2018    Eye Exam  09/11/2020     -Microalbumin / Creatinine ratio:       Lab Results   Component Value Date/Time    Microalbumin/Creat ratio (mg/g creat) 12 06/28/2017 09:30 AM    Microalb/Creat ratio (ug/mg creat.) 21 08/12/2021 09:17 AM    Microalbumin,urine random 1.91 06/28/2017 09:30 AM     -ASCVD Risk Score Parameters and Calculation    Race:      BP Readings from Last 3 Encounters:   10/25/22 130/70   09/21/22 128/70   08/03/22 134/78        Lab Results   Component Value Date/Time    Cholesterol, total 153 06/03/2022 08:21 AM    HDL Cholesterol 48 06/03/2022 08:21 AM    LDL-C, External 52 06/20/2016 12:00 AM    LDL, calculated 65 06/03/2022 08:21 AM    VLDL, calculated 40 06/03/2022 08:21 AM    Triglyceride 200 (H) 06/03/2022 08:21 AM    CHOL/HDL Ratio 3.2 06/03/2022 08:21 AM        Social History     Tobacco Use    Smoking status: Never    Smokeless tobacco: Never   Substance Use Topics    Alcohol use: Yes     Comment: occ      Statin?: yes (Lipitor 10mg daily)   Aspirin Therapy?: no      Calculated ASCVD Risk Score: The 10-year ASCVD risk score (Jacquelin ANNE, et al., 2019) is: 33.7%    Values used to calculate the score:      Age: 81 years      Sex: Male      Is Non- : No      Diabetic: Yes      Tobacco smoker: No      Systolic Blood Pressure: 589 mmHg      Is BP treated: Yes      HDL Cholesterol: 48 MG/DL      Total Cholesterol: 153 MG/DL    -Statin Safety Parameters:      Lab Results   Component Value Date/Time    ALT (SGPT) 39 06/03/2022 08:21 AM    AST (SGOT) 27 06/03/2022 08:21 AM    Alk.  phosphatase 85 06/03/2022 08:21 AM    Bilirubin, direct 0.2 09/30/2021 11:30 AM    Bilirubin, total 0.7 06/03/2022 08:21 AM     -Immunizations:      Immunization History   Administered Date(s) Administered     Influenza, FLUZONE (age 72 y+), High Dose 10/17/2022    COVID-19, MODERNA BLUE border, Primary or Immunocompromised, (age 18y+), IM, 100 mcg/0.5mL 02/13/2021, 03/13/2021, 10/29/2021, 05/06/2022    COVID-19, MODERNA Bivalent BOOSTER, (age 18y+), IM, 50 mcg/0.5 mL 10/17/2022    Hep A Vaccine 10/31/2002, 01/11/2016    Influenza High Dose Vaccine PF 10/29/2021    Influenza Vaccine 10/01/2016    Influenza Vaccine (Tri) Adjuvanted (>65 Yrs FLUAD TRI [id number]) 10/08/2018, 10/15/2019    Influenza, FLUARIX, FLULAVAL, FLUZONE (age 10 mo+) AND AFLURIA, (age 1 y+), PF, 0.5mL 10/23/2015    Pneumococcal Conjugate (PCV-13) 06/29/2016    Pneumococcal Polysaccharide (PPSV-23) 08/30/2017    Tdap 04/20/2015    Typhoid Vaccine 10/31/2002, 01/11/2016    Yellow Fever Vaccine 01/11/2016    Zoster Recombinant 01/22/2020, 06/01/2020       Additional Laboratory Parameters of Interest:   Estimation of renal function:  Lab Results   Component Value Date/Time    Creatinine 0.85 2022 08:21 AM    Creatinine 0.84 2021 08:45 AM    Creatinine 0.84 2021 11:30 AM    GFR est AA >60 2022 08:21 AM    GFR est AA >60 2021 08:45 AM    GFR est AA >60 2021 11:30 AM    GFR est non-AA >60 2022 08:21 AM    GFR est non-AA >60 2021 08:45 AM    GFR est non-AA >60 2021 11:30 AM     Wt Readings from Last 3 Encounters:   10/25/22 150 lb 3.2 oz (68.1 kg)   22 150 lb 12.8 oz (68.4 kg)   22 152 lb (68.9 kg)     Ht Readings from Last 1 Encounters:   10/25/22 5' 6\" (1.676 m)     Calculated estimated creatinine clearance: CrCl cannot be calculated (Unknown ideal weight.). Vital Signs Today:    There were no vitals taken for this visit. There are no discontinued medications. Future Appointments   Date Time Provider Ruben hCavez   2022  3:00 PM Esther FARLEY [facility]   2023 11:00 AM Tamera Segovia MD [facility]   2023  8:30 AM Jerad Antoine MD [facility]  AMB       Patient verbalized understanding of the information presented and all of the patients questions were answered. AVS was handed to the patient. Patient advised to call the office with any additional questions or concerns. Notifications of recommendations will be sent to Tamera eSgovia MD for review.       Thank you for the consult,  Adrianna Andrews, PharmD, BCACP, BC-ADM        For Pharmacy [address] [facility] in place: No  Recommendation Provided To: Patient/Caregiver: 5 via Telephone  Intervention Detail: Adherence Monitorin, Discontinued Rx: 1, reason: Duplicate Therapy, Dose Adjustment: 1, reason: Therapy Optimization, New Rx: 1, reason: Patient Preference, and Scheduled Appointment  Gap Closed?: Yes  Intervention Accepted By: Patient/Caregiver: 4  Time Spent (min): 60

## 2022-11-11 ENCOUNTER — VIRTUAL VISIT (OUTPATIENT)
Dept: INTERNAL MEDICINE CLINIC | Age: 72
End: 2022-11-11

## 2022-11-11 DIAGNOSIS — Z79.4 TYPE 2 DIABETES MELLITUS WITHOUT COMPLICATION, WITH LONG-TERM CURRENT USE OF INSULIN (HCC): Primary | ICD-10-CM

## 2022-11-11 DIAGNOSIS — E11.9 TYPE 2 DIABETES MELLITUS WITHOUT COMPLICATION, WITH LONG-TERM CURRENT USE OF INSULIN (HCC): Primary | ICD-10-CM

## 2022-11-11 NOTE — Clinical Note
- Will continue his Rybelsus as he is tolerating it well at this point without intolerable SE. Will discontinue Januvia as it is duplicate tx with a [id number]. - Advised him to inject Lantus 15units qam every single day and not to adjust daily like he has been. He was advised to contact the clinic if he starts to consistently have BG < 80mg/dL. Not concerned with some excursions above 180mg/dL until he returns from [facility]. - Plan to discontinue glipizide as it is likely ineffective at this point and may contribute to hypoglycemia. Pt denied stopping for now until returns from [facility].

## 2022-11-11 NOTE — PATIENT INSTRUCTIONS
Discussed with pt the plan:  - Stop Januvia  - Continue Rybelsus 14mg qam  - Inject Lantus 15units qam consistently and do not adjust daily  - Check blood glucose at least twice daily: FBG and one other time either ac tid or hs

## 2022-12-12 RX ORDER — LANCETS
EACH MISCELLANEOUS
Qty: 100 EACH | Refills: 3 | Status: SHIPPED | OUTPATIENT
Start: 2022-12-12

## 2022-12-16 NOTE — PROGRESS NOTES
Pharmacy Progress Note - Diabetes Management       Assessment / Plan:   Diabetes Management:  Per ADA guidelines, Pt's A1c is not at goal of < 7%. Pt's FBG values are typically elevated, but unknown prandial control without SMBG logs during those times. Will stop the glipizide as it is likely no longer needed with the Rybelsus 14mg on board. Will have him start checking staggered prandial BG values in order to monitor NFBG values. Will follow up with pt in 6 weeks follow his appointment with Trev Gunderson MD .  He will have his labs drawn prior to her appointment with him. Nutrition/Lifestyle Modifications:  - Educated pt on the importance of moderating carbohydrate intake. Reviewed sources of carbohydrates and method to help determine appropriate portion sizes (e.g., Diabetes Plate Method). - Advised patient to avoid sugar-sweetened beverages and replace with water or diet/zero sugar option.  - Recommend ~30 minutes consistent, moderately intensive, exercise/day or ~150 minutes/week. Start small, stay consistent, and increase length and types of exercise, as tolerated. Patient will return to clinic in 6 week(s) for follow up. S/O: Mr. Kristeen Aase, a 81 y.o. male referred by Trev Gunderson MD,  has a past medical history of Bladder cancer [facility]) (7/15/13), Bladder tumor, Diabetes ([facility] [address] Essential hypertension, History of kidney stones, Hyperlipidemia, Peripheral neuropathy, SBO (small bowel obstruction) ([facility] [address] Spinal stenosis, and Spondylolisthesis, grade 1. Pt was seen today virtually via MyChart video visit for diabetes management. Patient's last A1c was:   Lab Results   Component Value Date/Time    Hemoglobin A1c 9.2 (H) 06/03/2022 08:21 AM    Hemoglobin A1c (POC) 8.6 09/21/2022 04:45 PM    Hemoglobin A1c, External 8.4 06/20/2016 12:00 AM       Interim update: Pt was last seen by me on 11 Nov 2022. Per my prior note: Pt's A1c is not at goal of < 7%.   His self-adjustment of Lantus makes assessing his FBG values difficult. He tends to be at FBG goal < 130mg/dL if he injects Lantus 15units. His decrease in Lantus after having a goal FBG value causes his following day to be elevated. He was educated extensively on why consistent dosing was important and he verbalized understanding. Unable to assess prandial control as he is not checking any other time of the day. Will have pt perform staggered SMBG checks to be assessed at next visit. He is tolerating his Rybelsus 14mg qam so will continue this and discontinue the Januvia as it is duplicate tx with a [id number]. Would like to stop his glipizide since he is on insulin and this increases his risk of hypoglycemia. CHILDERS have a finite efficacy that declines over time as well and may no longer be effective at this point. Will reassess with SMBG logs at follow up when he returns from [facility] in about 5 weeks. Today: Pt states that he saw endocrinology on November 3rd, Sammy Cooper, with [facility]. They did not change anything. This was prior to our last appointment. He states that he has only been checking his BG once daily or sometimes not at all while he was in [facility]. He was reluctant to change anything at this time, but agreed to stop the glipizide. He wanted to follow up after his next appointment with Jose Rea MD without any further changes. He is amenable to performing staggered SMBG logs in the interim. Current anti-hyperglycemic regimen includes:    Key Antihyperglycemic Medications               semaglutide (Rybelsus) 14 mg tablet Take 1 Tablet by mouth Daily (before breakfast) for 360 days. Indications: type 2 diabetes mellitus    metFORMIN (GLUCOPHAGE) 1,000 mg tablet Take 1 Tablet by mouth two (2) times daily (with meals). glipiZIDE SR (GLUCOTROL XL) 10 mg CR tablet     insulin glargine (Lantus Solostar U-100 Insulin) 100 unit/mL (3 mL) inpn INJECT  10 UNITS SUBCUTANEOUSLY AT BEDTIME.  DISCARD AND BEGIN NEW PEN AFTER 28 DAYS. Complete current medication regimen includes:  Current Outpatient Medications   Medication Sig    lancets (Accu-Chek Softclix Lancets) misc TEST BLOOD SUGAR EVERY DAY    semaglutide (Rybelsus) 14 mg tablet Take 1 Tablet by mouth Daily (before breakfast) for 360 days. Indications: type 2 diabetes mellitus    mefloquine (LARIAM) 250 mg tablet Take 1 Tablet by mouth every seven (7) days for 9 doses. Take with food and at least 8 ounces of water. Take 2 wks prior travel, continue taking during travel and 4 weeks post travel. Take it weekly. (Patient not taking: Reported on 10/25/2022)    BD Ultra-Fine Short Pen Needle 31 gauge x 5/16\" ndle USE  TO INJECT ONE TIME DAILY    atorvastatin (LIPITOR) 10 mg tablet TAKE 1 TABLET EVERY NIGHT    tamsulosin (FLOMAX) 0.4 mg capsule TAKE 1 CAPSULE EVERY DAY    metFORMIN (GLUCOPHAGE) 1,000 mg tablet Take 1 Tablet by mouth two (2) times daily (with meals). finasteride (PROSCAR) 5 mg tablet TAKE 1 TABLET EVERY DAY    trospium (SANCTURA) 20 mg tablet Trospium Oral    1 tab po once daily    active    Accu-Chek Eliane Plus test strp strip TEST EVERY DAY    montelukast (SINGULAIR) 10 mg tablet TAKE 1 TABLET EVERY DAY    omeprazole (PRILOSEC) 40 mg capsule Take 1 Capsule by mouth in the morning. glipiZIDE SR (GLUCOTROL XL) 10 mg CR tablet     CALC-D3-MAGNES-[id number]-KM-CJ-MELVIN PO     valsartan (DIOVAN) 320 mg tablet Take 1 Tablet by mouth daily. Blood-Glucose Meter (Accu-Chek Eliane Plus Meter) misc Use 1 daily for blood glucose monitoring DX: E11.9    glucose blood VI test strips (Accu-Chek Eliane Plus test strp) strip Use 1 daily for blood glucose monitoring DX: E11.9    insulin glargine (Lantus Solostar U-100 Insulin) 100 unit/mL (3 mL) inpn INJECT  10 UNITS SUBCUTANEOUSLY AT BEDTIME. DISCARD AND BEGIN NEW PEN AFTER 28 DAYS.     ashwagandha root extract 300 mg cap Take  by mouth.    tadalafiL (CIALIS) 20 mg tablet Take 1 Tablet by mouth as needed for Erectile Dysfunction. cholecalciferol, vitamin D3, (VITAMIN D3 PO) Take  by mouth.    multivitamin (ONE A DAY) tablet Take 1 Tab by mouth daily. CINNAMON BARK (CINNAMON PO) Take  by mouth. TURMERIC ROOT EXTRACT PO Take  by mouth. SHOBHA ROOT (SHOBHA EXTRACT PO) Take  by mouth. Insulin Needles, Disposable, (BD INSULIN PEN NEEDLE UF MINI) 31 gauge x 3/16\" ndle Check twice daily    cyanocobalamin (VITAMIN B-12) 1,000 mcg sublingual tablet Take 1,000 mcg by mouth daily. No current facility-administered medications for this visit. Allergies: Allergies   Allergen Reactions    Actos [Pioglitazone] Other (comments)     Personal history of bladder cancer         Blood Glucose Monitoring (BGM) or CGM:  - Had access to home glucometer/blood glucose log/CGM reader today:  yes  - Conducts/scans: 1x   - Fasting BG today: 140mg/dL  - Post-prandial:   -FB-155mg/dL average   -ac lunch: none    -ac dinner: none   -hs: none    ROS:  Today, Pt endorses:  - Symptoms of Hyperglycemia: none  - Symptoms of Hypoglycemia: none    Lifestyle modification(s):  - no changes    Medication Adherence/Access:  - Endorses adherence to current regimen?: yes    Vitals/Labs:   Wt Readings from Last 3 Encounters:   10/25/22 150 lb 3.2 oz (68.1 kg)   22 150 lb 12.8 oz (68.4 kg)   22 152 lb (68.9 kg)     BP Readings from Last 3 Encounters:   10/25/22 130/70   22 128/70   22 134/78     Pulse Readings from Last 3 Encounters:   10/25/22 87   22 79   22 72       Lab Results   Component Value Date/Time    Sodium 138 2022 08:21 AM    Potassium 4.6 10/05/2022 09:43 AM    Chloride 106 2022 08:21 AM    CO2 26 2022 08:21 AM    Anion gap 6 2022 08:21 AM    Glucose 168 (H) 2022 08:21 AM    BUN 18 2022 08:21 AM    Creatinine 0.85 2022 08:21 AM    BUN/Creatinine ratio 21 (H) 2022 08:21 AM    GFR est AA >60 2022 08:21 AM    GFR est non-AA >60 2022 08:21 AM Calcium 9.2 06/03/2022 08:21 AM    Bilirubin, total 0.7 06/03/2022 08:21 AM    Alk.  phosphatase 85 06/03/2022 08:21 AM    Protein, total 6.9 06/03/2022 08:21 AM    Albumin 3.6 06/03/2022 08:21 AM    Globulin 3.3 06/03/2022 08:21 AM    A-G Ratio 1.1 06/03/2022 08:21 AM    ALT (SGPT) 39 06/03/2022 08:21 AM     Lab Results   Component Value Date/Time    Cholesterol, total 153 06/03/2022 08:21 AM    HDL Cholesterol 48 06/03/2022 08:21 AM    LDL, calculated 65 06/03/2022 08:21 AM    VLDL, calculated 40 06/03/2022 08:21 AM    Triglyceride 200 (H) 06/03/2022 08:21 AM    CHOL/HDL Ratio 3.2 06/03/2022 08:21 AM     Lab Results   Component Value Date/Time    WBC 7.4 06/03/2022 08:21 AM    HGB 14.4 06/03/2022 08:21 AM    HCT 43.4 06/03/2022 08:21 AM    PLATELET 924 42/00/4170 08:21 AM    MCV 98.2 06/03/2022 08:21 AM     Lab Results   Component Value Date/Time    Hemoglobin A1c 9.2 (H) 06/03/2022 08:21 AM    Hemoglobin A1c 9.0 (H) 08/12/2021 09:17 AM    Hemoglobin A1c 7.2 (H) 06/24/2020 08:52 AM    Hemoglobin A1c, External 8.4 06/20/2016 12:00 AM     Hemoglobin A1c (POC)   Date Value Ref Range Status   09/21/2022 8.6 % Final        Screenings/Prevention Parameters:  -Diabetic Eye and Foot Exams:      Diabetic Foot and Eye Exam HM Status   Topic Date Due    Diabetic Foot Care  08/30/2018    Eye Exam  09/11/2020     -Microalbumin / Creatinine ratio:       Lab Results   Component Value Date/Time    Microalbumin/Creat ratio (mg/g creat) 12 06/28/2017 09:30 AM    Microalb/Creat ratio (ug/mg creat.) 21 08/12/2021 09:17 AM    Microalbumin,urine random 1.91 06/28/2017 09:30 AM     -ASCVD Risk Score Parameters and Calculation    Race:      BP Readings from Last 3 Encounters:   10/25/22 130/70   09/21/22 128/70   08/03/22 134/78        Lab Results   Component Value Date/Time    Cholesterol, total 153 06/03/2022 08:21 AM    HDL Cholesterol 48 06/03/2022 08:21 AM    LDL-C, External 52 06/20/2016 12:00 AM    LDL, calculated 65 06/03/2022 08:21 AM    VLDL, calculated 40 06/03/2022 08:21 AM    Triglyceride 200 (H) 06/03/2022 08:21 AM    CHOL/HDL Ratio 3.2 06/03/2022 08:21 AM        Social History     Tobacco Use    Smoking status: Never    Smokeless tobacco: Never   Substance Use Topics    Alcohol use: Yes     Comment: occ         Calculated ASCVD Risk Score: The 10-year ASCVD risk score (Jacquelin ANNE, et al., 2019) is: 33.7%    Values used to calculate the score:      Age: 81 years      Sex: Male      Is Non- : No      Diabetic: Yes      Tobacco smoker: No      Systolic Blood Pressure: 778 mmHg      Is BP treated: Yes      HDL Cholesterol: 48 MG/DL      Total Cholesterol: 153 MG/DL    -Statin Safety Parameters:      Lab Results   Component Value Date/Time    ALT (SGPT) 39 06/03/2022 08:21 AM    AST (SGOT) 27 06/03/2022 08:21 AM    Alk.  phosphatase 85 06/03/2022 08:21 AM    Bilirubin, direct 0.2 09/30/2021 11:30 AM    Bilirubin, total 0.7 06/03/2022 08:21 AM     -Immunizations:      Immunization History   Administered Date(s) Administered     Influenza, FLUZONE (age 72 y+), High Dose 10/17/2022    COVID-19, MODERNA BLUE border, Primary or Immunocompromised, (age 18y+), IM, 100 mcg/0.5mL 02/13/2021, 03/13/2021, 10/29/2021, 05/06/2022    COVID-19, MODERNA Bivalent BOOSTER, (age 18y+), IM, 50 mcg/0.5 mL 10/17/2022    Hep A Vaccine 10/31/2002, 01/11/2016    Influenza High Dose Vaccine PF 10/29/2021    Influenza Vaccine 10/01/2016    Influenza Vaccine (Tri) Adjuvanted (>65 Yrs FLUAD TRI 61990) 10/08/2018, 10/15/2019    Influenza, FLUARIX, FLULAVAL, FLUZONE (age 10 mo+) AND AFLURIA, (age 1 y+), PF, 0.5mL 10/23/2015    Pneumococcal Conjugate (PCV-13) 06/29/2016    Pneumococcal Polysaccharide (PPSV-23) 08/30/2017    Tdap 04/20/2015    Typhoid Vaccine 10/31/2002, 01/11/2016    Yellow Fever Vaccine 01/11/2016    Zoster Recombinant 01/22/2020, 06/01/2020       Additional Laboratory Parameters of Interest:   Estimation of renal function:  Lab Results   Component Value Date/Time    Creatinine 0.85 2022 08:21 AM    Creatinine 0.84 2021 08:45 AM    Creatinine 0.84 2021 11:30 AM    GFR est AA >60 2022 08:21 AM    GFR est AA >60 2021 08:45 AM    GFR est AA >60 2021 11:30 AM    GFR est non-AA >60 2022 08:21 AM    GFR est non-AA >60 2021 08:45 AM    GFR est non-AA >60 2021 11:30 AM     Wt Readings from Last 3 Encounters:   10/25/22 150 lb 3.2 oz (68.1 kg)   22 150 lb 12.8 oz (68.4 kg)   22 152 lb (68.9 kg)     Ht Readings from Last 1 Encounters:   10/25/22 5' 6\" (1.676 m)     Calculated estimated creatinine clearance: CrCl cannot be calculated (Patient's most recent lab result is older than the maximum 180 days allowed. ). Vital Signs Today:    There were no vitals taken for this visit. There are no discontinued medications. Future Appointments   Date Time Provider Ruben Kathy   2023 11:00 AM Yandy Barnes MD [facility] BS AMB   2023  3:00 PM Demian GOMEZ BS AMB   2023  8:30 AM Darshan Mcdonald MD [facility] BS AMB       Patient verbalized understanding of the information presented and all of the patients questions were answered. AVS was gone over with the patient. Patient advised to call the office with any additional questions or concerns. Notifications of recommendations will be sent to Yandy Barnes MD for review.       Thank you for the consult,  Jluieta Burch, PharmD, BCACP, BC-ADM        For Pharmacy Admin Tracking Only    Program: Medical Group  CPA in place: Yes  Recommendation Provided To: Patient/Caregiver: 4 via Telephone  Intervention Detail: Adherence Monitorin, Discontinued Rx: 1, reason: Unnecessary Med, Refill(s) Provided, and Scheduled Appointment  Gap Closed?: No  Intervention Accepted By: Patient/Caregiver: 4  Time Spent (min): 30

## 2022-12-19 ENCOUNTER — VIRTUAL VISIT (OUTPATIENT)
Dept: INTERNAL MEDICINE CLINIC | Age: 72
End: 2022-12-19

## 2022-12-19 DIAGNOSIS — E11.9 TYPE 2 DIABETES MELLITUS WITHOUT COMPLICATION, WITH LONG-TERM CURRENT USE OF INSULIN (HCC): Primary | ICD-10-CM

## 2022-12-19 DIAGNOSIS — Z79.4 TYPE 2 DIABETES MELLITUS WITHOUT COMPLICATION, WITH LONG-TERM CURRENT USE OF INSULIN (HCC): Primary | ICD-10-CM

## 2023-01-18 ENCOUNTER — HOSPITAL ENCOUNTER (EMERGENCY)
Age: 73
Discharge: HOME | End: 2023-01-18
Attending: EMERGENCY MEDICINE
Payer: MEDICARE

## 2023-01-18 VITALS
DIASTOLIC BLOOD PRESSURE: 84 MMHG | WEIGHT: 150 LBS | RESPIRATION RATE: 18 BRPM | BODY MASS INDEX: 24.11 KG/M2 | HEART RATE: 76 BPM | TEMPERATURE: 97.4 F | HEIGHT: 66 IN | SYSTOLIC BLOOD PRESSURE: 143 MMHG | OXYGEN SATURATION: 100 %
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
## 2023-01-18 DIAGNOSIS — S01.319A COMPLEX LACERATION OF EAR, INITIAL ENCOUNTER: ICD-10-CM

## 2023-01-18 DIAGNOSIS — S01.352A DOG BITE OF LEFT EAR, INITIAL ENCOUNTER: Primary | ICD-10-CM

## 2023-01-18 PROCEDURE — 75810000293 HC SIMP/SUPERF WND  RPR

## 2023-01-18 PROCEDURE — 99284 EMERGENCY DEPT VISIT MOD MDM: CPT

## 2023-01-18 PROCEDURE — 90715 TDAP VACCINE 7 YRS/> IM: CPT | Performed by: NURSE PRACTITIONER

## 2023-01-18 PROCEDURE — 74011250637 HC RX REV CODE- 250/637: Performed by: NURSE PRACTITIONER

## 2023-01-18 PROCEDURE — 90471 IMMUNIZATION ADMIN: CPT

## 2023-01-18 PROCEDURE — 74011250636 HC RX REV CODE- 250/636: Performed by: NURSE PRACTITIONER

## 2023-01-18 RX ORDER — AMOXICILLIN AND CLAVULANATE POTASSIUM 875; 125 MG/1; MG/1
1 TABLET, FILM COATED ORAL 2 TIMES DAILY
Qty: 14 TABLET | Refills: 0 | Status: SHIPPED | OUTPATIENT
Start: 2023-01-18 | End: 2023-01-18 | Stop reason: SDUPTHER

## 2023-01-18 RX ORDER — CIPROFLOXACIN 500 MG/1
500 TABLET ORAL 2 TIMES DAILY
Qty: 14 TABLET | Refills: 0 | Status: SHIPPED | OUTPATIENT
Start: 2023-01-18 | End: 2023-01-18 | Stop reason: SDUPTHER

## 2023-01-18 RX ORDER — CIPROFLOXACIN 500 MG/1
500 TABLET ORAL 2 TIMES DAILY
Qty: 14 TABLET | Refills: 0 | Status: SHIPPED | OUTPATIENT
Start: 2023-01-18 | End: 2023-01-25

## 2023-01-18 RX ORDER — LIDOCAINE HYDROCHLORIDE 10 MG/ML
5 INJECTION, SOLUTION EPIDURAL; INFILTRATION; INTRACAUDAL; PERINEURAL ONCE
Status: DISCONTINUED | OUTPATIENT
Start: 2023-01-18 | End: 2023-01-18 | Stop reason: HOSPADM

## 2023-01-18 RX ORDER — ACETAMINOPHEN 500 MG
1000 TABLET ORAL
Status: COMPLETED | OUTPATIENT
Start: 2023-01-18 | End: 2023-01-18

## 2023-01-18 RX ORDER — AMOXICILLIN AND CLAVULANATE POTASSIUM 875; 125 MG/1; MG/1
1 TABLET, FILM COATED ORAL 2 TIMES DAILY
Qty: 14 TABLET | Refills: 0 | Status: SHIPPED | OUTPATIENT
Start: 2023-01-18 | End: 2023-01-25

## 2023-01-18 RX ORDER — AMOXICILLIN AND CLAVULANATE POTASSIUM 875; 125 MG/1; MG/1
1 TABLET, FILM COATED ORAL
Status: COMPLETED | OUTPATIENT
Start: 2023-01-18 | End: 2023-01-18

## 2023-01-18 RX ADMIN — ACETAMINOPHEN 1000 MG: 500 TABLET ORAL at 12:31

## 2023-01-18 RX ADMIN — AMOXICILLIN AND CLAVULANATE POTASSIUM 1 TABLET: 875; 125 TABLET, FILM COATED ORAL at 12:31

## 2023-01-18 RX ADMIN — TETANUS TOXOID, REDUCED DIPHTHERIA TOXOID AND ACELLULAR PERTUSSIS VACCINE, ADSORBED 0.5 ML: 5; 2.5; 8; 8; 2.5 SUSPENSION INTRAMUSCULAR at 12:32

## 2023-01-18 NOTE — ED PROVIDER NOTES
EMERGENCY DEPARTMENT HISTORY AND PHYSICAL EXAM    11:18 AM      Date: 1/18/2023  Patient Name: Hema Deluca    History of Presenting Illness     Chief Complaint   Patient presents with    Dog Bite         History Provided By: Patient    Additional History (Context): Hema Deluca is a 81 y.o. male with past medical history significant for diabetes, bladder tumor, hypertension, high cholesterol,who presents with a laceration to the left ear that occurred yesterday at approximately 8:30 PM after sustaining a dog bite from his dog. He has a husky, puppy that was playing and after a toy was removed from his mouth bit the owners left ear. There has been bleeding controlled with pressure prior to arrival.  Patient is unsure of his last tetanus shot. He is not on any blood thinners or aspirin. PCP: Haider Ziegler MD    Current Facility-Administered Medications   Medication Dose Route Frequency Provider Last Rate Last Admin    lidocaine (PF) (XYLOCAINE) 10 mg/mL (1 %) injection 5 mL  5 mL SubCUTAneous ONCE Nicole Rueda FNP         Current Outpatient Medications   Medication Sig Dispense Refill    amoxicillin-clavulanate (Augmentin) 875-125 mg per tablet Take 1 Tablet by mouth two (2) times a day for 7 days. 14 Tablet 0    ciprofloxacin HCl (Cipro) 500 mg tablet Take 1 Tablet by mouth two (2) times a day for 7 days. 14 Tablet 0    atorvastatin (LIPITOR) 10 mg tablet TAKE 1 TABLET EVERY NIGHT 90 Tablet 1    metFORMIN (GLUCOPHAGE) 1,000 mg tablet Take 1 Tablet by mouth two (2) times daily (with meals). 180 Tablet 1    insulin glargine (Lantus Solostar U-100 Insulin) 100 unit/mL (3 mL) inpn INJECT  10 UNITS SUBCUTANEOUSLY AT BEDTIME. DISCARD AND BEGIN NEW PEN AFTER 28 DAYS. 5 Pen 3    semaglutide (Rybelsus) 14 mg tablet Take 1 Tablet by mouth Daily (before breakfast) for 360 days.  Indications: type 2 diabetes mellitus 90 Tablet 3    lancets (Accu-Chek Softclix Lancets) misc TEST BLOOD SUGAR EVERY DAY 100 Each 3    BD Ultra-Fine Short Pen Needle 31 gauge x 5/16\" ndle USE  TO INJECT ONE TIME DAILY 100 Each 5    tamsulosin (FLOMAX) 0.4 mg capsule TAKE 1 CAPSULE EVERY DAY 90 Capsule 1    finasteride (PROSCAR) 5 mg tablet TAKE 1 TABLET EVERY DAY 90 Tablet 1    trospium (SANCTURA) 20 mg tablet Trospium Oral    1 tab po once daily    active      Accu-Chek Eliane Plus test strp strip TEST EVERY  Strip 0    montelukast (SINGULAIR) 10 mg tablet TAKE 1 TABLET EVERY DAY 90 Tablet 1    omeprazole (PRILOSEC) 40 mg capsule Take 1 Capsule by mouth in the morning. 90 Capsule 1    glipiZIDE SR (GLUCOTROL XL) 10 mg CR tablet       CALC-D3-MAGNES-T9-QD-FX-MELVIN PO       valsartan (DIOVAN) 320 mg tablet Take 1 Tablet by mouth daily. 90 Tablet 3    Blood-Glucose Meter (Accu-Chek Eliane Plus Meter) misc Use 1 daily for blood glucose monitoring DX: E11.9 1 Each 0    glucose blood VI test strips (Accu-Chek Eliane Plus test strp) strip Use 1 daily for blood glucose monitoring DX: E11.9 300 Strip 1    ashwagandha root extract 300 mg cap Take  by mouth.      tadalafiL (CIALIS) 20 mg tablet Take 1 Tablet by mouth as needed for Erectile Dysfunction. 90 Tablet 3    cholecalciferol, vitamin D3, (VITAMIN D3 PO) Take  by mouth.      multivitamin (ONE A DAY) tablet Take 1 Tab by mouth daily. CINNAMON BARK (CINNAMON PO) Take  by mouth. TURMERIC ROOT EXTRACT PO Take  by mouth. GINGER ROOT (GINGER EXTRACT PO) Take  by mouth. Insulin Needles, Disposable, (BD INSULIN PEN NEEDLE UF MINI) 31 gauge x 3/16\" ndle Check twice daily 100 Pen Needle 1    cyanocobalamin (VITAMIN B-12) 1,000 mcg sublingual tablet Take 1,000 mcg by mouth daily.          Past History     Past Medical History:  Past Medical History:   Diagnosis Date    Bladder cancer ([facility] 7/15/13    Clinical Stage [id number] HG UC    Bladder tumor     Diabetes ([facility]     Essential hypertension     History of kidney stones     Hyperlipidemia     Peripheral neuropathy     SBO (small bowel obstruction) ([facility])     Spinal stenosis     Spondylolisthesis, grade 1        Past Surgical History:  Past Surgical History:   Procedure Laterality Date    HX UROLOGICAL  7/15/13    TURBT, Dr. Rut Hoang, [facility]    HX UROLOGICAL  1/4/16    Cysto, Dr. Rut Hoang, [facility]    HX WRIST FRACTURE [address]  4/27/15    (L) wrist       Family History:  Family History   Problem Relation Age of Onset    Hypertension Mother     Heart Attack Neg Hx     Sudden Death Neg Hx        Social History:  Social History     Tobacco Use    Smoking status: Never    Smokeless tobacco: Never   Substance Use Topics    Alcohol use: Yes     Comment: occ    Drug use: No       Allergies: Allergies   Allergen Reactions    Actos [Pioglitazone] Other (comments)     Personal history of bladder cancer           Review of Systems       Review of Systems   Constitutional: Negative. Negative for chills and fever. HENT:  Positive for ear pain. Negative for congestion and rhinorrhea. Eyes: Negative. Negative for pain and redness. Respiratory: Negative. Negative for cough and shortness of breath. Cardiovascular: Negative. Negative for chest pain, palpitations and leg swelling. Gastrointestinal: Negative. Negative for abdominal pain, constipation, diarrhea, nausea and vomiting. Genitourinary: Negative. Negative for dysuria, frequency, hematuria and urgency. Musculoskeletal: Negative. Negative for back pain, gait problem, joint swelling and neck pain. Skin:  Positive for wound. Negative for rash. Neurological: Negative. Negative for dizziness, seizures, speech difficulty, weakness, light-headedness and headaches. Hematological:  Negative for adenopathy. Bruises/bleeds easily. All other systems reviewed and are negative.       Physical Exam   Visit Vitals  BP (!) 143/84 (BP 1 Location: Left upper arm, BP Patient Position: Sitting)   Pulse 76   Temp 97.4 °F (36.3 °C)   Resp 18   Ht 5' 6\" (1.676 m)   Wt 68 kg (150 lb)   SpO2 100% BMI 24.21 kg/m²         Physical Exam  Vitals and nursing note reviewed. Constitutional:       General: He is not in acute distress. Appearance: Normal appearance. He is normal weight. He is not ill-appearing, toxic-appearing or diaphoretic. HENT:      Head: Normocephalic and atraumatic. Left Ear: Laceration (Large, jagged laceration through the superior aspect of the helix anteriorly through and through posteriorly. Bleeding controlled with pressure.) present. Ears:     Eyes:      General: Vision grossly intact. Gaze aligned appropriately. Right eye: No discharge. Left eye: No discharge. Conjunctiva/sclera: Conjunctivae normal.      Pupils: Pupils are equal, round, and reactive to light. Cardiovascular:      Rate and Rhythm: Normal rate and regular rhythm. Pulmonary:      Effort: Pulmonary effort is normal. No respiratory distress. Breath sounds: Normal breath sounds. Abdominal:      General: Abdomen is flat. Palpations: Abdomen is soft. Tenderness: There is no abdominal tenderness. Musculoskeletal:         General: Normal range of motion. Cervical back: Full passive range of motion without pain, normal range of motion and neck supple. Skin:     General: Skin is warm and dry. Capillary Refill: Capillary refill takes less than 2 seconds. Neurological:      General: No focal deficit present. Mental Status: He is alert and oriented to person, place, and time. Diagnostic Study Results     Labs -  No results found for this or any previous visit (from the past 12 hour(s)). Radiologic Studies -   No orders to display         Medical Decision Making   I am the first provider for this patient. I reviewed available nursing notes, past medical history, past surgical history, family history and social history. Vital Signs-Reviewed the patient's vital signs.     Records Reviewed: Nursing Notes and Old Medical Records (Time of Review: 11:18 AM)    Pulse Oximetry Analysis - 100% on RA- normal     Cardiac Monitor:  Rate:   Rhythm:    EKG:    ED Course: Progress Notes, Reevaluation, and Consults:  11:18 AM  Initial assessment performed. The patients presenting problems have been discussed, and they/their family are in agreement with the care plan formulated and outlined with them. I have encouraged them to ask questions as they arise throughout their visit. 1:45 PM consultation with otolaryngologist, Dr. Allegra Cullen. Since a discussion regarding patient's history, physical exam findings, and mechanism of injury. Dr. Allegra Cullen recommends closing loosely with Prolene. He is aware the cartilage is exposed and states to keep the skin covering the cartilage no need to repair the cartilage itself. Also recommends Cipro antibiotics and Bactroban ointment. Wound Repair    Date/Time: 1/18/2023 2:15 PM  Preparation: skin prepped with Shur-Clens  Pre-procedure re-eval: Immediately prior to the procedure, the patient was reevaluated and found suitable for the planned procedure and any planned medications. Location details: left ear  Wound length:2.6 - 7.5 cm (3 cm)  Anesthesia: local infiltration    Anesthesia:  Local Anesthetic: lidocaine 1% without epinephrine  Anesthetic total: 3 mL  Foreign bodies: no foreign bodies (Extensive wound exploration in a bloodless field with irrigation with saline under high-pressure.)  Irrigation solution: saline  Irrigation method: jet lavage  Debridement: extensive  Wound skin closure material used: 5-0 Prolene. Number of sutures: 4  Technique: simple  Approximation: loose  Dressing: antibiotic ointment and 4x4  Patient tolerance: patient tolerated the procedure well with no immediate complications  My total time at bedside, performing this procedure was 16-30 minutes. 2:45 PM hemostasis achieved.   Patient educated extensively on wound care both written and verbal and patient will be started on Augmentin and Cipro for complex contaminated laceration due to dog bite. No evidence of retained foreign body after extensive exploration in a bloodless field and jet lavage extensively under high-pressure. Patient tolerated the procedure well. Recommend close follow-up in 2 days in this ER for wound evaluation or with ENT as discussed-referral given. Patient is to keep the wound clean and dry at all times. Discussed taking antibiotics as directed until complete. Provider Notes (Medical Decision Making):   Differential diagnosis: Dog bite, laceration, infected wound, external ear injury    Patient is a 79-year-old male presents to the ER ambulatory no acute distress with a dog bite to the left ear. There is a significant jagged laceration to the superior aspect of the helix extending posteriorly about 2 cm. There is active bleeding that is controlled with pressure. Cartilage is exposed. Will obtain appropriate studies to evaluate patient's complaints and treat symptomatically. Will disposition after reassessment assuming no clinical change or worsening and appropriate response to symptomatic treatment. Diagnosis     Clinical Impression:   1. Dog bite of left ear, initial encounter    2. Complex laceration of ear, initial encounter        Disposition: Discharged home in stable condition    DISCHARGE NOTE:     Patient has been reexamined. Patient has no new complaints, changes, or physical findings. Care plan outlined and precautions discussed. Results of physical exam findings were reviewed with the patient. All medications were reviewed with the patient; will discharge home with Cipro and Augmentin. All of patient's questions and concerns were addressed. Patient was instructed and agrees to follow up with this ER in 2 days for wound recheck and with ENT-Dr. Jose Macdonald, as well as to return to the ED upon further deterioration. Patient is ready to go home.     Follow-up Information       Follow up With Specialties Details Why Contact Info    [address] [facility] EMERGENCY DEPT Emergency Medicine In 2 days For wound re-check [address] Tamie Davis MD Otolaryngology, Surgery Physician Schedule an appointment as soon as possible for a visit  Follow-up from the Emergency Department . [facility] [address] [id number]  [phone]      [address] [facility] EMERGENCY DEPT Emergency Medicine  As needed, If symptoms worsen [address] [facility]  [phone]             Discharge Medication List as of 1/18/2023  2:29 PM        START taking these medications    Details   ciprofloxacin HCl (Cipro) 500 mg tablet Take 1 Tablet by mouth two (2) times a day for 7 days. , Normal, Disp-14 Tablet, R-0      amoxicillin-clavulanate (Augmentin) 875-125 mg per tablet Take 1 Tablet by mouth two (2) times a day for 7 days. , Normal, Disp-14 Tablet, R-0           CONTINUE these medications which have NOT CHANGED    Details   atorvastatin (LIPITOR) 10 mg tablet TAKE 1 TABLET EVERY NIGHT, Normal, Disp-90 Tablet, R-1      metFORMIN (GLUCOPHAGE) 1,000 mg tablet Take 1 Tablet by mouth two (2) times daily (with meals). , Normal, Disp-180 Tablet, R-1      insulin glargine (Lantus Solostar U-100 Insulin) 100 unit/mL (3 mL) inpn INJECT  10 UNITS SUBCUTANEOUSLY AT BEDTIME. DISCARD AND BEGIN NEW PEN AFTER 28 DAYS., Normal, Disp-5 Pen, R-3      semaglutide (Rybelsus) 14 mg tablet Take 1 Tablet by mouth Daily (before breakfast) for 360 days.  Indications: type 2 diabetes mellitus, Normal, Disp-90 Tablet, R-3      lancets (Accu-Chek Softclix Lancets) misc TEST BLOOD SUGAR EVERY DAY, Normal, Disp-100 Each, R-3      BD Ultra-Fine Short Pen Needle 31 gauge x 5/16\" ndle USE  TO INJECT ONE TIME DAILY, Normal, Disp-100 Each, R-5, BRIAN      tamsulosin (FLOMAX) 0.4 mg capsule TAKE 1 CAPSULE EVERY DAY, Normal, Disp-90 Capsule, R-1      finasteride (PROSCAR) 5 mg tablet TAKE 1 TABLET EVERY DAY, Normal, Disp-90 Tablet, R-1      trospium (SANCTURA) 20 mg tablet Trospium Oral    1 tab po once daily    active, Historical Med      !! Accu-Chek Eliane Plus test strp strip TEST EVERY DAY, Normal, Disp-100 Strip, R-0      montelukast (SINGULAIR) 10 mg tablet TAKE 1 TABLET EVERY DAY, Normal, Disp-90 Tablet, R-1      omeprazole (PRILOSEC) 40 mg capsule Take 1 Capsule by mouth in the morning., Normal, Disp-90 Capsule, R-1      glipiZIDE SR (GLUCOTROL XL) 10 mg CR tablet Historical Med      CALC-D3-MAGNES-A9-BB-UT-MELVIN PO Historical Med      valsartan (DIOVAN) 320 mg tablet Take 1 Tablet by mouth daily. , Normal, Disp-90 Tablet, R-3      Blood-Glucose Meter (Accu-Chek Eliane Plus Meter) misc Use 1 daily for blood glucose monitoring DX: E11.9, Normal, Disp-1 Each, R-0      !! glucose blood VI test strips (Accu-Chek Eliane Plus test strp) strip Use 1 daily for blood glucose monitoring DX: E11.9, Normal, Disp-300 Strip, R-1      ashwagandha root extract 300 mg cap Take  by mouth., Historical Med      tadalafiL (CIALIS) 20 mg tablet Take 1 Tablet by mouth as needed for Erectile Dysfunction. , Normal, Disp-90 Tablet, R-3, BRIAN      cholecalciferol, vitamin D3, (VITAMIN D3 PO) Take  by mouth., Historical Med      multivitamin (ONE A DAY) tablet Take 1 Tab by mouth daily. , Historical Med      CINNAMON BARK (CINNAMON PO) Take  by mouth., Historical Med      TURMERIC ROOT EXTRACT PO Take  by mouth., Historical Med      GINGER ROOT (GINGER EXTRACT PO) Take  by mouth., Historical Med      Insulin Needles, Disposable, (BD INSULIN PEN NEEDLE UF MINI) 31 gauge x 3/16\" ndle Check twice daily, Normal, Disp-100 Pen Needle, R-1      cyanocobalamin (VITAMIN B-12) 1,000 mcg sublingual tablet Take 1,000 mcg by mouth daily. , Historical Med       !! - Potential duplicate medications found. Please discuss with provider.             Dictation disclaimer:  Please note that this dictation was completed with Nadine, the [employer] voice recognition software. Quite often unanticipated grammatical, syntax, homophones, and other interpretive errors are inadvertently transcribed by the computer software. Please disregard these errors. Please excuse any errors that have escaped final proofreading.

## 2023-01-18 NOTE — ED TRIAGE NOTES
Pt is ambulatory to triage, presents with complaints of dog bite to his L ear. States he was playing with his husky puppy last night when it bit his ear at approx 2100. Bleeding is controlled at this time. No other complaints. Tetanus approx 6 years ago.

## 2023-01-18 NOTE — DISCHARGE INSTRUCTIONS
Keep wound clean and dry. Do not get the wound wet at all. Take antibiotics as directed. Follow-up in the ER or with specialist as directed in 2 days for wound recheck. Stitches will need to be removed in 5 days.

## 2023-01-20 ENCOUNTER — HOSPITAL ENCOUNTER (EMERGENCY)
Age: 73
Discharge: HOME | End: 2023-01-20
Attending: EMERGENCY MEDICINE | Admitting: EMERGENCY MEDICINE
Payer: MEDICARE

## 2023-01-20 VITALS
WEIGHT: 150 LBS | DIASTOLIC BLOOD PRESSURE: 84 MMHG | OXYGEN SATURATION: 99 % | RESPIRATION RATE: 16 BRPM | SYSTOLIC BLOOD PRESSURE: 127 MMHG | HEART RATE: 78 BPM | BODY MASS INDEX: 24.11 KG/M2 | HEIGHT: 66 IN | TEMPERATURE: 97.5 F

## 2023-01-20 DIAGNOSIS — S01.312D EAR LOBE LACERATION, LEFT, SUBSEQUENT ENCOUNTER: Primary | ICD-10-CM

## 2023-01-20 PROCEDURE — 75810000275 HC EMERGENCY DEPT VISIT NO LEVEL OF CARE: Performed by: EMERGENCY MEDICINE

## 2023-01-20 NOTE — ED TRIAGE NOTES
Pt to ED for suture removal for sutures placed on Wednesday, 3 days ago. Pt told typically sutures remained intact for 7-10 days. Pt states he was told to come back Friday for \"them to look\".

## 2023-01-20 NOTE — ED PROVIDER NOTES
EMERGENCY DEPARTMENT HISTORY AND PHYSICAL EXAM    3:03 PM patient seen at this time in room 1      Date: 1/20/2023  Patient Name: Arnulfo Lazo    History of Presenting Illness     Chief Complaint   Patient presents with    Wound Check         History Provided By: patient    Additional History (Context): Arnulfo Lazo is a 81 y.o. male presents with seen 2 days ago by Dr. Hever Meyer and jagged wound of the left ear with exposed cartilage was repaired, patient on antibiotics he is here as directed and a 2-day wound check. No drainage and no complication has continued pain. He has made an appointment with Dr. Li Haque ENT for Tuesday. PCP: Conor Chaparro MD    Chief Complaint:   Duration:    Timing:    Location:   Quality:   Severity:   Modifying Factors:   Associated Symptoms:       Current Outpatient Medications   Medication Sig Dispense Refill    amoxicillin-clavulanate (Augmentin) 875-125 mg per tablet Take 1 Tablet by mouth two (2) times a day for 7 days. 14 Tablet 0    ciprofloxacin HCl (Cipro) 500 mg tablet Take 1 Tablet by mouth two (2) times a day for 7 days. 14 Tablet 0    semaglutide (Rybelsus) 14 mg tablet Take 1 Tablet by mouth Daily (before breakfast) for 360 days. Indications: type 2 diabetes mellitus 90 Tablet 3    lancets (Accu-Chek Softclix Lancets) misc TEST BLOOD SUGAR EVERY  Each 3    BD Ultra-Fine Short Pen Needle 31 gauge x 5/16\" ndle USE  TO INJECT ONE TIME DAILY 100 Each 5    atorvastatin (LIPITOR) 10 mg tablet TAKE 1 TABLET EVERY NIGHT 90 Tablet 1    tamsulosin (FLOMAX) 0.4 mg capsule TAKE 1 CAPSULE EVERY DAY 90 Capsule 1    metFORMIN (GLUCOPHAGE) 1,000 mg tablet Take 1 Tablet by mouth two (2) times daily (with meals).  180 Tablet 1    finasteride (PROSCAR) 5 mg tablet TAKE 1 TABLET EVERY DAY 90 Tablet 1    trospium (SANCTURA) 20 mg tablet Trospium Oral    1 tab po once daily    active      Accu-Chek Eliane Plus test strp strip TEST EVERY  Strip 0    montelukast (SINGULAIR) 10 mg tablet TAKE 1 TABLET EVERY DAY 90 Tablet 1    omeprazole (PRILOSEC) 40 mg capsule Take 1 Capsule by mouth in the morning. 90 Capsule 1    glipiZIDE SR (GLUCOTROL XL) 10 mg CR tablet       CALC-D3-MAGNES-L3-OO-BN-MELVIN PO       valsartan (DIOVAN) 320 mg tablet Take 1 Tablet by mouth daily. 90 Tablet 3    Blood-Glucose Meter (Accu-Chek Eliane Plus Meter) misc Use 1 daily for blood glucose monitoring DX: E11.9 1 Each 0    glucose blood VI test strips (Accu-Chek Eliane Plus test strp) strip Use 1 daily for blood glucose monitoring DX: E11.9 300 Strip 1    insulin glargine (Lantus Solostar U-100 Insulin) 100 unit/mL (3 mL) inpn INJECT  10 UNITS SUBCUTANEOUSLY AT BEDTIME. DISCARD AND BEGIN NEW PEN AFTER 28 DAYS. 5 Pen 3    ashwagandha root extract 300 mg cap Take  by mouth.      tadalafiL (CIALIS) 20 mg tablet Take 1 Tablet by mouth as needed for Erectile Dysfunction. 90 Tablet 3    cholecalciferol, vitamin D3, (VITAMIN D3 PO) Take  by mouth.      multivitamin (ONE A DAY) tablet Take 1 Tab by mouth daily. CINNAMON BARK (CINNAMON PO) Take  by mouth. TURMERIC ROOT EXTRACT PO Take  by mouth. GINGER ROOT (GINGER EXTRACT PO) Take  by mouth. Insulin Needles, Disposable, (BD INSULIN PEN NEEDLE UF MINI) 31 gauge x 3/16\" ndle Check twice daily 100 Pen Needle 1    cyanocobalamin (VITAMIN B-12) 1,000 mcg sublingual tablet Take 1,000 mcg by mouth daily.          Past History     Past Medical History:  Past Medical History:   Diagnosis Date    Bladder cancer ([address] 7/15/13    Clinical Stage [id number] HG UC    Bladder tumor     Diabetes ([facility] [address]     Essential hypertension     History of kidney stones     Hyperlipidemia     Peripheral neuropathy     SBO (small bowel obstruction) (HCC)     Spinal stenosis     Spondylolisthesis, grade 1        Past Surgical History:  Past Surgical History:   Procedure Laterality Date    HX UROLOGICAL  7/15/13    TURBT, Dr. Carole Powell, [facility]    HX UROLOGICAL  1/4/16    Cysto,  [facility]    HX WRIST FRACTURE TX  4/27/15    (L) wrist       Family History:  Family History   Problem Relation Age of Onset    Hypertension Mother     Heart Attack Neg Hx     Sudden Death Neg Hx        Social History:  Social History     Tobacco Use    Smoking status: Never    Smokeless tobacco: Never   Substance Use Topics    Alcohol use: Yes     Comment: occ    Drug use: No       Allergies: Allergies   Allergen Reactions    Actos [Pioglitazone] Other (comments)     Personal history of bladder cancer           Review of Systems     Review of Systems      Physical Exam       Patient Vitals for the past 12 hrs:   Temp Pulse Resp BP SpO2   01/20/23 1448 97.5 °F (36.4 °C) 78 16 127/84 99 %       IPVITALS  Patient Vitals for the past 24 hrs:   BP Temp Pulse Resp SpO2 Height Weight   01/20/23 1448 127/84 97.5 °F (36.4 °C) 78 16 99 % 5' 6\" (1.676 m) 68 kg (150 lb)       Physical Exam  Vitals and nursing note reviewed. Constitutional:       Appearance: He is well-developed. HENT:      Head: Normocephalic and atraumatic. Ears:      Comments: Laceration at the top of the left ear well approximated stitches intact no dehiscence no drainage no erythema. Eyes:      General: No scleral icterus. Conjunctiva/sclera: Conjunctivae normal.   Neck:      Vascular: No JVD. Cardiovascular:      Rate and Rhythm: Normal rate and regular rhythm. Pulmonary:      Effort: Pulmonary effort is normal. No respiratory distress. Musculoskeletal:         General: Normal range of motion. Cervical back: Normal range of motion and neck supple. Skin:     General: Skin is warm and dry. Neurological:      Mental Status: He is alert. Psychiatric:         Thought Content: Thought content normal.         Judgment: Judgment normal.         Diagnostic Study Results   Labs -  No results found for this or any previous visit (from the past 24 hour(s)).     Radiologic Studies -   No orders to display     No results found.    Medications ordered:   Medications - No data to display      Medical Decision Making   Initial Medical Decision Making and DDx:  No visible complication of the stitches dehiscence infection. He has an appointment with ENT. He is on his antibiotics and will continue this. ED Course: Progress Notes, Reevaluation, and Consults:         I am the first provider for this patient. I reviewed the vital signs, available nursing notes, past medical history, past surgical history, family history and social history. Patient Vitals for the past 12 hrs:   Temp Pulse Resp BP SpO2   01/20/23 1448 97.5 °F (36.4 °C) 78 16 127/84 99 %       Vital Signs-Reviewed the patient's vital signs. Pulse Oximetry Analysis, Cardiac Monitor, 12 lead ekg:      Interpreted by the EP. Records Reviewed: Nursing notes reviewed (Time of Review: 3:03 PM)    Procedures:   Critical Care Time: 0  If critical care time is note it is exclusive of any separately billable procedures. Aspirin: (was aspirin given for stroke?)    Diagnosis     Clinical Impression:   1. Ear lobe laceration, left, subsequent encounter        Disposition: Discharged      Follow-up Information       Follow up With Specialties Details Why Contact Info    Dwight Green MD Otolaryngology, Surgery Physician In 4 days  [phone]               Patient's Medications   Start Taking    No medications on file   Continue Taking    ACCU-CHEK HALIMA PLUS TEST STRP STRIP    TEST EVERY DAY    AMOXICILLIN-CLAVULANATE (AUGMENTIN) 875-125 MG PER TABLET    Take 1 Tablet by mouth two (2) times a day for 7 days. ASHWAGANDHA ROOT EXTRACT 300 MG CAP    Take  by mouth.     ATORVASTATIN (LIPITOR) 10 MG TABLET    TAKE 1 TABLET EVERY NIGHT    BD ULTRA-FINE SHORT PEN NEEDLE 31 GAUGE X 5/16\" NDLE    USE  TO INJECT ONE TIME DAILY    BLOOD-GLUCOSE METER (ACCU-CHEK HALIMA PLUS METER) MISC    Use 1 daily for blood glucose monitoring DX: E11.9    CALC-D3-MAGNES-B0-GF-TE-MELVIN PO        CHOLECALCIFEROL, VITAMIN D3, (VITAMIN D3 PO)    Take  by mouth. CINNAMON BARK (CINNAMON PO)    Take  by mouth. CIPROFLOXACIN HCL (CIPRO) 500 MG TABLET    Take 1 Tablet by mouth two (2) times a day for 7 days. CYANOCOBALAMIN (VITAMIN B-12) 1,000 MCG SUBLINGUAL TABLET    Take 1,000 mcg by mouth daily. FINASTERIDE (PROSCAR) 5 MG TABLET    TAKE 1 TABLET EVERY DAY    GINGER ROOT (GINGER EXTRACT PO)    Take  by mouth. GLIPIZIDE SR (GLUCOTROL XL) 10 MG CR TABLET        GLUCOSE BLOOD VI TEST STRIPS (ACCU-CHEK HALIMA PLUS TEST STRP) STRIP    Use 1 daily for blood glucose monitoring DX: E11.9    INSULIN GLARGINE (LANTUS SOLOSTAR U-100 INSULIN) 100 UNIT/ML (3 ML) INPN    INJECT  10 UNITS SUBCUTANEOUSLY AT BEDTIME. DISCARD AND BEGIN NEW PEN AFTER 28 DAYS. INSULIN NEEDLES, DISPOSABLE, (BD INSULIN PEN NEEDLE UF MINI) 31 GAUGE X 3/16\" NDLE    Check twice daily    LANCETS (ACCU-CHEK SOFTCLIX LANCETS) MISC    TEST BLOOD SUGAR EVERY DAY    METFORMIN (GLUCOPHAGE) 1,000 MG TABLET    Take 1 Tablet by mouth two (2) times daily (with meals). MONTELUKAST (SINGULAIR) 10 MG TABLET    TAKE 1 TABLET EVERY DAY    MULTIVITAMIN (ONE A DAY) TABLET    Take 1 Tab by mouth daily. OMEPRAZOLE (PRILOSEC) 40 MG CAPSULE    Take 1 Capsule by mouth in the morning. SEMAGLUTIDE (RYBELSUS) 14 MG TABLET    Take 1 Tablet by mouth Daily (before breakfast) for 360 days. Indications: type 2 diabetes mellitus    TADALAFIL (CIALIS) 20 MG TABLET    Take 1 Tablet by mouth as needed for Erectile Dysfunction. TAMSULOSIN (FLOMAX) 0.4 MG CAPSULE    TAKE 1 CAPSULE EVERY DAY    TROSPIUM (SANCTURA) 20 MG TABLET    Trospium Oral    1 tab po once daily    active    TURMERIC ROOT EXTRACT PO    Take  by mouth. VALSARTAN (DIOVAN) 320 MG TABLET    Take 1 Tablet by mouth daily.    These Medications have changed    No medications on file   Stop Taking    No medications on file _______________________________    Notes:    Guillermo Geralds, MD using Dragon dictation      _______________________________

## 2023-01-25 ENCOUNTER — OFFICE VISIT (OUTPATIENT)
Dept: FAMILY MEDICINE CLINIC | Age: 73
End: 2023-01-25
Payer: MEDICARE

## 2023-01-25 VITALS
TEMPERATURE: 97.9 F | OXYGEN SATURATION: 95 % | SYSTOLIC BLOOD PRESSURE: 126 MMHG | BODY MASS INDEX: 24.14 KG/M2 | HEIGHT: 66 IN | HEART RATE: 86 BPM | RESPIRATION RATE: 16 BRPM | WEIGHT: 150.2 LBS | DIASTOLIC BLOOD PRESSURE: 78 MMHG

## 2023-01-25 DIAGNOSIS — C67.9 MALIGNANT NEOPLASM OF URINARY BLADDER, UNSPECIFIED SITE (HCC): ICD-10-CM

## 2023-01-25 DIAGNOSIS — E11.65 POORLY CONTROLLED DIABETES MELLITUS (HCC): ICD-10-CM

## 2023-01-25 DIAGNOSIS — E78.2 MIXED HYPERLIPIDEMIA: ICD-10-CM

## 2023-01-25 DIAGNOSIS — I10 ESSENTIAL HYPERTENSION, BENIGN: Primary | ICD-10-CM

## 2023-01-25 DIAGNOSIS — L84 CORN: ICD-10-CM

## 2023-01-25 PROCEDURE — 3017F COLORECTAL CA SCREEN DOC REV: CPT | Performed by: FAMILY MEDICINE

## 2023-01-25 PROCEDURE — 99213 OFFICE O/P EST LOW 20 MIN: CPT | Performed by: FAMILY MEDICINE

## 2023-01-25 PROCEDURE — G8536 NO DOC ELDER MAL SCRN: HCPCS | Performed by: FAMILY MEDICINE

## 2023-01-25 PROCEDURE — 3074F SYST BP LT 130 MM HG: CPT | Performed by: FAMILY MEDICINE

## 2023-01-25 PROCEDURE — 1123F ACP DISCUSS/DSCN MKR DOCD: CPT | Performed by: FAMILY MEDICINE

## 2023-01-25 PROCEDURE — 2022F DILAT RTA XM EVC RTNOPTHY: CPT | Performed by: FAMILY MEDICINE

## 2023-01-25 PROCEDURE — G8510 SCR DEP NEG, NO PLAN REQD: HCPCS | Performed by: FAMILY MEDICINE

## 2023-01-25 PROCEDURE — 3078F DIAST BP <80 MM HG: CPT | Performed by: FAMILY MEDICINE

## 2023-01-25 PROCEDURE — G8427 DOCREV CUR MEDS BY ELIG CLIN: HCPCS | Performed by: FAMILY MEDICINE

## 2023-01-25 PROCEDURE — G8420 CALC BMI NORM PARAMETERS: HCPCS | Performed by: FAMILY MEDICINE

## 2023-01-25 PROCEDURE — 1101F PT FALLS ASSESS-DOCD LE1/YR: CPT | Performed by: FAMILY MEDICINE

## 2023-01-25 PROCEDURE — 3046F HEMOGLOBIN A1C LEVEL >9.0%: CPT | Performed by: FAMILY MEDICINE

## 2023-01-25 NOTE — PATIENT INSTRUCTIONS
Call Dr. Mitchel Smith office to schedule a follow up appt   Complete your eye exam as your are due now.

## 2023-01-25 NOTE — PROGRESS NOTES
1. \"Have you been to the ER, urgent care clinic since your last visit? Hospitalized since your last visit? \" Yes When: Antonina Rickettsue 1/20/23 for earlobe laceration left, subsequent enc. [facility] 1/18/23 for dog bite left ear, complex laceration of ear, initial encounter. 2. \"Have you seen or consulted any other health care providers outside of the [address] [place] [address] since your last visit? \" Yes When: Yuri Maxwell LOV: 11/2022      3. For patients aged 39-70: Has the patient had a colonoscopy / FIT/ Cologuard?  Yes - no Care Gap present      Chief Complaint   Patient presents with    Diabetes    Hypertension

## 2023-02-03 NOTE — PROGRESS NOTES
Pharmacy Progress Note - Diabetes Management       Assessment / Plan:   Diabetes Management:  Per ADA guidelines, Pt's A1c is not at goal of < 7%. Pt's basal control is lacking which is exacerbating his post-prandial excursions. He has not followed the titration protocol to increase his Lantus. Will increase his Lantus to 17 units daily and have him resume the titration protocol by increasing by 2 units for every 2 day-average > 140mg/dL. Will reassess with SMBG logs at follow up in 4 weeks. Nutrition/Lifestyle Modifications:  - Educated pt on the importance of moderating carbohydrate intake. Reviewed sources of carbohydrates and method to help determine appropriate portion sizes (e.g., Diabetes Plate Method). - Advised patient to avoid sugar-sweetened beverages and replace with water or diet/zero sugar option.  - Recommend ~30 minutes consistent, moderately intensive, exercise/day or ~150 minutes/week. Start small, stay consistent, and increase length and types of exercise, as tolerated. Patient will return to clinic in 4 week(s) for follow up. S/O: Mr. Lesvia Sommers, a 81 y.o. male referred by Molly Schulz MD,  has a past medical history of Bladder cancer [facility]) (7/15/13), Bladder tumor, Diabetes ([facility] [address] Essential hypertension, History of kidney stones, Hyperlipidemia, Peripheral neuropathy, SBO (small bowel obstruction) ([facility] [address] Spinal stenosis, and Spondylolisthesis, grade 1. Pt was seen today virtually via MyChart video visit for diabetes management. Patient's last A1c was:   Lab Results   Component Value Date/Time    Hemoglobin A1c 9.2 (H) 06/03/2022 08:21 AM    Hemoglobin A1c (POC) 8.6 09/21/2022 04:45 PM    Hemoglobin A1c, External 8.4 11/03/2022 02:28 PM       Interim update: Pt was last seen by me on 19 Dec 2022. Per my prior note: Pt's A1c is not at goal of < 7%. Pt's FBG values are typically elevated, but unknown prandial control without SMBG logs during those times. Will stop the glipizide as it is likely no longer needed with the Rybelsus 14mg on board. Will have him start checking staggered prandial BG values in order to monitor NFBG values. Will follow up with pt in 6 weeks follow his appointment with Riaz Rosenberg MD .  He will have his labs drawn prior to her appointment with him. Pt was seen by Riaz Rosenberg MD on 25 Jan 2023 where his Lantus was increased to 17 units qhs and advised to increase by 2 units every other day until FBG < 140mg/dL. Today:   He states that he has remained at 15 units of Lantus despite having FBG values consistently above 140mg/dL. He states that he is amenable to going to 17 units now qam.  He is hesitant to make further changes to his regimen at this time. He wants to work on the insulin dose increase and follow up in a month. He states that he will be going to see a dietician in the interim through his endocrinologist.    Current anti-hyperglycemic regimen includes:    Key Antihyperglycemic Medications               semaglutide (Rybelsus) 14 mg tablet Take 1 Tablet by mouth Daily (before breakfast) for 360 days. Indications: type 2 diabetes mellitus    metFORMIN (GLUCOPHAGE) 1,000 mg tablet Take 1 Tablet by mouth two (2) times daily (with meals). glipiZIDE SR (GLUCOTROL XL) 10 mg CR tablet     insulin glargine (Lantus Solostar U-100 Insulin) 100 unit/mL (3 mL) inpn INJECT  10 UNITS SUBCUTANEOUSLY AT BEDTIME. DISCARD AND BEGIN NEW PEN AFTER 28 DAYS. Complete current medication regimen includes:  Current Outpatient Medications   Medication Sig    semaglutide (Rybelsus) 14 mg tablet Take 1 Tablet by mouth Daily (before breakfast) for 360 days.  Indications: type 2 diabetes mellitus    lancets (Accu-Chek Softclix Lancets) misc TEST BLOOD SUGAR EVERY DAY    BD Ultra-Fine Short Pen Needle 31 gauge x 5/16\" ndle USE  TO INJECT ONE TIME DAILY    atorvastatin (LIPITOR) 10 mg tablet TAKE 1 TABLET EVERY NIGHT    tamsulosin (FLOMAX) 0.4 mg capsule TAKE 1 CAPSULE EVERY DAY    metFORMIN (GLUCOPHAGE) 1,000 mg tablet Take 1 Tablet by mouth two (2) times daily (with meals). finasteride (PROSCAR) 5 mg tablet TAKE 1 TABLET EVERY DAY    trospium (SANCTURA) 20 mg tablet Trospium Oral    1 tab po once daily    active    Accu-Chek Eliane Plus test strp strip TEST EVERY DAY    montelukast (SINGULAIR) 10 mg tablet TAKE 1 TABLET EVERY DAY    omeprazole (PRILOSEC) 40 mg capsule Take 1 Capsule by mouth in the morning. glipiZIDE SR (GLUCOTROL XL) 10 mg CR tablet     CALC-D3-MAGNES-P4-BH-SN-MELVIN PO     valsartan (DIOVAN) 320 mg tablet Take 1 Tablet by mouth daily. Blood-Glucose Meter (Accu-Chek Eliane Plus Meter) misc Use 1 daily for blood glucose monitoring DX: E11.9    glucose blood VI test strips (Accu-Chek Eliane Plus test strp) strip Use 1 daily for blood glucose monitoring DX: E11.9    insulin glargine (Lantus Solostar U-100 Insulin) 100 unit/mL (3 mL) inpn INJECT  10 UNITS SUBCUTANEOUSLY AT BEDTIME. DISCARD AND BEGIN NEW PEN AFTER 28 DAYS. ashwagandha root extract 300 mg cap Take  by mouth.    tadalafiL (CIALIS) 20 mg tablet Take 1 Tablet by mouth as needed for Erectile Dysfunction. cholecalciferol, vitamin D3, (VITAMIN D3 PO) Take  by mouth.    multivitamin (ONE A DAY) tablet Take 1 Tab by mouth daily. CINNAMON BARK (CINNAMON PO) Take  by mouth. TURMERIC ROOT EXTRACT PO Take  by mouth. GINGER ROOT (GINGER EXTRACT PO) Take  by mouth. Insulin Needles, Disposable, (BD INSULIN PEN NEEDLE UF MINI) 31 gauge x 3/16\" ndle Check twice daily    cyanocobalamin (VITAMIN B-12) 1,000 mcg sublingual tablet Take 1,000 mcg by mouth daily. No current facility-administered medications for this visit. Allergies:   Allergies   Allergen Reactions    Actos [Pioglitazone] Other (comments)     Personal history of bladder cancer         Blood Glucose Monitoring (BGM) or CGM:  - Had access to home glucometer/blood glucose log/CGM reader today:  no  - Conducts/scans: 1-2x   - Fasting BG today: 152  - Post-prandial:   -FB-162   -ac lunch: 180s    -ac dinner: 190s-200s   -hs: none    ROS:  Today, Pt endorses:  - Symptoms of Hyperglycemia: none  - Symptoms of Hypoglycemia: none    Lifestyle modification(s):  - none    Medication Adherence/Access:  - Endorses adherence to current regimen?: yes    Vitals/Labs:  Lab Results   Component Value Date/Time    Hemoglobin A1c 9.2 (H) 2022 08:21 AM    Hemoglobin A1c 9.0 (H) 2021 09:17 AM    Hemoglobin A1c 7.2 (H) 2020 08:52 AM    Hemoglobin A1c, External 8.4 2022 02:28 PM    Hemoglobin A1c, External 8.4 2016 12:00 AM     Hemoglobin A1c (POC)   Date Value Ref Range Status   2022 8.6 % Final        Screenings/Prevention Parameters:  -Diabetic Eye and Foot Exams:      Diabetic Foot and Eye Exam HM Status   Topic Date Due    Diabetic Foot Care  2018    Eye Exam  2020     -Microalbumin / Creatinine ratio:       Lab Results   Component Value Date/Time    Microalbumin/Creat ratio (mg/g creat) 12 2017 09:30 AM    Microalb/Creat ratio (ug/mg creat.) 2021 09:17 AM    Microalbumin,urine random 1.91 2017 09:30 AM     -ASCVD Risk Score Parameters and Calculation    The 10-year ASCVD risk score (Jacquelin DK, et al., 2019) is: 32.2%    Values used to calculate the score:      Age: 81 years      Sex: Male      Is Non- : No      Diabetic: Yes      Tobacco smoker: No      Systolic Blood Pressure: 558 mmHg      Is BP treated: Yes      HDL Cholesterol: 48 MG/DL      Total Cholesterol: 153 MG/DL    -Immunizations:      Immunization History   Administered Date(s) Administered     Influenza, FLUZONE (age 72 y+), High Dose 10/17/2022    COVID-19, MODERNA BLUE border, Primary or Immunocompromised, (age 18y+), IM, 100 mcg/0.5mL 2021, 2021, 10/29/2021, 2022    COVID-19, MODERNA Bivalent BOOSTER, (age 18y+), IM, 50 mcg/0.5 mL 10/17/2022    Hep A Vaccine 10/31/2002, 01/11/2016    Influenza High Dose Vaccine PF 10/29/2021    Influenza Vaccine 10/01/2016    Influenza Vaccine (Tri) Adjuvanted (>65 Yrs FLUAD TRI 91105) 10/08/2018, 10/15/2019    Influenza, FLUARIX, FLULAVAL, FLUZONE (age 10 mo+) AND AFLURIA, (age 1 y+), PF, 0.5mL 10/23/2015    Pneumococcal Conjugate (PCV-13) 06/29/2016    Pneumococcal Polysaccharide (PPSV-23) 08/30/2017    Tdap 04/20/2015, 01/18/2023    Typhoid Vaccine 10/31/2002, 01/11/2016    Yellow Fever Vaccine 01/11/2016    Zoster Recombinant 01/22/2020, 06/01/2020       Additional Laboratory Parameters of Interest:   Estimation of renal function:  Lab Results   Component Value Date/Time    Creatinine 0.85 06/03/2022 08:21 AM    Creatinine 0.84 12/02/2021 08:45 AM    Creatinine 0.84 09/30/2021 11:30 AM    GFR est AA >60 06/03/2022 08:21 AM    GFR est AA >60 12/02/2021 08:45 AM    GFR est AA >60 09/30/2021 11:30 AM    GFR est non-AA >60 06/03/2022 08:21 AM    GFR est non-AA >60 12/02/2021 08:45 AM    GFR est non-AA >60 09/30/2021 11:30 AM     Wt Readings from Last 3 Encounters:   01/25/23 150 lb 3.2 oz (68.1 kg)   01/20/23 150 lb (68 kg)   01/18/23 150 lb (68 kg)     Ht Readings from Last 1 Encounters:   01/25/23 5' 6\" (1.676 m)     Calculated estimated creatinine clearance: CrCl cannot be calculated (Patient's most recent lab result is older than the maximum 180 days allowed. ). Vital Signs Today:    There were no vitals taken for this visit. There are no discontinued medications. No orders of the defined types were placed in this encounter. Future Appointments   Date Time Provider Ruben Chavez   2/6/2023  3:00 PM AMANDA Slaughter [facility] BS AMB       Patient verbalized understanding of the information presented and all of the patients questions were answered. AVS was gone over with the patient. Patient advised to call the office with any additional questions or concerns.     Notifications of recommendations will be sent to Martita Yusuf MD for review.       Thank you for the consult,  Osei Arango, RanjitD, BCACP, BC-ADM        For Pharmacy Admin Tracking Only    Program: Medical Group  CPA in place: Yes  Recommendation Provided To: Patient/Caregiver: 3 via Virtual Visit  Intervention Detail: Adherence Monitorin, Dose Adjustment: 1, reason: Therapy Optimization, and Scheduled Appointment  Intervention Accepted By: Patient/Caregiver: 3  Gap Closed?: No  Time Spent (min): 20

## 2023-02-05 DIAGNOSIS — E11.65 POORLY CONTROLLED DIABETES MELLITUS (HCC): Primary | ICD-10-CM

## 2023-02-06 ENCOUNTER — VIRTUAL VISIT (OUTPATIENT)
Dept: INTERNAL MEDICINE CLINIC | Age: 73
End: 2023-02-06

## 2023-02-06 ENCOUNTER — TRANSCRIBE ORDER (OUTPATIENT)
Dept: SCHEDULING | Age: 73
End: 2023-02-06

## 2023-02-06 DIAGNOSIS — R93.89 ABNORMAL RADIOLOGICAL FINDINGS IN SKIN AND SUBCUTANEOUS TISSUE: Primary | ICD-10-CM

## 2023-02-06 DIAGNOSIS — Z79.4 TYPE 2 DIABETES MELLITUS WITHOUT COMPLICATION, WITH LONG-TERM CURRENT USE OF INSULIN (HCC): Primary | ICD-10-CM

## 2023-02-06 DIAGNOSIS — E11.9 TYPE 2 DIABETES MELLITUS WITHOUT COMPLICATION, WITH LONG-TERM CURRENT USE OF INSULIN (HCC): Primary | ICD-10-CM

## 2023-02-07 ENCOUNTER — TRANSCRIBE ORDERS (OUTPATIENT)
Facility: HOSPITAL | Age: 73
End: 2023-02-07

## 2023-02-07 DIAGNOSIS — R93.89 ABNORMAL RADIOLOGICAL FINDINGS IN SKIN AND SUBCUTANEOUS TISSUE: Primary | ICD-10-CM

## 2023-02-13 RX ORDER — BLOOD SUGAR DIAGNOSTIC
STRIP MISCELLANEOUS
Qty: 100 STRIP | Refills: 5 | Status: SHIPPED | OUTPATIENT
Start: 2023-02-13

## 2023-02-16 RX ORDER — OMEPRAZOLE 40 MG/1
CAPSULE, DELAYED RELEASE ORAL
Qty: 90 CAPSULE | Refills: 1 | Status: SHIPPED | OUTPATIENT
Start: 2023-02-16

## 2023-02-16 RX ORDER — MONTELUKAST SODIUM 10 MG/1
TABLET ORAL
Qty: 90 TABLET | Refills: 1 | Status: SHIPPED | OUTPATIENT
Start: 2023-02-16

## 2023-02-21 ENCOUNTER — HOSPITAL ENCOUNTER (OUTPATIENT)
Facility: HOSPITAL | Age: 73
Discharge: HOME | End: 2023-02-24
Payer: MEDICARE

## 2023-02-21 DIAGNOSIS — R93.89 ABNORMAL RADIOLOGICAL FINDINGS IN SKIN AND SUBCUTANEOUS TISSUE: ICD-10-CM

## 2023-02-21 LAB — CREAT UR-MCNC: 0.6 MG/DL (ref 0.6–1.3)

## 2023-02-21 PROCEDURE — 6360000004 HC RX CONTRAST MEDICATION: Performed by: PHYSICIAN ASSISTANT

## 2023-02-21 PROCEDURE — 82565 ASSAY OF CREATININE: CPT

## 2023-02-21 PROCEDURE — 71260 CT THORAX DX C+: CPT

## 2023-02-21 RX ADMIN — IOPAMIDOL 100 ML: 612 INJECTION, SOLUTION INTRAVENOUS at 12:20

## 2023-03-03 RX ORDER — INSULIN GLARGINE 100 [IU]/ML
INJECTION, SOLUTION SUBCUTANEOUS
COMMUNITY
Start: 2020-04-01

## 2023-03-03 RX ORDER — TROSPIUM CHLORIDE 20 MG/1
TABLET, FILM COATED ORAL
COMMUNITY

## 2023-03-03 RX ORDER — IPRATROPIUM BROMIDE 21 UG/1
SPRAY, METERED NASAL
COMMUNITY
Start: 2023-02-19

## 2023-03-03 RX ORDER — ATORVASTATIN CALCIUM 10 MG/1
TABLET, FILM COATED ORAL
COMMUNITY
Start: 2018-10-08 | End: 2023-05-01

## 2023-03-03 RX ORDER — FINASTERIDE 5 MG/1
TABLET, FILM COATED ORAL
COMMUNITY
Start: 2020-05-28 | End: 2023-04-10 | Stop reason: SDUPTHER

## 2023-03-03 RX ORDER — OXYBUTYNIN CHLORIDE 10 MG/1
10 TABLET, EXTENDED RELEASE ORAL DAILY PRN
COMMUNITY
Start: 2022-06-17

## 2023-03-03 RX ORDER — TAMSULOSIN HYDROCHLORIDE 0.4 MG/1
CAPSULE ORAL
COMMUNITY
Start: 2022-10-01 | End: 2023-05-01

## 2023-03-03 RX ORDER — VALSARTAN 320 MG/1
320 TABLET ORAL DAILY
COMMUNITY
Start: 2022-05-31

## 2023-03-03 RX ORDER — GLIPIZIDE 10 MG/1
TABLET, FILM COATED, EXTENDED RELEASE ORAL
COMMUNITY
Start: 2022-02-02

## 2023-03-03 RX ORDER — TRAZODONE HYDROCHLORIDE 50 MG/1
50 TABLET ORAL DAILY
COMMUNITY
Start: 2018-10-08

## 2023-03-03 RX ORDER — LANCETS
EACH MISCELLANEOUS
COMMUNITY
Start: 2023-02-20

## 2023-03-03 RX ORDER — TADALAFIL 20 MG/1
20 TABLET ORAL PRN
COMMUNITY
Start: 2021-07-21

## 2023-03-03 NOTE — TELEPHONE ENCOUNTER
Requested Prescriptions     Pending Prescriptions Disp Refills    LANTUS SOLOSTAR 100 UNIT/ML injection pen [Pharmacy Med Name: LANTUS SOLOSTAR 100 UNIT/ML Solution Pen-injector] 15 mL      Sig: INJECT 10 UNITS SUBCUTANEOUSLY AT BEDTIME .  DISCARD PEN 28 DAYS AFTER OPENING     Last OV: 1/25/2023  Last labs: 11/3/2022 (A1c)  Next OV and labs: 4/25/2023

## 2023-03-05 RX ORDER — INSULIN GLARGINE 100 [IU]/ML
INJECTION, SOLUTION SUBCUTANEOUS
Qty: 15 ML | Refills: 0 | Status: SHIPPED | OUTPATIENT
Start: 2023-03-05

## 2023-03-06 ENCOUNTER — TELEPHONE (OUTPATIENT)
Age: 73
End: 2023-03-06

## 2023-03-06 DIAGNOSIS — Z79.4 TYPE 2 DIABETES MELLITUS WITHOUT COMPLICATION, WITH LONG-TERM CURRENT USE OF INSULIN (HCC): Primary | ICD-10-CM

## 2023-03-06 DIAGNOSIS — E11.9 TYPE 2 DIABETES MELLITUS WITHOUT COMPLICATION, WITH LONG-TERM CURRENT USE OF INSULIN (HCC): Primary | ICD-10-CM

## 2023-03-06 NOTE — TELEPHONE ENCOUNTER
Pharmacy Progress Note - Telephone Call    Mr. Elizabeth Patton 81 y.o. was contacted via an outbound telephone call today to reschedule their appointment for PharmD diabetes management and education per referral from Ashley Theodore MD.  A voicemail was left for pt to return the call.     Thank you,    Chloe Small, PharmD, BCACP, BC-ADM

## 2023-04-06 LAB — HBA1C MFR BLD HPLC: 8.6 %

## 2023-05-01 RX ORDER — ATORVASTATIN CALCIUM 10 MG/1
TABLET, FILM COATED ORAL
Qty: 90 TABLET | Refills: 0 | Status: SHIPPED | OUTPATIENT
Start: 2023-05-01

## 2023-05-01 RX ORDER — TAMSULOSIN HYDROCHLORIDE 0.4 MG/1
CAPSULE ORAL
Qty: 90 CAPSULE | Refills: 0 | Status: SHIPPED | OUTPATIENT
Start: 2023-05-01

## 2023-05-24 ENCOUNTER — HOSPITAL ENCOUNTER (OUTPATIENT)
Facility: HOSPITAL | Age: 73
Discharge: HOME | End: 2023-05-27
Payer: MEDICARE

## 2023-05-24 LAB
ALBUMIN SERPL-MCNC: 3.7 G/DL (ref 3.4–5)
ALBUMIN/GLOB SERPL: 1.2 (ref 0.8–1.7)
ALP SERPL-CCNC: 85 U/L (ref 45–117)
ALT SERPL-CCNC: 21 U/L (ref 16–61)
ANION GAP SERPL CALC-SCNC: 5 MMOL/L (ref 3–18)
AST SERPL-CCNC: 14 U/L (ref 10–38)
BILIRUB SERPL-MCNC: 0.6 MG/DL (ref 0.2–1)
BUN SERPL-MCNC: 17 MG/DL (ref 7–18)
BUN/CREAT SERPL: 19 (ref 12–20)
CALCIUM SERPL-MCNC: 9.6 MG/DL (ref 8.5–10.1)
CHLORIDE SERPL-SCNC: 104 MMOL/L (ref 100–111)
CO2 SERPL-SCNC: 29 MMOL/L (ref 21–32)
CREAT SERPL-MCNC: 0.9 MG/DL (ref 0.6–1.3)
EST. AVERAGE GLUCOSE BLD GHB EST-MCNC: 180 MG/DL
GLOBULIN SER CALC-MCNC: 3.1 G/DL (ref 2–4)
GLUCOSE SERPL-MCNC: 177 MG/DL (ref 74–99)
HBA1C MFR BLD: 7.9 % (ref 4.2–5.6)
POTASSIUM SERPL-SCNC: 4.6 MMOL/L (ref 3.5–5.5)
PROT SERPL-MCNC: 6.8 G/DL (ref 6.4–8.2)
SODIUM SERPL-SCNC: 138 MMOL/L (ref 136–145)

## 2023-05-24 PROCEDURE — 36415 COLL VENOUS BLD VENIPUNCTURE: CPT

## 2023-05-24 PROCEDURE — 83036 HEMOGLOBIN GLYCOSYLATED A1C: CPT

## 2023-05-24 PROCEDURE — 80053 COMPREHEN METABOLIC PANEL: CPT

## 2023-05-24 NOTE — PROGRESS NOTES
1. \"Have you been to the ER, urgent care clinic since your last visit? Hospitalized since your last visit? \" Patient [address] LOV: two months. 2. \"Have you seen or consulted any other health care providers outside of the [address] [place] [address] since your last visit? \" No     3. For patients aged 39-70: Has the patient had a colonoscopy / FIT/ Cologuard?  Yes - no Care Gap present 8/26/2020 colonoscopy      Chief Complaint   Patient presents with    Cholesterol Problem    Diabetes    Hypertension

## 2023-05-25 ENCOUNTER — OFFICE VISIT (OUTPATIENT)
Age: 73
End: 2023-05-25
Payer: MEDICARE

## 2023-05-25 VITALS
WEIGHT: 148.6 LBS | TEMPERATURE: 97.2 F | RESPIRATION RATE: 16 BRPM | HEIGHT: 66 IN | SYSTOLIC BLOOD PRESSURE: 110 MMHG | DIASTOLIC BLOOD PRESSURE: 72 MMHG | HEART RATE: 88 BPM | OXYGEN SATURATION: 98 % | BODY MASS INDEX: 23.88 KG/M2

## 2023-05-25 DIAGNOSIS — E11.40 TYPE 2 DIABETES MELLITUS WITH DIABETIC NEUROPATHY, WITH LONG-TERM CURRENT USE OF INSULIN (HCC): Primary | ICD-10-CM

## 2023-05-25 DIAGNOSIS — K21.9 GASTROESOPHAGEAL REFLUX DISEASE, UNSPECIFIED WHETHER ESOPHAGITIS PRESENT: ICD-10-CM

## 2023-05-25 DIAGNOSIS — Z79.4 TYPE 2 DIABETES MELLITUS WITH DIABETIC NEUROPATHY, WITH LONG-TERM CURRENT USE OF INSULIN (HCC): Primary | ICD-10-CM

## 2023-05-25 DIAGNOSIS — I10 PRIMARY HYPERTENSION: ICD-10-CM

## 2023-05-25 DIAGNOSIS — E78.2 MIXED HYPERLIPIDEMIA: ICD-10-CM

## 2023-05-25 PROCEDURE — G8428 CUR MEDS NOT DOCUMENT: HCPCS | Performed by: FAMILY MEDICINE

## 2023-05-25 PROCEDURE — 3051F HG A1C>EQUAL 7.0%<8.0%: CPT | Performed by: FAMILY MEDICINE

## 2023-05-25 PROCEDURE — 3017F COLORECTAL CA SCREEN DOC REV: CPT | Performed by: FAMILY MEDICINE

## 2023-05-25 PROCEDURE — 2022F DILAT RTA XM EVC RTNOPTHY: CPT | Performed by: FAMILY MEDICINE

## 2023-05-25 PROCEDURE — 3078F DIAST BP <80 MM HG: CPT | Performed by: FAMILY MEDICINE

## 2023-05-25 PROCEDURE — G8420 CALC BMI NORM PARAMETERS: HCPCS | Performed by: FAMILY MEDICINE

## 2023-05-25 PROCEDURE — 3074F SYST BP LT 130 MM HG: CPT | Performed by: FAMILY MEDICINE

## 2023-05-25 PROCEDURE — 1123F ACP DISCUSS/DSCN MKR DOCD: CPT | Performed by: FAMILY MEDICINE

## 2023-05-25 PROCEDURE — 1036F TOBACCO NON-USER: CPT | Performed by: FAMILY MEDICINE

## 2023-05-25 PROCEDURE — 99214 OFFICE O/P EST MOD 30 MIN: CPT | Performed by: FAMILY MEDICINE

## 2023-05-25 RX ORDER — IBUPROFEN 600 MG/1
600 TABLET ORAL EVERY 6 HOURS PRN
COMMUNITY
Start: 2023-04-19

## 2023-05-25 RX ORDER — OMEPRAZOLE 40 MG/1
CAPSULE, DELAYED RELEASE ORAL
Qty: 90 CAPSULE | Refills: 0 | Status: SHIPPED | OUTPATIENT
Start: 2023-05-25

## 2023-05-25 RX ORDER — MONTELUKAST SODIUM 10 MG/1
10 TABLET ORAL DAILY
Qty: 90 TABLET | Refills: 0 | Status: SHIPPED | OUTPATIENT
Start: 2023-05-25

## 2023-05-25 RX ORDER — ATORVASTATIN CALCIUM 10 MG/1
10 TABLET, FILM COATED ORAL NIGHTLY
Qty: 90 TABLET | Refills: 0 | Status: SHIPPED | OUTPATIENT
Start: 2023-05-25

## 2023-05-25 RX ORDER — FINASTERIDE 5 MG/1
5 TABLET, FILM COATED ORAL DAILY
Qty: 90 TABLET | Refills: 0 | Status: SHIPPED | OUTPATIENT
Start: 2023-05-25

## 2023-05-25 RX ORDER — INSULIN GLARGINE 100 [IU]/ML
17 INJECTION, SOLUTION SUBCUTANEOUS NIGHTLY
Qty: 15 ML | Refills: 1 | Status: SHIPPED | OUTPATIENT
Start: 2023-05-25

## 2023-05-25 ASSESSMENT — PATIENT HEALTH QUESTIONNAIRE - PHQ9
SUM OF ALL RESPONSES TO PHQ QUESTIONS 1-9: 0
SUM OF ALL RESPONSES TO PHQ9 QUESTIONS 1 & 2: 0
SUM OF ALL RESPONSES TO PHQ QUESTIONS 1-9: 0
2. FEELING DOWN, DEPRESSED OR HOPELESS: 0
SUM OF ALL RESPONSES TO PHQ QUESTIONS 1-9: 0
1. LITTLE INTEREST OR PLEASURE IN DOING THINGS: 0
SUM OF ALL RESPONSES TO PHQ QUESTIONS 1-9: 0

## 2023-05-25 NOTE — PROGRESS NOTES
Alessandra Mendoza, 72 y.o.,  male    SUBJECTIVE  Ff-up DM (Dr. Silvino Ramires pt)    Requesting med refills    DM w/ neuropathy- pt reports 's when he checks. A1c 7.9.  from 9's, says addition of glipizide has helped improve, current meds as listed, denies hypoglycemia. HTN-denies any cp,sob, says he is active taking diovan    HL- on lipitor. ROS:  See HPI, all others negative        Patient Active Problem List   Diagnosis    S/P colonoscopy with polypectomy    Personal history of bladder cancer    Elevated PSA    Type 2 diabetes mellitus without complication, with long-term current use of insulin (HCC)    Erectile dysfunction    Cubital tunnel syndrome on left    Abnormal findings on esophagogastroduodenoscopy (EGD)    Mixed hyperlipidemia    Chest pain, unspecified    Essential hypertension, benign    Advance directive discussed with patient    Bladder tumor    Type 2 diabetes mellitus with diabetic neuropathy ([facility]    Osteoporosis    Poorly controlled diabetes mellitus ([facility]    Primary hypertension       Current Outpatient Medications   Medication Sig Dispense Refill    omeprazole (PRILOSEC) 40 MG delayed release capsule TAKE 1 CAPSULE BY MOUTH IN THE MORNING.  90 capsule 0    montelukast (SINGULAIR) 10 MG tablet Take 1 tablet by mouth daily 90 tablet 0    metFORMIN (GLUCOPHAGE) 1000 MG tablet Take 1 tablet by mouth 2 times daily (with meals) 180 tablet 0    finasteride (PROSCAR) 5 MG tablet Take 1 tablet by mouth daily 90 tablet 0    atorvastatin (LIPITOR) 10 MG tablet Take 1 tablet by mouth nightly 90 tablet 0    Semaglutide 14 MG TABS Take 14 mg by mouth every morning (before breakfast) 90 tablet 0    insulin glargine (LANTUS SOLOSTAR) 100 UNIT/ML injection pen Inject 17 Units into the skin nightly 15 mL 1    Cyanocobalamin 1000 MCG SUBL Take 1,000 mcg by mouth daily      Ergocalciferol 50 MCG (2000 UT) TABS Take 1 tablet by mouth daily      glipiZIDE (GLUCOTROL XL) 10 MG extended release tablet

## 2023-06-01 NOTE — TELEPHONE ENCOUNTER
Please obtain more information regarding patient's upcoming travel. When will he be leaving and returning? Is he traveling to [facility] as he was last fall? Thank you.

## 2023-06-15 NOTE — TELEPHONE ENCOUNTER
Tried reaching out to Mr. Woody Joaogarry several time at [phone] (ring x 7 and then goes to a busy signal).

## 2023-06-19 NOTE — TELEPHONE ENCOUNTER
Spoke with  Sandra Bhumi to follow up on medication request of Lariam 250 mg. When will he be leaving and returning? Today June 19,2023    Is he traveling to [facility] as he was last fall? Yes, and he decided to travel without mediation.

## 2023-08-24 ENCOUNTER — HOSPITAL ENCOUNTER (OUTPATIENT)
Facility: HOSPITAL | Age: 73
Discharge: HOME | End: 2023-08-24
Payer: MEDICARE

## 2023-08-24 LAB
ALBUMIN SERPL-MCNC: 3.7 G/DL (ref 3.4–5)
ALBUMIN/GLOB SERPL: 1.1 (ref 0.8–1.7)
ALP SERPL-CCNC: 84 U/L (ref 45–117)
ALT SERPL-CCNC: 24 U/L (ref 16–61)
ANION GAP SERPL CALC-SCNC: 4 MMOL/L (ref 3–18)
AST SERPL-CCNC: 15 U/L (ref 10–38)
BILIRUB SERPL-MCNC: 0.6 MG/DL (ref 0.2–1)
BUN SERPL-MCNC: 14 MG/DL (ref 7–18)
BUN/CREAT SERPL: 18 (ref 12–20)
CALCIUM SERPL-MCNC: 9.2 MG/DL (ref 8.5–10.1)
CHLORIDE SERPL-SCNC: 105 MMOL/L (ref 100–111)
CHOLEST SERPL-MCNC: 137 MG/DL
CO2 SERPL-SCNC: 31 MMOL/L (ref 21–32)
CREAT SERPL-MCNC: 0.8 MG/DL (ref 0.6–1.3)
CREAT UR-MCNC: 128 MG/DL (ref 30–125)
EST. AVERAGE GLUCOSE BLD GHB EST-MCNC: 183 MG/DL
GLOBULIN SER CALC-MCNC: 3.3 G/DL (ref 2–4)
GLUCOSE SERPL-MCNC: 123 MG/DL (ref 74–99)
HBA1C MFR BLD: 8 % (ref 4.2–5.6)
HDLC SERPL-MCNC: 64 MG/DL (ref 40–60)
HDLC SERPL: 2.1 (ref 0–5)
LDLC SERPL CALC-MCNC: 52.4 MG/DL (ref 0–100)
LIPID PANEL: ABNORMAL
MICROALBUMIN UR-MCNC: 2.93 MG/DL (ref 0–3)
MICROALBUMIN/CREAT UR-RTO: 23 MG/G (ref 0–30)
POTASSIUM SERPL-SCNC: 4.7 MMOL/L (ref 3.5–5.5)
PROT SERPL-MCNC: 7 G/DL (ref 6.4–8.2)
SODIUM SERPL-SCNC: 140 MMOL/L (ref 136–145)
TRIGL SERPL-MCNC: 103 MG/DL
VLDLC SERPL CALC-MCNC: 20.6 MG/DL

## 2023-08-24 PROCEDURE — 82570 ASSAY OF URINE CREATININE: CPT

## 2023-08-24 PROCEDURE — 83036 HEMOGLOBIN GLYCOSYLATED A1C: CPT

## 2023-08-24 PROCEDURE — 36415 COLL VENOUS BLD VENIPUNCTURE: CPT

## 2023-08-24 PROCEDURE — 82043 UR ALBUMIN QUANTITATIVE: CPT

## 2023-08-24 PROCEDURE — 80053 COMPREHEN METABOLIC PANEL: CPT

## 2023-08-24 PROCEDURE — 80061 LIPID PANEL: CPT

## 2023-08-24 RX ORDER — MEFLOQUINE HYDROCHLORIDE 250 MG/1
TABLET ORAL
Qty: 9 TABLET | OUTPATIENT
Start: 2023-08-24

## 2023-08-28 ENCOUNTER — OFFICE VISIT (OUTPATIENT)
Age: 73
End: 2023-08-28
Payer: MEDICARE

## 2023-08-28 VITALS
SYSTOLIC BLOOD PRESSURE: 117 MMHG | WEIGHT: 151 LBS | DIASTOLIC BLOOD PRESSURE: 73 MMHG | HEIGHT: 66 IN | HEART RATE: 84 BPM | OXYGEN SATURATION: 98 % | BODY MASS INDEX: 24.27 KG/M2

## 2023-08-28 DIAGNOSIS — R60.0 LOCALIZED EDEMA: ICD-10-CM

## 2023-08-28 DIAGNOSIS — I25.10 ATHEROSCLEROSIS OF NATIVE CORONARY ARTERY OF NATIVE HEART WITHOUT ANGINA PECTORIS: Primary | ICD-10-CM

## 2023-08-28 DIAGNOSIS — Z79.4 TYPE 2 DIABETES MELLITUS WITH DIABETIC NEUROPATHY, WITH LONG-TERM CURRENT USE OF INSULIN (HCC): ICD-10-CM

## 2023-08-28 DIAGNOSIS — I10 ESSENTIAL (PRIMARY) HYPERTENSION: ICD-10-CM

## 2023-08-28 DIAGNOSIS — E11.40 TYPE 2 DIABETES MELLITUS WITH DIABETIC NEUROPATHY, WITH LONG-TERM CURRENT USE OF INSULIN (HCC): ICD-10-CM

## 2023-08-28 DIAGNOSIS — E78.2 MIXED HYPERLIPIDEMIA: ICD-10-CM

## 2023-08-28 PROCEDURE — G8428 CUR MEDS NOT DOCUMENT: HCPCS | Performed by: INTERNAL MEDICINE

## 2023-08-28 PROCEDURE — 99214 OFFICE O/P EST MOD 30 MIN: CPT | Performed by: INTERNAL MEDICINE

## 2023-08-28 PROCEDURE — 2022F DILAT RTA XM EVC RTNOPTHY: CPT | Performed by: INTERNAL MEDICINE

## 2023-08-28 PROCEDURE — 3017F COLORECTAL CA SCREEN DOC REV: CPT | Performed by: INTERNAL MEDICINE

## 2023-08-28 PROCEDURE — 1123F ACP DISCUSS/DSCN MKR DOCD: CPT | Performed by: INTERNAL MEDICINE

## 2023-08-28 PROCEDURE — 3078F DIAST BP <80 MM HG: CPT | Performed by: INTERNAL MEDICINE

## 2023-08-28 PROCEDURE — 1036F TOBACCO NON-USER: CPT | Performed by: INTERNAL MEDICINE

## 2023-08-28 PROCEDURE — 3074F SYST BP LT 130 MM HG: CPT | Performed by: INTERNAL MEDICINE

## 2023-08-28 PROCEDURE — 3052F HG A1C>EQUAL 8.0%<EQUAL 9.0%: CPT | Performed by: INTERNAL MEDICINE

## 2023-08-28 PROCEDURE — G8420 CALC BMI NORM PARAMETERS: HCPCS | Performed by: INTERNAL MEDICINE

## 2023-08-28 NOTE — PROGRESS NOTES
1. Have you been to the ER, urgent care clinic since your last visit? Hospitalized since your last visit?     no      2. Where do you normally have your labs drawn?    [facility]
performed. Hemodynamics are adequate for diagnosis. Blood pressure demonstrated a normal response and heart rate demonstrated a normal response to stress. The patient's heart rate recovery was normal. The patient reported no symptoms during the stress test.       No flowsheet data found. Assessment        Diagnosis Orders   1. Atherosclerosis of native coronary artery of native heart without angina pectoris      Stable symptom continue treatment monitor      2. Type 2 diabetes mellitus with diabetic neuropathy, with long-term current use of insulin ([address])      Continue treatment monitor lab with PCP      3. Essential (primary) hypertension      Stable continue current treatment      4. Mixed hyperlipidemia      Continue treatment monitor lab with PCP      5. Localized edema      Stable continue treatment          There are no discontinued medications. No orders of the defined types were placed in this encounter. 8/2020  Cardiac status stable. No evidence of pulmonary hypertension normal ejection fraction continue to monitor  Losartan HCTZ was discontinued due to frequent nighttime urination. I have changed that to valsartan 320 mg  8/2021 stable cardiac status blood pressure controlled. Edema is stable lab will be done with PCP  8/2022  Heavy coronary calcification seen on recent noncontrast CT. We will proceed with stress test.  Blood pressure controlled. Asymptomatic  8/2023  Cardiac status stable continue current medical management. Nuclear stress test negative.

## 2023-09-05 ENCOUNTER — OFFICE VISIT (OUTPATIENT)
Age: 73
End: 2023-09-05
Payer: MEDICARE

## 2023-09-05 VITALS
TEMPERATURE: 97.2 F | BODY MASS INDEX: 24.2 KG/M2 | OXYGEN SATURATION: 96 % | HEIGHT: 66 IN | DIASTOLIC BLOOD PRESSURE: 86 MMHG | WEIGHT: 150.6 LBS | HEART RATE: 71 BPM | SYSTOLIC BLOOD PRESSURE: 124 MMHG | RESPIRATION RATE: 12 BRPM

## 2023-09-05 DIAGNOSIS — E78.2 MIXED HYPERLIPIDEMIA: ICD-10-CM

## 2023-09-05 DIAGNOSIS — I10 PRIMARY HYPERTENSION: ICD-10-CM

## 2023-09-05 DIAGNOSIS — Z79.4 TYPE 2 DIABETES MELLITUS WITH DIABETIC NEUROPATHY, WITH LONG-TERM CURRENT USE OF INSULIN (HCC): ICD-10-CM

## 2023-09-05 DIAGNOSIS — E11.40 TYPE 2 DIABETES MELLITUS WITH DIABETIC NEUROPATHY, WITH LONG-TERM CURRENT USE OF INSULIN (HCC): ICD-10-CM

## 2023-09-05 DIAGNOSIS — Z00.00 MEDICARE ANNUAL WELLNESS VISIT, SUBSEQUENT: Primary | ICD-10-CM

## 2023-09-05 DIAGNOSIS — M81.0 AGE RELATED OSTEOPOROSIS, UNSPECIFIED PATHOLOGICAL FRACTURE PRESENCE: ICD-10-CM

## 2023-09-05 DIAGNOSIS — Z85.51 HISTORY OF BLADDER CANCER: ICD-10-CM

## 2023-09-05 PROCEDURE — G8420 CALC BMI NORM PARAMETERS: HCPCS | Performed by: FAMILY MEDICINE

## 2023-09-05 PROCEDURE — G8427 DOCREV CUR MEDS BY ELIG CLIN: HCPCS | Performed by: FAMILY MEDICINE

## 2023-09-05 PROCEDURE — G0439 PPPS, SUBSEQ VISIT: HCPCS | Performed by: FAMILY MEDICINE

## 2023-09-05 PROCEDURE — 99213 OFFICE O/P EST LOW 20 MIN: CPT | Performed by: FAMILY MEDICINE

## 2023-09-05 PROCEDURE — 3079F DIAST BP 80-89 MM HG: CPT | Performed by: FAMILY MEDICINE

## 2023-09-05 PROCEDURE — 3052F HG A1C>EQUAL 8.0%<EQUAL 9.0%: CPT | Performed by: FAMILY MEDICINE

## 2023-09-05 PROCEDURE — 2022F DILAT RTA XM EVC RTNOPTHY: CPT | Performed by: FAMILY MEDICINE

## 2023-09-05 PROCEDURE — 1036F TOBACCO NON-USER: CPT | Performed by: FAMILY MEDICINE

## 2023-09-05 PROCEDURE — 3074F SYST BP LT 130 MM HG: CPT | Performed by: FAMILY MEDICINE

## 2023-09-05 PROCEDURE — 1123F ACP DISCUSS/DSCN MKR DOCD: CPT | Performed by: FAMILY MEDICINE

## 2023-09-05 PROCEDURE — 3017F COLORECTAL CA SCREEN DOC REV: CPT | Performed by: FAMILY MEDICINE

## 2023-09-05 SDOH — ECONOMIC STABILITY: INCOME INSECURITY: HOW HARD IS IT FOR YOU TO PAY FOR THE VERY BASICS LIKE FOOD, HOUSING, MEDICAL CARE, AND HEATING?: PATIENT DECLINED

## 2023-09-05 SDOH — ECONOMIC STABILITY: FOOD INSECURITY: WITHIN THE PAST 12 MONTHS, THE FOOD YOU BOUGHT JUST DIDN'T LAST AND YOU DIDN'T HAVE MONEY TO GET MORE.: PATIENT DECLINED

## 2023-09-05 SDOH — ECONOMIC STABILITY: HOUSING INSECURITY
IN THE LAST 12 MONTHS, WAS THERE A TIME WHEN YOU DID NOT HAVE A STEADY PLACE TO SLEEP OR SLEPT IN A SHELTER (INCLUDING NOW)?: PATIENT REFUSED

## 2023-09-05 SDOH — ECONOMIC STABILITY: FOOD INSECURITY: WITHIN THE PAST 12 MONTHS, YOU WORRIED THAT YOUR FOOD WOULD RUN OUT BEFORE YOU GOT MONEY TO BUY MORE.: PATIENT DECLINED

## 2023-09-05 ASSESSMENT — LIFESTYLE VARIABLES
HOW MANY STANDARD DRINKS CONTAINING ALCOHOL DO YOU HAVE ON A TYPICAL DAY: 1 OR 2
HOW OFTEN DO YOU HAVE A DRINK CONTAINING ALCOHOL: MONTHLY OR LESS

## 2023-09-05 ASSESSMENT — PATIENT HEALTH QUESTIONNAIRE - PHQ9
2. FEELING DOWN, DEPRESSED OR HOPELESS: 0
SUM OF ALL RESPONSES TO PHQ QUESTIONS 1-9: 0
1. LITTLE INTEREST OR PLEASURE IN DOING THINGS: 0
SUM OF ALL RESPONSES TO PHQ9 QUESTIONS 1 & 2: 0

## 2023-09-05 NOTE — PROGRESS NOTES
1. \"Have you been to the ER, urgent care clinic since your last visit? Hospitalized since your last visit? \" No    2. \"Have you seen or consulted any other health care providers outside of the [address] [place] [address] since your last visit? \" No     3. For patients aged 43-73: Has the patient had a colonoscopy / FIT/ Cologuard?  Yes - no Care Gap present no

## 2023-09-05 NOTE — ACP (ADVANCE CARE PLANNING)
Advance Care Planning     General Advance Care Planning (ACP) Conversation    Date of Conversation: 9/5/2023  Conducted with: Patient with Decision Making Capacity    Healthcare Decision Maker:    Primary Decision Maker: Ralph DOMINGUEZ [address] - [phone]    Primary Decision Maker: Dr Markos Devonte - [facility] [phone]  Click here to complete [address] including selection of the Healthcare Decision Maker Relationship (ie \"Primary\"). Today we documented Decision Maker(s). The patient will provide ACP documents.     Content/Action Overview:  Pt will provide a copy of advance directive   Reviewed DNR/DNI and patient elects Full Code (Attempt Resuscitation)  treatment goals, benefit/burden of treatment options, artificial nutrition, ventilation preferences, hospitalization preferences, resuscitation preferences, end of life care preferences (vegetative state/imminent death), and hospice care      Length of Voluntary ACP Conversation in minutes:  <16 minutes (Non-Billable)    Sophie Wylie MD

## 2023-09-05 NOTE — PROGRESS NOTES
Vidya Mena is a 72 y.o. male complains of   Chief Complaint   Patient presents with    Medicare AWV       HPI: Here for follow up and also for medicare wellness. Diabetes. A1C went up around 8. No hypo or hyperglycemic episode. Used to be non compliant with diet and taking insulin but now being compliant  Stated morning sugar is under 150 and before dinner around 250. Vitals been stable. On statin. No side effects. H/o bladder cancer. Following urology. Osteoporosis . Done dexa scan by hematology. Will advise to ask during follow up regarding treatment. If needed will do rheumatology referral.   For now vitamin D supplement. Denies any headache, dizziness, no chest pain or trouble breathing, no arm or leg weakness. No nausea or vomiting, no weight or appetite changes, no mood changes . No urine or bowel complains, no palpitation, no diaphoresis. No abdominal pain. No cold or cough. No leg swelling. No fever. No sleep trouble.      CMP:  Lab Results   Component Value Date/Time     08/24/2023 09:13 AM    K 4.7 08/24/2023 09:13 AM     08/24/2023 09:13 AM    CO2 31 08/24/2023 09:13 AM    BUN 14 08/24/2023 09:13 AM    CREATININE 0.80 08/24/2023 09:13 AM    GLUCOSE 123 08/24/2023 09:13 AM    CALCIUM 9.2 08/24/2023 09:13 AM    PROT 7.0 08/24/2023 09:13 AM    LABALBU 3.7 08/24/2023 09:13 AM    BILITOT 0.6 08/24/2023 09:13 AM    AST 15 08/24/2023 09:13 AM    ALT 24 08/24/2023 09:13 AM        POC Glucose:   Lab Results   Component Value Date/Time    POCGLU 159 12/02/2021 02:24 PM        CBC:  Lab Results   Component Value Date/Time    WBC 7.4 06/03/2022 08:21 AM    RBC 4.42 06/03/2022 08:21 AM    HGB 14.4 06/03/2022 08:21 AM    HCT 43.4 06/03/2022 08:21 AM    MCV 98.2 06/03/2022 08:21 AM    MCH 32.6 06/03/2022 08:21 AM    MCHC 33.2 06/03/2022 08:21 AM    RDW 11.6 06/03/2022 08:21 AM     06/03/2022 08:21 AM    MPV 10.2 06/03/2022 08:21 AM        Lipids   Lab Results   Component

## 2023-09-18 RX ORDER — TAMSULOSIN HYDROCHLORIDE 0.4 MG/1
CAPSULE ORAL
Qty: 90 CAPSULE | Refills: 1 | Status: SHIPPED | OUTPATIENT
Start: 2023-09-18

## 2023-09-18 RX ORDER — ATORVASTATIN CALCIUM 10 MG/1
10 TABLET, FILM COATED ORAL
Qty: 90 TABLET | Refills: 1 | Status: SHIPPED | OUTPATIENT
Start: 2023-09-18

## 2023-09-19 NOTE — TELEPHONE ENCOUNTER
Left a voice message for Dr. Abner Navarro, asking her to fax OV and recommendations for further plan regarding DEXA scan which showed osteoporosis. Patient name/ was left on voice message. Suezanne Meigs was asked to fax OV and recommendations to Dr. Romulo Lam at [facility], [phone]. If any further questions, please call our office at [phone] press option # 3. Ask to speak with Amelia. Suezanne Meigs was advised patient will discuss DEXA results with Dr. Constantine Bates at next visit, as well.

## 2023-11-02 RX ORDER — PEN NEEDLE, DIABETIC 31 GX5/16"
NEEDLE, DISPOSABLE MISCELLANEOUS
Qty: 100 EACH | Refills: 10 | Status: SHIPPED | OUTPATIENT
Start: 2023-11-02

## 2023-11-27 RX ORDER — LANCETS
EACH MISCELLANEOUS
Qty: 100 EACH | Refills: 3 | Status: SHIPPED | OUTPATIENT
Start: 2023-11-27

## 2023-11-27 RX ORDER — OMEPRAZOLE 40 MG/1
CAPSULE, DELAYED RELEASE ORAL
Qty: 90 CAPSULE | Refills: 1 | Status: SHIPPED | OUTPATIENT
Start: 2023-11-27

## 2024-01-09 ENCOUNTER — OFFICE VISIT (OUTPATIENT)
Age: 74
End: 2024-01-09
Payer: MEDICARE

## 2024-01-09 VITALS
OXYGEN SATURATION: 98 % | HEART RATE: 68 BPM | TEMPERATURE: 97.9 F | DIASTOLIC BLOOD PRESSURE: 88 MMHG | WEIGHT: 149 LBS | SYSTOLIC BLOOD PRESSURE: 138 MMHG | RESPIRATION RATE: 16 BRPM | BODY MASS INDEX: 23.95 KG/M2 | HEIGHT: 66 IN

## 2024-01-09 DIAGNOSIS — E11.40 TYPE 2 DIABETES MELLITUS WITH DIABETIC NEUROPATHY, WITH LONG-TERM CURRENT USE OF INSULIN (HCC): ICD-10-CM

## 2024-01-09 DIAGNOSIS — E11.65 POORLY CONTROLLED DIABETES MELLITUS (HCC): Primary | ICD-10-CM

## 2024-01-09 DIAGNOSIS — Z79.4 TYPE 2 DIABETES MELLITUS WITH DIABETIC NEUROPATHY, WITH LONG-TERM CURRENT USE OF INSULIN (HCC): ICD-10-CM

## 2024-01-09 DIAGNOSIS — M54.9 DISCOMFORT OF BACK: Primary | ICD-10-CM

## 2024-01-09 DIAGNOSIS — C67.9 MALIGNANT NEOPLASM OF URINARY BLADDER, UNSPECIFIED SITE (HCC): ICD-10-CM

## 2024-01-09 LAB — HBA1C MFR BLD: 7.9 %

## 2024-01-09 PROCEDURE — PBSHW AMB POC HEMOGLOBIN A1C: Performed by: FAMILY MEDICINE

## 2024-01-09 PROCEDURE — 99213 OFFICE O/P EST LOW 20 MIN: CPT | Performed by: FAMILY MEDICINE

## 2024-01-09 PROCEDURE — 1123F ACP DISCUSS/DSCN MKR DOCD: CPT | Performed by: FAMILY MEDICINE

## 2024-01-09 PROCEDURE — G8420 CALC BMI NORM PARAMETERS: HCPCS | Performed by: FAMILY MEDICINE

## 2024-01-09 PROCEDURE — 3017F COLORECTAL CA SCREEN DOC REV: CPT | Performed by: FAMILY MEDICINE

## 2024-01-09 PROCEDURE — 2022F DILAT RTA XM EVC RTNOPTHY: CPT | Performed by: FAMILY MEDICINE

## 2024-01-09 PROCEDURE — 3079F DIAST BP 80-89 MM HG: CPT | Performed by: FAMILY MEDICINE

## 2024-01-09 PROCEDURE — 83036 HEMOGLOBIN GLYCOSYLATED A1C: CPT | Performed by: FAMILY MEDICINE

## 2024-01-09 PROCEDURE — G8484 FLU IMMUNIZE NO ADMIN: HCPCS | Performed by: FAMILY MEDICINE

## 2024-01-09 PROCEDURE — 1036F TOBACCO NON-USER: CPT | Performed by: FAMILY MEDICINE

## 2024-01-09 PROCEDURE — 3046F HEMOGLOBIN A1C LEVEL >9.0%: CPT | Performed by: FAMILY MEDICINE

## 2024-01-09 PROCEDURE — G8427 DOCREV CUR MEDS BY ELIG CLIN: HCPCS | Performed by: FAMILY MEDICINE

## 2024-01-09 PROCEDURE — 3075F SYST BP GE 130 - 139MM HG: CPT | Performed by: FAMILY MEDICINE

## 2024-01-09 RX ORDER — GLIPIZIDE 10 MG/1
10 TABLET, FILM COATED, EXTENDED RELEASE ORAL DAILY
Qty: 90 TABLET | Refills: 1 | Status: SHIPPED | OUTPATIENT
Start: 2024-01-09

## 2024-01-09 RX ORDER — METHOCARBAMOL 500 MG/1
1000 TABLET, FILM COATED ORAL 4 TIMES DAILY
COMMUNITY
Start: 2024-01-02

## 2024-01-09 ASSESSMENT — PATIENT HEALTH QUESTIONNAIRE - PHQ9
SUM OF ALL RESPONSES TO PHQ QUESTIONS 1-9: 0
2. FEELING DOWN, DEPRESSED OR HOPELESS: 0
1. LITTLE INTEREST OR PLEASURE IN DOING THINGS: 0
SUM OF ALL RESPONSES TO PHQ QUESTIONS 1-9: 0
SUM OF ALL RESPONSES TO PHQ9 QUESTIONS 1 & 2: 0

## 2024-01-09 NOTE — PROGRESS NOTES
Cele Burrows is a 73 y.o. male    Chief Complaint   Patient presents with    4 month follow up     Lower Back Pain     Muscle strain, seen at [facility] on 01/02/2024.     Vitals:    01/09/24 1320   BP: 138/88   Pulse: 68   Resp: 16   Temp: 97.9 °F (36.6 °C)   SpO2: 98%       \"Have you been to the ER, urgent care clinic since your last visit?  Hospitalized since your last visit?\"    YES - When: approximately 1  weeks ago.  Where and Why: [facility] for lower back pain/muscle strain.    “Have you seen or consulted any other health care providers outside of [facility] since your last visit?”    NO           
SHANICE PLUS strip TEST BLOOD SUGAR EVERY  strip 5     No current facility-administered medications for this visit.            OBJECTIVE:  /88 (Site: Left Upper Arm, Position: Sitting, Cuff Size: Medium Adult)   Pulse 68   Temp 97.9 °F (36.6 °C) (Temporal)   Resp 16   Ht 1.676 m (5' 6\")   Wt 67.6 kg (149 lb)   SpO2 98%   BMI 24.05 kg/m²       Physical Exam  Musculoskeletal:      Comments: No point tenderness over lumbosacral spine.  Left-sided paraspinal muscle tenderness around lumbar spine and buttock area.  Limitation in range of motion bending more than 40 degrees.  Straight leg raising positive     Neurological:      General: No focal deficit present.      Mental Status: He is alert and oriented to person, place, and time.   Psychiatric:         Behavior: Behavior normal.            ASSESSMENT/ PLAN:    Diagnoses and all orders for this visit:  Discomfort of back: Symptomatic treatment.  Heating pad and ice alternate.  Tylenol if needed.  He will observe little bit longer and see if he needs physical therapy.  Does not want muscle relaxant refill at this time.  He will contact the office if any symptoms  Change regarding his decision change regarding physical therapy    Type 2 diabetes mellitus with diabetic neuropathy, with long-term current use of insulin ([facility]): A1c 7.9.  Stable as before.  Advised compliance with diet modification.  His dinnertime blood sugar is higher more than 200 sometimes.  Advised to take 12 units of Lantus in the morning and 10 at bedtime.  If mealtime insulin is high at he can increase the dose of morning time insulin.  Given medication refill.  He has not made an appointment at [facility] endocrinology he will make a follow-up appointment  Comments:  10 units bid  Malignant neoplasm of urinary bladder, unspecified site ([facility]): Following Dr. Palma once a year.  No concern.  Will follow specialist recommendation  Other orders  -     glipiZIDE (GLUCOTROL XL) 10 MG

## 2024-02-02 NOTE — TELEPHONE ENCOUNTER
This patient contacted office for the following prescriptions to be filled:    Medication requested : LANTUS SOLOSTAR 100 UNIT/ML injection pen QTY 15ml pens Refill 1   PCP: Kelly  Pharmacy or Print: [facility]  Mail order or Local pharmacy Mail Order     Scheduled appointment if not seen by current providers in office: LOV 1/9/2024 WESLEY 4/9/2024

## 2024-02-05 RX ORDER — INSULIN GLARGINE 100 [IU]/ML
10 INJECTION, SOLUTION SUBCUTANEOUS 2 TIMES DAILY
Qty: 5 ADJUSTABLE DOSE PRE-FILLED PEN SYRINGE | Refills: 1 | Status: SHIPPED | OUTPATIENT
Start: 2024-02-05

## 2024-02-13 DIAGNOSIS — M81.8 OTHER OSTEOPOROSIS WITHOUT CURRENT PATHOLOGICAL FRACTURE: Primary | ICD-10-CM

## 2024-03-13 ENCOUNTER — HOSPITAL ENCOUNTER (OUTPATIENT)
Facility: HOSPITAL | Age: 74
Discharge: HOME | End: 2024-03-16
Payer: MEDICARE

## 2024-03-13 DIAGNOSIS — M81.0 OSTEOPOROSIS, UNSPECIFIED OSTEOPOROSIS TYPE, UNSPECIFIED PATHOLOGICAL FRACTURE PRESENCE: ICD-10-CM

## 2024-03-13 PROCEDURE — 77080 DXA BONE DENSITY AXIAL: CPT

## 2024-03-18 RX ORDER — BLOOD SUGAR DIAGNOSTIC
STRIP MISCELLANEOUS
Qty: 100 STRIP | Refills: 5 | Status: SHIPPED | OUTPATIENT
Start: 2024-03-18 | End: 2024-03-19 | Stop reason: SDUPTHER

## 2024-03-19 DIAGNOSIS — E11.9 DIABETES MELLITUS WITHOUT COMPLICATION (HCC): Primary | ICD-10-CM

## 2024-03-19 RX ORDER — BLOOD SUGAR DIAGNOSTIC
STRIP MISCELLANEOUS
Qty: 100 STRIP | Refills: 5 | Status: SHIPPED | OUTPATIENT
Start: 2024-03-19 | End: 2024-03-21 | Stop reason: ALTCHOICE

## 2024-03-20 ENCOUNTER — TELEPHONE (OUTPATIENT)
Age: 74
End: 2024-03-20

## 2024-03-20 DIAGNOSIS — E11.9 TYPE 2 DIABETES MELLITUS WITHOUT COMPLICATION, WITH LONG-TERM CURRENT USE OF INSULIN (HCC): Primary | ICD-10-CM

## 2024-03-20 DIAGNOSIS — Z79.4 TYPE 2 DIABETES MELLITUS WITHOUT COMPLICATION, WITH LONG-TERM CURRENT USE OF INSULIN (HCC): Primary | ICD-10-CM

## 2024-03-20 NOTE — TELEPHONE ENCOUNTER
Patient is following up on paperwork they say pharmacy faxed today for a new meter. I explained nurse is in patient care, but that we would check and let them know once completed.

## 2024-03-21 DIAGNOSIS — E11.9 TYPE 2 DIABETES MELLITUS WITHOUT COMPLICATION, WITH LONG-TERM CURRENT USE OF INSULIN (HCC): ICD-10-CM

## 2024-03-21 DIAGNOSIS — Z79.4 TYPE 2 DIABETES MELLITUS WITHOUT COMPLICATION, WITH LONG-TERM CURRENT USE OF INSULIN (HCC): ICD-10-CM

## 2024-03-21 RX ORDER — LANCETS 30 GAUGE
1 EACH MISCELLANEOUS 2 TIMES DAILY
Qty: 200 EACH | Refills: 5 | Status: SHIPPED | OUTPATIENT
Start: 2024-03-21

## 2024-03-21 RX ORDER — GLUCOSAMINE HCL/CHONDROITIN SU 500-400 MG
CAPSULE ORAL
Qty: 200 STRIP | Refills: 5 | Status: SHIPPED | OUTPATIENT
Start: 2024-03-21

## 2024-03-21 RX ORDER — BLOOD-GLUCOSE METER
KIT MISCELLANEOUS
Qty: 1 KIT | Refills: 0 | Status: SHIPPED | OUTPATIENT
Start: 2024-03-21

## 2024-03-21 RX ORDER — BLOOD-GLUCOSE METER
1 KIT MISCELLANEOUS DAILY
Qty: 1 KIT | Refills: 0 | Status: SHIPPED | OUTPATIENT
Start: 2024-03-21 | End: 2024-03-21 | Stop reason: SDUPTHER

## 2024-03-21 NOTE — TELEPHONE ENCOUNTER
Called pharmacy. They need a prescription sent in for:     Glucose Meter Kit  DX: E11.9    The insurance will not cover current strips so they will provide him with new meter and strips.     Please send to [facility] on [address]

## 2024-03-26 ENCOUNTER — TELEPHONE (OUTPATIENT)
Age: 74
End: 2024-03-26

## 2024-03-28 ENCOUNTER — TELEPHONE (OUTPATIENT)
Age: 74
End: 2024-03-28

## 2024-03-28 NOTE — TELEPHONE ENCOUNTER
Pt states that he is still having trouble with the diabetic supplies. Can you please call [facility] [phone] and see what exactly they need to get him a new meter and strips. Pt states that he has been out for awhile now. Please advise. Thank you

## 2024-04-05 RX ORDER — BLOOD-GLUCOSE METER
1 KIT MISCELLANEOUS DAILY
Qty: 1 KIT | Refills: 0 | Status: SHIPPED | OUTPATIENT
Start: 2024-04-05

## 2024-04-05 RX ORDER — LANCETS 30 GAUGE
1 EACH MISCELLANEOUS 2 TIMES DAILY
Qty: 300 EACH | Refills: 1 | Status: SHIPPED | OUTPATIENT
Start: 2024-04-05

## 2024-04-05 RX ORDER — GLUCOSAMINE HCL/CHONDROITIN SU 500-400 MG
CAPSULE ORAL
Qty: 200 STRIP | Refills: 3 | Status: SHIPPED | OUTPATIENT
Start: 2024-04-05

## 2024-04-05 NOTE — TELEPHONE ENCOUNTER
Spoke with Denise at [facility] to follow-up on refill request. She advised patient opted to buy OTC Relion Glucometer Kit which contains glucometer, test strips and lancets for total cost of $20. The Shima Accu-Chek Plus meter is not covered by patient's insurance.Denise advised the diabetic supply prescriptions that were sent in by Dr. Mendoza on 3/21/24 are still available, but it is at a higher out-of-pocket cost to patient. The diabetic supplies prescription from 3/21/24 are valid for six months, if the patient chooses to get filled through insurance vs buying OTC supplies.    I returned call to patient to follow-up on diabetic supplies inquiry. Patient advised he DID NOT buy OTC Relion glucometer kit. He wants to go through his insurance and needs to know what glucometer, strips and lancets are covered by his insurance. He stated he currently has the Accu-Chek Shima Plus meter and wanted to get test strips and lancets for the meter he already has and requested to know how much this would cost. I advised I will contact the pharmacy to find out this information. Patient call was placed on hold, while I again contacted [facility]. The previous pharmacy associate (Denise) was not available, as she had already left for the day. The current associate advised 100 test strips for the Accu-chek Shima Plus will cost $179. The Fast-Clix lancets will cost $15. The pharmacy associate requested that we resend generic prescriptions for glucose monitor kit, test strips and Lancets so she can run insurance inquiry to find out which brands are covered by patient's insurance and the associated costs. Prescriptions must include diagnosis code and dispense brand covered by insurance. She could not send insurance inquiry for covered options for the prescriptions that were sent in by Dr. Mendoza on 3/21/24 since an inquiry had already been done. Pharmacy associate was advised I will have our nurse

## 2024-04-05 NOTE — TELEPHONE ENCOUNTER
Spoke with patient (two identifiers verified) to advise he will need to request Proscar refill from his urologist. Patient verbalized understanding; stated he has already received a refill for this medication from urology - Dr. Palma office. We can disregard this request.

## 2024-04-08 NOTE — PROGRESS NOTES
\"Have you been to the ER, urgent care clinic since your last visit?  Hospitalized since your last visit?\"    NO    “Have you seen or consulted any other health care providers outside of [facility] since your last visit?”    Dr. Kenan Snowden LOV: 3/15/24 ENT.    Chief Complaint   Patient presents with    Diabetes    malignant neoplasm of urinary bladder    Hip Pain     Bilateral hip pain    Sinus Problem     Had CT scan from Dr. Snowden                     Click Here for Release of Records Request

## 2024-04-09 ENCOUNTER — HOSPITAL ENCOUNTER (OUTPATIENT)
Facility: HOSPITAL | Age: 74
Discharge: HOME | End: 2024-04-12
Payer: MEDICARE

## 2024-04-09 ENCOUNTER — OFFICE VISIT (OUTPATIENT)
Age: 74
End: 2024-04-09
Payer: MEDICARE

## 2024-04-09 VITALS
TEMPERATURE: 97.6 F | SYSTOLIC BLOOD PRESSURE: 122 MMHG | OXYGEN SATURATION: 97 % | HEART RATE: 86 BPM | DIASTOLIC BLOOD PRESSURE: 68 MMHG | WEIGHT: 149.4 LBS | RESPIRATION RATE: 16 BRPM | BODY MASS INDEX: 24.01 KG/M2 | HEIGHT: 66 IN

## 2024-04-09 DIAGNOSIS — Z85.51 PERSONAL HISTORY OF BLADDER CANCER: ICD-10-CM

## 2024-04-09 DIAGNOSIS — M25.552 PAIN OF LEFT HIP: ICD-10-CM

## 2024-04-09 DIAGNOSIS — J34.9 DISORDER OF MAXILLARY SINUS: ICD-10-CM

## 2024-04-09 DIAGNOSIS — I10 ESSENTIAL HYPERTENSION, BENIGN: Primary | ICD-10-CM

## 2024-04-09 DIAGNOSIS — M81.0 OSTEOPOROSIS WITHOUT CURRENT PATHOLOGICAL FRACTURE, UNSPECIFIED OSTEOPOROSIS TYPE: ICD-10-CM

## 2024-04-09 DIAGNOSIS — N40.0 BENIGN PROSTATIC HYPERPLASIA WITHOUT LOWER URINARY TRACT SYMPTOMS: ICD-10-CM

## 2024-04-09 DIAGNOSIS — Z91.81 STATUS POST FALL: ICD-10-CM

## 2024-04-09 DIAGNOSIS — M25.551 PAIN OF RIGHT HIP: ICD-10-CM

## 2024-04-09 DIAGNOSIS — J34.2 NASAL SEPTAL DEVIATION: ICD-10-CM

## 2024-04-09 DIAGNOSIS — E11.65 TYPE 2 DIABETES MELLITUS WITH HYPERGLYCEMIA, WITHOUT LONG-TERM CURRENT USE OF INSULIN (HCC): ICD-10-CM

## 2024-04-09 PROCEDURE — 2022F DILAT RTA XM EVC RTNOPTHY: CPT | Performed by: FAMILY MEDICINE

## 2024-04-09 PROCEDURE — 3017F COLORECTAL CA SCREEN DOC REV: CPT | Performed by: FAMILY MEDICINE

## 2024-04-09 PROCEDURE — G8427 DOCREV CUR MEDS BY ELIG CLIN: HCPCS | Performed by: FAMILY MEDICINE

## 2024-04-09 PROCEDURE — 3078F DIAST BP <80 MM HG: CPT | Performed by: FAMILY MEDICINE

## 2024-04-09 PROCEDURE — 3046F HEMOGLOBIN A1C LEVEL >9.0%: CPT | Performed by: FAMILY MEDICINE

## 2024-04-09 PROCEDURE — G8420 CALC BMI NORM PARAMETERS: HCPCS | Performed by: FAMILY MEDICINE

## 2024-04-09 PROCEDURE — 1036F TOBACCO NON-USER: CPT | Performed by: FAMILY MEDICINE

## 2024-04-09 PROCEDURE — 99214 OFFICE O/P EST MOD 30 MIN: CPT | Performed by: FAMILY MEDICINE

## 2024-04-09 PROCEDURE — 3074F SYST BP LT 130 MM HG: CPT | Performed by: FAMILY MEDICINE

## 2024-04-09 PROCEDURE — 1123F ACP DISCUSS/DSCN MKR DOCD: CPT | Performed by: FAMILY MEDICINE

## 2024-04-09 PROCEDURE — 73521 X-RAY EXAM HIPS BI 2 VIEWS: CPT

## 2024-04-09 ASSESSMENT — PATIENT HEALTH QUESTIONNAIRE - PHQ9
SUM OF ALL RESPONSES TO PHQ QUESTIONS 1-9: 0
2. FEELING DOWN, DEPRESSED OR HOPELESS: NOT AT ALL
SUM OF ALL RESPONSES TO PHQ9 QUESTIONS 1 & 2: 0
1. LITTLE INTEREST OR PLEASURE IN DOING THINGS: NOT AT ALL

## 2024-04-10 NOTE — PROGRESS NOTES
HISTORY OF PRESENT ILLNESS  Carlotta Kee is a 81 y.o. male. Patient with htn,dm,hyperlipidemia. On follow up patient denies any chest pains,sob, palpitation or other significant symptoms. 6/2020  Patient seen today for follow-up. He is complaining of increased leg edema. Denies any shortness of breath or chest pain. Continues to be on current medication he has frequent urination. Follow-up  Pertinent negatives include no chest pain, no abdominal pain, no headaches and no shortness of breath. Hypertension  The history is provided by the Patient. This is a chronic problem. The problem occurs constantly. The problem has not changed since onset. Pertinent negatives include no chest pain, no abdominal pain, no headaches and no shortness of breath. Cholesterol Problem  The history is provided by the Patient. This is a chronic problem. The problem occurs constantly. Pertinent negatives include no chest pain, no abdominal pain, no headaches and no shortness of breath. Review of Systems   Constitutional:  Negative for chills and fever. HENT:  Negative for nosebleeds. Eyes:  Negative for blurred vision and double vision. Respiratory:  Negative for cough, hemoptysis, sputum production, shortness of breath and wheezing. Cardiovascular:  Positive for leg swelling. Negative for chest pain, palpitations, orthopnea, claudication and PND. Gastrointestinal:  Negative for abdominal pain, heartburn, nausea and vomiting. Musculoskeletal:  Negative for myalgias. Skin:  Negative for rash. Neurological:  Negative for dizziness, weakness and headaches. Endo/Heme/Allergies:  Does not bruise/bleed easily.    Family History   Problem Relation Age of Onset    Hypertension Mother     Heart Attack Neg Hx     Sudden Death Neg Hx        Past Medical History:   Diagnosis Date    Bladder cancer ([facility] 7/15/13    Clinical Stage [id number] HG UC    Bladder tumor     Diabetes ([facility]     Essential hypertension In an effort to ensure that our patients [employer], a Team Member has reviewed your chart and identified an opportunity to provide the best care possible. An attempt was made to discuss or schedule overdue Preventive or Disease Management screening.     The Outcome was Contact was made, appointment declined. Care Gaps include Immunizations and Wellness Visits.    Name: HERACLIO RODRIGUEZ    ### Patient Details  YOB: 1938  MRN: [id number]    ### Encounter Details  Arrival Date: N/A  Discharge Date: N/A  Encounter ID: [id number]    ### Related interaction  [facility] Comprehensive Annual Visit ([place] CAV Outreach) ([email or web address])    ### Questions     Question 1   Comprehensive Annual Visit   If you would like us to help you schedule your in home visit or if you prefer a virtual visit, press 1; If you would like to receive a call back later, press 2; If you would like more information about this program, please press 3; or if you are not interested at this time, please press 4.   Callback Later (Issue Panel: CAV Visit Issue, Issue Alert: CAV Intervention)    ### Required Interventions and Feedback     Call Status         Call Status List:     Attempt 1 (edited by BRIAN on 04/10/2024 12:54 PM CDT)     Appointment Scheduling         Unable to Schedule CAV Appointment:     Patient declined (edited by BRIAN on 04/10/2024 12:54 PM CDT)    Comments :     Pt declined will read letter and will call back if interested. (edited by BRIAN on 04/10/2024 12:54 PM CDT)   History of kidney stones     Hyperlipidemia     Peripheral neuropathy     SBO (small bowel obstruction) (HCC)     Spinal stenosis     Spondylolisthesis, grade 1        Past Surgical History:   Procedure Laterality Date    HX UROLOGICAL  7/15/13    TURBT, Dr. Edna Lewis, [facility]    HX UROLOGICAL  1/4/16    Cysto, Dr. Edna Lewis, [facility]    HX WRIST FRACTURE [address]  4/27/15    (L) wrist       Allergies   Allergen Reactions    Actos [Pioglitazone] Other (comments)     Personal history of bladder cancer         Current Outpatient Medications   Medication Sig    semaglutide (Rybelsus) 7 mg tablet Take 1 Tablet by mouth Daily (before breakfast). Start after finishing 30 days of 3 mg dose. metFORMIN ER (GLUCOPHAGE XR) 750 mg tablet Take 1 Tablet by mouth two (2) times daily (with meals). Accu-Chek Eliane Plus test strp strip TEST EVERY DAY    montelukast (SINGULAIR) 10 mg tablet TAKE 1 TABLET EVERY DAY    omeprazole (PRILOSEC) 40 mg capsule Take 1 Capsule by mouth in the morning. glipiZIDE SR (GLUCOTROL XL) 10 mg CR tablet     CALC-D3-MAGNES-P5-AF-ZN-MELVIN PO     valsartan (DIOVAN) 320 mg tablet Take 1 Tablet by mouth daily. lancets (Accu-Chek Softclix Lancets) misc Use 1 daily for blood glucose monitoring DX: E11.9    Blood-Glucose Meter (Accu-Chek Eliane Plus Meter) misc Use 1 daily for blood glucose monitoring DX: E11.9    glucose blood VI test strips (Accu-Chek Eliane Plus test strp) strip Use 1 daily for blood glucose monitoring DX: E11.9    insulin glargine (Lantus Solostar U-100 Insulin) 100 unit/mL (3 mL) inpn INJECT  10 UNITS SUBCUTANEOUSLY AT BEDTIME. DISCARD AND BEGIN NEW PEN AFTER 28 DAYS.    tamsulosin (FLOMAX) 0.4 mg capsule TAKE 1 CAPSULE EVERY DAY    atorvastatin (LIPITOR) 10 mg tablet Take 1 Tablet by mouth nightly.     BD Ultra-Fine Short Pen Needle 31 gauge x 5/16\" ndle USE  TO INJECT ONE TIME DAILY    ashwagandha root extract 300 mg cap Take  by mouth.    tadalafiL (CIALIS) 20 mg tablet Take 1 Tablet by mouth as needed for Erectile Dysfunction. Accu-Chek Eliane Plus test strp strip TEST EVERY DAY    CALCIUM PO Take  by mouth. cholecalciferol, vitamin D3, (VITAMIN D3 PO) Take  by mouth.    multivitamin (ONE A DAY) tablet Take 1 Tab by mouth daily. CINNAMON BARK (CINNAMON PO) Take  by mouth. TURMERIC ROOT EXTRACT PO Take  by mouth. GINGER ROOT (GINGER EXTRACT PO) Take  by mouth. Insulin Needles, Disposable, (BD INSULIN PEN NEEDLE UF MINI) 31 gauge x 3/16\" ndle Check twice daily    cyanocobalamin (VITAMIN B-12) 1,000 mcg sublingual tablet Take 1,000 mcg by mouth daily. fluticasone propionate (FLONASE) 50 mcg/actuation nasal spray USE 1 SPRAY IN EACH NOSTRIL DAILY AS NEEDED FOR RHINITIS (Patient not taking: Reported on 8/3/2022)    finasteride (PROSCAR) 5 mg tablet TAKE 1 TABLET EVERY DAY     No current facility-administered medications for this visit. Visit Vitals  Ht 5' 6\" (1.676 m)   Wt 68.5 kg (151 lb)   BMI 24.37 kg/m²         Physical Exam  Constitutional:       Appearance: He is well-developed. HENT:      Head: Normocephalic and atraumatic. Eyes:      Conjunctiva/sclera: Conjunctivae normal.   Neck:      Thyroid: No thyromegaly. Vascular: No JVD. Trachea: No tracheal deviation. Cardiovascular:      Rate and Rhythm: Normal rate and regular rhythm. Heart sounds: Normal heart sounds. No murmur heard. No friction rub. No gallop. Pulmonary:      Effort: No respiratory distress. Breath sounds: Normal breath sounds. No wheezing or rales. Chest:      Chest wall: No tenderness. Abdominal:      Palpations: Abdomen is soft. Tenderness: There is no abdominal tenderness. Musculoskeletal:      Cervical back: Neck supple. Skin:     General: Skin is warm and dry. Neurological:      Mental Status: He is alert and oriented to person, place, and time. Mr. Seema Payton has a reminder for a \"due or due soon\" health maintenance.  I have asked that he contact his primary care provider for follow-up on this health maintenance. Conclusion: 4/2013:stress test  1. Normal perfusion scan. 2. Normal wall motion and ejection fraction. I Have personally reviewed recent relevant labs available and discussed with patient  3/2018  Interpretation Summary 8/2020    LV: Estimated LVEF is 55 - 60%. Normal cavity size, wall thickness and systolic function (ejection fraction normal). Wall motion: normal. Mild (grade 1) left ventricular diastolic dysfunction. LA: Left Atrium volume index is 30.74 mL/m2. RV: Normal right ventricular size and function. No hemodynamically significant valvular pathology. I Have personally reviewed recent relevant labs available and discussed with patient  6/2020, 4/2021          Assessment       ICD-10-CM ICD-9-CM    1. Essential hypertension, benign  I10 401.1       2. Mixed hyperlipidemia  E78.2 272.2       3. Type 2 diabetes mellitus with diabetic neuropathy, with long-term current use of insulin ([facility])  E11.40 250.60     Z79.4 357.2      V58.67       4. Bilateral leg edema  R60.0 782.3       8/2020  Cardiac status stable. No evidence of pulmonary hypertension normal ejection fraction continue to monitor  Losartan HCTZ was discontinued due to frequent nighttime urination. I have changed that to valsartan 320 mg  8/2021 stable cardiac status blood pressure controlled. Edema is stable lab will be done with PCP  8/2022  Heavy coronary calcification seen on recent noncontrast CT. We will proceed with stress test.  Blood pressure controlled. Asymptomatic  There are no discontinued medications. No orders of the defined types were placed in this encounter.

## 2024-04-17 ENCOUNTER — HOSPITAL ENCOUNTER (OUTPATIENT)
Facility: HOSPITAL | Age: 74
Setting detail: RECURRING SERIES
Discharge: HOME | End: 2024-04-20
Payer: MEDICARE

## 2024-04-17 PROCEDURE — 97110 THERAPEUTIC EXERCISES: CPT

## 2024-04-17 PROCEDURE — 97162 PT EVAL MOD COMPLEX 30 MIN: CPT

## 2024-04-17 PROCEDURE — 97535 SELF CARE MNGMENT TRAINING: CPT

## 2024-04-17 NOTE — PROGRESS NOTES
MD TYRA [facility] BS AMB   9/9/2024 10:00 AM Sonia Diaz, APRN - NP [facility] BS AMB

## 2024-04-17 NOTE — PROGRESS NOTES
Services are being furnished while the patient is under my care. I agree with the treatment plan and certify that this therapy is necessary.    Physician's Signature:_________________________   DATE:_________   TIME:________                           Alee Mendoza MD  Insurance: Payor: MEDICARE / Plan: MEDICARE PART A AND B / Product Type: *No Product type* /      ** Signature, Date and Time must be completed for valid certification **  [facility] or you may fax the signed copy to [phone]. Thank you.

## 2024-04-22 RX ORDER — TAMSULOSIN HYDROCHLORIDE 0.4 MG/1
CAPSULE ORAL
Qty: 90 CAPSULE | Refills: 1 | Status: SHIPPED | OUTPATIENT
Start: 2024-04-22

## 2024-04-22 RX ORDER — INSULIN GLARGINE 100 [IU]/ML
INJECTION, SOLUTION SUBCUTANEOUS
Qty: 30 ML | Refills: 1 | Status: SHIPPED | OUTPATIENT
Start: 2024-04-22

## 2024-04-22 RX ORDER — OMEPRAZOLE 40 MG/1
CAPSULE, DELAYED RELEASE ORAL
Qty: 90 CAPSULE | Refills: 1 | Status: SHIPPED | OUTPATIENT
Start: 2024-04-22

## 2024-04-22 RX ORDER — ATORVASTATIN CALCIUM 10 MG/1
10 TABLET, FILM COATED ORAL
Qty: 90 TABLET | Refills: 1 | Status: SHIPPED | OUTPATIENT
Start: 2024-04-22

## 2024-04-23 ENCOUNTER — HOSPITAL ENCOUNTER (OUTPATIENT)
Facility: HOSPITAL | Age: 74
Setting detail: RECURRING SERIES
Discharge: HOME | End: 2024-04-26
Payer: MEDICARE

## 2024-04-23 PROCEDURE — 97140 MANUAL THERAPY 1/> REGIONS: CPT

## 2024-04-23 PROCEDURE — 97110 THERAPEUTIC EXERCISES: CPT

## 2024-04-23 PROCEDURE — 97112 NEUROMUSCULAR REEDUCATION: CPT

## 2024-04-23 NOTE — PROGRESS NOTES
(example: do not include dry needle or estim unattended, both untimed codes, in totals to left)  8-22 min = 1 unit; 23-37 min = 2 units; 38-52 min = 3 units; 53-67 min = 4 units; 68-82 min = 5 units   Total Total     TOTAL TREATMENT TIME:        42     [x]  Patient Education billed concurrently with other procedures   [x] Review HEP    [] Progressed/Changed HEP, detail:    [] Other detail:       Objective Information/Functional Measures/Assessment    Initiated exercises per POC. Therapist provided cues for correct technique and muscle activation with exercises. Patient challenged with maintaining good posture, correct form, and stability with Hip 3-way with trap bar. Plan to attempt hip 3-way without trap bar next visit to encourage correct form. Patient reported decreased pain at end of the session.     Patient will continue to benefit from skilled PT / OT services to modify and progress therapeutic interventions, analyze and address functional mobility deficits, analyze and address ROM deficits, analyze and address strength deficits, analyze and address soft tissue restrictions, analyze and cue for proper movement patterns, analyze and modify for postural abnormalities, and analyze and address imbalance/dizziness to address functional deficits and attain remaining goals.    Progress toward goals / Updated goals:  []  See Progress Note/Recertification    Short Term Goals: To be accomplished in 2 weeks  1. I and compliant with HEP for self management of symptoms.   IE: issued HEP  Current: Met (4/23/24)   2. Increase L/S finger tips to toes without increase in pain to increase ease with ADLs.  IE:finger tips to ankles with increased pain  Long Term Goals: To be accomplished in 12 weeks  1. Improve FOTO to 60 to indicate improved function with daily activities.   IE:36  2. Increase B hip abd strength to 4+/5 to improve stability for performing heavy activities around home.   IE:3+/5  3. Increase B hip ext strength to

## 2024-04-24 ENCOUNTER — APPOINTMENT (OUTPATIENT)
Facility: HOSPITAL | Age: 74
End: 2024-04-24
Payer: MEDICARE

## 2024-04-24 LAB
CREATININE, EXTERNAL: 0.83
HBA1C MFR BLD HPLC: 8.5 %
LDL CHOLESTEROL, EXTERNAL: 53
MICROALBUMIN/CREATININE RATIO, EXTERNAL: 30.7
TOTAL CHOLESTEROL, EXTERNAL: 138

## 2024-04-26 ENCOUNTER — HOSPITAL ENCOUNTER (OUTPATIENT)
Facility: HOSPITAL | Age: 74
Setting detail: RECURRING SERIES
Discharge: HOME | End: 2024-04-29
Payer: MEDICARE

## 2024-04-26 PROCEDURE — 97112 NEUROMUSCULAR REEDUCATION: CPT

## 2024-04-26 PROCEDURE — 97140 MANUAL THERAPY 1/> REGIONS: CPT

## 2024-04-26 PROCEDURE — 97110 THERAPEUTIC EXERCISES: CPT

## 2024-04-26 NOTE — PROGRESS NOTES
PT / OT services to modify and progress therapeutic interventions, analyze and address functional mobility deficits, analyze and address ROM deficits, analyze and address strength deficits, analyze and address soft tissue restrictions, and analyze and cue for proper movement patterns to address functional deficits and attain remaining goals.    Progress toward goals / Updated goals:  []  See Progress Note/Recertification    Short Term Goals: To be accomplished in 2 weeks  1. I and compliant with HEP for self management of symptoms.   IE: issued HEP  Current: Met (4/23/24)   2. Increase L/S finger tips to toes without increase in pain to increase ease with ADLs.  IE:finger tips to ankles with increased pain  Current: Remains the same. 4/26/2024  Long Term Goals: To be accomplished in 12 weeks  1. Improve FOTO to 60 to indicate improved function with daily activities.   IE:36  2. Increase B hip abd strength to 4+/5 to improve stability for performing heavy activities around home.   IE:3+/5  3. Increase B hip ext strength to 4-/5 to improve stability with sit<>stand transfers.   IE:3/5 B  4. Patient will report >=50% improvement to increase tolerance for prolonged sitting and standing.   IE: 0%    PLAN  Yes  Continue plan of care  []  Upgrade activities as tolerated  []  Discharge due to :  []  Other:    Manuel Evans PTA    4/26/2024    2:55 PM    Future Appointments   Date Time Provider Department Center   4/26/2024  3:10 PM Manuel Evans PTA [facility]   5/1/2024  1:50 PM Tate Reynoso, PT [facility]   5/3/2024  1:50 PM Manuel Evans PTA [facility]   5/7/2024  1:50 PM Joyce Sepulveda, PT [facility]   5/10/2024  1:50 PM Joyce Sepulveda, PT [facility]   5/14/2024  1:50 PM Tate Reynoso, PT [facility]   5/17/2024  1:50 PM Joyce Sepulveda, PT [facility]   5/21/2024  1:50 PM Joyce Sepulveda, PT [facility]   8/12/2024  1:45 PM

## 2024-04-27 ENCOUNTER — HOSPITAL ENCOUNTER (EMERGENCY)
Facility: HOSPITAL | Age: 74
Discharge: HOME | End: 2024-04-27
Attending: EMERGENCY MEDICINE
Payer: MEDICARE

## 2024-04-27 ENCOUNTER — APPOINTMENT (OUTPATIENT)
Facility: HOSPITAL | Age: 74
End: 2024-04-27
Payer: MEDICARE

## 2024-04-27 VITALS
SYSTOLIC BLOOD PRESSURE: 127 MMHG | RESPIRATION RATE: 18 BRPM | HEART RATE: 80 BPM | WEIGHT: 150 LBS | BODY MASS INDEX: 24.99 KG/M2 | DIASTOLIC BLOOD PRESSURE: 83 MMHG | TEMPERATURE: 98.3 F | OXYGEN SATURATION: 100 % | HEIGHT: 65 IN

## 2024-04-27 DIAGNOSIS — R07.81 RIB PAIN ON RIGHT SIDE: Primary | ICD-10-CM

## 2024-04-27 LAB
EKG ATRIAL RATE: 78 BPM
EKG DIAGNOSIS: NORMAL
EKG P AXIS: 13 DEGREES
EKG P-R INTERVAL: 158 MS
EKG Q-T INTERVAL: 366 MS
EKG QRS DURATION: 76 MS
EKG QTC CALCULATION (BAZETT): 417 MS
EKG R AXIS: 89 DEGREES
EKG T AXIS: 53 DEGREES
EKG VENTRICULAR RATE: 78 BPM
TROPONIN I SERPL HS-MCNC: 4 NG/L (ref 0–78)

## 2024-04-27 PROCEDURE — 71101 X-RAY EXAM UNILAT RIBS/CHEST: CPT

## 2024-04-27 PROCEDURE — 99285 EMERGENCY DEPT VISIT HI MDM: CPT

## 2024-04-27 PROCEDURE — 84484 ASSAY OF TROPONIN QUANT: CPT

## 2024-04-27 PROCEDURE — 93005 ELECTROCARDIOGRAM TRACING: CPT | Performed by: EMERGENCY MEDICINE

## 2024-04-27 ASSESSMENT — PAIN SCALES - GENERAL
PAINLEVEL_OUTOF10: 5
PAINLEVEL_OUTOF10: 8

## 2024-04-27 ASSESSMENT — PAIN - FUNCTIONAL ASSESSMENT: PAIN_FUNCTIONAL_ASSESSMENT: 0-10

## 2024-04-27 NOTE — ED TRIAGE NOTES
Ambulatory to QC with right lateral rib pain \"I can feel it clicking in there, I had a broken rib in that area in the past due to osteoporosis\". Denies injury but became worse at PT for chronic hip pain this am.

## 2024-04-27 NOTE — ED PROVIDER NOTES
EMERGENCY DEPARTMENT HISTORY AND PHYSICAL EXAM    1:11 PM EDT seen at this time in room QC        Date: 4/27/2024  Patient Name: Cele Burrows    History of Presenting Illness     Chief Complaint   Patient presents with    Rib Pain         History Provided By: patient    Additional History (Context): Cele Burrows is a 74 y.o. male presents with right-sided rib pain perceives a clicking sound he thinks he may have broken a rib as he has osteoporosis.  This started up during physical therapy has lasted for couple of days.  Fairly minimal discomfort at rest but any kind of movement exacerbates it and again he hears a clicking sound.  No exertional component.  He is diabetic.    PCP: Alee Mendoza MD    Chief Complaint:   Duration:    Timing:    Location:   Quality:   Severity:   Modifying Factors:   Associated Symptoms:       No current facility-administered medications for this encounter.     Current Outpatient Medications   Medication Sig Dispense Refill    atorvastatin (LIPITOR) 10 MG tablet TAKE 1 TABLET EVERY NIGHT 90 tablet 1    omeprazole (PRILOSEC) 40 MG delayed release capsule TAKE 1 CAPSULE EVERY MORNING 90 capsule 1    LANTUS SOLOSTAR 100 UNIT/ML injection pen INJECT 10 UNITS UNDER THE SKIN TWICE DAILY 30 mL 1    tamsulosin (FLOMAX) 0.4 MG capsule TAKE 1 CAPSULE EVERY DAY 90 capsule 1    glucose monitoring kit 1 kit by Does not apply route daily Please dispense what's covered by insurance. DX:E11.9 1 kit 0    blood glucose monitor strips Check FBS in am and 2 hrs after dinner daily. DX:E11.9 Dispense brand covered by insurance. 200 strip 3    Lancets MISC 1 each by Does not apply route 2 times daily Check FBS in am and 2 hrs after dinner daily . DX: E11.9 Dispense brand covered by insurance. 300 each 1    blood glucose monitor strips Check twice a day. Provide according to glucometer covered by insurance 200 strip 5    Lancets MISC 1 each by Does not apply route 2 times daily 200 each 5     for this patient.    I reviewed the vital signs, available nursing notes, past medical history, past surgical history, family history and social history.    Patient Vitals for the past 12 hrs:   Temp Pulse Resp BP SpO2   04/27/24 1239 98.3 °F (36.8 °C) 77 15 125/70 100 %       Vital Signs-Reviewed the patient's vital signs.    Pulse Oximetry Analysis, Cardiac Monitor, 12 lead ekg:      Interpreted by the EP.      Records Reviewed: Nursing notes reviewed (Time of Review: 2:01 PM)    Procedures:   Critical Care Time: 0  If critical care time is note it is exclusive of any separately billable procedures.     Aspirin: (was aspirin given for stroke?)    Diagnosis     Disposition: DISPOSITION Decision To Discharge 04/27/2024 02:01:11 PM       DISCHARGE MEDICATIONS:  Current Discharge Medication List             Details   atorvastatin (LIPITOR) 10 MG tablet TAKE 1 TABLET EVERY NIGHT  Qty: 90 tablet, Refills: 1      omeprazole (PRILOSEC) 40 MG delayed release capsule TAKE 1 CAPSULE EVERY MORNING  Qty: 90 capsule, Refills: 1      LANTUS SOLOSTAR 100 UNIT/ML injection pen INJECT 10 UNITS UNDER THE SKIN TWICE DAILY  Qty: 30 mL, Refills: 1      tamsulosin (FLOMAX) 0.4 MG capsule TAKE 1 CAPSULE EVERY DAY  Qty: 90 capsule, Refills: 1      !! glucose monitoring kit 1 kit by Does not apply route daily Please dispense what's covered by insurance. DX:E11.9  Qty: 1 kit, Refills: 0      !! blood glucose monitor strips Check FBS in am and 2 hrs after dinner daily. DX:E11.9 Dispense brand covered by insurance.  Qty: 200 strip, Refills: 3    Comments: Brand per patient preference. May round up to next available package size.      !! Lancets MISC 1 each by Does not apply route 2 times daily Check FBS in am and 2 hrs after dinner daily . DX: E11.9 Dispense brand covered by insurance.  Qty: 300 each, Refills: 1      !! blood glucose monitor strips Check twice a day. Provide according to glucometer covered by insurance  Qty: 200 strip,

## 2024-04-30 LAB
EKG ATRIAL RATE: 78 BPM
EKG DIAGNOSIS: NORMAL
EKG P AXIS: 13 DEGREES
EKG P-R INTERVAL: 158 MS
EKG Q-T INTERVAL: 366 MS
EKG QRS DURATION: 76 MS
EKG QTC CALCULATION (BAZETT): 417 MS
EKG R AXIS: 89 DEGREES
EKG T AXIS: 53 DEGREES
EKG VENTRICULAR RATE: 78 BPM

## 2024-04-30 PROCEDURE — 93010 ELECTROCARDIOGRAM REPORT: CPT | Performed by: INTERNAL MEDICINE

## 2024-05-01 ENCOUNTER — HOSPITAL ENCOUNTER (OUTPATIENT)
Facility: HOSPITAL | Age: 74
Setting detail: RECURRING SERIES
Discharge: HOME | End: 2024-05-04
Payer: MEDICARE

## 2024-05-01 PROCEDURE — 97110 THERAPEUTIC EXERCISES: CPT

## 2024-05-01 PROCEDURE — 97112 NEUROMUSCULAR REEDUCATION: CPT

## 2024-05-01 PROCEDURE — 97530 THERAPEUTIC ACTIVITIES: CPT

## 2024-05-01 NOTE — PROGRESS NOTES
PHYSICAL / OCCUPATIONAL THERAPY - DAILY TREATMENT NOTE     Patient Name: Cele Burrows    Date: 2024    : 1950  Insurance: Payor: MEDICARE / Plan: MEDICARE PART A AND B / Product Type: *No Product type* /      Patient  verified Yes     Visit #   Current / Total 4 24   Time   In / Out 1:50 PM 2:40 PM   Pain   In / Out 7-8 7   Subjective Functional Status/Changes: Patient reports he heard a \"pop\" during therapy last session and had pain in ribs following therapy and went to the ER due to pain and concern about fractured ribs secondary to osteoporosis diagnosis. Patient stated he was cleared by MD and did not have any fractures. Patient stated he would like to focus more on the hips today instead of the back.   Changes to:  Allergies, Med Hx, Sx Hx?   no       TREATMENT AREA =  Pain in left hip [M25.552]  Other low back pain [M54.59]    OBJECTIVE    Modalities Rationale:     decrease pain and increase tissue extensibility to improve patient's ability to progress to PLOF and address remaining functional goals.     min [] Estim Unattended, type/location:                                      []  w/ice    []  w/heat    min [] Estim Attended, type/location:                                     []  w/US     []  w/ice    []  w/heat    []  TENS insruct      min []  Mechanical Traction: type/lbs                   []  pro   []  sup   []  int   []  cont    []  before manual    []  after manual    min []  Ultrasound, settings/location:      min []  Iontophoresis w/ dexamethasone, location:                                               []  take home patch       []  in clinic   10     min  unbilled []  Ice     [x]  Heat    location/position:  Low back - Pt seated     min []  Paraffin,  details:     min []  Vasopneumatic Device, press/temp:     min []  Whirlpool / Fluido:    If using vaso (only need to measure limb vaso being performed on)      pre-treatment girth :       post-treatment girth :       measured at

## 2024-05-03 ENCOUNTER — HOSPITAL ENCOUNTER (OUTPATIENT)
Facility: HOSPITAL | Age: 74
Setting detail: RECURRING SERIES
Discharge: HOME | End: 2024-05-06
Payer: MEDICARE

## 2024-05-03 PROCEDURE — 97110 THERAPEUTIC EXERCISES: CPT

## 2024-05-03 PROCEDURE — 97112 NEUROMUSCULAR REEDUCATION: CPT

## 2024-05-03 NOTE — PROGRESS NOTES
PHYSICAL / OCCUPATIONAL THERAPY - DAILY TREATMENT NOTE     Patient Name: Cele Burrows    Date: 5/3/2024    : 1950  Insurance: Payor: MEDICARE / Plan: MEDICARE PART A AND B / Product Type: *No Product type* /      Patient  verified Yes     Visit #   Current / Total 5 24   Time   In / Out 1:57 2:43   Pain   In / Out 8/10 8/10   Subjective Functional Status/Changes: Pt reports increased soreness/pain through hips from previous treatment. Pt wishes to split time in half between strengthening hips and back.   Changes to:  Allergies, Med Hx, Sx Hx?   no       TREATMENT AREA =  Pain in left hip [M25.552]  Other low back pain [M54.59]    OBJECTIVE    Modalities Rationale:     decrease pain and increase tissue extensibility to improve patient's ability to progress to PLOF and address remaining functional goals.     min [] Estim Unattended, type/location:                                      []  w/ice    []  w/heat    min [] Estim Attended, type/location:                                     []  w/US     []  w/ice    []  w/heat    []  TENS insruct      min []  Mechanical Traction: type/lbs                   []  pro   []  sup   []  int   []  cont    []  before manual    []  after manual    min []  Ultrasound, settings/location:      min []  Iontophoresis w/ dexamethasone, location:                                               []  take home patch       []  in clinic     10   min  unbilled []  Ice     [x]  Heat    location/position: Low back - Pt seated    min []  Paraffin,  details:     min []  Vasopneumatic Device, press/temp:     min []  Whirlpool / Fluido:    If using vaso (only need to measure limb vaso being performed on)      pre-treatment girth :       post-treatment girth :       measured at (landmark location) :      min []  Other:    Skin assessment post-treatment (if applicable):    []  intact    []  redness- no adverse reaction                 []redness - adverse reaction:         Therapeutic

## 2024-05-07 ENCOUNTER — HOSPITAL ENCOUNTER (OUTPATIENT)
Facility: HOSPITAL | Age: 74
Setting detail: RECURRING SERIES
End: 2024-05-07
Payer: MEDICARE

## 2024-05-10 ENCOUNTER — HOSPITAL ENCOUNTER (OUTPATIENT)
Facility: HOSPITAL | Age: 74
Setting detail: RECURRING SERIES
Discharge: HOME | End: 2024-05-13
Payer: MEDICARE

## 2024-05-10 PROCEDURE — 97110 THERAPEUTIC EXERCISES: CPT

## 2024-05-10 PROCEDURE — 97530 THERAPEUTIC ACTIVITIES: CPT

## 2024-05-10 PROCEDURE — 97112 NEUROMUSCULAR REEDUCATION: CPT

## 2024-05-10 NOTE — PROGRESS NOTES
PHYSICAL / OCCUPATIONAL THERAPY - DAILY TREATMENT NOTE (updated )    Patient Name: Cele Burrows    Date: 5/10/2024    : 1950  Insurance: Payor: MEDICARE / Plan: MEDICARE PART A AND B / Product Type: *No Product type* /      Patient  verified Yes     Visit #   Current / Total 6 24   Time   In / Out 153 242   Pain   In / Out 6 7   Subjective Functional Status/Changes: It's better than it was the other day.   Changes to:  Meds, Allergies, Med Hx, Sx Hx?  If yes, update Summary List no       TREATMENT AREA =  Pain in left hip [M25.552]  Other low back pain [M54.59]    OBJECTIVE    Modalities Rationale:     decrease pain to improve patient's ability to progress to PLOF and address remaining functional goals.     min [] Estim Unattended, type/location:                                      []  w/ice    []  w/heat    min [] Estim Attended, type/location:                                     []  w/US     []  w/ice    []  w/heat    []  TENS insruct      min []  Mechanical Traction: type/lbs                   []  pro   []  sup   []  int   []  cont    []  before manual    []  after manual    min []  Ultrasound, settings/location:      min []  Iontophoresis w/ dexamethasone, location:                                               []  take home patch       []  in clinic     10   min  unbilled []  Ice     [x]  Heat    location/position: Seated: l/S    min []  Paraffin,  details:     min []  Vasopneumatic Device, press/temp:     min []  Whirlpool / Fluido:    If using vaso (only need to measure limb vaso being performed on)      pre-treatment girth :       post-treatment girth :       measured at (landmark location) :      min []  Other:    Skin assessment post-treatment (if applicable):    []  intact    []  redness- no adverse reaction                 []redness - adverse reaction:         Therapeutic Procedures:  Tx Min Billable or 1:1 Min (if diff from Tx Min) Procedure, Rationale, Specifics   21  [id number]

## 2024-05-14 ENCOUNTER — HOSPITAL ENCOUNTER (OUTPATIENT)
Facility: HOSPITAL | Age: 74
Setting detail: RECURRING SERIES
Discharge: HOME | End: 2024-05-17
Payer: MEDICARE

## 2024-05-14 ENCOUNTER — TELEPHONE (OUTPATIENT)
Age: 74
End: 2024-05-14

## 2024-05-14 PROCEDURE — 97110 THERAPEUTIC EXERCISES: CPT

## 2024-05-14 PROCEDURE — 97112 NEUROMUSCULAR REEDUCATION: CPT

## 2024-05-14 PROCEDURE — 97530 THERAPEUTIC ACTIVITIES: CPT

## 2024-05-14 NOTE — TELEPHONE ENCOUNTER
This pharmacy faxed over request for the following prescriptions to be filled:    Medication requested : glipiZIDE (GLUCOTROL XL) 10 MG extended release table QTY 90 Refill 1  PCP: Kelly  Pharmacy or Print: [facility]  Mail order or Local pharmacy Mail Order     Scheduled appointment if not seen by current providers in office: LOV 45/9/2024 WESLEY 8/12/2024

## 2024-05-14 NOTE — PROGRESS NOTES
PHYSICAL / OCCUPATIONAL THERAPY - DAILY TREATMENT NOTE     Patient Name: Cele Burrows    Date: 2024    : 1950  Insurance: Payor: MEDICARE / Plan: MEDICARE PART A AND B / Product Type: *No Product type* /      Patient  verified Yes     Visit #   Current / Total 7 24   Time   In / Out 1:50 PM 2:40 PM   Pain   In / Out 5-6 5   Subjective Functional Status/Changes: Patient reports no new changes at this time   Changes to:  Allergies, Med Hx, Sx Hx?   no       TREATMENT AREA =  Pain in left hip [M25.552]  Other low back pain [M54.59]    OBJECTIVE    Modalities Rationale:     decrease pain to improve patient's ability to progress to PLOF and address remaining functional goals.     min [] Estim Unattended, type/location:                                      []  w/ice    []  w/heat    min [] Estim Attended, type/location:                                     []  w/US     []  w/ice    []  w/heat    []  TENS insruct      min []  Mechanical Traction: type/lbs                   []  pro   []  sup   []  int   []  cont    []  before manual    []  after manual    min []  Ultrasound, settings/location:      min []  Iontophoresis w/ dexamethasone, location:                                               []  take home patch       []  in clinic     10   min  unbilled []  Ice     [x]  Heat    location/position: Seated: l/S     min []  Paraffin,  details:     min []  Vasopneumatic Device, press/temp:     min []  Whirlpool / Fluido:    If using vaso (only need to measure limb vaso being performed on)      pre-treatment girth :       post-treatment girth :       measured at (landmark location) :      min []  Other:    Skin assessment post-treatment (if applicable):    [x]  intact    []  redness- no adverse reaction                 []redness - adverse reaction:         Therapeutic Procedures:  Tx Min Billable or 1:1 Min (if diff from Tx Min) Procedure, Rationale, Specifics   20  [id number] Therapeutic Exercise (timed):

## 2024-05-17 RX ORDER — GLIPIZIDE 10 MG/1
10 TABLET, FILM COATED, EXTENDED RELEASE ORAL DAILY
Qty: 90 TABLET | Refills: 1 | Status: SHIPPED | OUTPATIENT
Start: 2024-05-17

## 2024-05-21 ENCOUNTER — HOSPITAL ENCOUNTER (OUTPATIENT)
Facility: HOSPITAL | Age: 74
Setting detail: RECURRING SERIES
Discharge: HOME | End: 2024-05-24
Payer: MEDICARE

## 2024-05-21 PROCEDURE — 97110 THERAPEUTIC EXERCISES: CPT

## 2024-05-21 PROCEDURE — 97530 THERAPEUTIC ACTIVITIES: CPT

## 2024-05-21 PROCEDURE — 97112 NEUROMUSCULAR REEDUCATION: CPT

## 2024-05-21 NOTE — PROGRESS NOTES
PAT [facility]  [address] [place] [address] - Ph: [phone]   Fx: [phone]    PHYSICAL THERAPY PROGRESS NOTE      Patient name: Cele Burrows Start of Care: 24   Referral source: Alee Mendoza MD : 1950    Medical Diagnosis: Pain in left hip [M25.552]  Other low back pain [M54.59]  Payor: MEDICARE / Plan: MEDICARE PART A AND B / Product Type: *No Product type* /  Onset Date:24    Treatment Diagnosis: M25.552  LEFT HIP PAIN  and M54.59  OTHER LOWER BACK PAIN    Prior Hospitalization: see medical history Provider#: [id number]   Medications: Verified on Patient summary List   Comorbidities: DM; HTN; sleep dysfunction;kidney problems  Prior Level of Function: able to tolerate prolonged sitting/ daily activities without pain     Visits from Start of Care: 8    Missed Visits: 2    Goals/Measure of Progress: To be achieved in 12 weeks:    Short Term Goals: To be accomplished in 2 weeks  1. I and compliant with HEP for self management of symptoms.   IE: issued HEP  PN: Met    2. Increase L/S finger tips to toes without increase in pain to increase ease with ADLs.  IE:finger tips to ankles with increased pain  PN: Remains the same.   Long Term Goals: To be accomplished in 12 weeks  1. Improve FOTO to 60 to indicate improved function with daily activities.   IE:36  PN:49  2. Increase B hip abd strength to 4+/5 to improve stability for performing heavy activities around home.   IE:3+/5  PN: 4/5 progressing   3. Increase B hip ext strength to 4-/5 to improve stability with sit<>stand transfers.   IE:3/5 B  PN:3+/5 B   4. Patient will report >=50% improvement to increase tolerance for prolonged sitting and standing.   IE: 0%  PN: 75% goal met    Summary of Care/ Key Functional Changes: FOTO improved by 13 points      ASSESSMENT/RECOMMENDATIONS: Patient reports 75% overall improvement. B hip strength has improved by 1/2 grade. 
strength deficits, analyze and cue for proper movement patterns, and analyze and modify for postural abnormalities to address functional deficits and attain remaining goals.    Progress toward goals / Updated goals:  [x]  See Progress Note/Recertification    Short Term Goals: To be accomplished in 2 weeks  1. I and compliant with HEP for self management of symptoms.   IE: issued HEP  Current: Met (4/23/24)   2. Increase L/S finger tips to toes without increase in pain to increase ease with ADLs.  IE:finger tips to ankles with increased pain  Current: Remains the same. 4/26/2024  Long Term Goals: To be accomplished in 12 weeks  1. Improve FOTO to 60 to indicate improved function with daily activities.   IE:36  Current:49  2. Increase B hip abd strength to 4+/5 to improve stability for performing heavy activities around home.   IE:3+/5  Current: 4/5 progressing 5/10/24  3. Increase B hip ext strength to 4-/5 to improve stability with sit<>stand transfers.   IE:3/5 B  Current:3+/5 B   4. Patient will report >=50% improvement to increase tolerance for prolonged sitting and standing.   IE: 0%  Current: 75% goal met    PLAN  Yes  Continue plan of care  []  Upgrade activities as tolerated  []  Discharge due to :  []  Other:    [facility], [facility]    5/21/2024    1:45 PM    Future Appointments   Date Time Provider Department Center   5/21/2024  1:50 PM Joyce Sepulveda, PT [facility] [place]   5/28/2024  1:10 PM [facility]   6/4/2024  1:10 PM [facility]   8/12/2024  1:45 PM Alee Mendoza MD [facility]   9/9/2024 10:00 AM Sonia Diaz APRN - NP [facility]

## 2024-05-28 ENCOUNTER — HOSPITAL ENCOUNTER (OUTPATIENT)
Facility: HOSPITAL | Age: 74
Setting detail: RECURRING SERIES
Discharge: HOME | End: 2024-05-31
Payer: MEDICARE

## 2024-05-28 PROCEDURE — 97112 NEUROMUSCULAR REEDUCATION: CPT

## 2024-05-28 PROCEDURE — 97110 THERAPEUTIC EXERCISES: CPT

## 2024-05-28 NOTE — PROGRESS NOTES
PHYSICAL / OCCUPATIONAL THERAPY - DAILY TREATMENT NOTE (updated )    Patient Name: Cele Burrows    Date: 2024    : 1950  Insurance: Payor: MEDICARE / Plan: MEDICARE PART A AND B / Product Type: *No Product type* /      Patient  verified Yes     Visit #   Current / Total 9 24   Time   In / Out 109 151   Pain   In / Out 5-6 5   Subjective Functional Status/Changes: No change.   Changes to:  Meds, Allergies, Med Hx, Sx Hx?  If yes, update Summary List no       TREATMENT AREA =  Pain in left hip [M25.552]  Other low back pain [M54.59]    OBJECTIVE    Modalities Rationale:     decrease pain to improve patient's ability to progress to PLOF and address remaining functional goals.     min [] Estim Unattended, type/location:                                      []  w/ice    []  w/heat    min [] Estim Attended, type/location:                                     []  w/US     []  w/ice    []  w/heat    []  TENS insruct      min []  Mechanical Traction: type/lbs                   []  pro   []  sup   []  int   []  cont    []  before manual    []  after manual    min []  Ultrasound, settings/location:      min []  Iontophoresis w/ dexamethasone, location:                                               []  take home patch       []  in clinic   10     min  unbilled []  Ice     [x]  Heat    location/position: Seated; L/S    min []  Paraffin,  details:     min []  Vasopneumatic Device, press/temp:     min []  Whirlpool / Fluido:    If using vaso (only need to measure limb vaso being performed on)      pre-treatment girth :       post-treatment girth :       measured at (landmark location) :      min []  Other:    Skin assessment post-treatment (if applicable):    []  intact    []  redness- no adverse reaction                 []redness - adverse reaction:         Therapeutic Procedures:  Tx Min Billable or 1:1 Min (if diff from Tx Min) Procedure, Rationale, Specifics   24  [id number] Therapeutic Exercise (timed):

## 2024-05-30 ENCOUNTER — TELEPHONE (OUTPATIENT)
Age: 74
End: 2024-05-30

## 2024-05-30 RX ORDER — FINASTERIDE 5 MG/1
5 TABLET, FILM COATED ORAL DAILY
Qty: 90 TABLET | Refills: 3 | Status: SHIPPED | OUTPATIENT
Start: 2024-05-30

## 2024-05-30 NOTE — TELEPHONE ENCOUNTER
Pt states that he that [place] and there is a discrepancy with his glipizide RX. Pt states that he take this twice a day and the order was written fro once a day so [facility] pharmacy will only give him 90 with the directions of taking once a day. Can you please send a new RX to reflect the twice a day directions and can it be done today so that he will have it before he leaves the country next week. Please advise. [facility]. Thank you

## 2024-05-30 NOTE — TELEPHONE ENCOUNTER
Spoke with pt and advised that he is taking XR Glipizide and that is why he is only taking 1 a day. Pt voiced understanding. Closing encounter

## 2024-06-03 RX ORDER — GLIPIZIDE 10 MG/1
10 TABLET, FILM COATED, EXTENDED RELEASE ORAL DAILY
Qty: 90 TABLET | Refills: 1 | Status: SHIPPED | OUTPATIENT
Start: 2024-06-03

## 2024-06-27 RX ORDER — VALSARTAN 320 MG/1
TABLET ORAL
Qty: 90 TABLET | Refills: 3 | Status: SHIPPED | OUTPATIENT
Start: 2024-06-27

## 2024-07-03 ENCOUNTER — TELEPHONE (OUTPATIENT)
Facility: CLINIC | Age: 74
End: 2024-07-03

## 2024-07-03 DIAGNOSIS — E11.9 TYPE 2 DIABETES MELLITUS WITHOUT COMPLICATION, WITH LONG-TERM CURRENT USE OF INSULIN (HCC): Primary | ICD-10-CM

## 2024-07-03 DIAGNOSIS — E11.65 POORLY CONTROLLED DIABETES MELLITUS (HCC): Primary | ICD-10-CM

## 2024-07-03 DIAGNOSIS — E11.9 TYPE 2 DIABETES MELLITUS WITHOUT COMPLICATION, WITH LONG-TERM CURRENT USE OF INSULIN (HCC): ICD-10-CM

## 2024-07-03 DIAGNOSIS — Z79.4 TYPE 2 DIABETES MELLITUS WITHOUT COMPLICATION, WITH LONG-TERM CURRENT USE OF INSULIN (HCC): Primary | ICD-10-CM

## 2024-07-03 DIAGNOSIS — Z79.4 TYPE 2 DIABETES MELLITUS WITHOUT COMPLICATION, WITH LONG-TERM CURRENT USE OF INSULIN (HCC): ICD-10-CM

## 2024-07-03 RX ORDER — GLUCOSAMINE HCL/CHONDROITIN SU 500-400 MG
CAPSULE ORAL
Qty: 200 STRIP | Refills: 5 | Status: SHIPPED | OUTPATIENT
Start: 2024-07-03

## 2024-07-03 RX ORDER — LANCETS 30 GAUGE
EACH MISCELLANEOUS
Qty: 200 EACH | Refills: 5 | Status: SHIPPED | OUTPATIENT
Start: 2024-07-03

## 2024-07-03 RX ORDER — BLOOD-GLUCOSE METER
KIT MISCELLANEOUS
Qty: 1 KIT | Refills: 0 | Status: SHIPPED | OUTPATIENT
Start: 2024-07-03

## 2024-07-03 RX ORDER — LANCETS 30 GAUGE
EACH MISCELLANEOUS
Qty: 200 EACH | Refills: 5 | Status: SHIPPED | OUTPATIENT
Start: 2024-07-03 | End: 2024-07-03 | Stop reason: SDUPTHER

## 2024-07-03 NOTE — TELEPHONE ENCOUNTER
Pt's insurance will cover the Accu Chek meter , lancets and testing strips. [facility] please. Pt's wife is going to [facility] around 2:30 pm

## 2024-07-03 NOTE — TELEPHONE ENCOUNTER
[facility] on [facility]  states that the lancet has to have the directions on it for checking blood sugars twice a day. Can you please send this because wife is calling again about it. Just the lillian holden need the directions

## 2024-07-30 ENCOUNTER — OFFICE VISIT (OUTPATIENT)
Facility: CLINIC | Age: 74
End: 2024-07-30
Payer: MEDICARE

## 2024-07-30 VITALS
DIASTOLIC BLOOD PRESSURE: 76 MMHG | BODY MASS INDEX: 24.59 KG/M2 | OXYGEN SATURATION: 98 % | HEIGHT: 66 IN | HEART RATE: 71 BPM | SYSTOLIC BLOOD PRESSURE: 138 MMHG | RESPIRATION RATE: 16 BRPM | WEIGHT: 153 LBS | TEMPERATURE: 97.4 F

## 2024-07-30 DIAGNOSIS — T14.8XXA OPEN WOUND: Primary | ICD-10-CM

## 2024-07-30 PROCEDURE — 3075F SYST BP GE 130 - 139MM HG: CPT | Performed by: FAMILY MEDICINE

## 2024-07-30 PROCEDURE — 3017F COLORECTAL CA SCREEN DOC REV: CPT | Performed by: FAMILY MEDICINE

## 2024-07-30 PROCEDURE — G8420 CALC BMI NORM PARAMETERS: HCPCS | Performed by: FAMILY MEDICINE

## 2024-07-30 PROCEDURE — G8427 DOCREV CUR MEDS BY ELIG CLIN: HCPCS | Performed by: FAMILY MEDICINE

## 2024-07-30 PROCEDURE — 1123F ACP DISCUSS/DSCN MKR DOCD: CPT | Performed by: FAMILY MEDICINE

## 2024-07-30 PROCEDURE — 99213 OFFICE O/P EST LOW 20 MIN: CPT | Performed by: FAMILY MEDICINE

## 2024-07-30 PROCEDURE — 3078F DIAST BP <80 MM HG: CPT | Performed by: FAMILY MEDICINE

## 2024-07-30 PROCEDURE — 1036F TOBACCO NON-USER: CPT | Performed by: FAMILY MEDICINE

## 2024-07-30 RX ORDER — GLIPIZIDE 10 MG/1
TABLET ORAL
COMMUNITY
Start: 2024-07-25

## 2024-07-30 RX ORDER — CEPHALEXIN 500 MG/1
500 CAPSULE ORAL 3 TIMES DAILY
Qty: 21 CAPSULE | Refills: 0 | Status: SHIPPED | OUTPATIENT
Start: 2024-07-30

## 2024-07-30 RX ORDER — EMPAGLIFLOZIN 10 MG/1
TABLET, FILM COATED ORAL
COMMUNITY
Start: 2024-07-25

## 2024-07-30 NOTE — PROGRESS NOTES
\"Have you been to the ER, urgent care clinic since your last visit?  Hospitalized since your last visit?\"    [facility] LOV: 4/27/24    “Have you seen or consulted any other health care providers outside of [facility] since your last visit?”    NO      Last dm foot exam - No    Last dm eye exam Dr. Mcmullen - 10/2023. Record has been requested.      Click Here for Release of Records Request  
improvement or sooner if needed.  Take antibiotic with food. Educated that as he is diabetic he should wait this long to treat his any open wound. Need to come sooner in future.   Has hyperpigmented spots after scratching. For now advised to observe.   Comments:  rt leg  Orders:  -     cephALEXin (KEFLEX) 500 MG capsule; Take 1 capsule by mouth 3 times daily     Pt understood and agree with the plan       Return in about 1 week (around 8/6/2024), or if symptoms worsen or fail to improve.     No orders of the defined types were placed in this encounter.  Alee Mendoza MD

## 2024-08-08 ENCOUNTER — HOSPITAL ENCOUNTER (EMERGENCY)
Facility: HOSPITAL | Age: 74
Discharge: HOME | End: 2024-08-08
Attending: STUDENT IN AN ORGANIZED HEALTH CARE EDUCATION/TRAINING PROGRAM
Payer: MEDICARE

## 2024-08-08 VITALS
TEMPERATURE: 97.9 F | RESPIRATION RATE: 18 BRPM | HEIGHT: 65 IN | DIASTOLIC BLOOD PRESSURE: 67 MMHG | HEART RATE: 82 BPM | SYSTOLIC BLOOD PRESSURE: 122 MMHG | OXYGEN SATURATION: 99 % | BODY MASS INDEX: 24.99 KG/M2 | WEIGHT: 150 LBS

## 2024-08-08 DIAGNOSIS — L03.119 CELLULITIS AND ABSCESS OF LEG: Primary | ICD-10-CM

## 2024-08-08 DIAGNOSIS — L02.419 CELLULITIS AND ABSCESS OF LEG: Primary | ICD-10-CM

## 2024-08-08 PROCEDURE — 99283 EMERGENCY DEPT VISIT LOW MDM: CPT

## 2024-08-08 PROCEDURE — 10061 I&D ABSCESS COMP/MULTIPLE: CPT

## 2024-08-08 RX ORDER — SULFAMETHOXAZOLE AND TRIMETHOPRIM 800; 160 MG/1; MG/1
1 TABLET ORAL 2 TIMES DAILY
Qty: 14 TABLET | Refills: 0 | Status: SHIPPED | OUTPATIENT
Start: 2024-08-08 | End: 2024-08-15

## 2024-08-08 ASSESSMENT — LIFESTYLE VARIABLES
HOW MANY STANDARD DRINKS CONTAINING ALCOHOL DO YOU HAVE ON A TYPICAL DAY: PATIENT DOES NOT DRINK
HOW OFTEN DO YOU HAVE A DRINK CONTAINING ALCOHOL: NEVER

## 2024-08-08 ASSESSMENT — PAIN - FUNCTIONAL ASSESSMENT: PAIN_FUNCTIONAL_ASSESSMENT: 0-10

## 2024-08-08 ASSESSMENT — ENCOUNTER SYMPTOMS
COLOR CHANGE: 0
VOMITING: 0
RHINORRHEA: 0
ABDOMINAL PAIN: 0
DIARRHEA: 0
SHORTNESS OF BREATH: 0

## 2024-08-08 ASSESSMENT — PAIN SCALES - GENERAL: PAINLEVEL_OUTOF10: 3

## 2024-08-08 NOTE — ED TRIAGE NOTES
Pt arrives ambulatory with a nickel size wound on his right shin. Wound is oval with a black center and red inflamed edges that are pulling away from center. Pt states this was a mosquito bite from a month ago and was prescribed with cephalexin back in July. Pt is concerned due to having diabetes.

## 2024-08-08 NOTE — ED PROVIDER NOTES
kit  Check twice daily. Provide accu check glucometer           * This list has 2 medication(s) that are the same as other medications prescribed for you. Read the directions carefully, and ask your doctor or other care provider to review them with you.                ASK your doctor about these medications      atorvastatin 10 MG tablet  Commonly known as: LIPITOR  TAKE 1 TABLET EVERY NIGHT     blood glucose test strips  Check twice a day. Provide accucheck glucometer compatible test strips     cephALEXin 500 MG capsule  Commonly known as: KEFLEX  Take 1 capsule by mouth 3 times daily     Cyanocobalamin 1000 MCG Subl     diclofenac 50 MG EC tablet  Commonly known as: VOLTAREN     Ergocalciferol 50 MCG (2000 UT) Tabs     finasteride 5 MG tablet  Commonly known as: PROSCAR  TAKE 1 TABLET EVERY DAY     glipiZIDE 10 MG tablet  Commonly known as: GLUCOTROL     ibuprofen 600 MG tablet  Commonly known as: ADVIL;MOTRIN     ipratropium 0.03 % nasal spray  Commonly known as: ATROVENT     Jardiance 10 MG tablet  Generic drug: empagliflozin     Lantus SoloStar 100 UNIT/ML injection pen  Generic drug: insulin glargine  INJECT 10 UNITS UNDER THE SKIN TWICE DAILY     metFORMIN 1000 MG tablet  Commonly known as: GLUCOPHAGE  TAKE 1 TABLET TWICE DAILY WITH MEALS     montelukast 10 MG tablet  Commonly known as: SINGULAIR  Take 1 tablet by mouth daily     omeprazole 40 MG delayed release capsule  Commonly known as: PRILOSEC  TAKE 1 CAPSULE EVERY MORNING     oxyBUTYnin 10 MG extended release tablet  Commonly known as: DITROPAN-XL     Semaglutide 14 MG Tabs  Take 14 mg by mouth every morning (before breakfast)     tadalafil 20 MG tablet  Commonly known as: CIALIS     tamsulosin 0.4 MG capsule  Commonly known as: FLOMAX  TAKE 1 CAPSULE EVERY DAY     traZODone 50 MG tablet  Commonly known as: DESYREL     trospium 20 MG tablet  Commonly known as: SANCTURA     valsartan 320 MG tablet  Commonly known as: DIOVAN  TAKE 1 TABLET EVERY DAY

## 2024-08-15 ENCOUNTER — HOSPITAL ENCOUNTER (EMERGENCY)
Facility: HOSPITAL | Age: 74
Discharge: HOME | End: 2024-08-15
Payer: MEDICARE

## 2024-08-15 ENCOUNTER — APPOINTMENT (OUTPATIENT)
Facility: HOSPITAL | Age: 74
End: 2024-08-15
Payer: MEDICARE

## 2024-08-15 VITALS
HEART RATE: 82 BPM | WEIGHT: 150 LBS | BODY MASS INDEX: 24.99 KG/M2 | DIASTOLIC BLOOD PRESSURE: 87 MMHG | TEMPERATURE: 97.9 F | OXYGEN SATURATION: 99 % | RESPIRATION RATE: 18 BRPM | HEIGHT: 65 IN | SYSTOLIC BLOOD PRESSURE: 106 MMHG

## 2024-08-15 DIAGNOSIS — L02.419 CELLULITIS AND ABSCESS OF LEG: Primary | ICD-10-CM

## 2024-08-15 DIAGNOSIS — L03.119 CELLULITIS AND ABSCESS OF LEG: Primary | ICD-10-CM

## 2024-08-15 LAB — GLUCOSE BLD STRIP.AUTO-MCNC: 168 MG/DL (ref 70–110)

## 2024-08-15 PROCEDURE — 99283 EMERGENCY DEPT VISIT LOW MDM: CPT

## 2024-08-15 PROCEDURE — 73590 X-RAY EXAM OF LOWER LEG: CPT

## 2024-08-15 PROCEDURE — 82962 GLUCOSE BLOOD TEST: CPT

## 2024-08-15 RX ORDER — CLINDAMYCIN HYDROCHLORIDE 300 MG/1
300 CAPSULE ORAL 4 TIMES DAILY
Qty: 28 CAPSULE | Refills: 0 | Status: SHIPPED | OUTPATIENT
Start: 2024-08-15 | End: 2024-08-22

## 2024-08-15 ASSESSMENT — LIFESTYLE VARIABLES
HOW OFTEN DO YOU HAVE A DRINK CONTAINING ALCOHOL: NEVER
HOW MANY STANDARD DRINKS CONTAINING ALCOHOL DO YOU HAVE ON A TYPICAL DAY: PATIENT DOES NOT DRINK

## 2024-08-15 ASSESSMENT — ENCOUNTER SYMPTOMS
COLOR CHANGE: 0
RHINORRHEA: 0
DIARRHEA: 0
VOMITING: 0
SHORTNESS OF BREATH: 0
ABDOMINAL PAIN: 0

## 2024-08-15 ASSESSMENT — PAIN - FUNCTIONAL ASSESSMENT: PAIN_FUNCTIONAL_ASSESSMENT: NONE - DENIES PAIN

## 2024-08-15 NOTE — ED NOTES
Sterile 4x4 dressing applied by Provider and secured with paper tape.   
10 UNITS UNDER THE SKIN TWICE DAILY     metFORMIN 1000 MG tablet  Commonly known as: GLUCOPHAGE  TAKE 1 TABLET TWICE DAILY WITH MEALS     montelukast 10 MG tablet  Commonly known as: SINGULAIR  Take 1 tablet by mouth daily     omeprazole 40 MG delayed release capsule  Commonly known as: PRILOSEC  TAKE 1 CAPSULE EVERY MORNING     oxyBUTYnin 10 MG extended release tablet  Commonly known as: DITROPAN-XL     Semaglutide 14 MG Tabs  Take 14 mg by mouth every morning (before breakfast)     sulfamethoxazole-trimethoprim 800-160 MG per tablet  Commonly known as: Bactrim DS  Take 1 tablet by mouth 2 times daily for 7 days     tadalafil 20 MG tablet  Commonly known as: CIALIS     tamsulosin 0.4 MG capsule  Commonly known as: FLOMAX  TAKE 1 CAPSULE EVERY DAY     traZODone 50 MG tablet  Commonly known as: DESYREL     trospium 20 MG tablet  Commonly known as: SANCTURA     valsartan 320 MG tablet  Commonly known as: DIOVAN  TAKE 1 TABLET EVERY DAY               Where to Get Your Medications        These medications were sent to [facility] [address] [place] [address] [address] [facility] -  [phone] - F [phone]  [facility] [address]      Phone: [phone]   clindamycin 300 MG capsule

## 2024-08-15 NOTE — ED TRIAGE NOTES
Pt was seen last week for an infected wound on his R lower leg. It was packed. His PCP is on vacation so he came to the ED for a wound check.

## 2024-08-15 NOTE — ED PROVIDER NOTES
EMERGENCY DEPARTMENT HISTORY AND PHYSICAL EXAM      Date: 8/15/2024  Patient Name: Cele Burrows    History of Presenting Illness     Chief Complaint   Patient presents with    Wound Check       History (Context): Cele Burrows is a 74 y.o. male with significant past medical history of previous history of bladder cancer, small bowel obstruction, hypertension, hyperlipidemia, DM, peripheral neuropathy, spinal stenosis presents ambulatory to the ED today.  Patient states he was seen on 8/8 and he had an infection incised and drained.  Patient states he attempted to follow-up with his PCP but he was not able to obtain an appointment.  Patient has not yet remove the packing.  Patient reports he still notices a small amount of redness surrounding the wound.  Denies fever, chills, drainage      PCP: Alee Mendoza MD    No current facility-administered medications for this encounter.     Current Outpatient Medications   Medication Sig Dispense Refill    clindamycin (CLEOCIN) 300 MG capsule Take 1 capsule by mouth 4 times daily for 7 days 28 capsule 0    sulfamethoxazole-trimethoprim (BACTRIM DS) 800-160 MG per tablet Take 1 tablet by mouth 2 times daily for 7 days 14 tablet 0    JARDIANCE 10 MG tablet       glipiZIDE (GLUCOTROL) 10 MG tablet       cephALEXin (KEFLEX) 500 MG capsule Take 1 capsule by mouth 3 times daily 21 capsule 0    glucose monitoring kit Check twice daily. Provide accu check glucometer 1 kit 0    blood glucose monitor strips Check twice a day. Provide accucheck glucometer compatible test strips 200 strip 5    Lancets MISC Provide accucheck glucometer compatible. Use twice daily 200 each 5    valsartan (DIOVAN) 320 MG tablet TAKE 1 TABLET EVERY DAY 90 tablet 3    metFORMIN (GLUCOPHAGE) 1000 MG tablet TAKE 1 TABLET TWICE DAILY WITH MEALS 180 tablet 1    finasteride (PROSCAR) 5 MG tablet TAKE 1 TABLET EVERY DAY 90 tablet 3    atorvastatin (LIPITOR) 10 MG tablet TAKE 1 TABLET EVERY NIGHT 90

## 2024-08-21 ENCOUNTER — HOSPITAL ENCOUNTER (OUTPATIENT)
Facility: HOSPITAL | Age: 74
Discharge: HOME | End: 2024-08-24
Payer: MEDICARE

## 2024-08-21 ENCOUNTER — OFFICE VISIT (OUTPATIENT)
Facility: CLINIC | Age: 74
End: 2024-08-21
Payer: MEDICARE

## 2024-08-21 VITALS
WEIGHT: 149 LBS | TEMPERATURE: 96.9 F | DIASTOLIC BLOOD PRESSURE: 60 MMHG | RESPIRATION RATE: 16 BRPM | BODY MASS INDEX: 23.95 KG/M2 | SYSTOLIC BLOOD PRESSURE: 116 MMHG | HEIGHT: 66 IN | HEART RATE: 79 BPM | OXYGEN SATURATION: 99 %

## 2024-08-21 DIAGNOSIS — T14.8XXA OPEN WOUND: Primary | ICD-10-CM

## 2024-08-21 DIAGNOSIS — E11.65 POORLY CONTROLLED DIABETES MELLITUS (HCC): ICD-10-CM

## 2024-08-21 LAB
ALBUMIN SERPL-MCNC: 3.7 G/DL (ref 3.4–5)
ALBUMIN SERPL-MCNC: 3.8 G/DL (ref 3.4–5)
ALBUMIN/GLOB SERPL: 1.2 (ref 0.8–1.7)
ALBUMIN/GLOB SERPL: 1.3 (ref 0.8–1.7)
ALP SERPL-CCNC: 82 U/L (ref 45–117)
ALP SERPL-CCNC: 83 U/L (ref 45–117)
ALT SERPL-CCNC: 26 U/L (ref 16–61)
ALT SERPL-CCNC: 26 U/L (ref 16–61)
ANION GAP SERPL CALC-SCNC: 7 MMOL/L (ref 3–18)
ANION GAP SERPL CALC-SCNC: 8 MMOL/L (ref 3–18)
AST SERPL-CCNC: 19 U/L (ref 10–38)
AST SERPL-CCNC: 22 U/L (ref 10–38)
BILIRUB SERPL-MCNC: 0.4 MG/DL (ref 0.2–1)
BILIRUB SERPL-MCNC: 0.4 MG/DL (ref 0.2–1)
BUN SERPL-MCNC: 14 MG/DL (ref 7–18)
BUN SERPL-MCNC: 14 MG/DL (ref 7–18)
BUN/CREAT SERPL: 17 (ref 12–20)
BUN/CREAT SERPL: 17 (ref 12–20)
CALCIUM SERPL-MCNC: 9.1 MG/DL (ref 8.5–10.1)
CALCIUM SERPL-MCNC: 9.2 MG/DL (ref 8.5–10.1)
CHLORIDE SERPL-SCNC: 105 MMOL/L (ref 100–111)
CHLORIDE SERPL-SCNC: 106 MMOL/L (ref 100–111)
CHOLEST SERPL-MCNC: 133 MG/DL
CO2 SERPL-SCNC: 26 MMOL/L (ref 21–32)
CO2 SERPL-SCNC: 26 MMOL/L (ref 21–32)
CREAT SERPL-MCNC: 0.81 MG/DL (ref 0.6–1.3)
CREAT SERPL-MCNC: 0.82 MG/DL (ref 0.6–1.3)
CREAT UR-MCNC: 123 MG/DL (ref 30–125)
EST. AVERAGE GLUCOSE BLD GHB EST-MCNC: 180 MG/DL
GLOBULIN SER CALC-MCNC: 3 G/DL (ref 2–4)
GLOBULIN SER CALC-MCNC: 3 G/DL (ref 2–4)
GLUCOSE SERPL-MCNC: 119 MG/DL (ref 74–99)
GLUCOSE SERPL-MCNC: 121 MG/DL (ref 74–99)
HBA1C MFR BLD: 7.9 % (ref 4.2–5.6)
HBA1C MFR BLD: 8.3 % (ref 4.2–5.6)
HDLC SERPL-MCNC: 56 MG/DL (ref 40–60)
HDLC SERPL: 2.4 (ref 0–5)
LDLC SERPL CALC-MCNC: 55.2 MG/DL (ref 0–100)
LIPID PANEL: NORMAL
MICROALBUMIN UR-MCNC: 1.48 MG/DL (ref 0–3)
MICROALBUMIN/CREAT UR-RTO: 12 MG/G (ref 0–30)
POTASSIUM SERPL-SCNC: 4.7 MMOL/L (ref 3.5–5.5)
POTASSIUM SERPL-SCNC: 5.1 MMOL/L (ref 3.5–5.5)
PROT SERPL-MCNC: 6.7 G/DL (ref 6.4–8.2)
PROT SERPL-MCNC: 6.8 G/DL (ref 6.4–8.2)
SODIUM SERPL-SCNC: 139 MMOL/L (ref 136–145)
SODIUM SERPL-SCNC: 139 MMOL/L (ref 136–145)
TRIGL SERPL-MCNC: 109 MG/DL
TSH SERPL DL<=0.05 MIU/L-ACNC: 1.9 UIU/ML (ref 0.36–3.74)
VLDLC SERPL CALC-MCNC: 21.8 MG/DL

## 2024-08-21 PROCEDURE — 99213 OFFICE O/P EST LOW 20 MIN: CPT | Performed by: FAMILY MEDICINE

## 2024-08-21 PROCEDURE — 1123F ACP DISCUSS/DSCN MKR DOCD: CPT | Performed by: FAMILY MEDICINE

## 2024-08-21 PROCEDURE — 84443 ASSAY THYROID STIM HORMONE: CPT

## 2024-08-21 PROCEDURE — 80053 COMPREHEN METABOLIC PANEL: CPT

## 2024-08-21 PROCEDURE — 3074F SYST BP LT 130 MM HG: CPT | Performed by: FAMILY MEDICINE

## 2024-08-21 PROCEDURE — 3078F DIAST BP <80 MM HG: CPT | Performed by: FAMILY MEDICINE

## 2024-08-21 PROCEDURE — G8420 CALC BMI NORM PARAMETERS: HCPCS | Performed by: FAMILY MEDICINE

## 2024-08-21 PROCEDURE — 3052F HG A1C>EQUAL 8.0%<EQUAL 9.0%: CPT | Performed by: FAMILY MEDICINE

## 2024-08-21 PROCEDURE — 82043 UR ALBUMIN QUANTITATIVE: CPT

## 2024-08-21 PROCEDURE — 2022F DILAT RTA XM EVC RTNOPTHY: CPT | Performed by: FAMILY MEDICINE

## 2024-08-21 PROCEDURE — 80061 LIPID PANEL: CPT

## 2024-08-21 PROCEDURE — 36415 COLL VENOUS BLD VENIPUNCTURE: CPT

## 2024-08-21 PROCEDURE — 3017F COLORECTAL CA SCREEN DOC REV: CPT | Performed by: FAMILY MEDICINE

## 2024-08-21 PROCEDURE — G8427 DOCREV CUR MEDS BY ELIG CLIN: HCPCS | Performed by: FAMILY MEDICINE

## 2024-08-21 PROCEDURE — 83036 HEMOGLOBIN GLYCOSYLATED A1C: CPT

## 2024-08-21 PROCEDURE — 1036F TOBACCO NON-USER: CPT | Performed by: FAMILY MEDICINE

## 2024-08-21 PROCEDURE — 82570 ASSAY OF URINE CREATININE: CPT

## 2024-08-21 SDOH — ECONOMIC STABILITY: FOOD INSECURITY: WITHIN THE PAST 12 MONTHS, YOU WORRIED THAT YOUR FOOD WOULD RUN OUT BEFORE YOU GOT MONEY TO BUY MORE.: NEVER TRUE

## 2024-08-21 SDOH — ECONOMIC STABILITY: INCOME INSECURITY: HOW HARD IS IT FOR YOU TO PAY FOR THE VERY BASICS LIKE FOOD, HOUSING, MEDICAL CARE, AND HEATING?: NOT HARD AT ALL

## 2024-08-21 SDOH — ECONOMIC STABILITY: FOOD INSECURITY: WITHIN THE PAST 12 MONTHS, THE FOOD YOU BOUGHT JUST DIDN'T LAST AND YOU DIDN'T HAVE MONEY TO GET MORE.: NEVER TRUE

## 2024-08-21 NOTE — PATIENT INSTRUCTIONS
Take probiotic along with the antibiotic and you stop clindamycin as you are having diarrhea. No signs of wound infection or cellulitis. Scab over the open wound.

## 2024-08-21 NOTE — PROGRESS NOTES
Cele Burrows (:  1950) is a 74 y.o. male, Established patient, here for evaluation of the following chief complaint(s):  Hypertension and Diabetes         Assessment & Plan      Results    1. Open wound  Comments:  cellulitis  2. Poorly controlled diabetes mellitus (HCC)    Return in about 3 months (around 2024).       Subjective   History of Present Illness      Review of Systems       Objective   Blood pressure 116/60, pulse 79, temperature 96.9 °F (36.1 °C), temperature source Temporal, resp. rate 16, height 1.676 m (5' 6\"), weight 67.6 kg (149 lb), SpO2 99%.  Physical Exam             The patient (or guardian, if applicable) and other individuals in attendance with the patient were advised that Artificial Intelligence will be utilized during this visit to record, process the conversation to generate a clinical note and to support improvement of the AI technology. The patient (or guardian, if applicable) and other individuals in attendance at the appointment consented to the use of AI, including the recording.      An electronic signature was used to authenticate this note.    --Alee Mendoza MD       
\"Have you been to the ER, urgent care clinic since your last visit?  Hospitalized since your last visit?\"    [facility] LOV: 8/15/24 and 8/08/24 for cellulitis and abscess of left leg    “Have you seen or consulted any other health care providers outside of [facility] since your last visit?”    NO    [facility] Record has been requested.     Last dm eye exam Dr. Mcmullen - 10/2023. Record has been requested.     Chief Complaint   Patient presents with    Hypertension    Diabetes           Click Here for Release of Records Request  
TAKE 1 TABLET EVERY DAY 90 tablet 3    metFORMIN (GLUCOPHAGE) 1000 MG tablet TAKE 1 TABLET TWICE DAILY WITH MEALS 180 tablet 1    finasteride (PROSCAR) 5 MG tablet TAKE 1 TABLET EVERY DAY 90 tablet 3    atorvastatin (LIPITOR) 10 MG tablet TAKE 1 TABLET EVERY NIGHT 90 tablet 1    omeprazole (PRILOSEC) 40 MG delayed release capsule TAKE 1 CAPSULE EVERY MORNING 90 capsule 1    LANTUS SOLOSTAR 100 UNIT/ML injection pen INJECT 10 UNITS UNDER THE SKIN TWICE DAILY 30 mL 1    tamsulosin (FLOMAX) 0.4 MG capsule TAKE 1 CAPSULE EVERY DAY 90 capsule 1    diclofenac (VOLTAREN) 50 MG EC tablet Take 1 tablet by mouth 2 times daily as needed for Pain      Semaglutide 14 MG TABS Take 14 mg by mouth every morning (before breakfast) 90 tablet 1    Accu-Chek Softclix Lancets MISC TEST BLOOD SUGAR EVERY  each 3    B-D ULTRAFINE III SHORT PEN 31G X 8 MM MISC USE  TO INJECT ONE TIME DAILY 100 each 10    ibuprofen (ADVIL;MOTRIN) 600 MG tablet Take 1 tablet by mouth every 6 hours as needed      montelukast (SINGULAIR) 10 MG tablet Take 1 tablet by mouth daily 90 tablet 0    Cyanocobalamin 1000 MCG SUBL Take 1,000 mcg by mouth daily      Ergocalciferol 50 MCG (2000 UT) TABS Take 1 tablet by mouth daily      oxybutynin (DITROPAN-XL) 10 MG extended release tablet Take 1 tablet by mouth daily as needed      tadalafil (CIALIS) 20 MG tablet Take 1 tablet by mouth as needed      trospium (SANCTURA) 20 MG tablet Trospium Oral    1 tab po once daily    active      JARDIANCE 10 MG tablet  (Patient not taking: Reported on 8/21/2024)      traZODone (DESYREL) 50 MG tablet Take 1 tablet by mouth daily       No current facility-administered medications for this visit.            OBJECTIVE:  /60 (Site: Left Upper Arm, Position: Sitting, Cuff Size: Small Adult)   Pulse 79   Temp 96.9 °F (36.1 °C) (Temporal)   Resp 16   Ht 1.676 m (5' 6\")   Wt 67.6 kg (149 lb)   SpO2 99%   BMI 24.05 kg/m²       Physical Exam  Musculoskeletal:

## 2024-09-11 ENCOUNTER — OFFICE VISIT (OUTPATIENT)
Facility: CLINIC | Age: 74
End: 2024-09-11
Payer: MEDICARE

## 2024-09-11 VITALS
HEART RATE: 82 BPM | DIASTOLIC BLOOD PRESSURE: 80 MMHG | TEMPERATURE: 97.3 F | HEIGHT: 66 IN | BODY MASS INDEX: 24.81 KG/M2 | RESPIRATION RATE: 16 BRPM | OXYGEN SATURATION: 97 % | SYSTOLIC BLOOD PRESSURE: 140 MMHG | WEIGHT: 154.4 LBS

## 2024-09-11 DIAGNOSIS — K21.9 GASTROESOPHAGEAL REFLUX DISEASE WITHOUT ESOPHAGITIS: Primary | ICD-10-CM

## 2024-09-11 DIAGNOSIS — T14.8XXA OPEN WOUND: Primary | ICD-10-CM

## 2024-09-11 PROCEDURE — 3077F SYST BP >= 140 MM HG: CPT | Performed by: FAMILY MEDICINE

## 2024-09-11 PROCEDURE — 3017F COLORECTAL CA SCREEN DOC REV: CPT | Performed by: FAMILY MEDICINE

## 2024-09-11 PROCEDURE — 99213 OFFICE O/P EST LOW 20 MIN: CPT | Performed by: FAMILY MEDICINE

## 2024-09-11 PROCEDURE — G8420 CALC BMI NORM PARAMETERS: HCPCS | Performed by: FAMILY MEDICINE

## 2024-09-11 PROCEDURE — G8428 CUR MEDS NOT DOCUMENT: HCPCS | Performed by: FAMILY MEDICINE

## 2024-09-11 PROCEDURE — 1123F ACP DISCUSS/DSCN MKR DOCD: CPT | Performed by: FAMILY MEDICINE

## 2024-09-11 PROCEDURE — 3078F DIAST BP <80 MM HG: CPT | Performed by: FAMILY MEDICINE

## 2024-09-11 PROCEDURE — 1036F TOBACCO NON-USER: CPT | Performed by: FAMILY MEDICINE

## 2024-09-11 RX ORDER — MUPIROCIN 20 MG/G
OINTMENT TOPICAL
Qty: 30 G | Refills: 0 | Status: SHIPPED | OUTPATIENT
Start: 2024-09-11 | End: 2024-09-18

## 2024-09-12 RX ORDER — ATORVASTATIN CALCIUM 10 MG/1
10 TABLET, FILM COATED ORAL
Qty: 90 TABLET | Refills: 1 | Status: SHIPPED | OUTPATIENT
Start: 2024-09-12

## 2024-09-12 RX ORDER — OMEPRAZOLE 40 MG/1
CAPSULE, DELAYED RELEASE ORAL
Qty: 90 CAPSULE | Refills: 1 | Status: SHIPPED | OUTPATIENT
Start: 2024-09-12

## 2024-09-12 RX ORDER — TAMSULOSIN HYDROCHLORIDE 0.4 MG/1
CAPSULE ORAL
Qty: 90 CAPSULE | Refills: 1 | Status: SHIPPED | OUTPATIENT
Start: 2024-09-12

## 2024-09-12 RX ORDER — INSULIN GLARGINE 100 [IU]/ML
INJECTION, SOLUTION SUBCUTANEOUS
Qty: 30 ML | Refills: 3 | OUTPATIENT
Start: 2024-09-12

## 2024-09-16 DIAGNOSIS — Z79.4 TYPE 2 DIABETES MELLITUS WITHOUT COMPLICATION, WITH LONG-TERM CURRENT USE OF INSULIN (HCC): Primary | ICD-10-CM

## 2024-09-16 DIAGNOSIS — E11.9 TYPE 2 DIABETES MELLITUS WITHOUT COMPLICATION, WITH LONG-TERM CURRENT USE OF INSULIN (HCC): Primary | ICD-10-CM

## 2024-09-16 RX ORDER — INSULIN GLARGINE 100 [IU]/ML
INJECTION, SOLUTION SUBCUTANEOUS
Qty: 30 ML | Refills: 0 | Status: SHIPPED | OUTPATIENT
Start: 2024-09-16

## 2024-10-23 ENCOUNTER — OFFICE VISIT (OUTPATIENT)
Age: 74
End: 2024-10-23
Payer: MEDICARE

## 2024-10-23 VITALS
OXYGEN SATURATION: 99 % | HEART RATE: 76 BPM | SYSTOLIC BLOOD PRESSURE: 132 MMHG | DIASTOLIC BLOOD PRESSURE: 77 MMHG | WEIGHT: 153.6 LBS | HEIGHT: 66 IN | BODY MASS INDEX: 24.68 KG/M2

## 2024-10-23 DIAGNOSIS — I25.10 ATHEROSCLEROSIS OF NATIVE CORONARY ARTERY OF NATIVE HEART WITHOUT ANGINA PECTORIS: Primary | ICD-10-CM

## 2024-10-23 DIAGNOSIS — E78.2 MIXED HYPERLIPIDEMIA: ICD-10-CM

## 2024-10-23 DIAGNOSIS — I10 ESSENTIAL (PRIMARY) HYPERTENSION: ICD-10-CM

## 2024-10-23 DIAGNOSIS — Z79.4 TYPE 2 DIABETES MELLITUS WITH DIABETIC NEUROPATHY, WITH LONG-TERM CURRENT USE OF INSULIN (HCC): ICD-10-CM

## 2024-10-23 DIAGNOSIS — E11.40 TYPE 2 DIABETES MELLITUS WITH DIABETIC NEUROPATHY, WITH LONG-TERM CURRENT USE OF INSULIN (HCC): ICD-10-CM

## 2024-10-23 PROCEDURE — 1126F AMNT PAIN NOTED NONE PRSNT: CPT | Performed by: NURSE PRACTITIONER

## 2024-10-23 PROCEDURE — 2022F DILAT RTA XM EVC RTNOPTHY: CPT | Performed by: NURSE PRACTITIONER

## 2024-10-23 PROCEDURE — 3078F DIAST BP <80 MM HG: CPT | Performed by: NURSE PRACTITIONER

## 2024-10-23 PROCEDURE — 1036F TOBACCO NON-USER: CPT | Performed by: NURSE PRACTITIONER

## 2024-10-23 PROCEDURE — 1160F RVW MEDS BY RX/DR IN RCRD: CPT | Performed by: NURSE PRACTITIONER

## 2024-10-23 PROCEDURE — G8484 FLU IMMUNIZE NO ADMIN: HCPCS | Performed by: NURSE PRACTITIONER

## 2024-10-23 PROCEDURE — 3075F SYST BP GE 130 - 139MM HG: CPT | Performed by: NURSE PRACTITIONER

## 2024-10-23 PROCEDURE — 99214 OFFICE O/P EST MOD 30 MIN: CPT | Performed by: NURSE PRACTITIONER

## 2024-10-23 PROCEDURE — 1159F MED LIST DOCD IN RCRD: CPT | Performed by: NURSE PRACTITIONER

## 2024-10-23 PROCEDURE — 3017F COLORECTAL CA SCREEN DOC REV: CPT | Performed by: NURSE PRACTITIONER

## 2024-10-23 PROCEDURE — G8420 CALC BMI NORM PARAMETERS: HCPCS | Performed by: NURSE PRACTITIONER

## 2024-10-23 PROCEDURE — 1123F ACP DISCUSS/DSCN MKR DOCD: CPT | Performed by: NURSE PRACTITIONER

## 2024-10-23 PROCEDURE — 3052F HG A1C>EQUAL 8.0%<EQUAL 9.0%: CPT | Performed by: NURSE PRACTITIONER

## 2024-10-23 PROCEDURE — G8427 DOCREV CUR MEDS BY ELIG CLIN: HCPCS | Performed by: NURSE PRACTITIONER

## 2024-10-23 NOTE — PROGRESS NOTES
HISTORY OF PRESENT ILLNESS  Cele Burrows is a 72 y.o. male.    Patient with htn,dm,hyperlipidemia.On follow up patient denies any chest pains,sob, palpitation or other significant symptoms.  6/2020  Patient seen today for follow-up.  He is complaining of increased leg edema.  Denies any shortness of breath or chest pain.  Continues to be on current medication he has frequent urination.  10/23/2024      Follow-up  Pertinent negatives include no chest pain, no abdominal pain, no headaches and no shortness of breath.   Hypertension  The history is provided by the Patient. This is a chronic problem. The problem occurs constantly. The problem has not changed since onset.Pertinent negatives include no chest pain, no abdominal pain, no headaches and no shortness of breath.   Cholesterol Problem  The history is provided by the Patient. This is a chronic problem. The problem occurs constantly. Pertinent negatives include no chest pain, no abdominal pain, no headaches and no shortness of breath.     Review of Systems   Constitutional:  Negative for chills and fever.   HENT:  Negative for nosebleeds.    Eyes:  Negative for blurred vision and double vision.   Respiratory:  Negative for cough, hemoptysis, sputum production, shortness of breath and wheezing.    Cardiovascular:  Negative for chest pain, palpitations, orthopnea, claudication and PND.   Gastrointestinal:  Negative for abdominal pain, heartburn, nausea and vomiting.   Musculoskeletal:  Negative for myalgias.   Skin:  Negative for rash.   Neurological:  Negative for dizziness, weakness and headaches.   Endo/Heme/Allergies:  Does not bruise/bleed easily.   Family History   Problem Relation Age of Onset    Heart Attack Neg Hx     Sudden Death Neg Hx     Hypertension Mother        Past Medical History:   Diagnosis Date    Bladder cancer (HCC) 7/15/13    Clinical Stage [id number] HG UC    Bladder tumor     Diabetes (HCC)     Essential hypertension     History of kidney

## 2024-10-23 NOTE — PROGRESS NOTES
1. Have you been to the ER, urgent care clinic since your last visit?  Hospitalized since your last visit?Yes When: 08/15/24 Where: [facility] Reason for visit: Cellulitis    2. Have you seen or consulted any other health care providers outside of the [facility] since your last visit?  Include any pap smears or colon screening. Yes Where: Endocrine/PCP Reason for visit: Routine Visit

## 2024-11-04 RX ORDER — PEN NEEDLE, DIABETIC 31 GX5/16"
NEEDLE, DISPOSABLE MISCELLANEOUS
Qty: 100 EACH | Refills: 5 | Status: SHIPPED | OUTPATIENT
Start: 2024-11-04

## 2024-11-21 ENCOUNTER — OFFICE VISIT (OUTPATIENT)
Facility: CLINIC | Age: 74
End: 2024-11-21

## 2024-11-21 VITALS
DIASTOLIC BLOOD PRESSURE: 78 MMHG | OXYGEN SATURATION: 96 % | RESPIRATION RATE: 16 BRPM | BODY MASS INDEX: 24.75 KG/M2 | TEMPERATURE: 97.2 F | WEIGHT: 154 LBS | SYSTOLIC BLOOD PRESSURE: 126 MMHG | HEIGHT: 66 IN | HEART RATE: 84 BPM

## 2024-11-21 DIAGNOSIS — M54.2 NECK PAIN: ICD-10-CM

## 2024-11-21 DIAGNOSIS — E11.9 TYPE 2 DIABETES MELLITUS WITHOUT COMPLICATION, WITH LONG-TERM CURRENT USE OF INSULIN (HCC): ICD-10-CM

## 2024-11-21 DIAGNOSIS — R51.9 OCCIPITAL PAIN: ICD-10-CM

## 2024-11-21 DIAGNOSIS — Z00.00 MEDICARE ANNUAL WELLNESS VISIT, SUBSEQUENT: Primary | ICD-10-CM

## 2024-11-21 DIAGNOSIS — Z79.4 TYPE 2 DIABETES MELLITUS WITHOUT COMPLICATION, WITH LONG-TERM CURRENT USE OF INSULIN (HCC): ICD-10-CM

## 2024-11-21 RX ORDER — INSULIN GLARGINE 100 [IU]/ML
INJECTION, SOLUTION SUBCUTANEOUS
Qty: 20 ML | Refills: 5 | Status: SHIPPED | OUTPATIENT
Start: 2024-11-21

## 2024-11-21 ASSESSMENT — PATIENT HEALTH QUESTIONNAIRE - PHQ9
SUM OF ALL RESPONSES TO PHQ9 QUESTIONS 1 & 2: 0
1. LITTLE INTEREST OR PLEASURE IN DOING THINGS: NOT AT ALL
SUM OF ALL RESPONSES TO PHQ QUESTIONS 1-9: 0
SUM OF ALL RESPONSES TO PHQ QUESTIONS 1-9: 0
2. FEELING DOWN, DEPRESSED OR HOPELESS: NOT AT ALL
SUM OF ALL RESPONSES TO PHQ QUESTIONS 1-9: 0
SUM OF ALL RESPONSES TO PHQ QUESTIONS 1-9: 0

## 2024-11-21 NOTE — PATIENT INSTRUCTIONS
RSV and covid booster from the pharmacy      Preventing Falls: Care Instructions  Injuries and health problems such as trouble walking or poor eyesight can increase your risk of falling. So can some medicines. But there are things you can do to help prevent falls. You can exercise to get stronger. You can also arrange your home to make it safer.    Talk to your doctor about the medicines you take. Ask if any of them increase the risk of falls and whether they can be changed or stopped.   Try to exercise regularly. It can help improve your strength and balance. This can help lower your risk of falling.         Practice fall safety and prevention.   Wear low-heeled shoes that fit well and give your feet good support. Talk to your doctor if you have foot problems that make this hard.  Carry a cellphone or wear a medical alert device that you can use to call for help.  Use stepladders instead of chairs to reach high objects. Don't climb if you're at risk for falls. Ask for help, if needed.  Wear the correct eyeglasses, if you need them.        Make your home safer.   Remove rugs, cords, clutter, and furniture from walkways.  Keep your house well lit. Use night-lights in hallways and bathrooms.  Install and use sturdy handrails on stairways.  Wear nonskid footwear, even inside. Don't walk barefoot or in socks without shoes.        Be safe outside.   Use handrails, curb cuts, and ramps whenever possible.  Keep your hands free by using a shoulder bag or backpack.  Try to walk in well-lit areas. Watch out for uneven ground, changes in pavement, and debris.  Be careful in the winter. Walk on the grass or gravel when sidewalks are slippery. Use de-icer on steps and walkways. Add non-slip devices to shoes.    Put grab bars and nonskid mats in your shower or tub and near the toilet. Try to use a shower chair or bath bench when bathing.   Get into a tub or shower by putting in your weaker leg first. Get out with your strong side

## 2024-11-21 NOTE — ACP (ADVANCE CARE PLANNING)
Advance Care Planning     General Advance Care Planning (ACP) Conversation    Date of Conversation: 11/21/2024  Conducted with: Patient with Decision Making Capacity  Other persons present: None    Healthcare Decision Maker:   Primary Decision Maker: Asuncion Burrows - Spouse - [phone]    Primary Decision Maker: Dr Markos Devonte - Child - [phone]     Today we discussed advance directive. He has made one at home. He will provide a copy. Wants to be a full code. Prolong life support decision can be made by family member .   Content/Action Overview:  See above   Reviewed DNR/DNI and patient elects Full Code (Attempt Resuscitation)  treatment goals, benefit/burden of treatment options, artificial nutrition, ventilation preferences, hospitalization preferences, resuscitation preferences, end of life care preferences (vegetative state/imminent death), and hospice care      Length of Voluntary ACP Conversation in minutes:  <16 minutes (Non-Billable)    Alee Mendoza MD

## 2024-11-21 NOTE — PROGRESS NOTES
\"Have you been to the ER, urgent care clinic since your last visit?  Hospitalized since your last visit?\"    NO    “Have you seen or consulted any other health care providers outside our system since your last visit?”    Endocrinology, Dr. Douglas LOV: 10/09/24      “Have you had a diabetic eye exam?”    NO     Date of last diabetic eye exam: 9/11/2018     Last dm foot exam - 2024 [facility], per patient. Record has been requested.     Last dm eye exam Dr. Mcmullen - 11/2024. Record has been requested.    Chief Complaint   Patient presents with    Medicare AWV    Wound Check    Diabetes    Referral - General     Endocrinology - patient stated Dr. Douglas will no longer accept [employer] insurance                        
Surg Hx  Soc Hx  Fam Hx

## 2024-12-05 NOTE — TELEPHONE ENCOUNTER
Need to offer virtual appt. Over due for follow up .
Renate Weller [phone] advising patient that he is due for follow up with Dr. Lisa Gonzalez. Patient asked to contact [facility] to schedule a virtual visit.
[id number]

## 2024-12-16 ENCOUNTER — OFFICE VISIT (OUTPATIENT)
Facility: CLINIC | Age: 74
End: 2024-12-16
Payer: MEDICARE

## 2024-12-16 VITALS
WEIGHT: 154 LBS | RESPIRATION RATE: 14 BRPM | SYSTOLIC BLOOD PRESSURE: 118 MMHG | TEMPERATURE: 98 F | HEART RATE: 76 BPM | DIASTOLIC BLOOD PRESSURE: 70 MMHG | HEIGHT: 66 IN | BODY MASS INDEX: 24.75 KG/M2 | OXYGEN SATURATION: 97 %

## 2024-12-16 DIAGNOSIS — R09.81 NASAL CONGESTION: ICD-10-CM

## 2024-12-16 DIAGNOSIS — R05.2 SUBACUTE COUGH: ICD-10-CM

## 2024-12-16 DIAGNOSIS — J02.0 STREP THROAT: ICD-10-CM

## 2024-12-16 DIAGNOSIS — J02.9 SORE THROAT: Primary | ICD-10-CM

## 2024-12-16 LAB
GROUP A STREP ANTIGEN, POC: POSITIVE
VALID INTERNAL CONTROL, POC: YES

## 2024-12-16 PROCEDURE — 1036F TOBACCO NON-USER: CPT | Performed by: FAMILY MEDICINE

## 2024-12-16 PROCEDURE — G8420 CALC BMI NORM PARAMETERS: HCPCS | Performed by: FAMILY MEDICINE

## 2024-12-16 PROCEDURE — 87880 STREP A ASSAY W/OPTIC: CPT | Performed by: FAMILY MEDICINE

## 2024-12-16 PROCEDURE — G8484 FLU IMMUNIZE NO ADMIN: HCPCS | Performed by: FAMILY MEDICINE

## 2024-12-16 PROCEDURE — 3078F DIAST BP <80 MM HG: CPT | Performed by: FAMILY MEDICINE

## 2024-12-16 PROCEDURE — 3074F SYST BP LT 130 MM HG: CPT | Performed by: FAMILY MEDICINE

## 2024-12-16 PROCEDURE — 1123F ACP DISCUSS/DSCN MKR DOCD: CPT | Performed by: FAMILY MEDICINE

## 2024-12-16 PROCEDURE — G8427 DOCREV CUR MEDS BY ELIG CLIN: HCPCS | Performed by: FAMILY MEDICINE

## 2024-12-16 PROCEDURE — 1159F MED LIST DOCD IN RCRD: CPT | Performed by: FAMILY MEDICINE

## 2024-12-16 PROCEDURE — 3017F COLORECTAL CA SCREEN DOC REV: CPT | Performed by: FAMILY MEDICINE

## 2024-12-16 PROCEDURE — 1126F AMNT PAIN NOTED NONE PRSNT: CPT | Performed by: FAMILY MEDICINE

## 2024-12-16 PROCEDURE — 99213 OFFICE O/P EST LOW 20 MIN: CPT | Performed by: FAMILY MEDICINE

## 2024-12-16 NOTE — PROGRESS NOTES
\"Have you been to the ER, urgent care clinic since your last visit?  Hospitalized since your last visit?\"    NO    “Have you seen or consulted any other health care providers outside our system since your last visit?”    NO           
A  Subacute cough  Nasal congestion  Strep throat  -     amoxicillin-clavulanate (AUGMENTIN) 875-125 MG per tablet; Take 1 tablet by mouth 2 times daily for 10 days     Patient understood and agreed with the plan    Return for as scheduled .     Orders Placed This Encounter   Procedures    AMB POC RAPID STREP A   Alee Mendoza MD

## 2025-01-15 ENCOUNTER — HOSPITAL ENCOUNTER (OUTPATIENT)
Facility: HOSPITAL | Age: 75
Discharge: HOME | End: 2025-01-18
Payer: MEDICARE

## 2025-01-15 DIAGNOSIS — R51.9 OCCIPITAL PAIN: ICD-10-CM

## 2025-01-15 DIAGNOSIS — M54.2 NECK PAIN: ICD-10-CM

## 2025-01-15 DIAGNOSIS — S23.41XA SPRAIN OF RIBS, INITIAL ENCOUNTER: ICD-10-CM

## 2025-01-15 PROCEDURE — 71100 X-RAY EXAM RIBS UNI 2 VIEWS: CPT

## 2025-01-15 PROCEDURE — 72040 X-RAY EXAM NECK SPINE 2-3 VW: CPT

## 2025-01-23 DIAGNOSIS — R07.81 RIB PAIN ON RIGHT SIDE: Primary | ICD-10-CM

## 2025-01-23 RX ORDER — LIDOCAINE 50 MG/G
1 PATCH TOPICAL DAILY
Qty: 10 PATCH | Refills: 0 | Status: SHIPPED | OUTPATIENT
Start: 2025-01-23

## 2025-02-02 DIAGNOSIS — K21.9 GASTROESOPHAGEAL REFLUX DISEASE WITHOUT ESOPHAGITIS: ICD-10-CM

## 2025-02-06 RX ORDER — TAMSULOSIN HYDROCHLORIDE 0.4 MG/1
CAPSULE ORAL
Qty: 90 CAPSULE | Refills: 1 | Status: SHIPPED | OUTPATIENT
Start: 2025-02-06

## 2025-02-06 RX ORDER — OMEPRAZOLE 40 MG/1
CAPSULE, DELAYED RELEASE ORAL
Qty: 90 CAPSULE | Refills: 1 | Status: SHIPPED | OUTPATIENT
Start: 2025-02-06

## 2025-02-06 RX ORDER — ATORVASTATIN CALCIUM 10 MG/1
10 TABLET, FILM COATED ORAL
Qty: 90 TABLET | Refills: 1 | Status: SHIPPED | OUTPATIENT
Start: 2025-02-06

## 2025-02-06 NOTE — TELEPHONE ENCOUNTER
This patient contacted the office for the following prescriptions to be refilled:    Medication requested :   Requested Prescriptions     Pending Prescriptions Disp Refills    omeprazole (PRILOSEC) 40 MG delayed release capsule [Pharmacy Med Name: Omeprazole Oral Capsule Delayed Release 40 MG] 90 capsule 3     Sig: TAKE 1 CAPSULE EVERY MORNING    tamsulosin (FLOMAX) 0.4 MG capsule [Pharmacy Med Name: Tamsulosin HCl Oral Capsule 0.4 MG] 90 capsule 3     Sig: TAKE 1 CAPSULE EVERY DAY    atorvastatin (LIPITOR) 10 MG tablet [Pharmacy Med Name: Atorvastatin Calcium Oral Tablet 10 MG] 90 tablet 3     Sig: TAKE 1 TABLET EVERY NIGHT        Last Refilled: 9/12/2024    Last Office Visit: 12/16/2024    Next Office Visit: 2/24/2025    Last Labs:8/21/2024

## 2025-02-24 ENCOUNTER — OFFICE VISIT (OUTPATIENT)
Facility: CLINIC | Age: 75
End: 2025-02-24
Payer: MEDICARE

## 2025-02-24 ENCOUNTER — HOSPITAL ENCOUNTER (OUTPATIENT)
Facility: HOSPITAL | Age: 75
Discharge: HOME | End: 2025-02-27
Payer: MEDICARE

## 2025-02-24 VITALS
WEIGHT: 158.8 LBS | HEIGHT: 66 IN | OXYGEN SATURATION: 98 % | DIASTOLIC BLOOD PRESSURE: 80 MMHG | RESPIRATION RATE: 16 BRPM | BODY MASS INDEX: 25.52 KG/M2 | TEMPERATURE: 97 F | HEART RATE: 69 BPM | SYSTOLIC BLOOD PRESSURE: 130 MMHG

## 2025-02-24 DIAGNOSIS — E11.40 TYPE 2 DIABETES MELLITUS WITH DIABETIC NEUROPATHY, WITH LONG-TERM CURRENT USE OF INSULIN (HCC): Primary | ICD-10-CM

## 2025-02-24 DIAGNOSIS — M81.0 OSTEOPOROSIS WITHOUT CURRENT PATHOLOGICAL FRACTURE, UNSPECIFIED OSTEOPOROSIS TYPE: ICD-10-CM

## 2025-02-24 DIAGNOSIS — Z79.4 TYPE 2 DIABETES MELLITUS WITH DIABETIC NEUROPATHY, WITH LONG-TERM CURRENT USE OF INSULIN (HCC): Primary | ICD-10-CM

## 2025-02-24 DIAGNOSIS — I10 PRIMARY HYPERTENSION: ICD-10-CM

## 2025-02-24 DIAGNOSIS — E11.9 TYPE 2 DIABETES MELLITUS WITHOUT COMPLICATION, WITH LONG-TERM CURRENT USE OF INSULIN (HCC): ICD-10-CM

## 2025-02-24 DIAGNOSIS — C67.9 MALIGNANT NEOPLASM OF URINARY BLADDER, UNSPECIFIED SITE (HCC): ICD-10-CM

## 2025-02-24 DIAGNOSIS — E78.2 MIXED HYPERLIPIDEMIA: ICD-10-CM

## 2025-02-24 DIAGNOSIS — Z79.4 TYPE 2 DIABETES MELLITUS WITHOUT COMPLICATION, WITH LONG-TERM CURRENT USE OF INSULIN (HCC): ICD-10-CM

## 2025-02-24 LAB
ALBUMIN SERPL-MCNC: 3.6 G/DL (ref 3.4–5)
ALBUMIN/GLOB SERPL: 1 (ref 0.8–1.7)
ALP SERPL-CCNC: 94 U/L (ref 45–117)
ALT SERPL-CCNC: 24 U/L (ref 16–61)
ANION GAP SERPL CALC-SCNC: 2 MMOL/L (ref 3–18)
AST SERPL-CCNC: 18 U/L (ref 10–38)
BILIRUB SERPL-MCNC: 1.1 MG/DL (ref 0.2–1)
BUN SERPL-MCNC: 14 MG/DL (ref 7–18)
BUN/CREAT SERPL: 16 (ref 12–20)
CALCIUM SERPL-MCNC: 8.3 MG/DL (ref 8.5–10.1)
CHLORIDE SERPL-SCNC: 106 MMOL/L (ref 100–111)
CO2 SERPL-SCNC: 29 MMOL/L (ref 21–32)
CREAT SERPL-MCNC: 0.86 MG/DL (ref 0.6–1.3)
EST. AVERAGE GLUCOSE BLD GHB EST-MCNC: 206 MG/DL
GLOBULIN SER CALC-MCNC: 3.5 G/DL (ref 2–4)
GLUCOSE SERPL-MCNC: 147 MG/DL (ref 74–99)
HBA1C MFR BLD: 8.8 % (ref 4.2–5.6)
POTASSIUM SERPL-SCNC: 5.2 MMOL/L (ref 3.5–5.5)
PROT SERPL-MCNC: 7.1 G/DL (ref 6.4–8.2)
SODIUM SERPL-SCNC: 137 MMOL/L (ref 136–145)

## 2025-02-24 PROCEDURE — 3079F DIAST BP 80-89 MM HG: CPT | Performed by: FAMILY MEDICINE

## 2025-02-24 PROCEDURE — 1123F ACP DISCUSS/DSCN MKR DOCD: CPT | Performed by: FAMILY MEDICINE

## 2025-02-24 PROCEDURE — 80053 COMPREHEN METABOLIC PANEL: CPT

## 2025-02-24 PROCEDURE — 3052F HG A1C>EQUAL 8.0%<EQUAL 9.0%: CPT | Performed by: FAMILY MEDICINE

## 2025-02-24 PROCEDURE — G8428 CUR MEDS NOT DOCUMENT: HCPCS | Performed by: FAMILY MEDICINE

## 2025-02-24 PROCEDURE — 3017F COLORECTAL CA SCREEN DOC REV: CPT | Performed by: FAMILY MEDICINE

## 2025-02-24 PROCEDURE — 83036 HEMOGLOBIN GLYCOSYLATED A1C: CPT

## 2025-02-24 PROCEDURE — G8419 CALC BMI OUT NRM PARAM NOF/U: HCPCS | Performed by: FAMILY MEDICINE

## 2025-02-24 PROCEDURE — 3075F SYST BP GE 130 - 139MM HG: CPT | Performed by: FAMILY MEDICINE

## 2025-02-24 PROCEDURE — 36415 COLL VENOUS BLD VENIPUNCTURE: CPT

## 2025-02-24 PROCEDURE — 1036F TOBACCO NON-USER: CPT | Performed by: FAMILY MEDICINE

## 2025-02-24 PROCEDURE — 2022F DILAT RTA XM EVC RTNOPTHY: CPT | Performed by: FAMILY MEDICINE

## 2025-02-24 PROCEDURE — 99213 OFFICE O/P EST LOW 20 MIN: CPT | Performed by: FAMILY MEDICINE

## 2025-02-24 PROCEDURE — 1126F AMNT PAIN NOTED NONE PRSNT: CPT | Performed by: FAMILY MEDICINE

## 2025-02-24 RX ORDER — TIRZEPATIDE 2.5 MG/.5ML
INJECTION, SOLUTION SUBCUTANEOUS
COMMUNITY
Start: 2025-01-21

## 2025-02-24 SDOH — ECONOMIC STABILITY: FOOD INSECURITY: WITHIN THE PAST 12 MONTHS, YOU WORRIED THAT YOUR FOOD WOULD RUN OUT BEFORE YOU GOT MONEY TO BUY MORE.: NEVER TRUE

## 2025-02-24 SDOH — ECONOMIC STABILITY: FOOD INSECURITY: WITHIN THE PAST 12 MONTHS, THE FOOD YOU BOUGHT JUST DIDN'T LAST AND YOU DIDN'T HAVE MONEY TO GET MORE.: NEVER TRUE

## 2025-02-24 ASSESSMENT — PATIENT HEALTH QUESTIONNAIRE - PHQ9
SUM OF ALL RESPONSES TO PHQ QUESTIONS 1-9: 0
SUM OF ALL RESPONSES TO PHQ9 QUESTIONS 1 & 2: 0
SUM OF ALL RESPONSES TO PHQ QUESTIONS 1-9: 0
SUM OF ALL RESPONSES TO PHQ QUESTIONS 1-9: 0
1. LITTLE INTEREST OR PLEASURE IN DOING THINGS: NOT AT ALL
2. FEELING DOWN, DEPRESSED OR HOPELESS: NOT AT ALL
SUM OF ALL RESPONSES TO PHQ QUESTIONS 1-9: 0

## 2025-02-24 NOTE — PROGRESS NOTES
\"Have you been to the ER, urgent care clinic since your last visit?  Hospitalized since your last visit?\"    NO    “Have you seen or consulted any other health care providers outside our system since your last visit?”    Endocrinology, Dr. Douglas LOV: 1/15/25. [facility] LOV: 1/2025.      “Have you had a diabetic eye exam?”    Last eye exam was 11/2024 with Dr. Mcmullen. Record has been requested.     Last dm foot exam -   Last dm foot exam - 2024 [facility], per patient. Record has been requested.     Date of last diabetic eye exam: 9/11/2018     Chief Complaint   Patient presents with    Diabetes            
Mental Status: He is alert and oriented to person, place, and time.            ASSESSMENT/ PLAN:    Diagnoses and all orders for this visit:  Type 2 diabetes mellitus with diabetic neuropathy, with long-term current use of insulin (HCC): Poorly controlled diabetes.  Following endo-.  Advised to be more compliant with diet habits and low-carb diet.  He will work on it.  On statin.    Malignant neoplasm of urinary bladder, unspecified site (HCC): Being followed by urology.  No concern at this time.    Osteoporosis without current pathological fracture, unspecified osteoporosis type: Has some dental procedure coming up.  Following endo-.  Continue vitamin D.  Once he is done with the dental procedures he will be treated for osteoporosis.    Primary hypertension: Well-controlled.  Continue current plan.  Low-salt diet    Mixed hyperlipidemia: On statin.  Continue current plan.  Diet and lifestyle modification     Patient understood and agreed with the plan    Return in about 6 months (around 8/24/2025).     No orders of the defined types were placed in this encounter.  Alee Mendoza MD

## 2025-04-01 RX ORDER — FINASTERIDE 5 MG/1
5 TABLET, FILM COATED ORAL DAILY
Qty: 90 TABLET | Refills: 1 | Status: SHIPPED | OUTPATIENT
Start: 2025-04-01

## 2025-04-01 NOTE — TELEPHONE ENCOUNTER
Last OV 02/24/2025  Next OV 08/12/2025  Last lab 08/21/2024   If you are a smoker, it is important for your health to stop smoking. Please be aware that second hand smoke is also harmful.

## 2025-05-21 ENCOUNTER — OFFICE VISIT (OUTPATIENT)
Facility: CLINIC | Age: 75
End: 2025-05-21
Payer: MEDICARE

## 2025-05-21 VITALS
DIASTOLIC BLOOD PRESSURE: 68 MMHG | WEIGHT: 152.8 LBS | RESPIRATION RATE: 16 BRPM | OXYGEN SATURATION: 98 % | TEMPERATURE: 97.3 F | SYSTOLIC BLOOD PRESSURE: 122 MMHG | HEIGHT: 66 IN | BODY MASS INDEX: 24.56 KG/M2 | HEART RATE: 75 BPM

## 2025-05-21 DIAGNOSIS — R05.8 OTHER COUGH: ICD-10-CM

## 2025-05-21 DIAGNOSIS — J02.9 SORE THROAT: Primary | ICD-10-CM

## 2025-05-21 PROCEDURE — 3074F SYST BP LT 130 MM HG: CPT | Performed by: FAMILY MEDICINE

## 2025-05-21 PROCEDURE — 1123F ACP DISCUSS/DSCN MKR DOCD: CPT | Performed by: FAMILY MEDICINE

## 2025-05-21 PROCEDURE — 3078F DIAST BP <80 MM HG: CPT | Performed by: FAMILY MEDICINE

## 2025-05-21 PROCEDURE — 99213 OFFICE O/P EST LOW 20 MIN: CPT | Performed by: FAMILY MEDICINE

## 2025-05-21 PROCEDURE — G8428 CUR MEDS NOT DOCUMENT: HCPCS | Performed by: FAMILY MEDICINE

## 2025-05-21 PROCEDURE — 1125F AMNT PAIN NOTED PAIN PRSNT: CPT | Performed by: FAMILY MEDICINE

## 2025-05-21 PROCEDURE — 3017F COLORECTAL CA SCREEN DOC REV: CPT | Performed by: FAMILY MEDICINE

## 2025-05-21 PROCEDURE — G8420 CALC BMI NORM PARAMETERS: HCPCS | Performed by: FAMILY MEDICINE

## 2025-05-21 PROCEDURE — 1036F TOBACCO NON-USER: CPT | Performed by: FAMILY MEDICINE

## 2025-05-21 RX ORDER — TIRZEPATIDE 5 MG/.5ML
INJECTION, SOLUTION SUBCUTANEOUS
COMMUNITY
Start: 2025-05-13

## 2025-05-21 NOTE — PROGRESS NOTES
Have you been to the ER, urgent care clinic since your last visit?  Hospitalized since your last visit?   NO    Have you seen or consulted any other health care providers outside our system since your last visit?   Dentistry LOV: 4/2025      Date of last diabetic eye exam: 9/11/2018            Chief Complaint   Patient presents with    Cough     X 4-5 days    Sore Throat     X 4-5 days

## 2025-05-21 NOTE — PROGRESS NOTES
Cele Burrows is a 75 y.o. male complains of   Chief Complaint   Patient presents with    Cough     X 4-5 days    Sore Throat     X 4-5 days       HPI:  Here for an acute visit.  Complaining about sore throat, dry cough.  No chest congestion or any trouble breathing.  No chest pain.  Feeling nasal congestion and some postnasal drip.  Has taken Mucinex.  Symptoms going on since 5 days.  Wife was sick initially.  No fever.  No nausea vomiting or abdominal pain.  No urinary or bowel complaint.  Sitting comfortable without any acute distress.  No wheezing.  Able to talk in full sentences and not using any extra respiratory muscle.  No history of asthma or COPD.  Does have diabetes.      Concerned about small bump over the left side neck below the earlobe.  Looks like sebaceous cyst.  No open skin or any discharge or any pain.      Allergies   Allergen Reactions    Pioglitazone Other (See Comments)     Personal history of bladder cancer       ROS:  Review of Systems    Medication :  Current Outpatient Medications   Medication Sig Dispense Refill    MOUNJARO 5 MG/0.5ML SOAJ pen INJECT 5MG SUBCUTANEOUSLY EVERY WEEK      metFORMIN (GLUCOPHAGE) 1000 MG tablet TAKE 1 TABLET TWICE DAILY WITH MEALS 180 tablet 1    finasteride (PROSCAR) 5 MG tablet TAKE 1 TABLET EVERY DAY 90 tablet 1    MOUNJARO 2.5 MG/0.5ML SOAJ       omeprazole (PRILOSEC) 40 MG delayed release capsule TAKE 1 CAPSULE EVERY MORNING 90 capsule 1    tamsulosin (FLOMAX) 0.4 MG capsule TAKE 1 CAPSULE EVERY DAY 90 capsule 1    atorvastatin (LIPITOR) 10 MG tablet TAKE 1 TABLET EVERY NIGHT 90 tablet 1    lidocaine (LIDODERM) 5 % Place 1 patch onto the skin daily 12 hours on, 12 hours off. 10 patch 0    insulin glargine (LANTUS SOLOSTAR) 100 UNIT/ML injection pen 20 units at bedtime 20 mL 5    B-D ULTRAFINE III SHORT PEN 31G X 8 MM MISC USE TO INJECT ONE TIME DAILY 100 each 5    glipiZIDE (GLUCOTROL) 10 MG tablet       glucose monitoring kit Check twice daily.

## 2025-06-25 ENCOUNTER — OFFICE VISIT (OUTPATIENT)
Facility: CLINIC | Age: 75
End: 2025-06-25
Payer: MEDICARE

## 2025-06-25 ENCOUNTER — HOSPITAL ENCOUNTER (OUTPATIENT)
Age: 75
Discharge: HOME | End: 2025-06-25
Payer: MEDICARE

## 2025-06-25 VITALS
WEIGHT: 147.2 LBS | TEMPERATURE: 98.4 F | DIASTOLIC BLOOD PRESSURE: 60 MMHG | HEART RATE: 94 BPM | BODY MASS INDEX: 25.13 KG/M2 | OXYGEN SATURATION: 98 % | RESPIRATION RATE: 16 BRPM | SYSTOLIC BLOOD PRESSURE: 100 MMHG | HEIGHT: 64 IN

## 2025-06-25 DIAGNOSIS — E11.40 TYPE 2 DIABETES MELLITUS WITH DIABETIC NEUROPATHY, WITH LONG-TERM CURRENT USE OF INSULIN (HCC): ICD-10-CM

## 2025-06-25 DIAGNOSIS — R19.7 DIARRHEA, UNSPECIFIED TYPE: Primary | ICD-10-CM

## 2025-06-25 DIAGNOSIS — R10.84 GENERALIZED ABDOMINAL PAIN: ICD-10-CM

## 2025-06-25 DIAGNOSIS — Z79.4 TYPE 2 DIABETES MELLITUS WITH DIABETIC NEUROPATHY, WITH LONG-TERM CURRENT USE OF INSULIN (HCC): ICD-10-CM

## 2025-06-25 DIAGNOSIS — R19.7 DIARRHEA, UNSPECIFIED TYPE: ICD-10-CM

## 2025-06-25 DIAGNOSIS — I10 ESSENTIAL HYPERTENSION, BENIGN: ICD-10-CM

## 2025-06-25 LAB
ALBUMIN SERPL-MCNC: 3.2 G/DL (ref 3.4–5)
ALBUMIN/GLOB SERPL: 0.8 (ref 0.8–1.7)
ALP SERPL-CCNC: 110 U/L (ref 45–117)
ALT SERPL-CCNC: 22 U/L (ref 10–50)
ANION GAP SERPL CALC-SCNC: 10 MMOL/L (ref 3–18)
AST SERPL-CCNC: 23 U/L (ref 10–38)
BASOPHILS # BLD: 0 K/UL (ref 0–0.1)
BASOPHILS NFR BLD: 0 % (ref 0–2)
BILIRUB SERPL-MCNC: 0.5 MG/DL (ref 0.2–1)
BUN SERPL-MCNC: 14 MG/DL (ref 6–23)
BUN/CREAT SERPL: 13 (ref 12–20)
CALCIUM SERPL-MCNC: 9.5 MG/DL (ref 8.5–10.1)
CHLORIDE SERPL-SCNC: 101 MMOL/L (ref 98–107)
CO2 SERPL-SCNC: 23 MMOL/L (ref 21–32)
CREAT SERPL-MCNC: 1.09 MG/DL (ref 0.6–1.3)
DIFFERENTIAL METHOD BLD: ABNORMAL
EOSINOPHIL # BLD: 0.14 K/UL (ref 0–0.4)
EOSINOPHIL NFR BLD: 2 % (ref 0–5)
ERYTHROCYTE [DISTWIDTH] IN BLOOD BY AUTOMATED COUNT: 11.9 % (ref 11.6–14.5)
GLOBULIN SER CALC-MCNC: 3.8 G/DL (ref 2–4)
GLUCOSE SERPL-MCNC: 295 MG/DL (ref 74–108)
HCT VFR BLD AUTO: 44.5 % (ref 36–48)
HGB BLD-MCNC: 14.8 G/DL (ref 13–16)
IMM GRANULOCYTES # BLD AUTO: 0 K/UL (ref 0–0.04)
IMM GRANULOCYTES NFR BLD AUTO: 0 % (ref 0–0.5)
LIPASE SERPL-CCNC: 18 U/L (ref 13–75)
LYMPHOCYTES # BLD: 2.38 K/UL (ref 0.9–3.6)
LYMPHOCYTES NFR BLD: 33 % (ref 21–52)
MCH RBC QN AUTO: 33.9 PG (ref 24–34)
MCHC RBC AUTO-ENTMCNC: 33.3 G/DL (ref 31–37)
MCV RBC AUTO: 101.8 FL (ref 78–100)
MONOCYTES # BLD: 1.73 K/UL (ref 0.05–1.2)
MONOCYTES NFR BLD: 24 % (ref 3–10)
NEUTS SEG # BLD: 2.95 K/UL (ref 1.8–8)
NEUTS SEG NFR BLD: 41 % (ref 40–73)
NRBC # BLD: 0 K/UL (ref 0–0.01)
NRBC BLD-RTO: 0 PER 100 WBC
PLATELET # BLD AUTO: 255 K/UL (ref 135–420)
PLATELET COMMENT: ABNORMAL
PMV BLD AUTO: 9.8 FL (ref 9.2–11.8)
POTASSIUM SERPL-SCNC: 5.1 MMOL/L (ref 3.5–5.5)
PROT SERPL-MCNC: 6.9 G/DL (ref 6.4–8.2)
RBC # BLD AUTO: 4.37 M/UL (ref 4.35–5.65)
RBC MORPH BLD: ABNORMAL
SODIUM SERPL-SCNC: 135 MMOL/L (ref 136–145)
WBC # BLD AUTO: 7.2 K/UL (ref 4.6–13.2)

## 2025-06-25 PROCEDURE — 99214 OFFICE O/P EST MOD 30 MIN: CPT | Performed by: FAMILY MEDICINE

## 2025-06-25 PROCEDURE — 85025 COMPLETE CBC W/AUTO DIFF WBC: CPT

## 2025-06-25 PROCEDURE — 83690 ASSAY OF LIPASE: CPT

## 2025-06-25 PROCEDURE — 36415 COLL VENOUS BLD VENIPUNCTURE: CPT

## 2025-06-25 PROCEDURE — 1036F TOBACCO NON-USER: CPT | Performed by: FAMILY MEDICINE

## 2025-06-25 PROCEDURE — 1123F ACP DISCUSS/DSCN MKR DOCD: CPT | Performed by: FAMILY MEDICINE

## 2025-06-25 PROCEDURE — 3017F COLORECTAL CA SCREEN DOC REV: CPT | Performed by: FAMILY MEDICINE

## 2025-06-25 PROCEDURE — G8427 DOCREV CUR MEDS BY ELIG CLIN: HCPCS | Performed by: FAMILY MEDICINE

## 2025-06-25 PROCEDURE — 87506 IADNA-DNA/RNA PROBE TQ 6-11: CPT

## 2025-06-25 PROCEDURE — 1126F AMNT PAIN NOTED NONE PRSNT: CPT | Performed by: FAMILY MEDICINE

## 2025-06-25 PROCEDURE — 2022F DILAT RTA XM EVC RTNOPTHY: CPT | Performed by: FAMILY MEDICINE

## 2025-06-25 PROCEDURE — 3078F DIAST BP <80 MM HG: CPT | Performed by: FAMILY MEDICINE

## 2025-06-25 PROCEDURE — G8419 CALC BMI OUT NRM PARAM NOF/U: HCPCS | Performed by: FAMILY MEDICINE

## 2025-06-25 PROCEDURE — 3074F SYST BP LT 130 MM HG: CPT | Performed by: FAMILY MEDICINE

## 2025-06-25 PROCEDURE — 1159F MED LIST DOCD IN RCRD: CPT | Performed by: FAMILY MEDICINE

## 2025-06-25 PROCEDURE — 1160F RVW MEDS BY RX/DR IN RCRD: CPT | Performed by: FAMILY MEDICINE

## 2025-06-25 PROCEDURE — 80053 COMPREHEN METABOLIC PANEL: CPT

## 2025-06-25 PROCEDURE — 3052F HG A1C>EQUAL 8.0%<EQUAL 9.0%: CPT | Performed by: FAMILY MEDICINE

## 2025-06-25 RX ORDER — CIPROFLOXACIN 500 MG/1
500 TABLET, FILM COATED ORAL 2 TIMES DAILY
Qty: 6 TABLET | Refills: 0 | Status: SHIPPED | OUTPATIENT
Start: 2025-06-25 | End: 2025-06-28

## 2025-06-25 NOTE — PROGRESS NOTES
Have you been to the ER, urgent care clinic since your last visit?  Hospitalized since your last visit?   NO    Have you seen or consulted any other health care providers outside our system since your last visit?   NO      “Have you had a diabetic eye exam?”    NO     Date of last diabetic eye exam: 9/11/2018

## 2025-06-25 NOTE — PROGRESS NOTES
Cele Burrows (:  1950) is a 75 y.o. male, Established patient, here for evaluation of the following chief complaint(s):  GI Problem (Diarrhea, stomach pain. Sweating. )  (Dr. Mendoza pt)       Assessment & Plan  1. Abdominal cramping and loose stools.  - Symptoms may be indicative of acute gastroenteritis, given recent travel history and diabetes will start on ciprofloxacin to cover for bacterial infection.  - Mild generalized tenderness noted on physical examination; stool described as watery.  - Comprehensive blood workup ordered to assess for potential electrolyte imbalances and dehydration; stool sample to be collected for culture testing.  - Antibiotic regimen initiated to manage suspected bacterial infection; prescription sent to pharmacy.  - increase hydration and BRAT diet  2. Diabetes mellitus.  - Blood glucose levels fluctuating, potentially due to decreased food intake and ongoing glipizide therapy.  - Morning blood glucose levels reported as low (79, 70, 84); occasional spikes up to 200.  - Discussion on modifying glipizide therapy if blood glucose levels consistently fall below 70; temporary discontinuation suggested until appetite normalizes.  - Monitoring of blood glucose levels and adjustment of diabetes medications as needed.    3. Hypertension  -Low normal BP will plan to reduce diovan dose from 320 mg once daily to 160 mg once daily (1/2 tablet)  - if BP > 140/90, go back up to usual dose of diovan 320 mg daily    Results    1. Diarrhea, unspecified type  -     Comprehensive Metabolic Panel; Future  -     CBC with Auto Differential; Future  -     Lipase; Future  -     Enteric Bact Panel, DNA; Future  2. Generalized abdominal pain  -     Comprehensive Metabolic Panel; Future  -     CBC with Auto Differential; Future  -     Lipase; Future  -     Enteric Bact Panel, DNA; Future  3. Type 2 diabetes mellitus with diabetic neuropathy, with long-term current use of insulin ([facility])  4.

## 2025-06-25 NOTE — PATIENT INSTRUCTIONS
HOLD GLIPIZIDE IF FASTING BLOOD GLUCOSE IS <70  CUT DIOVAN DOSE IN HALF 160 MG (1/2 TAB  MG)   MONITOR BP IF >140/90 RESUME 320 MG FULL PILL DOSE

## 2025-06-26 ENCOUNTER — RESULTS FOLLOW-UP (OUTPATIENT)
Facility: CLINIC | Age: 75
End: 2025-06-26

## 2025-06-27 LAB
C COLI+JEJUNI TUF STL QL NAA+PROBE: NEGATIVE
EC STX1+STX2 GENES STL QL NAA+PROBE: NEGATIVE
ETEC ELTA+ESTB GENES STL QL NAA+PROBE: POSITIVE
P SHIGELLOIDES DNA STL QL NAA+PROBE: NEGATIVE
SALMONELLA SP SPAO STL QL NAA+PROBE: NEGATIVE
SHIGELLA SP+EIEC IPAH STL QL NAA+PROBE: NEGATIVE
V CHOL+PARA+VUL DNA STL QL NAA+NON-PROBE: NEGATIVE
Y ENTEROCOL DNA STL QL NAA+NON-PROBE: NEGATIVE

## 2025-07-01 RX ORDER — VALSARTAN 320 MG/1
320 TABLET ORAL DAILY
Qty: 90 TABLET | Refills: 1 | Status: SHIPPED | OUTPATIENT
Start: 2025-07-01

## 2025-07-01 NOTE — TELEPHONE ENCOUNTER
Last OV: 06/25/2025 Dr. Ocampo   Last labs:06/25/2025  Next OV and labs: 07/17/2025 and 08/12/20258

## 2025-07-07 PROBLEM — M81.0 SENILE OSTEOPOROSIS: Status: ACTIVE | Noted: 2025-07-07

## 2025-07-07 PROBLEM — Z79.899 ENCOUNTER FOR MEDICATION MANAGEMENT: Status: ACTIVE | Noted: 2025-07-07

## 2025-07-07 RX ORDER — ONDANSETRON 2 MG/ML
8 INJECTION INTRAMUSCULAR; INTRAVENOUS
Status: CANCELLED | OUTPATIENT
Start: 2025-07-15

## 2025-07-07 RX ORDER — HYDROCORTISONE SODIUM SUCCINATE 100 MG/2ML
100 INJECTION INTRAMUSCULAR; INTRAVENOUS
Status: CANCELLED | OUTPATIENT
Start: 2025-07-15

## 2025-07-07 RX ORDER — SODIUM CHLORIDE 9 MG/ML
INJECTION, SOLUTION INTRAVENOUS PRN
Status: CANCELLED | OUTPATIENT
Start: 2025-07-15

## 2025-07-07 RX ORDER — HEPARIN 100 UNIT/ML
500 SYRINGE INTRAVENOUS PRN
Status: CANCELLED | OUTPATIENT
Start: 2025-07-15

## 2025-07-07 RX ORDER — SODIUM CHLORIDE 9 MG/ML
5-250 INJECTION, SOLUTION INTRAVENOUS PRN
Status: CANCELLED | OUTPATIENT
Start: 2025-07-15

## 2025-07-07 RX ORDER — ACETAMINOPHEN 325 MG/1
650 TABLET ORAL
Status: CANCELLED | OUTPATIENT
Start: 2025-07-15

## 2025-07-07 RX ORDER — DIPHENHYDRAMINE HYDROCHLORIDE 50 MG/ML
50 INJECTION, SOLUTION INTRAMUSCULAR; INTRAVENOUS
Status: CANCELLED | OUTPATIENT
Start: 2025-07-15

## 2025-07-07 RX ORDER — ALBUTEROL SULFATE 90 UG/1
4 INHALANT RESPIRATORY (INHALATION) PRN
Status: CANCELLED | OUTPATIENT
Start: 2025-07-15

## 2025-07-07 RX ORDER — EPINEPHRINE 1 MG/ML
0.3 INJECTION, SOLUTION, CONCENTRATE INTRAVENOUS PRN
Status: CANCELLED | OUTPATIENT
Start: 2025-07-15

## 2025-07-11 DIAGNOSIS — K21.9 GASTROESOPHAGEAL REFLUX DISEASE WITHOUT ESOPHAGITIS: ICD-10-CM

## 2025-07-11 RX ORDER — ATORVASTATIN CALCIUM 10 MG/1
10 TABLET, FILM COATED ORAL
Qty: 90 TABLET | Refills: 1 | Status: SHIPPED | OUTPATIENT
Start: 2025-07-11

## 2025-07-11 RX ORDER — OMEPRAZOLE 40 MG/1
40 CAPSULE, DELAYED RELEASE ORAL EVERY MORNING
Qty: 90 CAPSULE | Refills: 1 | Status: SHIPPED | OUTPATIENT
Start: 2025-07-11

## 2025-07-11 RX ORDER — TAMSULOSIN HYDROCHLORIDE 0.4 MG/1
0.4 CAPSULE ORAL DAILY
Qty: 90 CAPSULE | Refills: 1 | Status: SHIPPED | OUTPATIENT
Start: 2025-07-11

## 2025-07-11 NOTE — TELEPHONE ENCOUNTER
This patient contacted the office for the following prescriptions to be refilled:    Medication requested :   Requested Prescriptions     Pending Prescriptions Disp Refills    tamsulosin (FLOMAX) 0.4 MG capsule [Pharmacy Med Name: Tamsulosin HCl Oral Capsule 0.4 MG] 90 capsule 3     Sig: TAKE 1 CAPSULE EVERY DAY    omeprazole (PRILOSEC) 40 MG delayed release capsule [Pharmacy Med Name: Omeprazole Oral Capsule Delayed Release 40 MG] 90 capsule 3     Sig: TAKE 1 CAPSULE EVERY MORNING    atorvastatin (LIPITOR) 10 MG tablet [Pharmacy Med Name: Atorvastatin Calcium Oral Tablet 10 MG] 90 tablet 3     Sig: TAKE 1 TABLET EVERY NIGHT        Last Refilled: 2/6/2025    Last Office Visit: 5/21/2025    Next Office Visit: 7/17/2025    Last Labs: 6/25/2025

## 2025-07-15 ENCOUNTER — HOSPITAL ENCOUNTER (OUTPATIENT)
Facility: HOSPITAL | Age: 75
Setting detail: INFUSION SERIES
Discharge: HOME | End: 2025-07-18
Payer: MEDICARE

## 2025-07-15 VITALS
HEIGHT: 65 IN | SYSTOLIC BLOOD PRESSURE: 119 MMHG | BODY MASS INDEX: 24.56 KG/M2 | RESPIRATION RATE: 16 BRPM | OXYGEN SATURATION: 97 % | TEMPERATURE: 97.3 F | DIASTOLIC BLOOD PRESSURE: 73 MMHG | WEIGHT: 147.4 LBS | HEART RATE: 83 BPM

## 2025-07-15 DIAGNOSIS — M81.0 SENILE OSTEOPOROSIS: Primary | ICD-10-CM

## 2025-07-15 DIAGNOSIS — Z79.899 ENCOUNTER FOR MEDICATION MANAGEMENT: ICD-10-CM

## 2025-07-15 PROCEDURE — 6360000002 HC RX W HCPCS: Performed by: INTERNAL MEDICINE

## 2025-07-15 PROCEDURE — 6370000000 HC RX 637 (ALT 250 FOR IP): Performed by: INTERNAL MEDICINE

## 2025-07-15 PROCEDURE — 96365 THER/PROPH/DIAG IV INF INIT: CPT

## 2025-07-15 PROCEDURE — 2500000003 HC RX 250 WO HCPCS: Performed by: INTERNAL MEDICINE

## 2025-07-15 PROCEDURE — 2580000003 HC RX 258: Performed by: INTERNAL MEDICINE

## 2025-07-15 RX ORDER — ALBUTEROL SULFATE 90 UG/1
4 INHALANT RESPIRATORY (INHALATION) PRN
OUTPATIENT
Start: 2026-07-12

## 2025-07-15 RX ORDER — ACETAMINOPHEN 325 MG/1
650 TABLET ORAL
OUTPATIENT
Start: 2026-07-12

## 2025-07-15 RX ORDER — SODIUM CHLORIDE 9 MG/ML
5-250 INJECTION, SOLUTION INTRAVENOUS PRN
OUTPATIENT
Start: 2026-07-12

## 2025-07-15 RX ORDER — DIPHENHYDRAMINE HYDROCHLORIDE 50 MG/ML
50 INJECTION, SOLUTION INTRAMUSCULAR; INTRAVENOUS
OUTPATIENT
Start: 2026-07-12

## 2025-07-15 RX ORDER — ONDANSETRON 2 MG/ML
8 INJECTION INTRAMUSCULAR; INTRAVENOUS
OUTPATIENT
Start: 2026-07-12

## 2025-07-15 RX ORDER — SODIUM CHLORIDE 0.9 % (FLUSH) 0.9 %
5-40 SYRINGE (ML) INJECTION PRN
OUTPATIENT
Start: 2026-07-12

## 2025-07-15 RX ORDER — HEPARIN 100 UNIT/ML
500 SYRINGE INTRAVENOUS PRN
OUTPATIENT
Start: 2026-07-12

## 2025-07-15 RX ORDER — SODIUM CHLORIDE 9 MG/ML
5-250 INJECTION, SOLUTION INTRAVENOUS PRN
Status: ACTIVE | OUTPATIENT
Start: 2025-07-15 | End: 2025-07-16

## 2025-07-15 RX ORDER — ZOLEDRONIC ACID 0.05 MG/ML
5 INJECTION, SOLUTION INTRAVENOUS ONCE
OUTPATIENT
Start: 2026-07-12 | End: 2026-07-12

## 2025-07-15 RX ORDER — ACETAMINOPHEN 325 MG/1
650 TABLET ORAL ONCE
OUTPATIENT
Start: 2026-07-12 | End: 2026-07-12

## 2025-07-15 RX ORDER — SODIUM CHLORIDE 9 MG/ML
INJECTION, SOLUTION INTRAVENOUS PRN
OUTPATIENT
Start: 2026-07-12

## 2025-07-15 RX ORDER — ZOLEDRONIC ACID 0.05 MG/ML
5 INJECTION, SOLUTION INTRAVENOUS ONCE
Status: COMPLETED | OUTPATIENT
Start: 2025-07-15 | End: 2025-07-15

## 2025-07-15 RX ORDER — SODIUM CHLORIDE 0.9 % (FLUSH) 0.9 %
5-40 SYRINGE (ML) INJECTION PRN
Status: ACTIVE | OUTPATIENT
Start: 2025-07-15 | End: 2025-07-16

## 2025-07-15 RX ORDER — EPINEPHRINE 1 MG/ML
0.3 INJECTION, SOLUTION, CONCENTRATE INTRAVENOUS PRN
OUTPATIENT
Start: 2026-07-12

## 2025-07-15 RX ORDER — HYDROCORTISONE SODIUM SUCCINATE 100 MG/2ML
100 INJECTION INTRAMUSCULAR; INTRAVENOUS
OUTPATIENT
Start: 2026-07-12

## 2025-07-15 RX ORDER — ACETAMINOPHEN 325 MG/1
650 TABLET ORAL ONCE
Status: COMPLETED | OUTPATIENT
Start: 2025-07-15 | End: 2025-07-15

## 2025-07-15 RX ADMIN — ACETAMINOPHEN 650 MG: 325 TABLET, FILM COATED ORAL at 09:58

## 2025-07-15 RX ADMIN — SODIUM CHLORIDE, PRESERVATIVE FREE 10 ML: 5 INJECTION INTRAVENOUS at 09:35

## 2025-07-15 RX ADMIN — ZOLEDRONIC ACID 5 MG: 0.05 INJECTION, SOLUTION INTRAVENOUS at 09:39

## 2025-07-15 RX ADMIN — SODIUM CHLORIDE 25 ML/HR: 0.9 INJECTION, SOLUTION INTRAVENOUS at 09:37

## 2025-07-15 RX ADMIN — SODIUM CHLORIDE, PRESERVATIVE FREE 10 ML: 5 INJECTION INTRAVENOUS at 10:30

## 2025-07-15 ASSESSMENT — PAIN - FUNCTIONAL ASSESSMENT
PAIN_FUNCTIONAL_ASSESSMENT: NONE - DENIES PAIN

## 2025-07-15 NOTE — PROGRESS NOTES
[facility] Progress Note    Date: July 15, 2025    Name: Cele Burrows    MRN: [id number]         : 1950    RECLAST Yearly Infusion    Mr. Burrows arrived to [facility] at 0915, ambulatory.  Mr. Burrows was assessed and education was provided. on medication, along w/potential side effects.  Printed copy of information provided and pt allowed to asked questions.  Pt verbalized understanding.    Patient verbalized he is taking his Calcium and Vitamin D and has no current dental issues, active infections or on antibiotics.    Mr. Burrows's vitals were reviewed.  Vitals:    07/15/25 1032   BP: 119/73   Pulse: 83   Resp: 16   Temp: 97.3 °F (36.3 °C)   SpO2: 97%      Latest Reference Range & Units 25 11:57   Calcium 8.5 - 10.1 MG/DL 9.5      Latest Reference Range & Units 25 11:57   Creatinine 0.6 - 1.3 MG/DL 1.09   Wt 66.86 kg  Creatinine Clearance 59.08    22g IV inserted in patient's RAC  x1 attempt.  Positive for blood return/ flushes without difficulty.    NS flush bag hung throughout treatment today.    Reclast 5 mg/100 ml IVPB infused as ordered and followed by NS flush from NS bag.    Tylenol 650 mg given po as ordered after completion of Reclast infusion.      Pt instructed to take Tylenol every 8 hours as needed for headache for up to 72 hrs.  Pt verbalized understanding.     Mr. Burrows tolerated well without complaints and no signs or symptoms of reaction noted.  Pt stayed for 30 minutes observation post infusion.  Discharge instructions reviewed and pt verbalized understanding.    IV removed/ intact.  Gauze/ coban to site.    [facility] Bur[facility] in [facility] condition at 1035.  He is to return on 07/15/2026 at 0900 for his next Reclast appointment.    Jessica Castillo RN  July 15, 2025

## 2025-07-17 ENCOUNTER — OFFICE VISIT (OUTPATIENT)
Facility: CLINIC | Age: 75
End: 2025-07-17
Payer: MEDICARE

## 2025-07-17 VITALS
RESPIRATION RATE: 16 BRPM | HEIGHT: 66 IN | DIASTOLIC BLOOD PRESSURE: 70 MMHG | OXYGEN SATURATION: 97 % | HEART RATE: 101 BPM | SYSTOLIC BLOOD PRESSURE: 112 MMHG | WEIGHT: 150.4 LBS | BODY MASS INDEX: 24.17 KG/M2 | TEMPERATURE: 96.8 F

## 2025-07-17 DIAGNOSIS — Z79.4 TYPE 2 DIABETES MELLITUS WITH DIABETIC NEUROPATHY, WITH LONG-TERM CURRENT USE OF INSULIN (HCC): ICD-10-CM

## 2025-07-17 DIAGNOSIS — A04.4 E COLI ENTERITIS: ICD-10-CM

## 2025-07-17 DIAGNOSIS — A09 DIARRHEA OF INFECTIOUS ORIGIN: ICD-10-CM

## 2025-07-17 DIAGNOSIS — E11.40 TYPE 2 DIABETES MELLITUS WITH DIABETIC NEUROPATHY, WITH LONG-TERM CURRENT USE OF INSULIN (HCC): ICD-10-CM

## 2025-07-17 DIAGNOSIS — E61.1 IRON DEFICIENCY: Primary | ICD-10-CM

## 2025-07-17 PROCEDURE — 1036F TOBACCO NON-USER: CPT | Performed by: FAMILY MEDICINE

## 2025-07-17 PROCEDURE — 3052F HG A1C>EQUAL 8.0%<EQUAL 9.0%: CPT | Performed by: FAMILY MEDICINE

## 2025-07-17 PROCEDURE — G8428 CUR MEDS NOT DOCUMENT: HCPCS | Performed by: FAMILY MEDICINE

## 2025-07-17 PROCEDURE — G8420 CALC BMI NORM PARAMETERS: HCPCS | Performed by: FAMILY MEDICINE

## 2025-07-17 PROCEDURE — 1123F ACP DISCUSS/DSCN MKR DOCD: CPT | Performed by: FAMILY MEDICINE

## 2025-07-17 PROCEDURE — 2022F DILAT RTA XM EVC RTNOPTHY: CPT | Performed by: FAMILY MEDICINE

## 2025-07-17 PROCEDURE — 3074F SYST BP LT 130 MM HG: CPT | Performed by: FAMILY MEDICINE

## 2025-07-17 PROCEDURE — 1125F AMNT PAIN NOTED PAIN PRSNT: CPT | Performed by: FAMILY MEDICINE

## 2025-07-17 PROCEDURE — 3017F COLORECTAL CA SCREEN DOC REV: CPT | Performed by: FAMILY MEDICINE

## 2025-07-17 PROCEDURE — 3078F DIAST BP <80 MM HG: CPT | Performed by: FAMILY MEDICINE

## 2025-07-17 PROCEDURE — 99213 OFFICE O/P EST LOW 20 MIN: CPT | Performed by: FAMILY MEDICINE

## 2025-07-17 RX ORDER — TIRZEPATIDE 7.5 MG/.5ML
INJECTION, SOLUTION SUBCUTANEOUS
COMMUNITY
Start: 2025-06-13

## 2025-07-17 RX ORDER — DICYCLOMINE HYDROCHLORIDE 10 MG/1
10 CAPSULE ORAL
Qty: 120 CAPSULE | Refills: 0 | Status: SHIPPED | OUTPATIENT
Start: 2025-07-17

## 2025-07-23 ENCOUNTER — HOSPITAL ENCOUNTER (EMERGENCY)
Age: 75
Discharge: HOME | End: 2025-07-23
Attending: EMERGENCY MEDICINE
Payer: MEDICARE

## 2025-07-23 VITALS
BODY MASS INDEX: 24.27 KG/M2 | HEIGHT: 66 IN | TEMPERATURE: 98.2 F | OXYGEN SATURATION: 99 % | DIASTOLIC BLOOD PRESSURE: 109 MMHG | RESPIRATION RATE: 17 BRPM | HEART RATE: 79 BPM | WEIGHT: 151 LBS | SYSTOLIC BLOOD PRESSURE: 131 MMHG

## 2025-07-23 DIAGNOSIS — L50.9 URTICARIA: Primary | ICD-10-CM

## 2025-07-23 PROCEDURE — 99284 EMERGENCY DEPT VISIT MOD MDM: CPT

## 2025-07-23 PROCEDURE — 6370000000 HC RX 637 (ALT 250 FOR IP): Performed by: PHYSICIAN ASSISTANT

## 2025-07-23 PROCEDURE — 6360000002 HC RX W HCPCS: Performed by: PHYSICIAN ASSISTANT

## 2025-07-23 PROCEDURE — 2580000003 HC RX 258: Performed by: PHYSICIAN ASSISTANT

## 2025-07-23 PROCEDURE — 96375 TX/PRO/DX INJ NEW DRUG ADDON: CPT

## 2025-07-23 PROCEDURE — 2500000003 HC RX 250 WO HCPCS: Performed by: PHYSICIAN ASSISTANT

## 2025-07-23 PROCEDURE — 96374 THER/PROPH/DIAG INJ IV PUSH: CPT

## 2025-07-23 RX ORDER — 0.9 % SODIUM CHLORIDE 0.9 %
1000 INTRAVENOUS SOLUTION INTRAVENOUS ONCE
Status: COMPLETED | OUTPATIENT
Start: 2025-07-23 | End: 2025-07-23

## 2025-07-23 RX ORDER — EPINEPHRINE 0.3 MG/.3ML
0.3 INJECTION SUBCUTANEOUS ONCE
Qty: 0.3 ML | Refills: 0 | Status: SHIPPED | OUTPATIENT
Start: 2025-07-23 | End: 2025-07-23

## 2025-07-23 RX ORDER — DIPHENHYDRAMINE HCL 25 MG
25 CAPSULE ORAL EVERY 6 HOURS PRN
Qty: 20 CAPSULE | Refills: 0 | Status: SHIPPED | OUTPATIENT
Start: 2025-07-23 | End: 2025-08-02

## 2025-07-23 RX ORDER — HYDROCODONE BITARTRATE AND ACETAMINOPHEN 5; 325 MG/1; MG/1
1 TABLET ORAL
Refills: 0 | Status: COMPLETED | OUTPATIENT
Start: 2025-07-23 | End: 2025-07-23

## 2025-07-23 RX ORDER — FAMOTIDINE 20 MG/1
20 TABLET, FILM COATED ORAL 2 TIMES DAILY
Qty: 12 TABLET | Refills: 0 | Status: SHIPPED | OUTPATIENT
Start: 2025-07-23

## 2025-07-23 RX ORDER — KETOROLAC TROMETHAMINE 15 MG/ML
15 INJECTION, SOLUTION INTRAMUSCULAR; INTRAVENOUS
Status: COMPLETED | OUTPATIENT
Start: 2025-07-23 | End: 2025-07-23

## 2025-07-23 RX ORDER — DIPHENHYDRAMINE HYDROCHLORIDE 50 MG/ML
25 INJECTION, SOLUTION INTRAMUSCULAR; INTRAVENOUS
Status: COMPLETED | OUTPATIENT
Start: 2025-07-23 | End: 2025-07-23

## 2025-07-23 RX ORDER — METHYLPREDNISOLONE 4 MG/1
TABLET ORAL
Qty: 1 KIT | Refills: 0 | Status: SHIPPED | OUTPATIENT
Start: 2025-07-23 | End: 2025-07-29

## 2025-07-23 RX ADMIN — SODIUM CHLORIDE 1000 ML: 0.9 INJECTION, SOLUTION INTRAVENOUS at 14:26

## 2025-07-23 RX ADMIN — HYDROCODONE BITARTRATE AND ACETAMINOPHEN 1 TABLET: 5; 325 TABLET ORAL at 16:34

## 2025-07-23 RX ADMIN — METHYLPREDNISOLONE SODIUM SUCCINATE 125 MG: 125 INJECTION INTRAMUSCULAR; INTRAVENOUS at 14:24

## 2025-07-23 RX ADMIN — DIPHENHYDRAMINE HYDROCHLORIDE 25 MG: 50 INJECTION, SOLUTION INTRAMUSCULAR; INTRAVENOUS at 14:24

## 2025-07-23 RX ADMIN — SODIUM CHLORIDE 1000 ML: 0.9 INJECTION, SOLUTION INTRAVENOUS at 15:20

## 2025-07-23 RX ADMIN — KETOROLAC TROMETHAMINE 15 MG: 15 INJECTION, SOLUTION INTRAMUSCULAR; INTRAVENOUS at 15:19

## 2025-07-23 RX ADMIN — SODIUM CHLORIDE, PRESERVATIVE FREE 20 MG: 5 INJECTION INTRAVENOUS at 14:24

## 2025-07-23 ASSESSMENT — PAIN DESCRIPTION - LOCATION: LOCATION: HAND

## 2025-07-23 ASSESSMENT — PAIN SCALES - GENERAL
PAINLEVEL_OUTOF10: 10
PAINLEVEL_OUTOF10: 6
PAINLEVEL_OUTOF10: 8

## 2025-07-23 ASSESSMENT — PAIN - FUNCTIONAL ASSESSMENT: PAIN_FUNCTIONAL_ASSESSMENT: NONE - DENIES PAIN

## 2025-07-23 ASSESSMENT — PAIN DESCRIPTION - ORIENTATION: ORIENTATION: RIGHT

## 2025-07-23 NOTE — DISCHARGE INSTRUCTIONS
Take medication as prescribed. Follow-up with your primary care physician within 2 days for reassessment. Bring the results from this visit with you for their review. Return to the ED immediately for any new, worsening, or persistent symptoms, including shortness of breath, lip swelling, or any other medical concerns.

## 2025-07-23 NOTE — FLOWSHEET NOTE
07/23/25 1519   Vital Signs   Pulse 81   Respirations 20   Pain Assessment   Pain Level 10   Patient's Stated Pain Goal 2   Oxygen Therapy   SpO2 100 %   Pulse via Oximetry 81 beats per minute     Toradol given at this time.

## 2025-07-23 NOTE — ED PROVIDER NOTES
EMERGENCY DEPARTMENT HISTORY AND PHYSICAL EXAM      Date: 7/23/2025  Patient Name: Cele Burrows    History of Presenting Illness     Chief Complaint   Patient presents with    Allergic Reaction    Pruritis    Insect Bite       History (Context): Cele Burrows is a 75 y.o. male  who presents to the ED today with chief complaint of diffuse pruritic rash x 1 hour.  Patient notes symptoms started after being stung by a wasp on his right hand while gardening.  Patient denies dizziness, headache, sore throat, drooling or stridor, chest pain, dyspnea, wheezing, nausea or vomiting.  Denies taking any medication for the symptoms prior to arrival.    PCP: Alee Mendoza MD    No current facility-administered medications for this encounter.     Current Outpatient Medications   Medication Sig Dispense Refill    methylPREDNISolone (MEDROL, JASWANT,) 4 MG tablet Take by mouth. 1 kit 0    diphenhydrAMINE (BENADRYL ALLERGY) 25 MG capsule Take 1 capsule by mouth every 6 hours as needed for Itching 20 capsule 0    famotidine (PEPCID) 20 MG tablet Take 1 tablet by mouth 2 times daily 12 tablet 0    EPINEPHrine (EPIPEN 2-JASWANT) 0.3 MG/0.3ML SOAJ injection Inject 0.3 mLs into the muscle once for 1 dose Use as directed for allergic reaction 0.3 mL 0    MOUNJARO 7.5 MG/0.5ML SOAJ pen       dicyclomine (BENTYL) 10 MG capsule Take 1 capsule by mouth 4 times daily (before meals and nightly) 120 capsule 0    tamsulosin (FLOMAX) 0.4 MG capsule TAKE 1 CAPSULE EVERY DAY 90 capsule 1    omeprazole (PRILOSEC) 40 MG delayed release capsule TAKE 1 CAPSULE EVERY MORNING (Patient not taking: Reported on 7/17/2025) 90 capsule 1    atorvastatin (LIPITOR) 10 MG tablet TAKE 1 TABLET EVERY NIGHT 90 tablet 1    valsartan (DIOVAN) 320 MG tablet Take 1 tablet by mouth daily 90 tablet 1    MOUNJARO 5 MG/0.5ML SOAJ pen INJECT 5MG SUBCUTANEOUSLY EVERY WEEK (Patient not taking: Reported on 7/17/2025)      metFORMIN (GLUCOPHAGE) 1000 MG tablet TAKE 1

## 2025-08-11 ENCOUNTER — HOSPITAL ENCOUNTER (OUTPATIENT)
Age: 75
Discharge: HOME | End: 2025-08-11
Payer: MEDICARE

## 2025-08-11 DIAGNOSIS — E11.40 TYPE 2 DIABETES MELLITUS WITH DIABETIC NEUROPATHY, WITH LONG-TERM CURRENT USE OF INSULIN (HCC): ICD-10-CM

## 2025-08-11 DIAGNOSIS — Z79.4 TYPE 2 DIABETES MELLITUS WITH DIABETIC NEUROPATHY, WITH LONG-TERM CURRENT USE OF INSULIN (HCC): ICD-10-CM

## 2025-08-11 LAB
ALBUMIN SERPL-MCNC: 3.4 G/DL (ref 3.4–5)
ALBUMIN/GLOB SERPL: 1.1 (ref 0.8–1.7)
ALP SERPL-CCNC: 82 U/L (ref 45–117)
ALT SERPL-CCNC: 15 U/L (ref 10–50)
ANION GAP SERPL CALC-SCNC: 10 MMOL/L (ref 3–18)
AST SERPL-CCNC: 17 U/L (ref 10–38)
BILIRUB SERPL-MCNC: 0.5 MG/DL (ref 0.2–1)
BUN SERPL-MCNC: 10 MG/DL (ref 6–23)
BUN/CREAT SERPL: 13 (ref 12–20)
CALCIUM SERPL-MCNC: 9.2 MG/DL (ref 8.5–10.1)
CHLORIDE SERPL-SCNC: 106 MMOL/L (ref 98–107)
CO2 SERPL-SCNC: 26 MMOL/L (ref 21–32)
CREAT SERPL-MCNC: 0.79 MG/DL (ref 0.6–1.3)
EST. AVERAGE GLUCOSE BLD GHB EST-MCNC: 177 MG/DL
GLOBULIN SER CALC-MCNC: 2.9 G/DL (ref 2–4)
GLUCOSE SERPL-MCNC: 156 MG/DL (ref 74–108)
HBA1C MFR BLD: 7.8 % (ref 4.2–5.6)
POTASSIUM SERPL-SCNC: 4.7 MMOL/L (ref 3.5–5.5)
PROT SERPL-MCNC: 6.3 G/DL (ref 6.4–8.2)
SODIUM SERPL-SCNC: 142 MMOL/L (ref 136–145)

## 2025-08-11 PROCEDURE — 80053 COMPREHEN METABOLIC PANEL: CPT

## 2025-08-11 PROCEDURE — 36415 COLL VENOUS BLD VENIPUNCTURE: CPT

## 2025-08-11 PROCEDURE — 83036 HEMOGLOBIN GLYCOSYLATED A1C: CPT

## 2025-08-12 ENCOUNTER — OFFICE VISIT (OUTPATIENT)
Facility: CLINIC | Age: 75
End: 2025-08-12
Payer: MEDICARE

## 2025-08-12 VITALS
OXYGEN SATURATION: 97 % | RESPIRATION RATE: 16 BRPM | HEIGHT: 66 IN | TEMPERATURE: 97.3 F | BODY MASS INDEX: 23.5 KG/M2 | WEIGHT: 146.2 LBS | HEART RATE: 80 BPM | SYSTOLIC BLOOD PRESSURE: 114 MMHG | DIASTOLIC BLOOD PRESSURE: 62 MMHG

## 2025-08-12 DIAGNOSIS — R97.20 ELEVATED PSA: ICD-10-CM

## 2025-08-12 DIAGNOSIS — E11.9 TYPE 2 DIABETES MELLITUS WITHOUT COMPLICATION, WITH LONG-TERM CURRENT USE OF INSULIN (HCC): Primary | ICD-10-CM

## 2025-08-12 DIAGNOSIS — Z79.4 TYPE 2 DIABETES MELLITUS WITHOUT COMPLICATION, WITH LONG-TERM CURRENT USE OF INSULIN (HCC): Primary | ICD-10-CM

## 2025-08-12 DIAGNOSIS — G56.03 BILATERAL CARPAL TUNNEL SYNDROME: ICD-10-CM

## 2025-08-12 DIAGNOSIS — Z85.51 PERSONAL HISTORY OF BLADDER CANCER: ICD-10-CM

## 2025-08-12 DIAGNOSIS — M81.0 OSTEOPOROSIS, UNSPECIFIED OSTEOPOROSIS TYPE, UNSPECIFIED PATHOLOGICAL FRACTURE PRESENCE: ICD-10-CM

## 2025-08-12 DIAGNOSIS — I10 ESSENTIAL HYPERTENSION, BENIGN: ICD-10-CM

## 2025-08-12 PROCEDURE — 2022F DILAT RTA XM EVC RTNOPTHY: CPT | Performed by: FAMILY MEDICINE

## 2025-08-12 PROCEDURE — 99214 OFFICE O/P EST MOD 30 MIN: CPT | Performed by: FAMILY MEDICINE

## 2025-08-12 PROCEDURE — G8420 CALC BMI NORM PARAMETERS: HCPCS | Performed by: FAMILY MEDICINE

## 2025-08-12 PROCEDURE — 3078F DIAST BP <80 MM HG: CPT | Performed by: FAMILY MEDICINE

## 2025-08-12 PROCEDURE — 3074F SYST BP LT 130 MM HG: CPT | Performed by: FAMILY MEDICINE

## 2025-08-12 PROCEDURE — G8428 CUR MEDS NOT DOCUMENT: HCPCS | Performed by: FAMILY MEDICINE

## 2025-08-12 PROCEDURE — 1036F TOBACCO NON-USER: CPT | Performed by: FAMILY MEDICINE

## 2025-08-12 PROCEDURE — 3051F HG A1C>EQUAL 7.0%<8.0%: CPT | Performed by: FAMILY MEDICINE

## 2025-08-12 PROCEDURE — 1126F AMNT PAIN NOTED NONE PRSNT: CPT | Performed by: FAMILY MEDICINE

## 2025-08-12 PROCEDURE — 3017F COLORECTAL CA SCREEN DOC REV: CPT | Performed by: FAMILY MEDICINE

## 2025-08-12 PROCEDURE — 1123F ACP DISCUSS/DSCN MKR DOCD: CPT | Performed by: FAMILY MEDICINE

## 2025-08-21 RX ORDER — FINASTERIDE 5 MG/1
5 TABLET, FILM COATED ORAL DAILY
Qty: 90 TABLET | Refills: 1 | Status: SHIPPED | OUTPATIENT
Start: 2025-08-21

## 2025-09-05 ENCOUNTER — HOSPITAL ENCOUNTER (EMERGENCY)
Age: 75
Discharge: HOME | End: 2025-09-05
Attending: EMERGENCY MEDICINE
Payer: MEDICARE

## 2025-09-05 VITALS
HEIGHT: 65 IN | RESPIRATION RATE: 18 BRPM | BODY MASS INDEX: 25.66 KG/M2 | DIASTOLIC BLOOD PRESSURE: 84 MMHG | TEMPERATURE: 98.5 F | OXYGEN SATURATION: 95 % | SYSTOLIC BLOOD PRESSURE: 156 MMHG | WEIGHT: 154 LBS | HEART RATE: 73 BPM

## 2025-09-05 DIAGNOSIS — T63.441A BEE STING REACTION, ACCIDENTAL OR UNINTENTIONAL, INITIAL ENCOUNTER: Primary | ICD-10-CM

## 2025-09-05 PROCEDURE — 6370000000 HC RX 637 (ALT 250 FOR IP): Performed by: EMERGENCY MEDICINE

## 2025-09-05 RX ORDER — PREDNISONE 20 MG/1
60 TABLET ORAL
Status: COMPLETED | OUTPATIENT
Start: 2025-09-05 | End: 2025-09-05

## 2025-09-05 RX ORDER — FAMOTIDINE 20 MG/1
20 TABLET, FILM COATED ORAL
Status: COMPLETED | OUTPATIENT
Start: 2025-09-05 | End: 2025-09-05

## 2025-09-05 RX ORDER — METHYLPREDNISOLONE 4 MG/1
TABLET ORAL
Qty: 1 KIT | Refills: 0 | Status: SHIPPED | OUTPATIENT
Start: 2025-09-05 | End: 2025-09-11

## 2025-09-05 RX ORDER — DIPHENHYDRAMINE HCL 50 MG
50 CAPSULE ORAL
Status: COMPLETED | OUTPATIENT
Start: 2025-09-05 | End: 2025-09-05

## 2025-09-05 RX ORDER — EPINEPHRINE 0.3 MG/.3ML
0.3 INJECTION SUBCUTANEOUS ONCE AS NEEDED
Qty: 0.3 ML | Refills: 0 | Status: SHIPPED | OUTPATIENT
Start: 2025-09-05

## 2025-09-05 RX ORDER — DIPHENHYDRAMINE HCL 25 MG
25 CAPSULE ORAL EVERY 6 HOURS PRN
Qty: 30 CAPSULE | Refills: 0 | Status: SHIPPED | OUTPATIENT
Start: 2025-09-05 | End: 2025-09-15

## 2025-09-05 RX ORDER — LORATADINE 10 MG/1
10 TABLET ORAL DAILY
Qty: 10 TABLET | Refills: 0 | Status: SHIPPED | OUTPATIENT
Start: 2025-09-05 | End: 2025-09-15

## 2025-09-05 RX ADMIN — FAMOTIDINE 20 MG: 20 TABLET, FILM COATED ORAL at 10:22

## 2025-09-05 RX ADMIN — PREDNISONE 60 MG: 20 TABLET ORAL at 10:22

## 2025-09-05 RX ADMIN — DIPHENHYDRAMINE HYDROCHLORIDE 50 MG: 50 CAPSULE ORAL at 10:22

## 2025-09-05 ASSESSMENT — PAIN SCALES - GENERAL: PAINLEVEL_OUTOF10: 0

## 2025-09-05 ASSESSMENT — PAIN - FUNCTIONAL ASSESSMENT: PAIN_FUNCTIONAL_ASSESSMENT: 0-10
